# Patient Record
Sex: MALE | Race: WHITE | NOT HISPANIC OR LATINO | Employment: FULL TIME | URBAN - METROPOLITAN AREA
[De-identification: names, ages, dates, MRNs, and addresses within clinical notes are randomized per-mention and may not be internally consistent; named-entity substitution may affect disease eponyms.]

---

## 2017-02-17 ENCOUNTER — OFFICE VISIT (OUTPATIENT)
Dept: INTERNAL MEDICINE | Facility: CLINIC | Age: 47
End: 2017-02-17
Payer: COMMERCIAL

## 2017-02-17 VITALS
BODY MASS INDEX: 32.46 KG/M2 | WEIGHT: 239.63 LBS | HEIGHT: 72 IN | TEMPERATURE: 98 F | OXYGEN SATURATION: 97 % | DIASTOLIC BLOOD PRESSURE: 86 MMHG | SYSTOLIC BLOOD PRESSURE: 124 MMHG | HEART RATE: 63 BPM

## 2017-02-17 DIAGNOSIS — M1A.9XX0 CHRONIC GOUT WITHOUT TOPHUS, UNSPECIFIED CAUSE, UNSPECIFIED SITE: ICD-10-CM

## 2017-02-17 DIAGNOSIS — I10 ESSENTIAL HYPERTENSION: ICD-10-CM

## 2017-02-17 DIAGNOSIS — E11.9 TYPE 2 DIABETES MELLITUS WITHOUT COMPLICATION, UNSPECIFIED LONG TERM INSULIN USE STATUS: Primary | ICD-10-CM

## 2017-02-17 PROCEDURE — 3074F SYST BP LT 130 MM HG: CPT | Mod: S$GLB,,, | Performed by: FAMILY MEDICINE

## 2017-02-17 PROCEDURE — 3044F HG A1C LEVEL LT 7.0%: CPT | Mod: S$GLB,,, | Performed by: FAMILY MEDICINE

## 2017-02-17 PROCEDURE — 99999 PR PBB SHADOW E&M-EST. PATIENT-LVL III: CPT | Mod: PBBFAC,,, | Performed by: FAMILY MEDICINE

## 2017-02-17 PROCEDURE — 3079F DIAST BP 80-89 MM HG: CPT | Mod: S$GLB,,, | Performed by: FAMILY MEDICINE

## 2017-02-17 PROCEDURE — 99214 OFFICE O/P EST MOD 30 MIN: CPT | Mod: S$GLB,,, | Performed by: FAMILY MEDICINE

## 2017-02-17 PROCEDURE — 4010F ACE/ARB THERAPY RXD/TAKEN: CPT | Mod: S$GLB,,, | Performed by: FAMILY MEDICINE

## 2017-02-17 RX ORDER — ROSUVASTATIN CALCIUM 10 MG/1
10 TABLET, COATED ORAL DAILY
Qty: 30 TABLET | Refills: 11 | Status: SHIPPED | OUTPATIENT
Start: 2017-02-17 | End: 2018-02-19 | Stop reason: SDUPTHER

## 2017-02-17 NOTE — PROGRESS NOTES
Subjective:       Patient ID: Guillermo Castillo is a male.    Chief Complaint: Multiple issues see below    HPI Type 2 diabetes: a1c controlled.nov Tolerating medicine. No hypoglycemia;   Hypercholesterolemia:brenda crstor needs rf  Gout:  No c/o recent flares.  Urate elev  Hypertension: blood pressures at home normal. Tolerating medicine.   Fatty liver nl . No etoh long time;           Past Medical History   Diagnosis Date    Fatty liver     Gout     Hypertension     Hyperlipidemia     Hypertriglyceridemia     Mood disorder     Panic disorder     Type 2 diabetes mellitus      History reviewed. No pertinent past surgical history.  Family History   Problem Relation Age of Onset    Coronary artery disease Father     Diabetes Father     Lung cancer Father          Review of Systems   Respiratory: Negative for shortness of breath.    Cardiovascular: Negative for chest pain and leg swelling.   No ankle pain\  No back pain      Objective:      Physical Exam   Constitutional: He is oriented to person, place, and time. He appears well-developed and well-nourished.   HENT:   Head: Normocephalic and atraumatic.   Eyes: Conjunctivae and EOM are normal. Pupils are equal, round, and reactive to light.   Neck: Normal range of motion. Neck supple. Carotid bruit is not present.   Cardiovascular: Normal rate and regular rhythm.    Pulmonary/Chest: Effort normal and breath sounds normal.       Neurological: He is alert and oriented to person, place, and time.   Skin: Skin is warm and dry.   Psychiatric: He has a normal mood and affect. His behavior is normal. Judgment and thought content normal.       .        Assessment:       1. Type 2 diabetes mellitus    2. Hypertension    3. Gout    4. Hypercholesteremia    5. Fatty liver      Plan:     Lab 5/17 and f/u    rf crestor  Type 2 diabetes mellitus without complication, unspecified long term insulin use status    Essential hypertension    Chronic gout without tophus, unspecified  cause, unspecified site    Other orders  -     rosuvastatin (CRESTOR) 10 MG tablet; Take 1 tablet (10 mg total) by mouth once daily.  Dispense: 30 tablet; Refill: 11

## 2017-03-24 ENCOUNTER — OFFICE VISIT (OUTPATIENT)
Dept: URGENT CARE | Facility: CLINIC | Age: 47
End: 2017-03-24
Payer: COMMERCIAL

## 2017-03-24 VITALS
SYSTOLIC BLOOD PRESSURE: 120 MMHG | BODY MASS INDEX: 31.69 KG/M2 | TEMPERATURE: 98 F | WEIGHT: 233.94 LBS | HEIGHT: 72 IN | OXYGEN SATURATION: 98 % | RESPIRATION RATE: 20 BRPM | HEART RATE: 82 BPM | DIASTOLIC BLOOD PRESSURE: 76 MMHG

## 2017-03-24 DIAGNOSIS — J01.00 ACUTE NON-RECURRENT MAXILLARY SINUSITIS: Primary | ICD-10-CM

## 2017-03-24 PROCEDURE — 1160F RVW MEDS BY RX/DR IN RCRD: CPT | Mod: S$GLB,,, | Performed by: PHYSICIAN ASSISTANT

## 2017-03-24 PROCEDURE — 3078F DIAST BP <80 MM HG: CPT | Mod: S$GLB,,, | Performed by: PHYSICIAN ASSISTANT

## 2017-03-24 PROCEDURE — 99999 PR PBB SHADOW E&M-EST. PATIENT-LVL III: CPT | Mod: PBBFAC,,, | Performed by: PHYSICIAN ASSISTANT

## 2017-03-24 PROCEDURE — 3074F SYST BP LT 130 MM HG: CPT | Mod: S$GLB,,, | Performed by: PHYSICIAN ASSISTANT

## 2017-03-24 PROCEDURE — 99214 OFFICE O/P EST MOD 30 MIN: CPT | Mod: S$GLB,,, | Performed by: PHYSICIAN ASSISTANT

## 2017-03-24 RX ORDER — BROMPHENIRAMINE MALEATE, PSEUDOEPHEDRINE HYDROCHLORIDE, AND DEXTROMETHORPHAN HYDROBROMIDE 2; 30; 10 MG/5ML; MG/5ML; MG/5ML
5 SYRUP ORAL
Qty: 118 ML | Refills: 0 | Status: SHIPPED | OUTPATIENT
Start: 2017-03-24 | End: 2017-08-07

## 2017-03-24 RX ORDER — BENZONATATE 100 MG/1
200 CAPSULE ORAL 3 TIMES DAILY PRN
Qty: 60 CAPSULE | Refills: 0 | Status: SHIPPED | OUTPATIENT
Start: 2017-03-24 | End: 2017-04-03

## 2017-03-24 RX ORDER — AMOXICILLIN AND CLAVULANATE POTASSIUM 875; 125 MG/1; MG/1
1 TABLET, FILM COATED ORAL 2 TIMES DAILY
Qty: 14 TABLET | Refills: 0 | Status: SHIPPED | OUTPATIENT
Start: 2017-03-24 | End: 2017-03-31

## 2017-03-24 NOTE — PATIENT INSTRUCTIONS
Sinusitis (Antibiotic Treatment)    The sinuses are air-filled spaces within the bones of the face. They connect to the inside of the nose. Sinusitis is an inflammation of the tissue lining the sinus cavity. Sinus inflammation can occur during a cold. It can also be due to allergies to pollens and other particles in the air. Sinusitis can cause symptoms of sinus congestion and fullness. A sinus infection causes fever, headache and facial pain. There is often green or yellow drainage from the nose or into the back of the throat (post-nasal drip). You have been given antibiotics to treat this condition.  Home care:  · Take the full course of antibiotics as instructed. Do not stop taking them, even if you feel better.  · Drink plenty of water, hot tea, and other liquids. This may help thin mucus. It also may promote sinus drainage.  · Heat may help soothe painful areas of the face. Use a towel soaked in hot water. Or,  the shower and direct the hot spray onto your face. Using a vaporizer along with a menthol rub at night may also help.   · An expectorant containing guaifenesin may help thin the mucus and promote drainage from the sinuses.  · Over-the-counter decongestants may be used unless a similar medicine was prescribed. Nasal sprays work the fastest. Use one that contains phenylephrine or oxymetazoline. First blow the nose gently. Then use the spray. Do not use these medicines more often than directed on the label or symptoms may get worse. You may also use tablets containing pseudoephedrine. Avoid products that combine ingredients, because side effects may be increased. Read labels. You can also ask the pharmacist for help. (NOTE: Persons with high blood pressure should not use decongestants. They can raise blood pressure.)  · Over-the-counter antihistamines may help if allergies contributed to your sinusitis.    · Do not use nasal rinses or irrigation during an acute sinus infection, unless told to by  your health care provider. Rinsing may spread the infection to other sinuses.  · Use acetaminophen or ibuprofen to control pain, unless another pain medicine was prescribed. (If you have chronic liver or kidney disease or ever had a stomach ulcer, talk with your doctor before using these medicines. Aspirin should never be used in anyone under 18 years of age who is ill with a fever. It may cause severe liver damage.)  · Don't smoke. This can worsen symptoms.  Follow-up care  Follow up with your healthcare provider or our staff if you are not improving within the next week.  When to seek medical advice  Call your healthcare provider if any of these occur:  · Facial pain or headache becoming more severe  · Stiff neck  · Unusual drowsiness or confusion  · Swelling of the forehead or eyelids  · Vision problems, including blurred or double vision  · Fever of 100.4ºF (38ºC) or higher, or as directed by your healthcare provider  · Seizure  · Breathing problems  · Symptoms not resolving within 10 days  Date Last Reviewed: 4/13/2015  © 8118-4807 [employer]. [address] [place] [address]. All rights reserved. This information is not intended as a substitute for professional medical care. Always follow your healthcare professional's instructions.      Take all antibiotics even if you feel better before completing the entire regimen.   -Use normal saline nasal spray during the day every 3 hours for sinus irrigation and congestion.    -Avoid exposure to cigarette smoke.    -Practice good handwashing.  -Use warm compresses to sinuses for relief several times a day  -Increase fluid intake to at least 64 ounces of water per day to keep secretions thin and loose  -Use a humidifier in home to help relieve congestion or steam inhalation three times a day for 20-30 minutes.  -Sleep with head of bed elevated   -Take tylenol or ibuprofen as needed for sinus headache.    -Use warm salt water gargles for throat  discomfort.  -Avoid caffeine and alcohol    Follow up with PCP in 1 week if no improvement or sooner if worsening.    Go to ER if you develop fever of 103 or higher, chest pain, shortness of breath, upper back pain, stiff neck or severe headache.

## 2017-03-24 NOTE — MR AVS SNAPSHOT
[facility]  [address]  [place] [address]  Phone: [phone]                  Guillermo Castillo   3/24/2017 5:00 PM   Office Visit    Description:  Male : 1970   Provider:  Linda Macario PA-C   Department:  [facility]           Reason for Visit     Cough     Nasal Congestion     Hoarse           Diagnoses this Visit        Comments    Acute non-recurrent maxillary sinusitis    -  Primary            To Do List           Future Appointments        Provider Department Dept Phone    2017 7:45 AM LABORATORY, [facility] [phone]    2017 7:50 AM SPECIMEN, [facility] [phone]    2017 10:40 AM Kip Siddiqui MD [facility] - Internal [facility] [phone]      Goals (5 Years of Data)     None      Follow-Up and Disposition     Return if symptoms worsen or fail to improve.       These Medications        Disp Refills Start End    brompheniramine-pseudoeph-DM (BROMFED DM) 2-30-10 mg/5 mL Syrp 118 mL 0 3/24/2017     Take 5 mLs by mouth every 6 to 8 hours as needed. - Oral    Pharmacy: [id number] - [place], [place] - [address] #: [phone]       amoxicillin-clavulanate 875-125mg (AUGMENTIN) 875-125 mg per tablet 14 tablet 0 3/24/2017 3/31/2017    Take 1 tablet by mouth 2 (two) times daily. - Oral    Pharmacy: [id number] - [place], [place] - [address] Ph #: [phone]       benzonatate (TESSALON) 100 MG capsule 60 capsule 0 3/24/2017 4/3/2017    Take 2 capsules (200 mg total) by mouth 3 (three) times daily as needed for Cough (daytime cough). - Oral    Pharmacy: [id number] - [place], [place] - [address] #: [phone]         [facility]     [facility] On [facility] Nurse Care Line -  Assistance  Registered nurses in the [facility] Center provide clinical advisement, health education, appointment booking, and other advisory services.  Call for this free service at  [phone].             Medications           Message regarding Medications     Verify the changes and/or additions to your medication regime listed below are the same as discussed with your clinician today.  If any of these changes or additions are incorrect, please notify your healthcare provider.        START taking these NEW medications        Refills    brompheniramine-pseudoeph-DM (BROMFED DM) 2-30-10 mg/5 mL Syrp 0    Sig: Take 5 mLs by mouth every 6 to 8 hours as needed.    Class: Print    Route: Oral    amoxicillin-clavulanate 875-125mg (AUGMENTIN) 875-125 mg per tablet 0    Sig: Take 1 tablet by mouth 2 (two) times daily.    Class: Normal    Route: Oral    benzonatate (TESSALON) 100 MG capsule 0    Sig: Take 2 capsules (200 mg total) by mouth 3 (three) times daily as needed for Cough (daytime cough).    Class: Normal    Route: Oral           Verify that the below list of medications is an accurate representation of the medications you are currently taking.  If none reported, the list may be blank. If incorrect, please contact your healthcare provider. Carry this list with you in case of emergency.           Current Medications     amlodipine-benazepril 5-10 mg (LOTREL) 5-10 mg per capsule Take 1 capsule by mouth once daily.    aspirin 81 mg Tab Take 1 tablet by mouth Daily.    duloxetine (CYMBALTA) 60 MG capsule TAKE ONE CAPSULE BY MOUTH EVERY DAY AS DIRECTED    ibuprofen (ADVIL,MOTRIN) 800 MG tablet Take 1 tablet (800 mg total) by mouth every 6 (six) hours as needed for Pain.    metformin (GLUCOPHAGE-XR) 500 MG 24 hr tablet TAKE TWO TABLETS BY MOUTH ONCE DAILY    rosuvastatin (CRESTOR) 10 MG tablet Take 1 tablet (10 mg total) by mouth once daily.    amoxicillin-clavulanate 875-125mg (AUGMENTIN) 875-125 mg per tablet Take 1 tablet by mouth 2 (two) times daily.    benzonatate (TESSALON) 100 MG capsule Take 2 capsules (200 mg total) by mouth 3 (three) times daily as needed for Cough (daytime cough).     brompheniramine-pseudoeph-DM (BROMFED DM) 2-30-10 mg/5 mL Syrp Take 5 mLs by mouth every 6 to 8 hours as needed.    hydrocodone-acetaminophen 10-325mg (NORCO)  mg Tab Take 1 tablet by mouth every 4 (four) hours as needed.           Clinical Reference Information           Your Vitals Were     BP Pulse Temp Resp    120/76 (BP Location: Right arm, Patient Position: Sitting, BP Method: Manual) 82 98 °F (36.7 °C) (Tympanic) 20    Height Weight SpO2 BMI    6' (1.829 m) 106.1 kg (233 lb 14.5 oz) 98% 31.72 kg/m2      Blood Pressure          Most Recent Value    BP  120/76      Allergies as of 3/24/2017     Hydrochlorothiazide (Bulk)    Simvastatin      Immunizations Administered on Date of Encounter - 3/24/2017     None      Instructions      Sinusitis (Antibiotic Treatment)    The sinuses are air-filled spaces within the bones of the face. They connect to the inside of the nose. Sinusitis is an inflammation of the tissue lining the sinus cavity. Sinus inflammation can occur during a cold. It can also be due to allergies to pollens and other particles in the air. Sinusitis can cause symptoms of sinus congestion and fullness. A sinus infection causes fever, headache and facial pain. There is often green or yellow drainage from the nose or into the back of the throat (post-nasal drip). You have been given antibiotics to treat this condition.  Home care:  · Take the full course of antibiotics as instructed. Do not stop taking them, even if you feel better.  · Drink plenty of water, hot tea, and other liquids. This may help thin mucus. It also may promote sinus drainage.  · Heat may help soothe painful areas of the face. Use a towel soaked in hot water. Or,  the shower and direct the hot spray onto your face. Using a vaporizer along with a menthol rub at night may also help.   · An expectorant containing guaifenesin may help thin the mucus and promote drainage from the  sinuses.  · Over-the-counter decongestants may be used unless a similar medicine was prescribed. Nasal sprays work the fastest. Use one that contains phenylephrine or oxymetazoline. First blow the nose gently. Then use the spray. Do not use these medicines more often than directed on the label or symptoms may get worse. You may also use tablets containing pseudoephedrine. Avoid products that combine ingredients, because side effects may be increased. Read labels. You can also ask the pharmacist for help. (NOTE: Persons with high blood pressure should not use decongestants. They can raise blood pressure.)  · Over-the-counter antihistamines may help if allergies contributed to your sinusitis.    · Do not use nasal rinses or irrigation during an acute sinus infection, unless told to by your health care provider. Rinsing may spread the infection to other sinuses.  · Use acetaminophen or ibuprofen to control pain, unless another pain medicine was prescribed. (If you have chronic liver or kidney disease or ever had a stomach ulcer, talk with your doctor before using these medicines. Aspirin should never be used in anyone under 18 years of age who is ill with a fever. It may cause severe liver damage.)  · Don't smoke. This can worsen symptoms.  Follow-up care  Follow up with your healthcare provider or our staff if you are not improving within the next week.  When to seek medical advice  Call your healthcare provider if any of these occur:  · Facial pain or headache becoming more severe  · Stiff neck  · Unusual drowsiness or confusion  · Swelling of the forehead or eyelids  · Vision problems, including blurred or double vision  · Fever of 100.4ºF (38ºC) or higher, or as directed by your healthcare provider  · Seizure  · Breathing problems  · Symptoms not resolving within 10 days  Date Last Reviewed: 4/13/2015  © 1953-5919 [employer]. [address] [place] [address], [place] [address]. All rights reserved. This  information is not intended as a substitute for professional medical care. Always follow your healthcare professional's instructions.      Take all antibiotics even if you feel better before completing the entire regimen.   -Use normal saline nasal spray during the day every 3 hours for sinus irrigation and congestion.    -Avoid exposure to cigarette smoke.    -Practice good handwashing.  -Use warm compresses to sinuses for relief several times a day  -Increase fluid intake to at least 64 ounces of water per day to keep secretions thin and loose  -Use a humidifier in home to help relieve congestion or steam inhalation three times a day for 20-30 minutes.  -Sleep with head of bed elevated   -Take tylenol or ibuprofen as needed for sinus headache.    -Use warm salt water gargles for throat discomfort.  -Avoid caffeine and alcohol    Follow up with PCP in 1 week if no improvement or sooner if worsening.    Go to ER if you develop fever of 103 or higher, chest pain, shortness of breath, upper back pain, stiff neck or severe headache.           Language Assistance Services     ATTENTION: Language assistance services are available, free of charge. Please call [phone].      ATENCIÓN: Si habla deepak, tiene a miller disposición servicios gratuitos de asistencia lingüística. Llame al [phone].     CHRISTOPH Ý: N?u b?n nói Ti?ng Vi?t, có các d?ch v? h? tr? ngôn ng? mi?n phí dành cho b?n. G?i s? [phone].         [facility] complies with applicable Federal civil rights laws and does not discriminate on the basis of race, color, national origin, age, disability, or sex.

## 2017-03-24 NOTE — PROGRESS NOTES
Subjective:       Patient ID: Guillermo Castillo is a 47 y.o. male.    Chief Complaint: Cough; Nasal Congestion; and Hoarse    Cough   This is a new problem. The current episode started 1 to 4 weeks ago (now for almost 2 weeks ). The problem has been unchanged. The problem occurs constantly. The cough is non-productive (dry, thinks it's from drainage). Associated symptoms include headaches (sinus), nasal congestion (has had nasal congestion for 2 weeks, has alternating sides with worsening congestion, feels something blocked but doesn't have much drainage), postnasal drip and rhinorrhea. Pertinent negatives include no chest pain, chills, ear congestion, ear pain, fever, sore throat (lost his voice today), shortness of breath or wheezing. The symptoms are aggravated by lying down. Treatments tried: delsym, dayquil, nyquil. The treatment provided mild relief. There is no history of bronchitis or pneumonia.     Review of Systems   Constitutional: Negative for chills, fatigue and fever.   HENT: Positive for congestion, postnasal drip, rhinorrhea and voice change. Negative for ear discharge, ear pain, sinus pressure, sneezing, sore throat (lost his voice today) and trouble swallowing.    Eyes: Negative for pain and discharge.   Respiratory: Positive for cough. Negative for shortness of breath and wheezing.    Cardiovascular: Negative for chest pain and leg swelling.   Gastrointestinal: Negative for abdominal pain, nausea and vomiting.   Neurological: Positive for headaches (sinus).       Objective:      /76 (BP Location: Right arm, Patient Position: Sitting, BP Method: Manual)  Pulse 82  Temp 98 °F (36.7 °C) (Tympanic)   Resp 20  Ht 6' (1.829 m)  Wt 106.1 kg (233 lb 14.5 oz)  SpO2 98%  BMI 31.72 kg/m2  Physical Exam   Constitutional: He is oriented to person, place, and time. He appears well-developed and well-nourished. No distress.   HENT:   Head: Normocephalic and atraumatic.   Right Ear: Tympanic membrane,  external ear and ear canal normal.   Left Ear: Tympanic membrane, external ear and ear canal normal.   Nose: Nose normal. Right sinus exhibits no maxillary sinus tenderness and no frontal sinus tenderness. Left sinus exhibits no maxillary sinus tenderness and no frontal sinus tenderness.   Mouth/Throat: Oropharynx is clear and moist. No oropharyngeal exudate or posterior oropharyngeal erythema. No tonsillar exudate.   Eyes: Conjunctivae and EOM are normal. Pupils are equal, round, and reactive to light.   Neck: Normal range of motion. Neck supple.   Cardiovascular: Normal rate, regular rhythm, normal heart sounds and intact distal pulses.  Exam reveals no gallop and no friction rub.    No murmur heard.  Pulmonary/Chest: Effort normal and breath sounds normal. No stridor. No respiratory distress. He has no wheezes. He has no rales. He exhibits no tenderness.   Lymphadenopathy:     He has no cervical adenopathy.   Neurological: He is alert and oriented to person, place, and time.   Skin: Skin is warm and dry. No rash noted. He is not diaphoretic.   Nursing note and vitals reviewed.      Assessment:       1. Acute non-recurrent maxillary sinusitis        Plan:       Acute non-recurrent maxillary sinusitis  -     brompheniramine-pseudoeph-DM (BROMFED DM) 2-30-10 mg/5 mL Syrp; Take 5 mLs by mouth every 6 to 8 hours as needed.  Dispense: 118 mL; Refill: 0  -     amoxicillin-clavulanate 875-125mg (AUGMENTIN) 875-125 mg per tablet; Take 1 tablet by mouth 2 (two) times daily.  Dispense: 14 tablet; Refill: 0  -     benzonatate (TESSALON) 100 MG capsule; Take 2 capsules (200 mg total) by mouth 3 (three) times daily as needed for Cough (daytime cough).  Dispense: 60 capsule; Refill: 0    Suspect initial viral sinobronchitis now with secondary bacterial sinusitis given improving symptoms last week and now 5 days of worsening sinus pressure, congestion, and post nasal drainage.      Take all antibiotics even if you feel better  before completing the entire regimen.   -Use normal saline nasal spray during the day every 3 hours for sinus irrigation and congestion.    -Avoid exposure to cigarette smoke.    -Practice good handwashing.  -Use warm compresses to sinuses for relief several times a day  -Increase fluid intake to at least 64 ounces of water per day to keep secretions thin and loose  -Use a humidifier in home to help relieve congestion or steam inhalation three times a day for 20-30 minutes.  -Sleep with head of bed elevated   -Take tylenol or ibuprofen as needed for sinus headache.    -Use warm salt water gargles for throat discomfort.  -Avoid caffeine and alcohol    Follow up with PCP in 1 week if no improvement or sooner if worsening.    Go to ER if you develop fever of 103 or higher, chest pain, shortness of breath, upper back pain, stiff neck or severe headache.        Heather Trant PA-C [facility]

## 2017-05-22 ENCOUNTER — LAB VISIT (OUTPATIENT)
Dept: LAB | Facility: HOSPITAL | Age: 47
End: 2017-05-22
Attending: FAMILY MEDICINE
Payer: COMMERCIAL

## 2017-05-22 DIAGNOSIS — E11.9 TYPE 2 DIABETES MELLITUS WITHOUT COMPLICATION, WITHOUT LONG-TERM CURRENT USE OF INSULIN: ICD-10-CM

## 2017-05-22 LAB
ANION GAP SERPL CALC-SCNC: 9 MMOL/L
BUN SERPL-MCNC: 16 MG/DL
CALCIUM SERPL-MCNC: 9.7 MG/DL
CHLORIDE SERPL-SCNC: 104 MMOL/L
CHOLEST/HDLC SERPL: 5 {RATIO}
CO2 SERPL-SCNC: 26 MMOL/L
CREAT SERPL-MCNC: 1 MG/DL
EST. GFR  (AFRICAN AMERICAN): >60 ML/MIN/1.73 M^2
EST. GFR  (NON AFRICAN AMERICAN): >60 ML/MIN/1.73 M^2
GLUCOSE SERPL-MCNC: 118 MG/DL
HDL/CHOLESTEROL RATIO: 20 %
HDLC SERPL-MCNC: 190 MG/DL
HDLC SERPL-MCNC: 38 MG/DL
LDLC SERPL CALC-MCNC: 85.6 MG/DL
NONHDLC SERPL-MCNC: 152 MG/DL
POTASSIUM SERPL-SCNC: 4.3 MMOL/L
SODIUM SERPL-SCNC: 139 MMOL/L
TRIGL SERPL-MCNC: 332 MG/DL

## 2017-05-22 PROCEDURE — 36415 COLL VENOUS BLD VENIPUNCTURE: CPT | Mod: PO

## 2017-05-22 PROCEDURE — 83036 HEMOGLOBIN GLYCOSYLATED A1C: CPT

## 2017-05-22 PROCEDURE — 80061 LIPID PANEL: CPT

## 2017-05-22 PROCEDURE — 80048 BASIC METABOLIC PNL TOTAL CA: CPT

## 2017-05-22 RX ORDER — METFORMIN HYDROCHLORIDE 500 MG/1
TABLET, EXTENDED RELEASE ORAL
Qty: 60 TABLET | Refills: 10 | Status: SHIPPED | OUTPATIENT
Start: 2017-05-22 | End: 2018-06-13 | Stop reason: SDUPTHER

## 2017-05-23 LAB
ESTIMATED AVG GLUCOSE: 114 MG/DL
HBA1C MFR BLD HPLC: 5.6 %

## 2017-05-29 ENCOUNTER — OFFICE VISIT (OUTPATIENT)
Dept: INTERNAL MEDICINE | Facility: CLINIC | Age: 47
End: 2017-05-29
Payer: COMMERCIAL

## 2017-05-29 ENCOUNTER — LAB VISIT (OUTPATIENT)
Dept: LAB | Facility: HOSPITAL | Age: 47
End: 2017-05-29
Attending: FAMILY MEDICINE
Payer: COMMERCIAL

## 2017-05-29 VITALS
WEIGHT: 239.19 LBS | HEIGHT: 72 IN | DIASTOLIC BLOOD PRESSURE: 84 MMHG | BODY MASS INDEX: 32.4 KG/M2 | OXYGEN SATURATION: 98 % | TEMPERATURE: 97 F | SYSTOLIC BLOOD PRESSURE: 134 MMHG | HEART RATE: 67 BPM

## 2017-05-29 DIAGNOSIS — K76.0 FATTY LIVER: ICD-10-CM

## 2017-05-29 DIAGNOSIS — E11.9 TYPE 2 DIABETES MELLITUS WITHOUT COMPLICATION, UNSPECIFIED LONG TERM INSULIN USE STATUS: Primary | ICD-10-CM

## 2017-05-29 DIAGNOSIS — N45.1 EPIDIDYMITIS: ICD-10-CM

## 2017-05-29 DIAGNOSIS — N50.819 ORCHALGIA: ICD-10-CM

## 2017-05-29 DIAGNOSIS — I10 ESSENTIAL HYPERTENSION: ICD-10-CM

## 2017-05-29 LAB
BILIRUB UR QL STRIP: NEGATIVE
CLARITY UR: CLEAR
COLOR UR: YELLOW
GLUCOSE UR QL STRIP: NEGATIVE
HGB UR QL STRIP: NEGATIVE
KETONES UR QL STRIP: NEGATIVE
LEUKOCYTE ESTERASE UR QL STRIP: NEGATIVE
NITRITE UR QL STRIP: NEGATIVE
PH UR STRIP: 6 [PH] (ref 5–8)
PROT UR QL STRIP: NEGATIVE
SP GR UR STRIP: 1.02 (ref 1–1.03)
URN SPEC COLLECT METH UR: NORMAL

## 2017-05-29 PROCEDURE — 4010F ACE/ARB THERAPY RXD/TAKEN: CPT | Mod: S$GLB,,, | Performed by: FAMILY MEDICINE

## 2017-05-29 PROCEDURE — 87086 URINE CULTURE/COLONY COUNT: CPT

## 2017-05-29 PROCEDURE — 81003 URINALYSIS AUTO W/O SCOPE: CPT | Mod: PO

## 2017-05-29 PROCEDURE — 99214 OFFICE O/P EST MOD 30 MIN: CPT | Mod: S$GLB,,, | Performed by: FAMILY MEDICINE

## 2017-05-29 PROCEDURE — 99999 PR PBB SHADOW E&M-EST. PATIENT-LVL III: CPT | Mod: PBBFAC,,, | Performed by: FAMILY MEDICINE

## 2017-05-29 PROCEDURE — 3044F HG A1C LEVEL LT 7.0%: CPT | Mod: S$GLB,,, | Performed by: FAMILY MEDICINE

## 2017-05-29 RX ORDER — CIPROFLOXACIN 500 MG/1
500 TABLET ORAL 2 TIMES DAILY
Qty: 14 TABLET | Refills: 0 | Status: SHIPPED | OUTPATIENT
Start: 2017-05-29 | End: 2017-06-05

## 2017-05-29 RX ORDER — OMEPRAZOLE 40 MG/1
40 CAPSULE, DELAYED RELEASE ORAL DAILY
Qty: 30 CAPSULE | Refills: 1 | Status: SHIPPED | OUTPATIENT
Start: 2017-05-29 | End: 2017-08-13 | Stop reason: SDUPTHER

## 2017-05-29 NOTE — PATIENT INSTRUCTIONS
Epididymitis  Inflammation of the epididymis can cause pain and swelling in your scrotum. The epididymis is a small tube next to the testicle that stores sperm. Epididymitis is usually caused by an infection. In sexually active men, it is often caused by a sexually transmitted disease (STD) such as chlamydia or gonorrhea. In boys and in men over 40, it can be from bacteria from other parts of the urinary tract (not an STD infection).  Symptoms may begin with pain in the lower belly (abdomen) or low back. The pain then spreads down into the scrotum. Usually only one side is affected. The testicle and scrotum swell and become very painful and red. You may have fever and a burning when passing urine. Sometimes you may have a discharge from the penis.  Treatment is with antibiotics, and anti-inflammatory and pain medicines. The condition should get better over the first few days of treatment. But it will take several weeks for all the swelling and discomfort to go away. If your healthcare provider suspects that an STD is the cause, your sexual partners may need to be treated.  Home care  The following will help you care for yourself at home:  · Support the scrotum. When lying down, place a rolled towel under the scrotum. When walking, use an athletic supporter or 2 pairs of jockey-style underwear.  · To relieve pain, put ice packs on the inflamed area. You can make your own ice pack by putting ice cubes in a sealed plastic bag wrapped in a thin towel.  · You may use over-the-counter medicines to control pain, unless another medicine was given. If you have chronic liver or kidney disease, talk with your healthcare provider before taking these medicines. Also talk with your provider if you've ever had a stomach ulcer or GI bleeding.  · Rest in bed for the first few days until the fever, pain, and swelling get better. It may take several weeks for all of the swelling to go away.  · Constipation can make you strain. This  makes the pain worse. Avoid constipation by eating natural laxatives such as prunes, fresh fruits, and whole-grain cereals. If necessary, use a mild over-the-counter laxative for constipation. Mineral oil can be used to keep the stools soft.  · Do not have sex until you have finished all treatment and all symptoms have cleared.  · Take all medicine as directed. Do not miss any doses and do not stop early even if you feel better.  Follow-up care  Follow up with your healthcare provider, or as advised, to be sure you are responding properly to treatment. If a culture was taken, you may call for the result as directed. A culture test can ensure that you are on the correct antibiotic.   When to seek medical advice  Call your healthcare provider right away if any of these occur:  · Fever of 100.4ºF (38ºC), or as directed by your healthcare provider  · Increasing pain or swelling of the testicle after starting treatment  · Pressure or pain in your bladder that gets worse  · Unable to pass urine for 8 hours  Date Last Reviewed: 9/1/2016  © 5875-0291 [employer]. [address] [place] [address], [place] [address]. All rights reserved. This information is not intended as a substitute for professional medical care. Always follow your healthcare professional's instructions.

## 2017-05-29 NOTE — PROGRESS NOTES
Subjective:       Patient ID: Guillermo Castillo is a male.    Chief Complaint: Multiple issues see below    HPI Type 2 diabetes: a1c controlled. Tolerating medicine. No hypoglycemia;   Hypercholesterolemia:brenda crstor   Gout:  No c/o recent flares.    Hypertension: blood pressures at home normal. Tolerating medicine.   Fatty liver nl . No etoh long time;     Several days ago lower left abd pain sharp intermtt then rad to left test. Area. No fever. No urin sympt resolved couple days    6 months ascend ant chest after eating any food. Several weeks snsation food getting stuck w swallowing. Only w foods like mash potatoes;no meds. Used. No nicotin nor etoh. No wt loss; monogamous x 6 yrs          Past Medical History   Diagnosis Date    Fatty liver     Gout     Hypertension     Hyperlipidemia     Hypertriglyceridemia     Mood disorder     Panic disorder     Type 2 diabetes mellitus      History reviewed. No pertinent past surgical history.  Family History   Problem Relation Age of Onset    Coronary artery disease Father     Diabetes Father     Lung cancer Father          Review of Systems   Respiratory: Negative for shortness of breath.    Cardiovascular: Negative for chest pain and leg swelling.   No ankle pain\  No back pain      Objective:      Physical Exam   Constitutional: He is oriented to person, place, and time. He appears well-developed and well-nourished.   HENT:   Head: Normocephalic and atraumatic.   Eyes: Conjunctivae and EOM are normal. Pupils are equal, round, and reactive to light.   Neck: Normal range of motion. Neck supple. Carotid bruit is not present. no mass  Cardiovascular: Normal rate and regular rhythm.    Pulmonary/Chest: Effort normal and breath sounds normal.   Abd+bs soft ntnd no hsm no mass      Neurological: He is alert and oriented to person, place, and time.   Skin: Skin is warm and dry.   Psychiatric: He has a normal mood and affect. His behavior is normal. Judgment and thought  content normal.         Gu: no hernia and tend left epidid. No test mass  .        Assessment:       1. Type 2 diabetes mellitus    2. Hypertension    3. Gout    4. Hypercholesteremia    5. Fatty liver    Gerd/globus  epididymitis  Plan:     Lab and follow up after in 6 months  Type 2 diabetes mellitus without complication, unspecified long term insulin use status  -     Hemoglobin A1c; Future; Expected date: 11/25/2017    Essential hypertension    Fatty liver    Orchalgia  -     Urinalysis; Future; Expected date: 05/29/2017    Epididymitis  -     Urine culture; Future; Expected date: 05/29/2017    Other orders  -     omeprazole (PRILOSEC) 40 MG capsule; Take 1 capsule (40 mg total) by mouth once daily.  Dispense: 30 capsule; Refill: 1  -     ciprofloxacin HCl (CIPRO) 500 MG tablet; Take 1 tablet (500 mg total) by mouth 2 (two) times daily.  Dispense: 14 tablet; Refill: 0    notify if reflux and swall. prob done resolve  Notify if gu sympt recur

## 2017-05-31 LAB — BACTERIA UR CULT: NO GROWTH

## 2017-08-06 ENCOUNTER — OFFICE VISIT (OUTPATIENT)
Dept: URGENT CARE | Facility: CLINIC | Age: 47
End: 2017-08-06
Payer: COMMERCIAL

## 2017-08-06 VITALS
BODY MASS INDEX: 32.34 KG/M2 | OXYGEN SATURATION: 98 % | SYSTOLIC BLOOD PRESSURE: 140 MMHG | HEART RATE: 70 BPM | TEMPERATURE: 96 F | DIASTOLIC BLOOD PRESSURE: 86 MMHG | WEIGHT: 238.44 LBS

## 2017-08-06 DIAGNOSIS — M79.674 GREAT TOE PAIN, RIGHT: Primary | ICD-10-CM

## 2017-08-06 PROCEDURE — 99213 OFFICE O/P EST LOW 20 MIN: CPT | Mod: S$GLB,,, | Performed by: NURSE PRACTITIONER

## 2017-08-06 PROCEDURE — 3008F BODY MASS INDEX DOCD: CPT | Mod: S$GLB,,, | Performed by: NURSE PRACTITIONER

## 2017-08-06 PROCEDURE — 99999 PR PBB SHADOW E&M-EST. PATIENT-LVL III: CPT | Mod: PBBFAC,,, | Performed by: NURSE PRACTITIONER

## 2017-08-06 RX ORDER — NAPROXEN 500 MG/1
500 TABLET ORAL 2 TIMES DAILY WITH MEALS
Qty: 20 TABLET | Refills: 0 | Status: SHIPPED | OUTPATIENT
Start: 2017-08-06 | End: 2017-08-15

## 2017-08-06 NOTE — PROGRESS NOTES
"Subjective:       Patient ID: Guillermo Castillo is a 47 y.o. male.    Chief Complaint: Foot Injury    Patient is presenting with right great toe pain. No injury to area. He reports that he had gout years ago and is " worried that this is a flare up".       Foot Injury    The incident occurred 3 to 5 days ago. There was no injury mechanism. The pain is present in the right toes. The quality of the pain is described as shooting. The pain is at a severity of 3/10. The pain has been intermittent since onset. Pertinent negatives include no inability to bear weight, loss of motion, loss of sensation, muscle weakness, numbness or tingling. He reports no foreign bodies present. He has tried elevation for the symptoms. The treatment provided mild relief.     Review of Systems   Constitutional: Positive for activity change. Negative for fever.   HENT: Negative.    Respiratory: Negative.    Cardiovascular: Negative.    Musculoskeletal:        Pain to right great toe.   Allergic/Immunologic: Negative.    Neurological: Negative for tingling and numbness.       Objective:      BP (!) 140/86 (BP Location: Left arm, Patient Position: Sitting, BP Method: Automatic)   Pulse 70   Temp 96.2 °F (35.7 °C)   Wt 108.2 kg (238 lb 6.8 oz)   SpO2 98%   BMI 32.34 kg/m²   Physical Exam   Constitutional: He is oriented to person, place, and time. He appears well-developed and well-nourished. He appears distressed.   Cardiovascular: Normal rate, regular rhythm, normal heart sounds and intact distal pulses.    Pulmonary/Chest: Effort normal and breath sounds normal.   Musculoskeletal: Normal range of motion. He exhibits tenderness. He exhibits no edema or deformity.   Neurological: He is alert and oriented to person, place, and time. He has normal reflexes.   Skin: Capillary refill takes less than 2 seconds. There is erythema.   Right great toe, no edema, tenderness with exam , mild erythema.    Psychiatric: He has a normal mood and affect. His " behavior is normal.       Assessment:       1. Great toe pain, right        Plan:       Great toe pain, right  -     naproxen (NAPROSYN) 500 MG tablet; Take 1 tablet (500 mg total) by mouth 2 (two) times daily with meals.  Dispense: 20 tablet; Refill: 0      No labs within the past 12 months related to uric acid levels.Lead KIMBERLY contacted for recommendations. Her recommendation was NSAIDs for 10 days, follow-up with PCP.Patient agreed with plan of care.

## 2017-08-07 ENCOUNTER — OFFICE VISIT (OUTPATIENT)
Dept: INTERNAL MEDICINE | Facility: CLINIC | Age: 47
End: 2017-08-07
Payer: COMMERCIAL

## 2017-08-07 VITALS
HEIGHT: 72 IN | OXYGEN SATURATION: 98 % | DIASTOLIC BLOOD PRESSURE: 93 MMHG | TEMPERATURE: 97 F | BODY MASS INDEX: 32.57 KG/M2 | HEART RATE: 70 BPM | WEIGHT: 240.5 LBS | SYSTOLIC BLOOD PRESSURE: 138 MMHG

## 2017-08-07 DIAGNOSIS — E11.9 TYPE 2 DIABETES MELLITUS WITHOUT COMPLICATION, WITHOUT LONG-TERM CURRENT USE OF INSULIN: ICD-10-CM

## 2017-08-07 DIAGNOSIS — I10 ESSENTIAL HYPERTENSION: ICD-10-CM

## 2017-08-07 DIAGNOSIS — M10.9 ACUTE GOUT OF RIGHT FOOT, UNSPECIFIED CAUSE: Primary | ICD-10-CM

## 2017-08-07 PROCEDURE — 3080F DIAST BP >= 90 MM HG: CPT | Mod: S$GLB,,, | Performed by: PHYSICIAN ASSISTANT

## 2017-08-07 PROCEDURE — 3044F HG A1C LEVEL LT 7.0%: CPT | Mod: S$GLB,,, | Performed by: PHYSICIAN ASSISTANT

## 2017-08-07 PROCEDURE — 99999 PR PBB SHADOW E&M-EST. PATIENT-LVL IV: CPT | Mod: PBBFAC,,, | Performed by: PHYSICIAN ASSISTANT

## 2017-08-07 PROCEDURE — 3075F SYST BP GE 130 - 139MM HG: CPT | Mod: S$GLB,,, | Performed by: PHYSICIAN ASSISTANT

## 2017-08-07 PROCEDURE — 99213 OFFICE O/P EST LOW 20 MIN: CPT | Mod: S$GLB,,, | Performed by: PHYSICIAN ASSISTANT

## 2017-08-07 PROCEDURE — 3008F BODY MASS INDEX DOCD: CPT | Mod: S$GLB,,, | Performed by: PHYSICIAN ASSISTANT

## 2017-08-07 PROCEDURE — 4010F ACE/ARB THERAPY RXD/TAKEN: CPT | Mod: S$GLB,,, | Performed by: PHYSICIAN ASSISTANT

## 2017-08-07 RX ORDER — COLCHICINE 0.6 MG/1
TABLET ORAL
Qty: 3 TABLET | Refills: 0 | Status: SHIPPED | OUTPATIENT
Start: 2017-08-07 | End: 2017-08-15

## 2017-08-07 NOTE — PROGRESS NOTES
Subjective:       Patient ID: Guillermo Castillo is a 47 y.o. male.    Chief Complaint: Gout (R foot)    47 year old male c/o swelling, redness, and pain to R 1st MTP joint X one week. PCP is Dr. Siddiqui. He reports having gout and has not had a gout flare in about 6 years since avoiding EtOH. He saw rheum in 2012 for gout. He reports going on an out-of-state trip last week and ate seafood and red meat prior to onset of sxs. He reports likely breaking R 1st toe 3-4 years ago when a heavy hitch fell on the toe - he reports he did not seek medical care at that time and sxs improved on their own. He declines any foot imaging at this time. He reports no fever, chills, known recent injury, open wounds, CP, SOB, numbness/tingling, muscular weakness, N/V, red streaking, radiation of the pain, or other medical complaints. He was seen by  yesterday and was prescribed Naproxen, which he says improves his discomfort short-term. He reports overall sxs have not improved since onset and he needs to RTW in 2 days, so he would like sxs to improve more quickly if possible. Pt reports occasionally checking glucose and it is around 130 postprandial. A1C was 5.6 on 5/22/17.     Patient Active Problem List:     Fatty liver     Type 2 diabetes mellitus     Mood disorder     Hypertriglyceridemia     Hyperlipidemia     Hypertension     Chronic gout      Review of Systems   Constitutional: Negative for activity change, chills, fever and unexpected weight change.   HENT: Negative for hearing loss, rhinorrhea and trouble swallowing.    Eyes: Negative for discharge and visual disturbance.   Respiratory: Negative for cough, chest tightness, shortness of breath and wheezing.    Cardiovascular: Negative for chest pain, palpitations and leg swelling.   Gastrointestinal: Negative for blood in stool, constipation, diarrhea and vomiting.   Endocrine: Negative for polydipsia and polyuria.   Genitourinary: Negative for difficulty urinating, hematuria and  urgency.   Musculoskeletal: Positive for arthralgias (R 1st MTP) and joint swelling (R 1st MTP). Negative for neck pain.   Skin: Negative for rash.   Neurological: Negative for weakness, numbness and headaches.   Psychiatric/Behavioral: Negative for confusion and dysphoric mood.       Objective:       Vitals:    08/07/17 0942   BP: (!) 138/93   BP Location: Right arm   Patient Position: Sitting   BP Method: Automatic   Pulse: 70   Temp: 97 °F (36.1 °C)   TempSrc: Tympanic   SpO2: 98%   Weight: 109.1 kg (240 lb 8.4 oz)   Height: 6' (1.829 m)       Physical Exam   Constitutional: He is oriented to person, place, and time. He appears well-developed and well-nourished. No distress.   HENT:   Head: Normocephalic and atraumatic.   Eyes: EOM are normal. No scleral icterus.   Neck: Neck supple.   Cardiovascular: Normal rate, regular rhythm and intact distal pulses.    Pulmonary/Chest: Effort normal and breath sounds normal. No respiratory distress. He has no decreased breath sounds. He has no wheezes. He has no rhonchi. He has no rales.   Musculoskeletal: Normal range of motion. He exhibits no edema.   FROM BLEs; localized mild erythema, warmth, and TTP to lateral R 1st MTP joint; no bruising, open wounds, rash, drainage, red streaking, or crepitus; NV intact; 5+ strength   Neurological: He is alert and oriented to person, place, and time. No cranial nerve deficit.   Skin: Skin is dry. No rash noted. He is not diaphoretic.   Psychiatric: He has a normal mood and affect. His speech is normal and behavior is normal. Thought content normal.       Assessment:       1. Acute gout of right foot, unspecified cause    2. Essential hypertension    3. Type 2 diabetes mellitus without complication, without long-term current use of insulin        Plan:         Guillermo was seen today for gout.    Diagnoses and all orders for this visit:    Acute gout of right foot, unspecified cause  -     colchicine 0.6 mg tablet; 1.2mg PO once, then  0.6mg one hour later  Different medication options for acute gout flare discussed with pt - will try colchicine - discussed possible increased risk of myopathy / rhabdomyolysis due to concurrent use of Crestor - pt reports he accepts risks and will monitor for sxs.  Stop Naproxen (and avoid other NSAIDs) while taking colchicine. Pt declines foot imaging. Monitor for new or worsening sxs.  Rest and elevate foot often. Gout diet. F/u with rheumatology or PCP if sxs persist or worsen. ER if severe sxs occur.    Essential hypertension  Currently mildly elevated at 138/93 - pt in pain (foot). Monitor BP and f/u with PCP as recommended.    Type 2 diabetes mellitus without complication, without long-term current use of insulin  Well-controlled. Healthy diet and f/u with PCP as recommended.

## 2017-08-14 RX ORDER — OMEPRAZOLE 40 MG/1
40 CAPSULE, DELAYED RELEASE ORAL DAILY
Qty: 30 CAPSULE | Refills: 11 | Status: SHIPPED | OUTPATIENT
Start: 2017-08-14 | End: 2017-12-04 | Stop reason: SDUPTHER

## 2017-08-15 ENCOUNTER — OFFICE VISIT (OUTPATIENT)
Dept: INTERNAL MEDICINE | Facility: CLINIC | Age: 47
End: 2017-08-15
Payer: COMMERCIAL

## 2017-08-15 VITALS
OXYGEN SATURATION: 98 % | TEMPERATURE: 96 F | WEIGHT: 242.31 LBS | HEART RATE: 63 BPM | SYSTOLIC BLOOD PRESSURE: 142 MMHG | BODY MASS INDEX: 32.82 KG/M2 | DIASTOLIC BLOOD PRESSURE: 96 MMHG | HEIGHT: 72 IN

## 2017-08-15 DIAGNOSIS — E11.9 TYPE 2 DIABETES MELLITUS WITHOUT COMPLICATION, WITHOUT LONG-TERM CURRENT USE OF INSULIN: ICD-10-CM

## 2017-08-15 DIAGNOSIS — M10.9 PODAGRA: Primary | ICD-10-CM

## 2017-08-15 DIAGNOSIS — I10 ESSENTIAL HYPERTENSION: ICD-10-CM

## 2017-08-15 PROCEDURE — 4010F ACE/ARB THERAPY RXD/TAKEN: CPT | Mod: S$GLB,,, | Performed by: FAMILY MEDICINE

## 2017-08-15 PROCEDURE — 99214 OFFICE O/P EST MOD 30 MIN: CPT | Mod: S$GLB,,, | Performed by: FAMILY MEDICINE

## 2017-08-15 PROCEDURE — 3008F BODY MASS INDEX DOCD: CPT | Mod: S$GLB,,, | Performed by: FAMILY MEDICINE

## 2017-08-15 PROCEDURE — 3080F DIAST BP >= 90 MM HG: CPT | Mod: S$GLB,,, | Performed by: FAMILY MEDICINE

## 2017-08-15 PROCEDURE — 99999 PR PBB SHADOW E&M-EST. PATIENT-LVL III: CPT | Mod: PBBFAC,,, | Performed by: FAMILY MEDICINE

## 2017-08-15 PROCEDURE — 3044F HG A1C LEVEL LT 7.0%: CPT | Mod: S$GLB,,, | Performed by: FAMILY MEDICINE

## 2017-08-15 PROCEDURE — 3077F SYST BP >= 140 MM HG: CPT | Mod: S$GLB,,, | Performed by: FAMILY MEDICINE

## 2017-08-15 RX ORDER — INDOMETHACIN 50 MG/1
CAPSULE ORAL
Qty: 30 CAPSULE | Refills: 0 | Status: SHIPPED | OUTPATIENT
Start: 2017-08-15 | End: 2018-06-18 | Stop reason: ALTCHOICE

## 2017-09-19 RX ORDER — IBUPROFEN 800 MG/1
800 TABLET ORAL EVERY 6 HOURS PRN
Qty: 30 TABLET | Refills: 5 | Status: SHIPPED | OUTPATIENT
Start: 2017-09-19 | End: 2018-04-07 | Stop reason: SDUPTHER

## 2017-09-20 ENCOUNTER — PATIENT MESSAGE (OUTPATIENT)
Dept: ADMINISTRATIVE | Facility: OTHER | Age: 47
End: 2017-09-20

## 2017-10-16 RX ORDER — OMEPRAZOLE 40 MG/1
40 CAPSULE, DELAYED RELEASE ORAL DAILY
Qty: 30 CAPSULE | Refills: 1 | Status: SHIPPED | OUTPATIENT
Start: 2017-10-16 | End: 2018-10-31 | Stop reason: SDUPTHER

## 2017-11-16 ENCOUNTER — PATIENT OUTREACH (OUTPATIENT)
Dept: ADMINISTRATIVE | Facility: HOSPITAL | Age: 47
End: 2017-11-16

## 2017-11-20 ENCOUNTER — LAB VISIT (OUTPATIENT)
Dept: LAB | Facility: HOSPITAL | Age: 47
End: 2017-11-20
Attending: FAMILY MEDICINE
Payer: COMMERCIAL

## 2017-11-20 DIAGNOSIS — E11.9 TYPE 2 DIABETES MELLITUS WITHOUT COMPLICATION, UNSPECIFIED LONG TERM INSULIN USE STATUS: ICD-10-CM

## 2017-11-20 LAB
ESTIMATED AVG GLUCOSE: 117 MG/DL
HBA1C MFR BLD HPLC: 5.7 %

## 2017-11-20 PROCEDURE — 83036 HEMOGLOBIN GLYCOSYLATED A1C: CPT

## 2017-11-20 PROCEDURE — 36415 COLL VENOUS BLD VENIPUNCTURE: CPT | Mod: PO

## 2017-11-22 RX ORDER — DULOXETIN HYDROCHLORIDE 60 MG/1
CAPSULE, DELAYED RELEASE ORAL
Qty: 30 CAPSULE | Refills: 11 | Status: SHIPPED | OUTPATIENT
Start: 2017-11-22 | End: 2018-10-02 | Stop reason: SDUPTHER

## 2017-11-23 ENCOUNTER — PATIENT MESSAGE (OUTPATIENT)
Dept: ADMINISTRATIVE | Facility: OTHER | Age: 47
End: 2017-11-23

## 2017-11-27 ENCOUNTER — PATIENT MESSAGE (OUTPATIENT)
Dept: ADMINISTRATIVE | Facility: OTHER | Age: 47
End: 2017-11-27

## 2017-11-28 ENCOUNTER — PATIENT MESSAGE (OUTPATIENT)
Dept: ADMINISTRATIVE | Facility: OTHER | Age: 47
End: 2017-11-28

## 2017-12-04 ENCOUNTER — PATIENT OUTREACH (OUTPATIENT)
Dept: OTHER | Facility: OTHER | Age: 47
End: 2017-12-04

## 2017-12-04 NOTE — PROGRESS NOTES
Mr. Guillermo Castillo is a 47 y.o. male who is newly enrolled in the [facility]pertension [facility].     The following information was reviewed/updated:  Preferred pharmacy   [id number] - [place] - [address]  [address]  [place] [address]  Phone: [phone] Fax: [phone]    Patient prefers a 90 days supply  Review of patient's allergies indicates:   Allergen Reactions    Hydrochlorothiazide (bulk)      Other reaction(s): gout    Simvastatin      Other reaction(s): aches/inc lfts     Current Outpatient Prescriptions on File Prior to Visit   Medication Sig Dispense Refill    amlodipine-benazepril 5-10 mg (LOTREL) 5-10 mg per capsule Take 1 capsule by mouth once daily. 30 capsule 11    aspirin 81 mg Tab Take 1 tablet by mouth Daily.      DULoxetine (CYMBALTA) 60 MG capsule TAKE ONE CAPSULE BY MOUTH EVERY DAY AS DIRECTED 30 capsule 11    ibuprofen (ADVIL,MOTRIN) 800 MG tablet Take 1 tablet (800 mg total) by mouth every 6 (six) hours as needed for Pain. 30 tablet 5    indomethacin (INDOCIN) 50 MG capsule 1 po tid x 2 days then tid prn only 30 capsule 0    metformin (GLUCOPHAGE-XR) 500 MG 24 hr tablet TAKE TWO TABLETS BY MOUTH ONCE DAILY 60 tablet 10    omeprazole (PRILOSEC) 40 MG capsule TAKE 1 CAPSULE (40 MG TOTAL) BY MOUTH ONCE DAILY. 30 capsule 1    rosuvastatin (CRESTOR) 10 MG tablet Take 1 tablet (10 mg total) by mouth once daily. 30 tablet 11    [DISCONTINUED] omeprazole (PRILOSEC) 40 MG capsule TAKE 1 CAPSULE (40 MG TOTAL) BY MOUTH ONCE DAILY. 30 capsule 11     No current facility-administered medications on file prior to visit.        IF ARPAN screen positive    ARPAN screening results for this patient suggest a high likelihood of sleep apnea, which can contribute to hypertension. Patient has not been previously diagnosed with sleep apnea and is interested in referral at this time. I sent a message to Dr. Siddiqui informing him of the need for further  evaluation.      Reviewed non-pharmacologic therapies and impact on BP:    1. Low-sodium diet- 11 mmHg reduction 2-4 weeks. I have reviewed [facility].A.S.[facility] diet sodium restrictions (<2000mg/day). Patient does not put salt on anything.   2. Exercise- 7 mmHg reduction 4-12 weeks. I have recommended patient engage in exercise as tolerated at least 30 minutes 5x per week to improve cardiovascular health. No daily exercise.  3. Alcohol intake- 3 mmHg reduction 4-12 weeks. I have discussed with patient the maximum recommended number of 3 drink(s) per day for men. Patient does not consume alcohol.     Explained that we expect patient to obtain several blood pressures per week at random times of day.   Our goal is to get  BP to consistently below 130/80mmHg and make the process convenient so patient can avoid extra trips to the office. Getting your blood pressure below 130/80mmHg (definition of control) will reduce your risk for heart attack, kidney failure, stroke and death (as well as kidney failure, eye disease, & dementia).     Patient is not meeting the goal already.   When asked what the patient thinks is causing BP to be elevated, he states he took his BP laying down. We went through the proper technique described below and he will begin testing using this technique 12/5.    Instructed patient not to allow anyone else to use phone and BP cuff.   I'm not available for emergencies. Patient will call [facility] on-call ([phone] or [phone]) or [phone] if needed.     Discussed appropriate BP measuring technique:  Before taking your blood pressure  Find a quiet place. You will need to listen for your heartbeat.  Roll up the sleeve on your left arm or remove any tight-sleeved clothing, if needed. (It's best to take your blood pressure from your left arm if you are right-handed.You can use the other arm if you have been told by your health care provider to do so.)  Rest in a chair next to a table for 5 to 10 minutes. (Your left  arm should rest comfortably at heart level.)  Sit up straight with your back against the chair, legs uncrossed and on the ground.  Rest your forearm on the table with the palm of your hand facing up.  You should not talk, read the newspaper, or watch television during this process.      Patient and I agreed that he will continue to monitor blood pressure and sodium intake, and continue to remain adherent to medications.     I will plan to follow-up with the patient in 1-2 weeks.   Emailed patient link to [email or web address] HTN webpage and my contact information in case he has any questions.

## 2017-12-05 ENCOUNTER — TELEPHONE (OUTPATIENT)
Dept: INTERNAL MEDICINE | Facility: CLINIC | Age: 47
End: 2017-12-05

## 2017-12-05 DIAGNOSIS — G47.33 OSA (OBSTRUCTIVE SLEEP APNEA): Primary | ICD-10-CM

## 2017-12-05 NOTE — TELEPHONE ENCOUNTER
----- Message from Ruby York, PharmJACKIE sent at 12/4/2017  2:47 PM CST -----  Good Afternoon Dr. Siddiqui and Staff,    Mr. Castillo screened positive for obstructive sleep apnea in this questionnaires into the Hypertension Digital Medicine program. Upon further questioning with the patient, it does appear he has sleep apnea. I informed the patient I would message you to assess whether it is something that requires further information.     Thank you!    Ruby York

## 2017-12-05 NOTE — TELEPHONE ENCOUNTER
Please let patient know digital htn program screen showed symptoms concerned for sleep apnea. Recommend see pulm dept for further eval

## 2017-12-06 ENCOUNTER — PATIENT OUTREACH (OUTPATIENT)
Dept: OTHER | Facility: OTHER | Age: 47
End: 2017-12-06

## 2017-12-06 NOTE — PROGRESS NOTES
Last 5 Patient Entered Readings Current 30 Day Average: 151/90     Recent Readings 12/4/2017 12/4/2017 12/3/2017 12/1/2017 12/1/2017    Systolic BP (mmHg) 135 148 154 148 148    Diastolic BP (mmHg) 96 88 89 82 93    Pulse 65 67 64 64 66          LVM and requested Abbey Guillermo MOSELEY Jonathan call back so I can complete the initial introduction of the health coaches role in the program.

## 2017-12-07 ENCOUNTER — TELEPHONE (OUTPATIENT)
Dept: INTERNAL MEDICINE | Facility: CLINIC | Age: 47
End: 2017-12-07

## 2017-12-07 DIAGNOSIS — G47.33 OSA (OBSTRUCTIVE SLEEP APNEA): Primary | ICD-10-CM

## 2017-12-07 DIAGNOSIS — E11.9 TYPE 2 DIABETES MELLITUS WITHOUT COMPLICATION, WITHOUT LONG-TERM CURRENT USE OF INSULIN: ICD-10-CM

## 2017-12-07 NOTE — TELEPHONE ENCOUNTER
----- Message from Steph Pierce MA sent at 12/4/2017  3:03 PM CST -----      ----- Message -----  From: Ruby York PharmD  Sent: 12/4/2017   2:47 PM  To: Kip Siddiqui MD, Artur TEAGUE Staff    Good Afternoon Dr. Siddiqui and Staff,    Abbey Jonathan screened positive for obstructive sleep apnea in this questionnaires into the Hypertension Digital Medicine program. Upon further questioning with the patient, it does appear he has sleep apnea. I informed the patient I would message you to assess whether it is something that requires further information.     Thank you!    Ruby York

## 2017-12-08 NOTE — TELEPHONE ENCOUNTER
Please let him know digital htn screen indicates possible sleep apnea so need pulm referral  Also needs lab 6 months anf /u

## 2017-12-11 NOTE — TELEPHONE ENCOUNTER
Patient notified and states ok, appt scheduled with Lejeune on 12/21/17 at 1pm, lab 6/15/18 and f/u appt  6/22/18 at 9:40am

## 2017-12-13 ENCOUNTER — PATIENT OUTREACH (OUTPATIENT)
Dept: OTHER | Facility: OTHER | Age: 47
End: 2017-12-13

## 2017-12-13 DIAGNOSIS — I10 ESSENTIAL HYPERTENSION: Primary | ICD-10-CM

## 2017-12-13 NOTE — PROGRESS NOTES
Last 5 Patient Entered Readings Current 30 Day Average: 148/90     Recent Readings 12/12/2017 12/8/2017 12/8/2017 12/7/2017 12/7/2017    Systolic BP (mmHg) 145 149 159 142 139    Diastolic BP (mmHg) 99 85 96 81 77    Pulse 66 73 77 92 62          Patient's BP average is above goal of <130/80.     Patient denies s/s of hypotension (lightheadedness, dizziness, nausea, fatigue) associated with low readings. Instructed patient to inform me if this occurs, patient confirms understanding.      Patient denies s/s of hypertension (SOB, CP, severe headaches, changes in vision) associated with high readings. Instructed patient to go to the ED if BP > 180/110 and accompanied by hypertensive s/s, patient confirms understanding.    Will continue to monitor regularly. Will follow up in 2-3 weeks, sooner if BP begins to trend upward or downward.    Patient has my contact information and knows to call with any concerns or clinical changes.     Current HTN regimen:  Hypertension Medications             amlodipine-benazepril 5-10 mg (LOTREL) 5-10 mg per capsule TAKE 1 CAPSULE BY MOUTH ONCE DAILY.

## 2017-12-19 ENCOUNTER — OFFICE VISIT (OUTPATIENT)
Dept: INTERNAL MEDICINE | Facility: CLINIC | Age: 47
End: 2017-12-19
Payer: COMMERCIAL

## 2017-12-19 VITALS
DIASTOLIC BLOOD PRESSURE: 88 MMHG | TEMPERATURE: 98 F | HEART RATE: 65 BPM | BODY MASS INDEX: 32.31 KG/M2 | OXYGEN SATURATION: 98 % | WEIGHT: 238.56 LBS | HEIGHT: 72 IN | SYSTOLIC BLOOD PRESSURE: 138 MMHG

## 2017-12-19 DIAGNOSIS — E11.9 TYPE 2 DIABETES MELLITUS WITHOUT COMPLICATION, WITHOUT LONG-TERM CURRENT USE OF INSULIN: Primary | ICD-10-CM

## 2017-12-19 DIAGNOSIS — I10 ESSENTIAL HYPERTENSION: ICD-10-CM

## 2017-12-19 DIAGNOSIS — K76.0 FATTY LIVER: ICD-10-CM

## 2017-12-19 DIAGNOSIS — G47.33 OSA (OBSTRUCTIVE SLEEP APNEA): ICD-10-CM

## 2017-12-19 PROCEDURE — 99214 OFFICE O/P EST MOD 30 MIN: CPT | Mod: S$GLB,,, | Performed by: FAMILY MEDICINE

## 2017-12-19 PROCEDURE — 99999 PR PBB SHADOW E&M-EST. PATIENT-LVL III: CPT | Mod: PBBFAC,,, | Performed by: FAMILY MEDICINE

## 2017-12-19 NOTE — PROGRESS NOTES
Subjective:       Patient ID: Guillermo Castillo is a male.    Chief Complaint: Multiple issues see below    HPI Type 2 diabetes: a1c controlled. Tolerating medicine. No hypoglycemia;   Hypercholesterolemia:brenda crstor   Gout:  No c/o recent flares.    Hypertension: blood pressures at home 140-150 syt at home w dig. htn program. Nl here.  Tolerating medicine.   Fatty liver nl . No etoh long time;   gerd controlled. No sympt            Past Medical History   Diagnosis Date    Fatty liver     Gout     Hypertension     Hyperlipidemia     Hypertriglyceridemia     Mood disorder     Panic disorder     Type 2 diabetes mellitus      History reviewed. No pertinent past surgical history.  Family History   Problem Relation Age of Onset    Coronary artery disease Father     Diabetes Father     Lung cancer Father          Review of Systems   Respiratory: Negative for shortness of breath.    Cardiovascular: Negative for chest pain and leg swelling.       Objective:      Physical Exam   Constitutional: He is oriented to person, place, and time. He appears well-developed and well-nourished.   HENT:   Head: Normocephalic and atraumatic.   Eyes: Conjunctivae and EOM are normal. Pupils are equal, round, and reactive to light.   Neck: Normal range of motion. Neck supple. Carotid bruit is not present. no mass  Cardiovascular: Normal rate and regular rhythm.    Pulmonary/Chest: Effort normal and breath sounds normal.         Neurological: He is alert and oriented to person, place, and time.   Skin: Skin is warm and dry.   Psychiatric: He has a normal mood and affect. His behavior is normal. Judgment and thought content normal.         Bilateral feet with normal monofilament testing and no lesions.    .        Assessment:       1. Type 2 diabetes mellitus    2. Hypertension    3. Gout    4. Hypercholesteremia    5. Fatty liver        Plan:     Lab and follow up after in June schd  Before changing bp med he wants to check w a diff  automatic arm cuff and let me know if elev  Type 2 diabetes mellitus without complication, without long-term current use of insulin    Essential hypertension    ARPAN (obstructive sleep apnea)    Fatty liver  -     ALT (SGPT); Future; Expected date: 06/17/2018    he will sched eye

## 2017-12-21 ENCOUNTER — OFFICE VISIT (OUTPATIENT)
Dept: PULMONOLOGY | Facility: CLINIC | Age: 47
End: 2017-12-21
Payer: COMMERCIAL

## 2017-12-21 VITALS
SYSTOLIC BLOOD PRESSURE: 130 MMHG | WEIGHT: 238.31 LBS | HEART RATE: 67 BPM | RESPIRATION RATE: 18 BRPM | BODY MASS INDEX: 31.59 KG/M2 | HEIGHT: 73 IN | DIASTOLIC BLOOD PRESSURE: 82 MMHG

## 2017-12-21 DIAGNOSIS — G47.33 OSA (OBSTRUCTIVE SLEEP APNEA): Primary | ICD-10-CM

## 2017-12-21 DIAGNOSIS — G47.26 SHIFTING SLEEP-WORK SCHEDULE: ICD-10-CM

## 2017-12-21 PROCEDURE — 99999 PR PBB SHADOW E&M-EST. PATIENT-LVL III: CPT | Mod: PBBFAC,,, | Performed by: NURSE PRACTITIONER

## 2017-12-21 PROCEDURE — 99243 OFF/OP CNSLTJ NEW/EST LOW 30: CPT | Mod: S$GLB,,, | Performed by: NURSE PRACTITIONER

## 2017-12-21 NOTE — PROGRESS NOTES
"Subjective:      Patient ID: Guillermo Castillo is a 47 y.o. male.    Chief Complaint: Sleep Apnea    Patient presents to the office today for evaluation of sleep apnea.  Patient with snoring and witnessed apneas. Patient not having problems falling asleep. He works shift with EMS.  Patient with daytime hypersomnolence.  [place] Sleepiness Scale score 14.  Patient has had symptoms for years. Comorbidities include HTN, DM    STOP - BANG Questionnaire:     1. Snoring : Do you snore loudly ?    Yes    2. Tired : Do you often feel tired, fatigued, or sleepy during daytime? Yes    3. Observed: Has anyone observed you stop breathing during your sleep?   Yes     4. Blood pressure : Do you have or are you being treated for high blood pressure?   Yes    5. BMI :BMI more than 35 kg/m2?   No    6. Age : Age over 50 yr old?   No    7. Neck circumference: Neck circumference greater than 40 cm?   No    8. Gender: Gender male?   Yes    High risk of ARPAN: Yes 5 - 8  Intermediate risk of ARPAN: Yes 3 - 4  Low risk of ARPAN: Yes 0 - 2      References:   STOP Questionnaire   A Tool to Screen Patients for Obstructive Sleep Apnea: SARAI Ortega.C.P.C., Shar Sharma M.B.B.S., Shawn Dela Cruz M.D.,Yesenia Wang, Ph.D., Horacio Mcbride M.B.B.S.,_ MANDI Pinto.Sc.,_ Susanne Monroy M.D., Devyn Quintero F.R.C.P.C.; Anesthesiology 2008; 108:812-21 Copyright © 2008, the American Society of Anesthesiologists, Inc. [employer]            /82   Pulse 67   Resp 18   Ht 6' 1" (1.854 m)   Wt 108.1 kg (238 lb 5.1 oz)   BMI 31.44 kg/m²   Body mass index is 31.44 kg/m².    Review of Systems   Constitutional: Negative.    HENT: Negative.    Respiratory: Positive for snoring and somnolence.    Cardiovascular: Negative.    Musculoskeletal: Negative.    Gastrointestinal: Negative.    Neurological: Negative.    Psychiatric/Behavioral: Negative.      Objective:      Physical Exam   Constitutional: He is " oriented to person, place, and time. He appears well-developed and well-nourished.   HENT:   Head: Normocephalic and atraumatic.   Mouth/Throat: Oropharynx is clear and moist.   Eyes: EOM are normal. Pupils are equal, round, and reactive to light.   Neck: Normal range of motion. Neck supple.   Cardiovascular: Normal rate and regular rhythm.  Exam reveals no gallop and no friction rub.    No murmur heard.  Pulmonary/Chest: Effort normal and breath sounds normal.   Abdominal: Soft. Bowel sounds are normal. He exhibits no distension. There is no tenderness.   Musculoskeletal: Normal range of motion.   Neurological: He is alert and oriented to person, place, and time.   Skin: Skin is warm and dry.   Psychiatric: He has a normal mood and affect.         Assessment:       1. ARPAN (obstructive sleep apnea)    2. Shifting sleep-work schedule        Outpatient Encounter Prescriptions as of 12/21/2017   Medication Sig Dispense Refill    amlodipine-benazepril 5-10 mg (LOTREL) 5-10 mg per capsule Take 1 capsule by mouth once daily. 30 capsule 11    aspirin 81 mg Tab Take 1 tablet by mouth Daily.      DULoxetine (CYMBALTA) 60 MG capsule TAKE ONE CAPSULE BY MOUTH EVERY DAY AS DIRECTED 30 capsule 11    ibuprofen (ADVIL,MOTRIN) 800 MG tablet Take 1 tablet (800 mg total) by mouth every 6 (six) hours as needed for Pain. 30 tablet 5    indomethacin (INDOCIN) 50 MG capsule 1 po tid x 2 days then tid prn only 30 capsule 0    metformin (GLUCOPHAGE-XR) 500 MG 24 hr tablet TAKE TWO TABLETS BY MOUTH ONCE DAILY 60 tablet 10    omeprazole (PRILOSEC) 40 MG capsule TAKE 1 CAPSULE (40 MG TOTAL) BY MOUTH ONCE DAILY. 30 capsule 1    rosuvastatin (CRESTOR) 10 MG tablet Take 1 tablet (10 mg total) by mouth once daily. 30 tablet 11     No facility-administered encounter medications on file as of 12/21/2017.      Orders Placed This Encounter   Procedures    Polysomnogram (CPAP will be added if patient meets diagnostic criteria.)     Standing  Status:   Future     Standing Expiration Date:   12/21/2018     Plan:      Formal polysomnogram for evaluation of sleep apnea. Return to clinic when study is available for review.    Thank you Dr. Siddiqui for this consultation.

## 2017-12-21 NOTE — LETTER
December 21, 2017      Kip Siddiqui MD  [address]  [place] [address]           [facility]  [address] [address]  Phone: [phone]  Fax: [phone]          Patient: Guillermo Castillo   MR Number: [id number]   YOB: 1970   Date of Visit: 12/21/2017       Dear Dr. Kip Siddiqui:    Thank you for referring Guillermo Castillo to me for evaluation. Attached you will find relevant portions of my assessment and plan of care.    If you have questions, please do not hesitate to call me. I look forward to following Guillermo Castillo along with you.    Sincerely,    Elizabeth Lejeune, NP    Enclosure  CC:  No Recipients    If you would like to receive this communication electronically, please contact [email or web address] or [phone] to request more information on [facility] Link access.    For providers and/or their staff who would like to refer a patient to [facility], please contact us through our one-stop-shop provider referral line, [facility], at [phone].    If you feel you have received this communication in error or would no longer like to receive these types of communications, please e-mail [email or web address]

## 2017-12-26 RX ORDER — AMLODIPINE AND BENAZEPRIL HYDROCHLORIDE 5; 10 MG/1; MG/1
1 CAPSULE ORAL DAILY
Qty: 30 CAPSULE | Refills: 11 | Status: SHIPPED | OUTPATIENT
Start: 2017-12-26 | End: 2018-10-02 | Stop reason: SDUPTHER

## 2018-01-04 NOTE — PROGRESS NOTES
"Last 5 Patient Entered Readings Current 30 Day Average: 146/88     Recent Readings 1/4/2018 12/29/2017 12/29/2017 12/22/2017 12/21/2017    SBP (mmHg) 154 156 166 136 139    DBP (mmHg) 94 88 90 68 81    Pulse 71 79 83 71 62          Hypertension Digital Medicine Program (HDMP): Health  Introduction    Introduced Mr. Guillermo Castillo to [facility]. Discussed health  role and recommended lifestyle modifications.    Diet (i.e. Low sodium, food labels): Patient reports he eats out and cooks at home. Patient states when eating out his go to places are [employer] and [employer]. Patient reports when he eats at [employer] he gets the steak and cheese sub / [facility] sauce. Patient reports he does not cook fried food at home but does go out and eat fried food. Patient states he "loves [employer] fried chicken".  Patient reports when he cooks at home he bakes chicken and uses lemon pepper seasoning.Patient reports he does not add any salt to his meals while cooking at home or add salt to his meals when eating out. Patient reports he buys frozen vegetables. Patient states he drinks a lot of diet coke. We discussed trying to eat more home cooked meals, cutting back on diet soft drinks and drinking more water, and eating less fried food.     Exercise: Patient reports he does not get any physical activity. We discussed trying to increase physical activity by doing exercises he is comfortable with. Mr. Castillo states he will try riding his bike for 15 minutes 2 times a week.     Alcohol/Tobacco: Patient reports he does not smoke or drink.    Medication Adherence: has been compliant with the medicaiton regimen.    Other goals: Patient reports his goal is to check his BP .    Reviewed AHA recommendations:  Limit sodium intake to <2000mg/day  Perform 150 minutes of physical activity per week    Patient is aware of the importance of taking daily BP readings at various times of the day  Patient aware that the health  can be used as a resource " for lifestyle modifications to help reduce or maintain a healthy BP  Patient is aware of the importance of medication adherence.  [facility]. Patient should call 911 or [facility] on [facility] if one arises.

## 2018-01-04 NOTE — PROGRESS NOTES
Last 5 Patient Entered Readings Current 30 Day Average: 145/87     Recent Readings 12/29/2017 12/29/2017 12/22/2017 12/21/2017 12/21/2017    SBP (mmHg) 156 166 136 139 130    DBP (mmHg) 88 90 68 81 70    Pulse 79 83 71 62 68        Patient's BP average is above goal of <130/80.     Patient denies s/s of hypotension (lightheadedness, dizziness, nausea, fatigue) associated with low readings. Instructed patient to inform me if this occurs, patient confirms understanding.      Patient denies s/s of hypertension (SOB, CP, severe headaches, changes in vision) associated with high readings. Instructed patient to go to the ED if BP > 180/110 and accompanied by hypertensive s/s, patient confirms understanding.    Will continue to monitor regularly. Will follow up in 2-3 weeks, sooner if BP begins to trend upward or downward.    Patient has my contact information and knows to call with any concerns or clinical changes.     Current HTN regimen:  Hypertension Medications             amlodipine-benazepril 5-10 mg (LOTREL) 5-10 mg per capsule TAKE 1 CAPSULE BY MOUTH ONCE DAILY.        Patient has been out of town on a cruise. Patient denies any swelling with amlodipine. I am increasing amlodipine-benazepril from daily to twice daily starting today. He just received a new prescription and has enough capsules. I cannot change the order in meds/orders and will need to make this adjustment at his next call in two weeks. We went through side effects and hypotensive symptoms.

## 2018-01-09 DIAGNOSIS — G47.33 OSA (OBSTRUCTIVE SLEEP APNEA): Primary | ICD-10-CM

## 2018-01-10 ENCOUNTER — TELEPHONE (OUTPATIENT)
Dept: PULMONOLOGY | Facility: HOSPITAL | Age: 48
End: 2018-01-10

## 2018-01-10 NOTE — TELEPHONE ENCOUNTER
----- Message from Urbano Sam sent at 1/10/2018  9:25 AM CST -----  Review Chart.[facility]

## 2018-01-18 ENCOUNTER — PATIENT OUTREACH (OUTPATIENT)
Dept: OTHER | Facility: OTHER | Age: 48
End: 2018-01-18

## 2018-01-18 NOTE — PROGRESS NOTES
Last 5 Patient Entered Readings                                      Current 30 Day Average: 144/82     Recent Readings 1/16/2018 1/15/2018 1/13/2018 1/13/2018 1/11/2018    SBP (mmHg) 146 153 143 155 131    DBP (mmHg) 77 83 86 85 75    Pulse 60 65 62 65 62        Patient's BP average is above goal of <130/80.     Patient denies s/s of hypotension (lightheadedness, dizziness, nausea, fatigue) associated with low readings. Instructed patient to inform me if this occurs, patient confirms understanding.      Patient denies s/s of hypertension (SOB, CP, severe headaches, changes in vision) associated with high readings. Instructed patient to go to the ED if BP > 180/110 and accompanied by hypertensive s/s, patient confirms understanding.    Will continue to monitor regularly. Will follow up in 2-3 weeks, sooner if BP begins to trend upward or downward.    Patient has my contact information and knows to call with any concerns or clinical changes.     Current HTN regimen:  Hypertension Medications             amlodipine-benazepril 5-10 mg (LOTREL) 5-10 mg per capsule TAKE 1 CAPSULE BY MOUTH ONCE DAILY.        Patient expected to take Lotrel BID however the patient often forget the nighttime dose. Out of 7 nights, he stated he is missing the dose 3-4 times. I am changing the way he is taking Lotrel from BID to 10-20mg in the morning. Patient has a history of gout therefore thiazides can exacerbate a gout flare-up. He is currently not on a thiazide and should be considered.     CPAP began 1/31/18. I will follow-up in 1-2 weeks to make sure his BP is reducing since increasing Lotrel and starting the CPAP. If not, I will consider adding on spironolactone or breaking up Lotrel.

## 2018-01-19 ENCOUNTER — OFFICE VISIT (OUTPATIENT)
Dept: SLEEP MEDICINE | Facility: CLINIC | Age: 48
End: 2018-01-19
Payer: COMMERCIAL

## 2018-01-19 DIAGNOSIS — G47.33 OSA (OBSTRUCTIVE SLEEP APNEA): ICD-10-CM

## 2018-01-19 PROCEDURE — 99999 PR PBB SHADOW E&M-EST. PATIENT-LVL II: CPT | Mod: PBBFAC,,,

## 2018-01-19 PROCEDURE — 99499 UNLISTED E&M SERVICE: CPT | Mod: S$GLB,,, | Performed by: INTERNAL MEDICINE

## 2018-01-19 PROCEDURE — 95806 SLEEP STUDY UNATT&RESP EFFT: CPT | Mod: S$GLB,,, | Performed by: INTERNAL MEDICINE

## 2018-01-19 NOTE — Clinical Note
Assessment and Recommendations Data collected for 5 hours 51 minutes. Lowest oxygen saturation was 88%. Average heart rate was 67 bpm with a maximal heart rate of 1 55 bpm. Significant heart rate variability noted. Snoring recorded was 100% of the time recorded above 50 dB. Apnea-hypopnea index: 12.1 events per hour total events 71. Obstructive sleep apnea detected Obstructive sleep apnea-hypopnea syndrome detected. Treatment recommendations: CPAP would be the guideline recommendation for first-line treatment for obstructive sleep apnea. Either order in lab CPAP titration or AutoPAP device. Follow-up evaluation and treatment in the sleep disorder clinic. Other modalities for treatment of obstructive sleep apnea may be explored in patients with intolerant to CPAP including evaluation by ear nose and throat, mandibular advancement device, hypoglossal nerve stimulator ( INSPIRE) , nonsupine sleep positioning device. Significant weight loss is recommended to normal ranges. Close follow-up to ensure

## 2018-01-19 NOTE — PROGRESS NOTES
Assessment and Recommendations  Data collected for 5 hours 51 minutes. Lowest oxygen saturation was 88%. Average heart rate was 67 bpm with a  maximal heart rate of 1 55 bpm. Significant heart rate variability noted. Snoring recorded was 100% of the time  recorded above 50 dB.  Apnea-hypopnea index: 12.1 events per hour total events 71.  Obstructive sleep apnea detected  Obstructive sleep apnea-hypopnea syndrome detected.  Treatment recommendations:  CPAP would be the guideline recommendation for first-line treatment for obstructive sleep apnea.  Either order in lab CPAP titration or AutoPAP device.  Follow-up evaluation and treatment in the sleep disorder clinic.  Other modalities for treatment of obstructive sleep apnea may be explored in patients with intolerant to CPAP including  evaluation by ear nose and throat, mandibular advancement device, hypoglossal nerve stimulator ( INSPIRE) , nonsupine sleep  positioning device.  Significant weight loss is recommended to normal ranges.  Close follow-up to ensure resolution of symptoms.

## 2018-01-22 ENCOUNTER — PATIENT MESSAGE (OUTPATIENT)
Dept: PULMONOLOGY | Facility: CLINIC | Age: 48
End: 2018-01-22

## 2018-01-22 DIAGNOSIS — G47.33 OSA (OBSTRUCTIVE SLEEP APNEA): Primary | ICD-10-CM

## 2018-01-30 ENCOUNTER — OFFICE VISIT (OUTPATIENT)
Dept: SLEEP MEDICINE | Facility: CLINIC | Age: 48
End: 2018-01-30
Payer: COMMERCIAL

## 2018-01-30 ENCOUNTER — PATIENT OUTREACH (OUTPATIENT)
Dept: ADMINISTRATIVE | Facility: HOSPITAL | Age: 48
End: 2018-01-30

## 2018-01-30 VITALS
HEART RATE: 70 BPM | DIASTOLIC BLOOD PRESSURE: 82 MMHG | WEIGHT: 240.31 LBS | HEIGHT: 73 IN | BODY MASS INDEX: 31.85 KG/M2 | OXYGEN SATURATION: 97 % | SYSTOLIC BLOOD PRESSURE: 128 MMHG | RESPIRATION RATE: 17 BRPM

## 2018-01-30 DIAGNOSIS — G47.33 OSA (OBSTRUCTIVE SLEEP APNEA): Primary | ICD-10-CM

## 2018-01-30 DIAGNOSIS — G47.26 SHIFT WORK SLEEP DISORDER: ICD-10-CM

## 2018-01-30 PROCEDURE — 99213 OFFICE O/P EST LOW 20 MIN: CPT | Mod: S$GLB,,, | Performed by: NURSE PRACTITIONER

## 2018-01-30 PROCEDURE — 99999 PR PBB SHADOW E&M-EST. PATIENT-LVL III: CPT | Mod: PBBFAC,,, | Performed by: NURSE PRACTITIONER

## 2018-01-30 PROCEDURE — 3008F BODY MASS INDEX DOCD: CPT | Mod: S$GLB,,, | Performed by: NURSE PRACTITIONER

## 2018-01-30 NOTE — PROGRESS NOTES
"Subjective:      Patient ID: Guillermo Castillo is a 47 y.o. male.    Chief Complaint: Sleep Apnea    Patient presents to the office today for evaluation of sleep apnea.  Patient with snoring and witnessed apneas. Patient not having problems falling asleep. He works shift with EMS.  Patient with daytime hypersomnolence.  [place] Sleepiness Scale score 14.  Patient has had symptoms for years. Comorbidities include HTN, DM            /82   Pulse 70   Resp 17   Ht 6' 1" (1.854 m)   Wt 109 kg (240 lb 4.8 oz)   SpO2 97%   BMI 31.70 kg/m²   Body mass index is 31.7 kg/m².    Review of Systems   Respiratory: Positive for snoring and somnolence.    Psychiatric/Behavioral: Positive for sleep disturbance.   All other systems reviewed and are negative.    Objective:      Physical Exam   Constitutional: He is oriented to person, place, and time. He appears well-developed and well-nourished.   HENT:   Head: Normocephalic and atraumatic.   Neck: Normal range of motion. Neck supple.   Neurological: He is alert and oriented to person, place, and time.   Skin: Skin is warm and dry.   Psychiatric: He has a normal mood and affect.     Personal Diagnostic Review    Home sleep test with AHI 12/hr      Assessment:       1. ARPAN (obstructive sleep apnea)    2. Shift work sleep disorder        Outpatient Encounter Prescriptions as of 1/30/2018   Medication Sig Dispense Refill    amlodipine-benazepril 5-10 mg (LOTREL) 5-10 mg per capsule TAKE 1 CAPSULE BY MOUTH ONCE DAILY. 30 capsule 11    aspirin 81 mg Tab Take 1 tablet by mouth Daily.      DULoxetine (CYMBALTA) 60 MG capsule TAKE ONE CAPSULE BY MOUTH EVERY DAY AS DIRECTED 30 capsule 11    ibuprofen (ADVIL,MOTRIN) 800 MG tablet Take 1 tablet (800 mg total) by mouth every 6 (six) hours as needed for Pain. 30 tablet 5    indomethacin (INDOCIN) 50 MG capsule 1 po tid x 2 days then tid prn only 30 capsule 0    metformin (GLUCOPHAGE-XR) 500 MG 24 hr tablet TAKE TWO TABLETS BY MOUTH " ONCE DAILY 60 tablet 10    omeprazole (PRILOSEC) 40 MG capsule TAKE 1 CAPSULE (40 MG TOTAL) BY MOUTH ONCE DAILY. 30 capsule 1    rosuvastatin (CRESTOR) 10 MG tablet Take 1 tablet (10 mg total) by mouth once daily. 30 tablet 11     No facility-administered encounter medications on file as of 1/30/2018.      No orders of the defined types were placed in this encounter.    Plan:      AutoPAP 4-20 cm H2O and follow up in 8 weeks with download of data card and review of symptoms.

## 2018-01-30 NOTE — PROGRESS NOTES
Attempted to schedule diabetic eye exam. Patient will call back to schedule. Patient in agreement and vocalize understanding.

## 2018-02-07 ENCOUNTER — PATIENT OUTREACH (OUTPATIENT)
Dept: OTHER | Facility: OTHER | Age: 48
End: 2018-02-07

## 2018-02-07 NOTE — LETTER
Ruby York, PharmD  [address] [place] [address]     Dear Guillermo,    We have made several attempts to encourage your participation in Digital Medicine monitoring. Unfortunately, we have been unsuccessful and this is an official notice that you are no longer enrolled in Hypertension Digital Medicine Program, and thus, we will no longer be managing your hypertension.    Please note this has no impact on your relationship with [facility] or your providers. Going forward, please reach out to your primary care provider with any questions or concerns regarding your health.                         Please contact [phone] if you have any additional questions.     Sincerely,    The [facility] Digital Medicine Team                                                                                                                                            Guillermo Castillo  [address] [id number]

## 2018-02-07 NOTE — PROGRESS NOTES
Last 5 Patient Entered Readings                                      Current 30 Day Average: 145/84     Recent Readings 2/7/2018 2/4/2018 2/4/2018 2/4/2018 2/3/2018    SBP (mmHg) 149 145 141 141 144    DBP (mmHg) 93 80 85 103 86    Pulse 72 78 60 64 75      Recommend changing amlodipine 10mg-benazepril 20mg to amlodipine 10mg and valsartan 160mg daily once I reach the patient.    4/17: Patient is discharged from our program for non-compliance.

## 2018-02-07 NOTE — LETTER
Ruby York, PharmD  [address] [facility] [address]     Dear Guillermo,    Thank you for enrolling in the [facility] Hypertension Digital Medicine Program. To participate in our program, we ask that you submit a blood pressure reading at least once weekly through your [facility] Account and maintain regular contact with your Care Team.  We have not received any data or heard from you in some time.     The Digital Medicine Care Team has attempted to reach you on multiple occasions to determine if you would like to continue participating in the program. While we encourage you to continue participating fully, we understand that circumstances may change.      To continue participating in the program, please contact me at [phone]. If we do not hear back, you will be un-enrolled and your physician will be notified of your decision.    If you have submitted blood pressure readings during the past 3 days and believe you are receiving this letter in error, please call the Digital Medicine Patient Support Line at [phone] for troubleshooting.      We look forward to hearing from you soon.    Sincerely,     Ruby York, PharmD  Your Personal Clinical Pharmacist                                                                                                                        Guillermo Castillo  [address] [address]

## 2018-02-19 RX ORDER — ROSUVASTATIN CALCIUM 10 MG/1
10 TABLET, COATED ORAL DAILY
Qty: 30 TABLET | Refills: 8 | Status: SHIPPED | OUTPATIENT
Start: 2018-02-19 | End: 2018-09-04 | Stop reason: SDUPTHER

## 2018-03-05 ENCOUNTER — OFFICE VISIT (OUTPATIENT)
Dept: OPHTHALMOLOGY | Facility: CLINIC | Age: 48
End: 2018-03-05
Payer: COMMERCIAL

## 2018-03-05 DIAGNOSIS — H52.4 BILATERAL PRESBYOPIA: ICD-10-CM

## 2018-03-05 DIAGNOSIS — E11.9 DIABETES MELLITUS TYPE 2 WITHOUT RETINOPATHY: Primary | ICD-10-CM

## 2018-03-05 PROCEDURE — 99999 PR PBB SHADOW E&M-EST. PATIENT-LVL I: CPT | Mod: PBBFAC,,, | Performed by: OPTOMETRIST

## 2018-03-05 PROCEDURE — 92015 DETERMINE REFRACTIVE STATE: CPT | Mod: S$GLB,,, | Performed by: OPTOMETRIST

## 2018-03-05 PROCEDURE — 92014 COMPRE OPH EXAM EST PT 1/>: CPT | Mod: S$GLB,,, | Performed by: OPTOMETRIST

## 2018-03-05 NOTE — PROGRESS NOTES
HPI     NIDDM exam. No visual complaints. Last eye exam 11/15/2016 TRF. Patient   wears reading glasses. Update glasses RX. Glasses scratch up and nose   piece missing.    Last edited by Christina Felipe on 3/5/2018  2:45 PM. (History)            Assessment /Plan     For exam results, see Encounter Report.    Diabetes mellitus type 2 without retinopathy    Bilateral presbyopia      No Background Diabetic Retinopathy    Dispense Final Rx for glasses.  RTC 1 year

## 2018-03-16 ENCOUNTER — HOSPITAL ENCOUNTER (EMERGENCY)
Facility: HOSPITAL | Age: 48
Discharge: HOME | End: 2018-03-17
Attending: EMERGENCY MEDICINE
Payer: COMMERCIAL

## 2018-03-16 DIAGNOSIS — E86.0 MILD DEHYDRATION: Primary | ICD-10-CM

## 2018-03-16 DIAGNOSIS — R50.9 FEVER: ICD-10-CM

## 2018-03-16 DIAGNOSIS — R42 DIZZINESS: ICD-10-CM

## 2018-03-16 LAB
ALBUMIN SERPL BCP-MCNC: 3.9 G/DL
ALP SERPL-CCNC: 103 U/L
ALT SERPL W/O P-5'-P-CCNC: 25 U/L
ANION GAP SERPL CALC-SCNC: 9 MMOL/L
AST SERPL-CCNC: 21 U/L
BASOPHILS # BLD AUTO: 0 K/UL
BASOPHILS NFR BLD: 0 %
BILIRUB SERPL-MCNC: 1.2 MG/DL
BILIRUB UR QL STRIP: NEGATIVE
BUN SERPL-MCNC: 25 MG/DL
CALCIUM SERPL-MCNC: 9.2 MG/DL
CHLORIDE SERPL-SCNC: 104 MMOL/L
CLARITY UR: CLEAR
CO2 SERPL-SCNC: 23 MMOL/L
COLOR UR: YELLOW
CREAT SERPL-MCNC: 1.1 MG/DL
DIFFERENTIAL METHOD: ABNORMAL
EOSINOPHIL # BLD AUTO: 0 K/UL
EOSINOPHIL NFR BLD: 0.3 %
ERYTHROCYTE [DISTWIDTH] IN BLOOD BY AUTOMATED COUNT: 14.4 %
EST. GFR  (AFRICAN AMERICAN): >60 ML/MIN/1.73 M^2
EST. GFR  (NON AFRICAN AMERICAN): >60 ML/MIN/1.73 M^2
FLUAV AG SPEC QL IA: NEGATIVE
FLUBV AG SPEC QL IA: NEGATIVE
GLUCOSE SERPL-MCNC: 157 MG/DL
GLUCOSE UR QL STRIP: NEGATIVE
HCT VFR BLD AUTO: 38.5 %
HGB BLD-MCNC: 13.4 G/DL
HGB UR QL STRIP: NEGATIVE
KETONES UR QL STRIP: NEGATIVE
LACTATE SERPL-SCNC: 2.4 MMOL/L
LEUKOCYTE ESTERASE UR QL STRIP: NEGATIVE
LYMPHOCYTES # BLD AUTO: 0.4 K/UL
LYMPHOCYTES NFR BLD: 6.9 %
MCH RBC QN AUTO: 28.7 PG
MCHC RBC AUTO-ENTMCNC: 34.8 G/DL
MCV RBC AUTO: 82 FL
MONOCYTES # BLD AUTO: 0.3 K/UL
MONOCYTES NFR BLD: 4.6 %
NEUTROPHILS # BLD AUTO: 5.5 K/UL
NEUTROPHILS NFR BLD: 88.2 %
NITRITE UR QL STRIP: NEGATIVE
PH UR STRIP: 6 [PH] (ref 5–8)
PLATELET # BLD AUTO: 170 K/UL
PMV BLD AUTO: 9.4 FL
POTASSIUM SERPL-SCNC: 3.8 MMOL/L
PROT SERPL-MCNC: 7 G/DL
PROT UR QL STRIP: ABNORMAL
RBC # BLD AUTO: 4.67 M/UL
SODIUM SERPL-SCNC: 136 MMOL/L
SP GR UR STRIP: 1.02 (ref 1–1.03)
SPECIMEN SOURCE: NORMAL
TROPONIN I SERPL DL<=0.01 NG/ML-MCNC: <0.006 NG/ML
URN SPEC COLLECT METH UR: ABNORMAL
UROBILINOGEN UR STRIP-ACNC: NEGATIVE EU/DL
WBC # BLD AUTO: 6.24 K/UL

## 2018-03-16 PROCEDURE — 80053 COMPREHEN METABOLIC PANEL: CPT

## 2018-03-16 PROCEDURE — 83605 ASSAY OF LACTIC ACID: CPT

## 2018-03-16 PROCEDURE — 81003 URINALYSIS AUTO W/O SCOPE: CPT

## 2018-03-16 PROCEDURE — 96360 HYDRATION IV INFUSION INIT: CPT

## 2018-03-16 PROCEDURE — 84484 ASSAY OF TROPONIN QUANT: CPT

## 2018-03-16 PROCEDURE — 96361 HYDRATE IV INFUSION ADD-ON: CPT

## 2018-03-16 PROCEDURE — 25000003 PHARM REV CODE 250: Performed by: EMERGENCY MEDICINE

## 2018-03-16 PROCEDURE — 36415 COLL VENOUS BLD VENIPUNCTURE: CPT

## 2018-03-16 PROCEDURE — 99284 EMERGENCY DEPT VISIT MOD MDM: CPT | Mod: 25

## 2018-03-16 PROCEDURE — 85025 COMPLETE CBC W/AUTO DIFF WBC: CPT

## 2018-03-16 PROCEDURE — 87400 INFLUENZA A/B EACH AG IA: CPT

## 2018-03-16 PROCEDURE — 93010 ELECTROCARDIOGRAM REPORT: CPT | Mod: ,,, | Performed by: INTERNAL MEDICINE

## 2018-03-16 RX ADMIN — SODIUM CHLORIDE 1000 ML: 0.9 INJECTION, SOLUTION INTRAVENOUS at 11:03

## 2018-03-17 VITALS
BODY MASS INDEX: 32.91 KG/M2 | SYSTOLIC BLOOD PRESSURE: 124 MMHG | RESPIRATION RATE: 16 BRPM | OXYGEN SATURATION: 100 % | HEART RATE: 89 BPM | WEIGHT: 242.94 LBS | TEMPERATURE: 99 F | HEIGHT: 72 IN | DIASTOLIC BLOOD PRESSURE: 62 MMHG

## 2018-03-17 PROCEDURE — 25000003 PHARM REV CODE 250: Performed by: EMERGENCY MEDICINE

## 2018-03-17 RX ORDER — ONDANSETRON 4 MG/1
4 TABLET, FILM COATED ORAL EVERY 8 HOURS PRN
Qty: 12 TABLET | Refills: 0 | Status: SHIPPED | OUTPATIENT
Start: 2018-03-17 | End: 2018-12-03

## 2018-03-17 RX ADMIN — SODIUM CHLORIDE 1000 ML: 0.9 INJECTION, SOLUTION INTRAVENOUS at 12:03

## 2018-03-17 NOTE — ED PROVIDER NOTES
SCRIBE #1 NOTE: I, Mariya Naranjo, am scribing for, and in the presence of, Lorena Jones MD. I have scribed the entire note.      History      Chief Complaint   Patient presents with    Dizziness     +nausea x 1 day       Review of patient's allergies indicates:   Allergen Reactions    Hydrochlorothiazide (bulk)      Other reaction(s): gout    Simvastatin      Other reaction(s): aches/inc lfts        HPI   HPI    3/16/2018, 10:42 PM   History obtained from the patient      History of Present Illness: Guillermo Castillo is a 48 y.o. male patient who presents to the Emergency Department for dizziness which onset gradually this morning. Symptoms are intermittent and moderate in severity. Pt states sxs have been worsening since this am.  Sxs are exacerbated with standing. Associated sxs include nausea, diaphoresis, subjective fever, back pain, diffuse myalgias, cough, rhinorrhea, and abd pain . Patient denies any vomiting, diarrhea, syncope, HA , hematochezia, dysuria, hematuria, weakness, SOB , CP and all other sxs at this time. No prior Tx reported. Pt states he received the flu shot this year. No further complaints or concerns at this time.         Arrival mode: Personal vehicle     PCP: Kip Siddiqui MD       Past Medical History:  Past Medical History:   Diagnosis Date    DM (diabetes mellitus) 05/2013     x 1 week ago 11/11/2015    DM (diabetes mellitus) 05/2013     03/01/2018 last tested    Fatty liver     Gout     Hyperlipidemia     Hypertension     Hypertriglyceridemia     Mood disorder     Panic disorder     Type 2 diabetes mellitus     05/2013 BS       Past Surgical History:  Past Surgical History:   Procedure Laterality Date    APPENDECTOMY           Family History:  Family History   Problem Relation Age of Onset    Coronary artery disease Father     Diabetes Father     Lung cancer Father     Diabetes Maternal Aunt     Diabetes Paternal Aunt     Hypertension Maternal  Grandmother     Hypertension Mother        Social History:  Social History     Social History Main Topics    Smoking status: Never Smoker    Smokeless tobacco: Never Used    Alcohol use Yes      Comment: rarely    Drug use: Unknown    Sexual activity: unknown       ROS   Review of Systems   Constitutional: Positive for diaphoresis and fever (subjective). Negative for chills.   HENT: Positive for rhinorrhea. Negative for congestion and sore throat.    Respiratory: Negative for shortness of breath.    Cardiovascular: Negative for chest pain.   Gastrointestinal: Positive for abdominal pain and nausea. Negative for constipation, diarrhea and vomiting.   Genitourinary: Negative for dysuria and hematuria.   Musculoskeletal: Positive for back pain and myalgias (diffuse). Negative for neck pain and neck stiffness.   Neurological: Positive for dizziness. Negative for syncope, weakness and headaches.       Physical Exam      Initial Vitals [03/16/18 2212]   BP Pulse Resp Temp SpO2   120/72 95 20 98.8 °F (37.1 °C) 97 %      MAP       88          Physical Exam  Nursing Notes and Vital Signs Reviewed.  Constitutional: Patient is in no apparent distress. Well-developed and well-nourished.  Head: Atraumatic. Normocephalic.  Eyes: PERRL. EOM intact. Conjunctivae are not pale. No scleral icterus.  ENT: Mucous membranes are moist. Oropharynx is clear and symmetric.    Neck: Supple. Full ROM. No lymphadenopathy.  Cardiovascular: Regular rate. Regular rhythm. No murmurs, rubs, or gallops. Distal pulses are 2+ and symmetric.  Pulmonary/Chest: No respiratory distress. Clear to auscultation bilaterally. No wheezing or rales.  Abdominal: Soft and non-distended.  There is no tenderness.  No rebound, guarding, or rigidity. Good bowel sounds.  Genitourinary: No CVA tenderness  Musculoskeletal: Moves all extremities. No obvious deformities. No edema. No calf tenderness.  Skin: Warm and dry.  Neurological:  Alert, awake, and appropriate.   Normal speech.  No acute focal neurological deficits are appreciated.  Psychiatric: Normal affect. Good eye contact. Appropriate in content.    ED Course    Procedures  ED Vital Signs:  Vitals:    03/16/18 2212 03/16/18 2258 03/16/18 2301 03/16/18 2305   BP: 120/72 122/61 122/72 122/72   Pulse: 95 98 90 98   Resp: 20 18 18 19   Temp: 98.8 °F (37.1 °C)      TempSrc: Oral      SpO2: 97% 97% 100% 100%   Weight: 110.2 kg (242 lb 15.2 oz)      Height: 6' (1.829 m)       03/16/18 2330 03/17/18 0118   BP:  124/62   Pulse: 82 89   Resp:  16   Temp:  98.6 °F (37 °C)   TempSrc:  Oral   SpO2:  100%   Weight:     Height:         Abnormal Lab Results:  Labs Reviewed   CBC W/ AUTO DIFFERENTIAL - Abnormal; Notable for the following:        Result Value    Hemoglobin 13.4 (*)     Hematocrit 38.5 (*)     Lymph # 0.4 (*)     Gran% 88.2 (*)     Lymph% 6.9 (*)     All other components within normal limits   COMPREHENSIVE METABOLIC PANEL - Abnormal; Notable for the following:     Glucose 157 (*)     BUN, Bld 25 (*)     Total Bilirubin 1.2 (*)     All other components within normal limits   LACTIC ACID, PLASMA - Abnormal; Notable for the following:     Lactate (Lactic Acid) 2.4 (*)     All other components within normal limits   URINALYSIS - Abnormal; Notable for the following:     Protein, UA Trace (*)     All other components within normal limits   TROPONIN I   INFLUENZA A AND B ANTIGEN        All Lab Results:  Results for orders placed or performed during the hospital encounter of 03/16/18   CBC auto differential   Result Value Ref Range    WBC 6.24 3.90 - 12.70 K/uL    RBC 4.67 4.60 - 6.20 M/uL    Hemoglobin 13.4 (L) 14.0 - 18.0 g/dL    Hematocrit 38.5 (L) 40.0 - 54.0 %    MCV 82 82 - 98 fL    MCH 28.7 27.0 - 31.0 pg    MCHC 34.8 32.0 - 36.0 g/dL    RDW 14.4 11.5 - 14.5 %    Platelets 170 150 - 350 K/uL    MPV 9.4 9.2 - 12.9 fL    Gran # (ANC) 5.5 1.8 - 7.7 K/uL    Lymph # 0.4 (L) 1.0 - 4.8 K/uL    Mono # 0.3 0.3 - 1.0 K/uL    Eos #  0.0 0.0 - 0.5 K/uL    Baso # 0.00 0.00 - 0.20 K/uL    Gran% 88.2 (H) 38.0 - 73.0 %    Lymph% 6.9 (L) 18.0 - 48.0 %    Mono% 4.6 4.0 - 15.0 %    Eosinophil% 0.3 0.0 - 8.0 %    Basophil% 0.0 0.0 - 1.9 %    Differential Method Automated    Comprehensive metabolic panel   Result Value Ref Range    Sodium 136 136 - 145 mmol/L    Potassium 3.8 3.5 - 5.1 mmol/L    Chloride 104 95 - 110 mmol/L    CO2 23 23 - 29 mmol/L    Glucose 157 (H) 70 - 110 mg/dL    BUN, Bld 25 (H) 6 - 20 mg/dL    Creatinine 1.1 0.5 - 1.4 mg/dL    Calcium 9.2 8.7 - 10.5 mg/dL    Total Protein 7.0 6.0 - 8.4 g/dL    Albumin 3.9 3.5 - 5.2 g/dL    Total Bilirubin 1.2 (H) 0.1 - 1.0 mg/dL    Alkaline Phosphatase 103 55 - 135 U/L    AST 21 10 - 40 U/L    ALT 25 10 - 44 U/L    Anion Gap 9 8 - 16 mmol/L    eGFR if African American >60 >60 mL/min/1.73 m^2    eGFR if non African American >60 >60 mL/min/1.73 m^2   Lactic acid, plasma   Result Value Ref Range    Lactate (Lactic Acid) 2.4 (H) 0.5 - 2.2 mmol/L   Urinalysis   Result Value Ref Range    Specimen UA Urine, Clean Catch     Color, UA Yellow Yellow, Straw, Xiomy    Appearance, UA Clear Clear    pH, UA 6.0 5.0 - 8.0    Specific Gravity, UA 1.025 1.005 - 1.030    Protein, UA Trace (A) Negative    Glucose, UA Negative Negative    Ketones, UA Negative Negative    Bilirubin (UA) Negative Negative    Occult Blood UA Negative Negative    Nitrite, UA Negative Negative    Urobilinogen, UA Negative <2.0 EU/dL    Leukocytes, UA Negative Negative   Troponin I   Result Value Ref Range    Troponin I <0.006 0.000 - 0.026 ng/mL   Influenza antigen Nasopharyngeal Swab   Result Value Ref Range    Influenza A Ag, EIA Negative Negative    Influenza B Ag, EIA Negative Negative    Flu A & B Source Nasopharyngeal Swab          Imaging Results:  Imaging Results          X-Ray Chest 1 View (Final result)  Result time 03/16/18 23:48:08    Final result by Linda Pham MD (03/16/18 23:48:08)                 Impression:      No acute cardiopulmonary disease.      Electronically signed by: JARAD BENOIT MD  Date:     03/16/18  Time:    23:48              Narrative:    EXAM:   XR CHEST 1 VIEW  CLINICAL HISTORY:  Fever  COMPARISON: September 2016  Findings:   The lungs are clear. The cardiac silhouette is within normal limits.  No aggressive osseous lesions.                                  The EKG was ordered, reviewed, and independently interpreted by the ED provider.  Interpretation time: 22:44  Rate: 88 BPM  Rhythm: normal sinus rhythm  Interpretation: No STEMI.      The Emergency Provider reviewed the vital signs and test results, which are outlined above.    ED Discussion     1:23 AM:. Discussed with pt all pertinent ED information and results. Discussed pt dx and plan of tx. Gave pt all f/u and return to the ED instructions. All questions and concerns were addressed at this time. Pt expresses understanding of information and instructions, and is comfortable with plan to discharge. Pt is stable for discharge.    I discussed with patient and/or family/caretaker that evaluation in the ED does not suggest any emergent or life threatening medical conditions requiring immediate intervention beyond what was provided in the ED, and I believe patient is safe for discharge.  Regardless, an unremarkable evaluation in the ED does not preclude the development or presence of a serious of life threatening condition. As such, patient was instructed to return immediately for any worsening or change in current symptoms.        ED Medication(s):  Medications   sodium chloride 0.9% bolus 1,000 mL (0 mLs Intravenous Stopped 3/17/18 0001)   sodium chloride 0.9% bolus 1,000 mL (0 mLs Intravenous Stopped 3/17/18 0122)       Discharge Medication List as of 3/17/2018  1:19 AM      START taking these medications    Details   ondansetron (ZOFRAN) 4 MG tablet Take 1 tablet (4 mg total) by mouth every 8 (eight) hours as needed for Nausea., Starting Sat  3/17/2018, Print             Follow-up Information     Kip Siddiqui MD In 2 days.    Specialty:  Family Medicine  Contact information:  [address]  [place] [address] [phone]             [facility] - .    Specialty:  Emergency Medicine  Why:  As needed, If symptoms worsen  Contact information:  [address]  [facility] [address] [phone]                   Medical Decision Making    Medical Decision Making:   Clinical Tests:   Lab Tests: Ordered and Reviewed  Radiological Study: Ordered and Reviewed  Medical Tests: Ordered and Reviewed           Scribe Attestation:   Scribe #1: I performed the above scribed service and the documentation accurately describes the services I performed. I attest to the accuracy of the note.    Attending:   Physician Attestation Statement for Scribe #1: I, Lorena Jones MD, personally performed the services described in this documentation, as scribed by Mariya Naranjo, in my presence, and it is both accurate and complete.          Clinical Impression       ICD-10-CM ICD-9-CM   1. Mild dehydration E86.0 276.51   2. Dizziness R42 780.4   3. Fever R50.9 780.60       Disposition:   Disposition: Discharged  Condition: Stable         Lorena Jones MD  03/17/18 6832

## 2018-03-19 ENCOUNTER — OFFICE VISIT (OUTPATIENT)
Dept: INTERNAL MEDICINE | Facility: CLINIC | Age: 48
End: 2018-03-19
Payer: COMMERCIAL

## 2018-03-19 ENCOUNTER — HOSPITAL ENCOUNTER (OUTPATIENT)
Dept: RADIOLOGY | Facility: HOSPITAL | Age: 48
Discharge: HOME | End: 2018-03-19
Attending: NURSE PRACTITIONER
Payer: COMMERCIAL

## 2018-03-19 VITALS
WEIGHT: 237.56 LBS | BODY MASS INDEX: 32.18 KG/M2 | SYSTOLIC BLOOD PRESSURE: 140 MMHG | TEMPERATURE: 98 F | DIASTOLIC BLOOD PRESSURE: 82 MMHG | OXYGEN SATURATION: 97 % | HEIGHT: 72 IN | HEART RATE: 75 BPM

## 2018-03-19 DIAGNOSIS — R10.33 PERIUMBILICAL ABDOMINAL PAIN: ICD-10-CM

## 2018-03-19 DIAGNOSIS — R19.7 DIARRHEA, UNSPECIFIED TYPE: ICD-10-CM

## 2018-03-19 DIAGNOSIS — R10.9 ABDOMINAL PAIN, UNSPECIFIED ABDOMINAL LOCATION: Primary | ICD-10-CM

## 2018-03-19 PROCEDURE — 74019 RADEX ABDOMEN 2 VIEWS: CPT | Mod: TC,FY,PO

## 2018-03-19 PROCEDURE — 74019 RADEX ABDOMEN 2 VIEWS: CPT | Mod: 26,,, | Performed by: RADIOLOGY

## 2018-03-19 PROCEDURE — 99213 OFFICE O/P EST LOW 20 MIN: CPT | Mod: S$GLB,,, | Performed by: NURSE PRACTITIONER

## 2018-03-19 PROCEDURE — 99999 PR PBB SHADOW E&M-EST. PATIENT-LVL IV: CPT | Mod: PBBFAC,,, | Performed by: NURSE PRACTITIONER

## 2018-03-19 PROCEDURE — 3079F DIAST BP 80-89 MM HG: CPT | Mod: CPTII,S$GLB,, | Performed by: NURSE PRACTITIONER

## 2018-03-19 PROCEDURE — 3077F SYST BP >= 140 MM HG: CPT | Mod: CPTII,S$GLB,, | Performed by: NURSE PRACTITIONER

## 2018-03-19 NOTE — PATIENT INSTRUCTIONS
Clear Liquid Diet    Clear liquids are any liquid that you can see through. They are also very easy to digest. You may be put on a clear liquid diet if you are recovering from irritation or infection of the stomach or intestinal tract. This diet may also be used before surgery or special procedures such as a colonoscopy. You should not be on this diet for more than 3 days. Below are some clear liquids you can have on this diet.  Adults and children over 2 years old  Adults should drink a total of 2 to 3 quarts of liquid per day. It may be easier to drink small frequent servings rather than a few large ones. Liquids can include:  · Fruit juices. Strained orange juice or lemonade (no pulp); apple, grape, and cranberry juice; clear fruit drinks  · Beverages. Sport drinks, sodas, mineral water (plain or flavored), tea, black coffee, liquid gelatin (add twice the recommended amount of water)  · Soups. Clear broth  · Desserts. Plain gelatin, popsicles, fruit juice bars  Children under 2 years old  Oral rehydration fluids are available at drug stores and most grocery stores. You dont need a prescription.  Date Last Reviewed: 8/1/2016  © 5769-6093 [employer]. [address] [place] [address], [place] [address]. All rights reserved. This information is not intended as a substitute for professional medical care. Always follow your healthcare professional's instructions.

## 2018-03-19 NOTE — LETTER
March 19, 2018                 [facility] Internal Medicine  Internal Medicine  [address] [place] [address]  Phone: [phone]  Fax: [phone]   March 19, 2018     Patient: Guillermo Castillo   YOB: 1970   Date of Visit: 3/19/2018       To Whom it May Concern:    Guillermo Castillo was seen in my clinic on 3/19/2018. He may return to work on 3/21/2018.  Please excuse from 3/16/2018-3/21/2018.    If you have any questions or concerns, please don't hesitate to call.    Sincerely,         Ruby Ortega NP

## 2018-03-27 ENCOUNTER — OFFICE VISIT (OUTPATIENT)
Dept: SLEEP MEDICINE | Facility: CLINIC | Age: 48
End: 2018-03-27
Payer: COMMERCIAL

## 2018-03-27 VITALS
OXYGEN SATURATION: 97 % | DIASTOLIC BLOOD PRESSURE: 78 MMHG | SYSTOLIC BLOOD PRESSURE: 122 MMHG | HEIGHT: 72 IN | RESPIRATION RATE: 18 BRPM | BODY MASS INDEX: 32.49 KG/M2 | HEART RATE: 64 BPM | WEIGHT: 239.88 LBS

## 2018-03-27 DIAGNOSIS — G47.26 SLEEP DISORDER, SHIFT WORK: ICD-10-CM

## 2018-03-27 DIAGNOSIS — G47.33 OSA (OBSTRUCTIVE SLEEP APNEA): Primary | ICD-10-CM

## 2018-03-27 PROCEDURE — 3078F DIAST BP <80 MM HG: CPT | Mod: CPTII,S$GLB,, | Performed by: NURSE PRACTITIONER

## 2018-03-27 PROCEDURE — 99999 PR PBB SHADOW E&M-EST. PATIENT-LVL III: CPT | Mod: PBBFAC,,, | Performed by: NURSE PRACTITIONER

## 2018-03-27 PROCEDURE — 99213 OFFICE O/P EST LOW 20 MIN: CPT | Mod: S$GLB,,, | Performed by: NURSE PRACTITIONER

## 2018-03-27 PROCEDURE — 3074F SYST BP LT 130 MM HG: CPT | Mod: CPTII,S$GLB,, | Performed by: NURSE PRACTITIONER

## 2018-03-27 NOTE — PROGRESS NOTES
Subjective:      Patient ID: Guillermo Castillo is a 48 y.o. male.    Chief Complaint: Sleep Apnea    Presents to office for review of AutoPAP therapy. Patient states improved symptoms with use of AutoPAP. Sleeping more soundly. Waking up feeling more refreshed. Improved daytime sleepiness. Patient states he is benefiting from use of the AutoPAP. He works the night shift with [employer] so sleep quality has not improved much. He is requesting to start at higher pressure.     Patient Active Problem List:     Fatty liver     Type 2 diabetes mellitus     Mood disorder     Hypertriglyceridemia     Hyperlipidemia     Hypertension     Chronic gout     ARPAN (obstructive sleep apnea)            /78   Pulse 64   Resp 18   Ht 6' (1.829 m)   Wt 108.8 kg (239 lb 13.8 oz)   SpO2 97%   BMI 32.53 kg/m²   Body mass index is 32.53 kg/m².    Review of Systems   Constitutional: Negative.    HENT: Negative.    Respiratory: Negative.    Cardiovascular: Negative.    Musculoskeletal: Negative.    Gastrointestinal: Negative.    Neurological: Negative.    Psychiatric/Behavioral: Negative.      Objective:      Physical Exam   Constitutional: He is oriented to person, place, and time. He appears well-developed and well-nourished.   HENT:   Head: Normocephalic and atraumatic.   Neck: Normal range of motion. Neck supple.   Neurological: He is alert and oriented to person, place, and time.   Skin: Skin is warm and dry.   Psychiatric: He has a normal mood and affect.     Personal Diagnostic Review  Autopap download: Patient wears on average 7 hrs and 17 minutes. Greater than 4 hrs 93.3 % of the time. 90% percentile pressure 10 with AHI 4 events/hr    Assessment:       1. ARPAN (obstructive sleep apnea)    2. Sleep disorder, shift work        Outpatient Encounter Prescriptions as of 3/27/2018   Medication Sig Dispense Refill    amlodipine-benazepril 5-10 mg (LOTREL) 5-10 mg per capsule TAKE 1 CAPSULE BY MOUTH ONCE DAILY. 30 capsule 11    aspirin  81 mg Tab Take 1 tablet by mouth Daily.      DULoxetine (CYMBALTA) 60 MG capsule TAKE ONE CAPSULE BY MOUTH EVERY DAY AS DIRECTED 30 capsule 11    ibuprofen (ADVIL,MOTRIN) 800 MG tablet Take 1 tablet (800 mg total) by mouth every 6 (six) hours as needed for Pain. 30 tablet 5    metformin (GLUCOPHAGE-XR) 500 MG 24 hr tablet TAKE TWO TABLETS BY MOUTH ONCE DAILY 60 tablet 10    omeprazole (PRILOSEC) 40 MG capsule TAKE 1 CAPSULE (40 MG TOTAL) BY MOUTH ONCE DAILY. 30 capsule 1    ondansetron (ZOFRAN) 4 MG tablet Take 1 tablet (4 mg total) by mouth every 8 (eight) hours as needed for Nausea. 12 tablet 0    rosuvastatin (CRESTOR) 10 MG tablet TAKE 1 TABLET (10 MG TOTAL) BY MOUTH ONCE DAILY. 30 tablet 8    indomethacin (INDOCIN) 50 MG capsule 1 po tid x 2 days then tid prn only 30 capsule 0     No facility-administered encounter medications on file as of 3/27/2018.      No orders of the defined types were placed in this encounter.    Plan:   Doing well on PAP. Increase start pressure to 7-15cm H2O. Patient is compliant. Follow up in 12 months with PAP data download or call earlier if any problems.

## 2018-04-09 RX ORDER — IBUPROFEN 800 MG/1
800 TABLET ORAL EVERY 6 HOURS PRN
Qty: 30 TABLET | Refills: 5 | Status: SHIPPED | OUTPATIENT
Start: 2018-04-09 | End: 2018-07-27 | Stop reason: SDUPTHER

## 2018-05-08 RX ORDER — METFORMIN HYDROCHLORIDE 500 MG/1
TABLET, EXTENDED RELEASE ORAL
Qty: 60 TABLET | Refills: 10 | Status: SHIPPED | OUTPATIENT
Start: 2018-05-08 | End: 2019-01-29

## 2018-06-13 RX ORDER — METFORMIN HYDROCHLORIDE 500 MG/1
TABLET, EXTENDED RELEASE ORAL
Qty: 60 TABLET | Refills: 10 | Status: SHIPPED | OUTPATIENT
Start: 2018-06-13 | End: 2019-07-10 | Stop reason: SDUPTHER

## 2018-06-18 ENCOUNTER — OFFICE VISIT (OUTPATIENT)
Dept: INTERNAL MEDICINE | Facility: CLINIC | Age: 48
End: 2018-06-18
Payer: COMMERCIAL

## 2018-06-18 ENCOUNTER — LAB VISIT (OUTPATIENT)
Dept: LAB | Facility: HOSPITAL | Age: 48
End: 2018-06-18
Attending: FAMILY MEDICINE
Payer: COMMERCIAL

## 2018-06-18 VITALS
DIASTOLIC BLOOD PRESSURE: 82 MMHG | SYSTOLIC BLOOD PRESSURE: 126 MMHG | TEMPERATURE: 97 F | OXYGEN SATURATION: 97 % | BODY MASS INDEX: 32.79 KG/M2 | HEART RATE: 67 BPM | HEIGHT: 72 IN | WEIGHT: 242.06 LBS

## 2018-06-18 DIAGNOSIS — E11.9 TYPE 2 DIABETES MELLITUS WITHOUT COMPLICATION, WITHOUT LONG-TERM CURRENT USE OF INSULIN: ICD-10-CM

## 2018-06-18 DIAGNOSIS — H65.01 RIGHT ACUTE SEROUS OTITIS MEDIA, RECURRENCE NOT SPECIFIED: Primary | ICD-10-CM

## 2018-06-18 LAB
CREAT UR-MCNC: 33 MG/DL
MICROALBUMIN UR DL<=1MG/L-MCNC: 3 UG/ML
MICROALBUMIN/CREATININE RATIO: 9.1 UG/MG

## 2018-06-18 PROCEDURE — 3079F DIAST BP 80-89 MM HG: CPT | Mod: CPTII,S$GLB,, | Performed by: PHYSICIAN ASSISTANT

## 2018-06-18 PROCEDURE — 96372 THER/PROPH/DIAG INJ SC/IM: CPT | Mod: S$GLB,,, | Performed by: PHYSICIAN ASSISTANT

## 2018-06-18 PROCEDURE — 99999 PR PBB SHADOW E&M-EST. PATIENT-LVL IV: CPT | Mod: PBBFAC,,, | Performed by: PHYSICIAN ASSISTANT

## 2018-06-18 PROCEDURE — 3008F BODY MASS INDEX DOCD: CPT | Mod: CPTII,S$GLB,, | Performed by: PHYSICIAN ASSISTANT

## 2018-06-18 PROCEDURE — 99214 OFFICE O/P EST MOD 30 MIN: CPT | Mod: 25,S$GLB,, | Performed by: PHYSICIAN ASSISTANT

## 2018-06-18 PROCEDURE — 82043 UR ALBUMIN QUANTITATIVE: CPT

## 2018-06-18 PROCEDURE — 3074F SYST BP LT 130 MM HG: CPT | Mod: CPTII,S$GLB,, | Performed by: PHYSICIAN ASSISTANT

## 2018-06-18 RX ORDER — METHYLPREDNISOLONE ACETATE 40 MG/ML
60 INJECTION, SUSPENSION INTRA-ARTICULAR; INTRALESIONAL; INTRAMUSCULAR; SOFT TISSUE ONCE
Status: COMPLETED | OUTPATIENT
Start: 2018-06-18 | End: 2018-06-18

## 2018-06-18 RX ADMIN — METHYLPREDNISOLONE ACETATE 60 MG: 40 INJECTION, SUSPENSION INTRA-ARTICULAR; INTRALESIONAL; INTRAMUSCULAR; SOFT TISSUE at 09:06

## 2018-06-18 NOTE — PATIENT INSTRUCTIONS
-start antihistamine daily for the next 2-4 weeks. (zyrtec, Claritin,or allegra) and take once daily.   -tylenol for pain today  -tomorrow, start ibuprofen or motrin. Take 2-3 tablets three times daily with food. Or start Aleve take 2 OTC tablets with food twice daily.   -if no improvement over the next week, contact clinic, may need to see ENT     Common Middle Ear Problems    Your middle ear may have been injured or infected recently. Over time, certain growths or bone disease can also harm the middle ear. Left untreated, middle ear problems often lead to lifelong hearing loss. There are two types of hearing loss: conductive and sensorineural. One or both kinds can occur. Injury, infection, certain growths, or bone disease can cause your symptoms. A ruptured eardrum or a long-lasting (chronic) ear infection may be painful and decrease hearing.  Symptoms  · Hearing loss in one or both ears  · Fluid, often smelly, draining from the ear  · Pain, pressure, or discomfort in the ear  · Ringing in the ear  Conductive and sensorineural hearing loss  Sound waves may be disrupted before they reach the inner ear. If this happens, conductive hearing loss may occur. The ear canal can be blocked by wax, infection, a tumor, or a foreign object. The eardrum can be injured or infected. Abnormal bone growth, infection, or tumors in the middle ear can block sound waves.  Sound waves may not be processed correctly in the inner ear. If this happens, sensorineural hearing loss may occur. Permanent hearing loss is most commonly associated with sensorineural problems.  The tests and evaluations used to diagnose what type of hearing problem you have will depend on your symptoms.   Date Last Reviewed: 10/1/2016  © 7847-0688 [employer]. [address] [place] [address], [place] [address]. All rights reserved. This information is not intended as a substitute for professional medical care. Always follow your healthcare professional's  instructions.

## 2018-06-26 ENCOUNTER — PATIENT OUTREACH (OUTPATIENT)
Dept: ADMINISTRATIVE | Facility: HOSPITAL | Age: 48
End: 2018-06-26

## 2018-07-09 ENCOUNTER — OFFICE VISIT (OUTPATIENT)
Dept: INTERNAL MEDICINE | Facility: CLINIC | Age: 48
End: 2018-07-09
Payer: COMMERCIAL

## 2018-07-09 VITALS
HEART RATE: 64 BPM | SYSTOLIC BLOOD PRESSURE: 128 MMHG | TEMPERATURE: 96 F | OXYGEN SATURATION: 98 % | DIASTOLIC BLOOD PRESSURE: 76 MMHG | HEIGHT: 72 IN | BODY MASS INDEX: 32.57 KG/M2 | WEIGHT: 240.5 LBS

## 2018-07-09 DIAGNOSIS — K76.0 FATTY LIVER: ICD-10-CM

## 2018-07-09 DIAGNOSIS — I10 ESSENTIAL HYPERTENSION: ICD-10-CM

## 2018-07-09 DIAGNOSIS — E78.5 HYPERLIPIDEMIA, UNSPECIFIED HYPERLIPIDEMIA TYPE: ICD-10-CM

## 2018-07-09 DIAGNOSIS — M1A.9XX0 CHRONIC GOUT WITHOUT TOPHUS, UNSPECIFIED CAUSE, UNSPECIFIED SITE: ICD-10-CM

## 2018-07-09 DIAGNOSIS — E11.9 TYPE 2 DIABETES MELLITUS WITHOUT COMPLICATION, WITHOUT LONG-TERM CURRENT USE OF INSULIN: Primary | ICD-10-CM

## 2018-07-09 PROCEDURE — 3078F DIAST BP <80 MM HG: CPT | Mod: CPTII,S$GLB,, | Performed by: FAMILY MEDICINE

## 2018-07-09 PROCEDURE — 99214 OFFICE O/P EST MOD 30 MIN: CPT | Mod: S$GLB,,, | Performed by: FAMILY MEDICINE

## 2018-07-09 PROCEDURE — 3044F HG A1C LEVEL LT 7.0%: CPT | Mod: CPTII,S$GLB,, | Performed by: FAMILY MEDICINE

## 2018-07-09 PROCEDURE — 99999 PR PBB SHADOW E&M-EST. PATIENT-LVL III: CPT | Mod: PBBFAC,,, | Performed by: FAMILY MEDICINE

## 2018-07-09 PROCEDURE — 3074F SYST BP LT 130 MM HG: CPT | Mod: CPTII,S$GLB,, | Performed by: FAMILY MEDICINE

## 2018-07-09 PROCEDURE — 3008F BODY MASS INDEX DOCD: CPT | Mod: CPTII,S$GLB,, | Performed by: FAMILY MEDICINE

## 2018-07-09 NOTE — PROGRESS NOTES
Subjective:       Patient ID: Guillermo Castillo is a male.    Chief Complaint: Multiple issues see below    HPI Type 2 diabetes: a1c controlled. Tolerating medicine. No hypoglycemia;   Hypercholesterolemia:brenda crstor   Gout:  No c/o recent flares.  Last one 8/17; d/wd prophylax /declines  Hypertension: blood pressures at home nl.  Tolerating medicine.   Fatty liver nl . No etoh long time;   gerd controlled. No sympt            Past Medical History   Diagnosis Date    Fatty liver     Gout     Hypertension     Hyperlipidemia     Hypertriglyceridemia     Mood disorder     Panic disorder     Type 2 diabetes mellitus      History reviewed. No pertinent past surgical history.  Family History   Problem Relation Age of Onset    Coronary artery disease Father     Diabetes Father     Lung cancer Father          Review of Systems   Respiratory: Negative for shortness of breath.    Cardiovascular: Negative for chest pain and leg swelling.       Objective:      Physical Exam   Constitutional: He is oriented to person, place, and time. He appears well-developed and well-nourished.   HENT:   Head: Normocephalic and atraumatic.   Eyes: Conjunctivae and EOM are normal. Pupils are equal, round, and reactive to light.   Neck: Normal range of motion. Neck supple. Carotid bruit is not present. no mass  Cardiovascular: Normal rate and regular rhythm.    Pulmonary/Chest: Effort normal and breath sounds normal.         Neurological: He is alert and oriented to person, place, and time.   Skin: Skin is warm and dry.   Psychiatric: He has a normal mood and affect. His behavior is normal. Judgment and thought content normal.             .        Assessment:       1. Type 2 diabetes mellitus    2. Hypertension    3. Gout    4. Hypercholesteremia    5. Fatty liver        Plan:     Lab and follow up 6 months  Type 2 diabetes mellitus without complication, without long-term current use of insulin  -     Hemoglobin A1c; Future; Expected  date: 01/05/2019    Chronic gout without tophus, unspecified cause, unspecified site    Fatty liver    Hyperlipidemia, unspecified hyperlipidemia type    Essential hypertension

## 2018-07-27 NOTE — TELEPHONE ENCOUNTER
Pt requested refill of ibuprofen 800 mg.  Notified pt that message will be sent to doctor for approval.  Pt verbalized understanding.

## 2018-07-27 NOTE — TELEPHONE ENCOUNTER
----- Message from Tammi Lazo sent at 7/27/2018  4:17 PM CDT -----  Contact: [facility] pharmacy   Caller is requesting a call back from the nurse in regards to pt getting a refill.  [phone] (home)           1. What is the name of the medication you are requesting? ibuprofen   2. What is the dose? 800 mg  3. How do you take the medication? Orally, topically, etc? Orally  4. How often do you take this medication? 1 every six hr.  5. Do you need a 30 day or 90 day supply? 30 day  6. How many refills are you requesting? Per   7. What is your preferred pharmacy and location of the pharmacy?     [facility]/pharmacy #[id number] - [place]  [address]  [address] [address]  Phone: [phone] Fax: [phone]    8. Who can we contact with further questions? pt

## 2018-07-30 RX ORDER — IBUPROFEN 800 MG/1
800 TABLET ORAL EVERY 6 HOURS PRN
Qty: 30 TABLET | Refills: 5 | Status: SHIPPED | OUTPATIENT
Start: 2018-07-30 | End: 2019-01-29

## 2018-09-04 RX ORDER — ROSUVASTATIN CALCIUM 10 MG/1
10 TABLET, COATED ORAL DAILY
Qty: 30 TABLET | Refills: 6 | Status: SHIPPED | OUTPATIENT
Start: 2018-09-04 | End: 2019-04-14 | Stop reason: SDUPTHER

## 2018-10-02 RX ORDER — DULOXETIN HYDROCHLORIDE 60 MG/1
CAPSULE, DELAYED RELEASE ORAL
Qty: 30 CAPSULE | Refills: 10 | Status: SHIPPED | OUTPATIENT
Start: 2018-10-02 | End: 2019-07-12 | Stop reason: SDUPTHER

## 2018-10-02 RX ORDER — AMLODIPINE AND BENAZEPRIL HYDROCHLORIDE 5; 10 MG/1; MG/1
1 CAPSULE ORAL DAILY
Qty: 30 CAPSULE | Refills: 10 | Status: SHIPPED | OUTPATIENT
Start: 2018-10-02 | End: 2019-05-01 | Stop reason: DRUGHIGH

## 2018-10-04 ENCOUNTER — OFFICE VISIT (OUTPATIENT)
Dept: INTERNAL MEDICINE | Facility: CLINIC | Age: 48
End: 2018-10-04
Payer: COMMERCIAL

## 2018-10-04 ENCOUNTER — HOSPITAL ENCOUNTER (OUTPATIENT)
Dept: RADIOLOGY | Facility: HOSPITAL | Age: 48
Discharge: HOME | End: 2018-10-04
Attending: FAMILY MEDICINE
Payer: COMMERCIAL

## 2018-10-04 VITALS
TEMPERATURE: 98 F | BODY MASS INDEX: 32.49 KG/M2 | DIASTOLIC BLOOD PRESSURE: 86 MMHG | OXYGEN SATURATION: 97 % | HEIGHT: 72 IN | WEIGHT: 239.88 LBS | HEART RATE: 62 BPM | SYSTOLIC BLOOD PRESSURE: 130 MMHG

## 2018-10-04 DIAGNOSIS — J34.1 MAXILLARY SINUS CYST: Primary | ICD-10-CM

## 2018-10-04 DIAGNOSIS — M79.671 RIGHT FOOT PAIN: ICD-10-CM

## 2018-10-04 PROCEDURE — 73630 X-RAY EXAM OF FOOT: CPT | Mod: TC,FY,PO,RT

## 2018-10-04 PROCEDURE — 90686 IIV4 VACC NO PRSV 0.5 ML IM: CPT | Mod: S$GLB,,, | Performed by: FAMILY MEDICINE

## 2018-10-04 PROCEDURE — 73630 X-RAY EXAM OF FOOT: CPT | Mod: 26,RT,, | Performed by: RADIOLOGY

## 2018-10-04 PROCEDURE — 99214 OFFICE O/P EST MOD 30 MIN: CPT | Mod: 25,S$GLB,, | Performed by: FAMILY MEDICINE

## 2018-10-04 PROCEDURE — 3079F DIAST BP 80-89 MM HG: CPT | Mod: CPTII,S$GLB,, | Performed by: FAMILY MEDICINE

## 2018-10-04 PROCEDURE — 90471 IMMUNIZATION ADMIN: CPT | Mod: S$GLB,,, | Performed by: FAMILY MEDICINE

## 2018-10-04 PROCEDURE — 3008F BODY MASS INDEX DOCD: CPT | Mod: CPTII,S$GLB,, | Performed by: FAMILY MEDICINE

## 2018-10-04 PROCEDURE — 3075F SYST BP GE 130 - 139MM HG: CPT | Mod: CPTII,S$GLB,, | Performed by: FAMILY MEDICINE

## 2018-10-04 PROCEDURE — 99999 PR PBB SHADOW E&M-EST. PATIENT-LVL IV: CPT | Mod: PBBFAC,,, | Performed by: FAMILY MEDICINE

## 2018-10-04 NOTE — PROGRESS NOTES
"Subjective:       Patient ID: Guillermo Castillo is a 48 y.o. male.    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 No sinus pain. Few weeks clear nasal calvin.   Two months nontraum r foot pain when walking for awhile. Diff from gout pain. 4/10. ibup somehelp;has improved recently    Past Medical History:   Diagnosis Date    DM (diabetes mellitus) 05/2013     x 1 week ago 11/11/2015    DM (diabetes mellitus) 05/2013     03/01/2018 last tested    Fatty liver     Gout     Hyperlipidemia     Hypertension     Hypertriglyceridemia     Mood disorder     Panic disorder     Type 2 diabetes mellitus     05/2013 BS     Past Surgical History:   Procedure Laterality Date    APPENDECTOMY       Family History   Problem Relation Age of Onset    Coronary artery disease Father     Diabetes Father     Lung cancer Father     Diabetes Maternal Aunt     Diabetes Paternal Aunt     Hypertension Maternal Grandmother     Hypertension Mother      Social History     Socioeconomic History    Marital status: Single     Spouse name: None    Number of children: None    Years of education: None    Highest education level: None   Social Needs    Financial resource strain: None    Food insecurity - worry: None    Food insecurity - inability: None    Transportation needs - medical: None    Transportation needs - non-medical: None   Occupational History    None   Tobacco Use    Smoking status: Never Smoker    Smokeless tobacco: Never Used   Substance and Sexual Activity    Alcohol use: Yes     Comment: rarely    Drug use: None    Sexual activity: None   Other Topics Concern    None   Social History Narrative    None       Review of Systems    Objective:      Physical Exam  r foot no swelling. ttp plant forefoot. Pain reprod dors/plant flex.   Assessment:       1. Maxillary sinus cyst    2. Right foot pain        Plan:       **arch support " insert  Defers podiatry but recomm this if pain not gone two weeks*      Maxillary sinus cyst  -     CT Maxillofacial Without Contrast; Future; Expected date: 10/04/2018  -     Ambulatory referral to ENT    Right foot pain  -     X-Ray Foot Complete Right; Future; Expected date: 10/04/2018    f/u as sched

## 2018-10-09 ENCOUNTER — OFFICE VISIT (OUTPATIENT)
Dept: OTOLARYNGOLOGY | Facility: CLINIC | Age: 48
End: 2018-10-09
Payer: COMMERCIAL

## 2018-10-09 ENCOUNTER — HOSPITAL ENCOUNTER (OUTPATIENT)
Dept: RADIOLOGY | Facility: HOSPITAL | Age: 48
Discharge: HOME | End: 2018-10-09
Attending: FAMILY MEDICINE
Payer: COMMERCIAL

## 2018-10-09 VITALS
DIASTOLIC BLOOD PRESSURE: 82 MMHG | BODY MASS INDEX: 32.71 KG/M2 | HEART RATE: 63 BPM | SYSTOLIC BLOOD PRESSURE: 129 MMHG | WEIGHT: 241.19 LBS

## 2018-10-09 DIAGNOSIS — R09.81 NASAL CONGESTION: Primary | ICD-10-CM

## 2018-10-09 DIAGNOSIS — J34.1 MAXILLARY SINUS CYST: ICD-10-CM

## 2018-10-09 DIAGNOSIS — G47.33 OSA (OBSTRUCTIVE SLEEP APNEA): ICD-10-CM

## 2018-10-09 PROCEDURE — 99999 PR PBB SHADOW E&M-EST. PATIENT-LVL III: CPT | Mod: PBBFAC,,, | Performed by: PHYSICIAN ASSISTANT

## 2018-10-09 PROCEDURE — 3074F SYST BP LT 130 MM HG: CPT | Mod: CPTII,S$GLB,, | Performed by: PHYSICIAN ASSISTANT

## 2018-10-09 PROCEDURE — 99203 OFFICE O/P NEW LOW 30 MIN: CPT | Mod: S$GLB,,, | Performed by: PHYSICIAN ASSISTANT

## 2018-10-09 PROCEDURE — 70486 CT MAXILLOFACIAL W/O DYE: CPT | Mod: 26,,, | Performed by: RADIOLOGY

## 2018-10-09 PROCEDURE — 70486 CT MAXILLOFACIAL W/O DYE: CPT | Mod: TC,PO

## 2018-10-09 PROCEDURE — 3008F BODY MASS INDEX DOCD: CPT | Mod: CPTII,S$GLB,, | Performed by: PHYSICIAN ASSISTANT

## 2018-10-09 PROCEDURE — 3079F DIAST BP 80-89 MM HG: CPT | Mod: CPTII,S$GLB,, | Performed by: PHYSICIAN ASSISTANT

## 2018-10-09 RX ORDER — FLUTICASONE PROPIONATE 50 MCG
2 SPRAY, SUSPENSION (ML) NASAL DAILY
Qty: 1 BOTTLE | Refills: 12 | Status: SHIPPED | OUTPATIENT
Start: 2018-10-09 | End: 2021-09-05 | Stop reason: CLARIF

## 2018-10-09 NOTE — LETTER
October 9, 2018      [facility]  [address] [facility] [place] [address]  Phone: [phone]  Fax: [phone]       Patient: Guillermo Castillo   YOB: 1970  Date of Visit: 10/09/2018    To Whom It May Concern:    Teresita Castillo  was at [facility] on 10/09/2018. He may return to work/school on 10/10/18 with no restrictions. If you have any questions or concerns, or if I can be of further assistance, please do not hesitate to contact me.    Sincerely,        MOOKIE Coates

## 2018-10-09 NOTE — LETTER
October 10, 2018      Kip Siddiqui MD  [address] [place] [address]           [facility]  [address] [place] [address]  Phone: [phone]  Fax: [phone]          Patient: Guillermo Castillo   MR Number: [id number]   YOB: 1970   Date of Visit: 10/9/2018       Dear Dr. Kip Siddiqui:    Thank you for referring Guillermo Castillo to me for evaluation. Attached you will find relevant portions of my assessment and plan of care.    If you have questions, please do not hesitate to call me. I look forward to following Guillermo Castillo along with you.    Sincerely,    Yaneth Portillo PA-C    Enclosure  CC:  No Recipients    If you would like to receive this communication electronically, please contact [email or web address] or [phone] to request more information on [facility] access.    For providers and/or their staff who would like to refer a patient to [facility], please contact us through our one-stop-shop provider referral line, [facility], at [phone].    If you feel you have received this communication in error or would no longer like to receive these types of communications, please e-mail [email or web address]

## 2018-10-09 NOTE — PROGRESS NOTES
Subjective:       Patient ID: Guillermo Castillo is a 48 y.o. male.    Chief Complaint: Maxillary Sinus Cyst (Dr. Rene Michele in [place] )    Mr. Castillo is a very pleasant 47 yo male here to see me today for the first time with complaints of nasal congestion. He states that he went to dentist and had yearly xray and they noticed a maxillary sinus cyst. He then went to his pcp that ordered a CT sinus which is scheduled for later today. He has a h/o seasonal allergies but is on no medications.        Review of Systems   Constitutional: Negative for fatigue, fever and unexpected weight change.   HENT: Positive for congestion and rhinorrhea. Negative for ear discharge, ear pain, facial swelling, hearing loss, nosebleeds, postnasal drip, sinus pressure, sneezing, sore throat, tinnitus, trouble swallowing and voice change.    Eyes: Positive for itching. Negative for discharge and redness.   Respiratory: Negative for cough, choking, shortness of breath and wheezing.    Cardiovascular: Negative for chest pain and palpitations.   Gastrointestinal: Negative for abdominal pain.        No reflux.   Musculoskeletal: Negative for neck pain.   Neurological: Negative for dizziness, facial asymmetry, light-headedness and headaches.   Hematological: Negative for adenopathy. Does not bruise/bleed easily.   Psychiatric/Behavioral: Negative for agitation, behavioral problems, confusion and decreased concentration.       Objective:      Physical Exam   Constitutional: He is oriented to person, place, and time. Vital signs are normal. He appears well-developed and well-nourished. No distress.   HENT:   Head: Normocephalic and atraumatic.   Right Ear: Hearing, tympanic membrane, external ear and ear canal normal.   Left Ear: Hearing, tympanic membrane, external ear and ear canal normal.   Nose: Nose normal. No mucosal edema, rhinorrhea, nasal deformity or septal deviation.   Mouth/Throat: Uvula is midline, oropharynx is clear and moist  and mucous membranes are normal. No trismus in the jaw. Normal dentition. No uvula swelling. No oropharyngeal exudate or posterior oropharyngeal edema.   Eyes: Conjunctivae and EOM are normal. Pupils are equal, round, and reactive to light. Right eye exhibits no chemosis. Left eye exhibits no chemosis. Right conjunctiva is not injected. Left conjunctiva is not injected. No scleral icterus.   Neck: Trachea normal and phonation normal. No tracheal tenderness present. No tracheal deviation present. No thyroid mass and no thyromegaly present.   Cardiovascular: Intact distal pulses.   Pulmonary/Chest: Effort normal. No accessory muscle usage or stridor. No respiratory distress.   Lymphadenopathy:        Head (right side): No submental, no submandibular, no preauricular and no posterior auricular adenopathy present.        Head (left side): No submental, no submandibular, no preauricular and no posterior auricular adenopathy present.     He has no cervical adenopathy.        Right cervical: No superficial cervical and no deep cervical adenopathy present.       Left cervical: No superficial cervical and no deep cervical adenopathy present.   Neurological: He is alert and oriented to person, place, and time. No cranial nerve deficit.   Skin: Skin is warm and dry. No rash noted. No erythema.   Psychiatric: He has a normal mood and affect. His behavior is normal. Thought content normal.       Assessment:       1. Nasal congestion    2. ARPAN (obstructive sleep apnea)        Plan:       Nasal Congstion: We talked that a lot of his nasal irritation could be secondary to CPAP machine. We also discussed that the cyst found on xray could be just and incidental findings. If the cyst is not too large to block opening of sinus, it frequently will not be problematic. We will obtain CT as ordered by pcp. I also rec that he start flonase daily, frequent saline flushes and making sure CPAP is humidified air. RTC as needed.   ARPAN: continue  CPAP as prescribed.

## 2018-10-10 ENCOUNTER — TELEPHONE (OUTPATIENT)
Dept: INTERNAL MEDICINE | Facility: CLINIC | Age: 48
End: 2018-10-10

## 2018-10-10 ENCOUNTER — TELEPHONE (OUTPATIENT)
Dept: OTOLARYNGOLOGY | Facility: CLINIC | Age: 48
End: 2018-10-10

## 2018-10-10 NOTE — TELEPHONE ENCOUNTER
----- Message from Katarzyna Lamar sent at 10/8/2018  2:22 PM CDT -----  Regarding: CT Order  Mr Castillo is scheduled for CT Maxillofacial tomorrow. It looks like this scan is for the sinuses. If so, please change the order to CT SINUSES WITHOUT CONTRAST and link it to his appointment.     Thanks,  Masha  [phone]

## 2018-10-10 NOTE — TELEPHONE ENCOUNTER
----- Message from Claudia Harden sent at 10/10/2018  3:21 PM CDT -----  Contact: Kel- Dr Mcfarlane's office  Kel is calling for an e-mail for Dr Valdes to send pt's information. Please  back at [phone].    Thanks,   Claudia Harden

## 2018-10-10 NOTE — TELEPHONE ENCOUNTER
I spoke with Kel and gave her Dr. Valdes email.  The doctor is going to send over some pictures of xrays.  I did inform Kel that Dr. Valdes is out of the country for a few weeks.  She verbalized understanding.

## 2018-10-31 RX ORDER — OMEPRAZOLE 40 MG/1
40 CAPSULE, DELAYED RELEASE ORAL DAILY
Qty: 30 CAPSULE | Refills: 11 | Status: SHIPPED | OUTPATIENT
Start: 2018-10-31 | End: 2021-05-13 | Stop reason: SDUPTHER

## 2018-12-03 ENCOUNTER — OFFICE VISIT (OUTPATIENT)
Dept: URGENT CARE | Facility: CLINIC | Age: 48
End: 2018-12-03
Payer: COMMERCIAL

## 2018-12-03 VITALS
BODY MASS INDEX: 33.19 KG/M2 | HEIGHT: 72 IN | DIASTOLIC BLOOD PRESSURE: 90 MMHG | SYSTOLIC BLOOD PRESSURE: 150 MMHG | HEART RATE: 78 BPM | WEIGHT: 245.06 LBS | TEMPERATURE: 98 F | OXYGEN SATURATION: 98 %

## 2018-12-03 DIAGNOSIS — J06.9 URI WITH COUGH AND CONGESTION: Primary | ICD-10-CM

## 2018-12-03 PROCEDURE — 99214 OFFICE O/P EST MOD 30 MIN: CPT | Mod: S$GLB,,, | Performed by: FAMILY MEDICINE

## 2018-12-03 PROCEDURE — 3008F BODY MASS INDEX DOCD: CPT | Mod: CPTII,S$GLB,, | Performed by: FAMILY MEDICINE

## 2018-12-03 PROCEDURE — 99999 PR PBB SHADOW E&M-EST. PATIENT-LVL III: CPT | Mod: PBBFAC,,, | Performed by: FAMILY MEDICINE

## 2018-12-03 PROCEDURE — 3077F SYST BP >= 140 MM HG: CPT | Mod: CPTII,S$GLB,, | Performed by: FAMILY MEDICINE

## 2018-12-03 PROCEDURE — 3080F DIAST BP >= 90 MM HG: CPT | Mod: CPTII,S$GLB,, | Performed by: FAMILY MEDICINE

## 2018-12-03 RX ORDER — CETIRIZINE HYDROCHLORIDE 10 MG/1
10 TABLET ORAL DAILY PRN
COMMUNITY
End: 2021-09-05 | Stop reason: CLARIF

## 2018-12-03 RX ORDER — ALBUTEROL SULFATE 90 UG/1
AEROSOL, METERED RESPIRATORY (INHALATION)
Qty: 1 INHALER | Refills: 0 | Status: SHIPPED | OUTPATIENT
Start: 2018-12-03 | End: 2019-02-14

## 2018-12-03 NOTE — PROGRESS NOTES
Subjective:       Patient ID: Guillermo Castillo is a 48 y.o. male.    Chief Complaint: Nasal Congestion and Headache    BP (!) 150/90 (BP Location: Right arm, Patient Position: Sitting, BP Method: Large (Manual))   Pulse 78   Temp 97.7 °F (36.5 °C) (Tympanic)   Ht 6' (1.829 m)   Wt 111.1 kg (245 lb 0.7 oz)   SpO2 98%   BMI 33.23 kg/m²     HPI  Nasal congestion head congestion for 3 days. Feeling miserable. Taking nyquil. Too a neb treatment was somewhat helpful. Pt works EMS    Review of Systems   Constitutional: Positive for activity change and fatigue. Negative for chills and fever.   HENT: Positive for congestion, postnasal drip and sinus pressure. Negative for sore throat.    Respiratory: Positive for shortness of breath (using C pap). Negative for cough and wheezing.    Cardiovascular: Negative.    Gastrointestinal: Negative.    Genitourinary: Negative.    Musculoskeletal: Negative.    Skin: Negative.    Neurological: Positive for headaches.   Hematological: Negative.        Objective:      Physical Exam   Constitutional: He is oriented to person, place, and time. He appears well-developed and well-nourished. No distress.   HENT:   Head: Normocephalic and atraumatic.   Mouth/Throat: Oropharynx is clear and moist. No oropharyngeal exudate.   Eyes: EOM are normal. Pupils are equal, round, and reactive to light. Right eye exhibits no discharge. Left eye exhibits no discharge.   Neck: Normal range of motion. Neck supple.   Cardiovascular: Normal rate, regular rhythm, normal heart sounds and intact distal pulses.   No murmur heard.  Pulmonary/Chest: Effort normal and breath sounds normal. He has no wheezes. He has no rales.   Musculoskeletal: Normal range of motion.   Lymphadenopathy:     He has cervical adenopathy (left).   Neurological: He is alert and oriented to person, place, and time.   Skin: Skin is warm and dry. He is not diaphoretic.   Nursing note and vitals reviewed.      Assessment:       1. URI with  cough and congestion        Plan:     Guillermo was seen today for nasal congestion and headache.    Diagnoses and all orders for this visit:    URI with cough and congestion  -     albuterol (PROVENTIL/VENTOLIN HFA) 90 mcg/actuation inhaler; 2 puffs every 4-6 hours as needed for wheezing, shortness of breath or cough    warm fluids, rest.   OTC sudafed for nasal congestion  Rx albuterol for deep cough, wheeze or SOB  Work excuse

## 2018-12-03 NOTE — LETTER
December 3, 2018      [facility]  [address]  [place] [address]  Phone: [phone]  Fax: [phone]       Patient: Guillermo Castillo   YOB: 1970  Date of Visit: 12/03/2018    To Whom It May Concern:    Teresita Castillo  was at [facility] on 12/03/2018. He may return to work/school on 12/5 with no restrictions. If you have any questions or concerns, or if I can be of further assistance, please do not hesitate to contact me.    Sincerely,    Soy Schrader MD

## 2019-01-07 ENCOUNTER — LAB VISIT (OUTPATIENT)
Dept: LAB | Facility: HOSPITAL | Age: 49
End: 2019-01-07
Attending: FAMILY MEDICINE
Payer: COMMERCIAL

## 2019-01-07 DIAGNOSIS — E11.9 TYPE 2 DIABETES MELLITUS WITHOUT COMPLICATION, WITHOUT LONG-TERM CURRENT USE OF INSULIN: ICD-10-CM

## 2019-01-07 LAB
ESTIMATED AVG GLUCOSE: 177 MG/DL
HBA1C MFR BLD HPLC: 7.8 %

## 2019-01-07 PROCEDURE — 83036 HEMOGLOBIN GLYCOSYLATED A1C: CPT

## 2019-01-07 PROCEDURE — 36415 COLL VENOUS BLD VENIPUNCTURE: CPT | Mod: PO

## 2019-01-28 ENCOUNTER — LAB VISIT (OUTPATIENT)
Dept: LAB | Facility: HOSPITAL | Age: 49
End: 2019-01-28
Attending: PHYSICIAN ASSISTANT
Payer: COMMERCIAL

## 2019-01-28 ENCOUNTER — OFFICE VISIT (OUTPATIENT)
Dept: URGENT CARE | Facility: CLINIC | Age: 49
End: 2019-01-28
Payer: COMMERCIAL

## 2019-01-28 VITALS
WEIGHT: 237.44 LBS | TEMPERATURE: 97 F | SYSTOLIC BLOOD PRESSURE: 128 MMHG | BODY MASS INDEX: 32.2 KG/M2 | DIASTOLIC BLOOD PRESSURE: 84 MMHG

## 2019-01-28 DIAGNOSIS — R73.09 ABNORMAL BLOOD SUGAR: Primary | ICD-10-CM

## 2019-01-28 DIAGNOSIS — R73.09 ABNORMAL BLOOD SUGAR: ICD-10-CM

## 2019-01-28 LAB
ALBUMIN SERPL BCP-MCNC: 4.4 G/DL
ALP SERPL-CCNC: 118 U/L
ALT SERPL W/O P-5'-P-CCNC: 41 U/L
ANION GAP SERPL CALC-SCNC: 12 MMOL/L
AST SERPL-CCNC: 27 U/L
BASOPHILS # BLD AUTO: 0.05 K/UL
BASOPHILS NFR BLD: 0.5 %
BILIRUB SERPL-MCNC: 0.8 MG/DL
BUN SERPL-MCNC: 24 MG/DL
CALCIUM SERPL-MCNC: 10.1 MG/DL
CHLORIDE SERPL-SCNC: 98 MMOL/L
CO2 SERPL-SCNC: 24 MMOL/L
CREAT SERPL-MCNC: 1.4 MG/DL
DIFFERENTIAL METHOD: ABNORMAL
EOSINOPHIL # BLD AUTO: 0.3 K/UL
EOSINOPHIL NFR BLD: 2.9 %
ERYTHROCYTE [DISTWIDTH] IN BLOOD BY AUTOMATED COUNT: 14 %
EST. GFR  (AFRICAN AMERICAN): >60 ML/MIN/1.73 M^2
EST. GFR  (NON AFRICAN AMERICAN): 59 ML/MIN/1.73 M^2
GLUCOSE SERPL-MCNC: 234 MG/DL
HCT VFR BLD AUTO: 43.4 %
HGB BLD-MCNC: 14.5 G/DL
IMM GRANULOCYTES # BLD AUTO: 0.12 K/UL
IMM GRANULOCYTES NFR BLD AUTO: 1.2 %
LYMPHOCYTES # BLD AUTO: 2.5 K/UL
LYMPHOCYTES NFR BLD: 24.1 %
MCH RBC QN AUTO: 27.2 PG
MCHC RBC AUTO-ENTMCNC: 33.4 G/DL
MCV RBC AUTO: 81 FL
MONOCYTES # BLD AUTO: 0.9 K/UL
MONOCYTES NFR BLD: 8.7 %
NEUTROPHILS # BLD AUTO: 6.6 K/UL
NEUTROPHILS NFR BLD: 63.8 %
NRBC BLD-RTO: 0 /100 WBC
PLATELET # BLD AUTO: 270 K/UL
PMV BLD AUTO: 9.2 FL
POTASSIUM SERPL-SCNC: 4.1 MMOL/L
PROT SERPL-MCNC: 7.8 G/DL
RBC # BLD AUTO: 5.33 M/UL
SODIUM SERPL-SCNC: 134 MMOL/L
WBC # BLD AUTO: 10.35 K/UL

## 2019-01-28 PROCEDURE — 3008F BODY MASS INDEX DOCD: CPT | Mod: CPTII,S$GLB,, | Performed by: PHYSICIAN ASSISTANT

## 2019-01-28 PROCEDURE — 3079F PR MOST RECENT DIASTOLIC BLOOD PRESSURE 80-89 MM HG: ICD-10-PCS | Mod: CPTII,S$GLB,, | Performed by: PHYSICIAN ASSISTANT

## 2019-01-28 PROCEDURE — 3074F PR MOST RECENT SYSTOLIC BLOOD PRESSURE < 130 MM HG: ICD-10-PCS | Mod: CPTII,S$GLB,, | Performed by: PHYSICIAN ASSISTANT

## 2019-01-28 PROCEDURE — 85025 COMPLETE CBC W/AUTO DIFF WBC: CPT

## 2019-01-28 PROCEDURE — 80053 COMPREHEN METABOLIC PANEL: CPT

## 2019-01-28 PROCEDURE — 3008F PR BODY MASS INDEX (BMI) DOCUMENTED: ICD-10-PCS | Mod: CPTII,S$GLB,, | Performed by: PHYSICIAN ASSISTANT

## 2019-01-28 PROCEDURE — 36415 COLL VENOUS BLD VENIPUNCTURE: CPT

## 2019-01-28 PROCEDURE — 99214 PR OFFICE/OUTPT VISIT, EST, LEVL IV, 30-39 MIN: ICD-10-PCS | Mod: S$GLB,,, | Performed by: PHYSICIAN ASSISTANT

## 2019-01-28 PROCEDURE — 3079F DIAST BP 80-89 MM HG: CPT | Mod: CPTII,S$GLB,, | Performed by: PHYSICIAN ASSISTANT

## 2019-01-28 PROCEDURE — 99999 PR PBB SHADOW E&M-EST. PATIENT-LVL III: ICD-10-PCS | Mod: PBBFAC,,, | Performed by: PHYSICIAN ASSISTANT

## 2019-01-28 PROCEDURE — 99214 OFFICE O/P EST MOD 30 MIN: CPT | Mod: S$GLB,,, | Performed by: PHYSICIAN ASSISTANT

## 2019-01-28 PROCEDURE — 3074F SYST BP LT 130 MM HG: CPT | Mod: CPTII,S$GLB,, | Performed by: PHYSICIAN ASSISTANT

## 2019-01-28 PROCEDURE — 99999 PR PBB SHADOW E&M-EST. PATIENT-LVL III: CPT | Mod: PBBFAC,,, | Performed by: PHYSICIAN ASSISTANT

## 2019-01-28 NOTE — PROGRESS NOTES
Subjective:      Patient ID: Guillermo Castillo is a 48 y.o. male.    Chief Complaint: Hyperglycemia    Guillermo is a 48 year old male who presents to urgent care with elevated blood sugar.  His blood sugar when he woke up today was 120, but after he ate a bowl of cereal (honey bunches of oats), it went up to 256.  It continued to climb up to 422.  A that point, he took metformin.  It continued to be elevated after metformin with the highest reading being greater than 500, so he came to urgent care.  Upon arrival here, his blood sugar was 255.  He has noticed that his sugars have been running higher for the past 3-4 days, with today being the highest.      He is on 500 mg of metformin per day for diabetes  His blood sugars are usually well controlled, and often in the 100's.  Typically, he can eat some sugar (like honey bunches of oats) without major spikes in his blood glucose.  He denies upper respiratory symptoms, or other signs of infection, or recent surgeries.  He admits mild lightheadedness and had mild blurred vision when his blood glucose had peaked, but his vision has improved.  He has been drinking lots of water, but does not admit increase thirst.  He denies peeing more than normal.        Review of Systems   Constitutional: Negative for chills and fever.   HENT: Negative for ear discharge, sinus pressure, sinus pain and sore throat.    Eyes: Positive for visual disturbance (blurred vision when sugars were highest, vision has improved back to baseline as sugars have come down ).   Respiratory: Negative for cough, shortness of breath and wheezing.    Gastrointestinal: Negative for abdominal pain, diarrhea, nausea and vomiting.   Endocrine: Negative for polydipsia and polyuria.   Skin: Negative for rash.       Objective:   /84 (BP Location: Left arm, Patient Position: Sitting, BP Method: Large (Automatic))   Temp 97.2 °F (36.2 °C) (Tympanic)   Wt 107.7 kg (237 lb 7 oz)   BMI 32.20 kg/m²   Physical Exam    Constitutional: He is oriented to person, place, and time. He appears well-developed and well-nourished. No distress.   HENT:   Head: Normocephalic.   Right Ear: Tympanic membrane, external ear and ear canal normal.   Left Ear: Tympanic membrane, external ear and ear canal normal.   Nose: Nose normal. No mucosal edema or rhinorrhea. Right sinus exhibits no maxillary sinus tenderness and no frontal sinus tenderness. Left sinus exhibits no maxillary sinus tenderness and no frontal sinus tenderness.   Mouth/Throat: Uvula is midline, oropharynx is clear and moist and mucous membranes are normal. No oropharyngeal exudate, posterior oropharyngeal edema or posterior oropharyngeal erythema.   Eyes: Conjunctivae and EOM are normal.   Neck: Normal range of motion. Neck supple.   Cardiovascular: Normal rate, regular rhythm and normal heart sounds.   Pulmonary/Chest: Effort normal and breath sounds normal. No accessory muscle usage. No apnea, no tachypnea and no bradypnea. No respiratory distress. He has no decreased breath sounds. He has no wheezes. He has no rhonchi. He has no rales.   Lymphadenopathy:        Head (right side): No submental, no submandibular and no tonsillar adenopathy present.        Head (left side): No submental, no submandibular and no tonsillar adenopathy present.     He has no cervical adenopathy.   Neurological: He is alert and oriented to person, place, and time.   Skin: Skin is warm and dry. He is not diaphoretic.     Assessment:      1. Abnormal blood sugar       Plan:   Abnormal blood sugar  -     CBC auto differential; Future; Expected date: 01/28/2019  -     Comprehensive metabolic panel; Future; Expected date: 01/28/2019    Abnormal Blood Sugar    -  POCT glucose 255   -  No history of recent infection, injury, surgeries    -  Patient admits some blurred vision earlier in the day that resolved as his blood sugar came down - advised going to ED if reoccurs    -  CMP:  BUN mildly increased.  Cr  trending upward over time- called patient with results - will repeat lab tomorrow to ensure accurate before appt with PCP   -  Recommended small meals with protein throughout the day, keep daily sugar to less than 25g, increase fluids     -  Guillermo is due for his 6 month follow-up with Dr. Siddiqui - scheduled for tomorrow - can discuss glucose control at that time     Discussed warning signs to go to ED for further evaluation:  Visual changes, blood glucose greater than 450, fever, nausea/vomiting, or other worrisome symptoms     AVS provided and instructions reviewed with patient.     Aurora Fonseca PA-C   Physician Assistant   [facility]

## 2019-01-28 NOTE — PATIENT INSTRUCTIONS
"  Exercise to Manage Your Blood Sugar    Being physically active every day can help you manage your blood sugar. Thats because an active lifestyle can improve your bodys ability to use insulin. Daily activity can also help delay or prevent complications of diabetes. And its a great way to relieve stress. If you arent normally active, be sure to consult your healthcare provider before getting started.  How much activity do you need?  If daily activity is new to you, start slow and steady. Begin with 10 minutes of activity each day. Then work up to at least 150 minutes a week of physical activity. Don't let more than 2 days go by without being active. When sitting for long periods of time, get up for short sessions of light activity every 30 minutes  Just move!  You dont have to join a gym or own pricey sports equipment. Just get out and walk. Walking is an aerobic exercise that makes your heart and lungs work hard. It helps your heart and blood vessels. Walking needs only a sturdy pair of sneakers and your own two feet. The more you walk, the easier it gets:  · Schedule time every day to move your feet.  · Make it part of your daily routine.  · Walk with a friend or a group to keep it interesting and fun.  · Try taking several short walks during the day to meet your daily activity goal.  A pedometer makes every step count  A pedometer is a small device that keeps track of how many steps you take. You can clip it to your belt (or a strap on your arm or leg) and go about your daily routine. "Smartphones" now also have apps to record your walking. At the end of the day, the pedometer shows the total number of steps you took. Use a pedometer to set daily goals for yourself. For instance, if you walk 4,000 steps a day, try adding 200 more steps each day. Aim for a goal of 7,500. With every step, youre doing a little more to help your body use insulin.   Adding resistance exercise  Resistance exercise (also called " strength training), makes muscles stronger. It also helps muscles use insulin better. Ask your healthcare provider whether this type of exercise is right for you. If it is, your healthcare provider can help you work it in to your activity plan.  Staying safe  Being active may cause blood sugar to drop faster than usual. This is especially true if you take medicine to manage your blood sugar. But there are things you can do to help reduce the risk of accidental lows. Keep these tips in mind:  · Always carry identification when you exercise outside your home. Carry a cell phone to use in case of emergency.  · If you can, include friends and family in your activities.  · Wear a medical ID bracelet that says you have diabetes.  · Use the right safety equipment for the activity you do (such as a bicycle helmet when you ride a bicycle outdoors). Wear closed-toed shoes that fit your feet well.  · Drink plenty of water before and during activity.  · Keep a fast-acting sugar (such as glucose tablets) on hand in case of low blood sugar.  · Dress properly for the weather. Wear a hat if its jose, or wait until evening if its too hot.  · Avoid being active for long periods in very hot or very cold weather.  · Skip activity if youre sick.     Notice how activity affects blood sugar  Physical activity is important when you have diabetes. But you need to keep an eye on your blood sugar level. Check often if you have been active for longer than usual, or if the activity was unplanned. Make it a habit to check your blood sugar before being active. And check again a few hours later. Use your log book to write down how activity affects your numbers. If you take insulin, you may be able to adjust your dose before a planned activity. This can help prevent lows. You may also need to take a small carbohydrate snack before the exercise. Talk to your healthcare provider to learn more.    Date Last Reviewed: 6/1/2016  © 6479-2937 The  [employer]. [address] [place] [address], [place] [address]. All rights reserved. This information is not intended as a substitute for professional medical care. Always follow your healthcare professional's instructions.        Diet: Diabetes  Food is an important tool that you can use to control diabetes and stay healthy. Eating well-balanced meals in the correct amounts will help you control your blood glucose levels and prevent low blood sugar reactions. It will also help you reduce the health risks of diabetes. There is no one specific diet that is right for everyone with diabetes. But there are general guidelines to follow. A registered dietitian (PRIMO) will create a tailored diet approach thats just right for you. He or she will also help you plan healthy meals and snacks. If you have any questions, call your dietitian for advice.     Guidelines for success  Talk with your healthcare provider before starting a diabetes diet or weight loss program. If you haven't talked with a dietitian yet, ask your provider for a referral. The following guidelines can help you succeed:  · Select foods from the 6 food groups below. Your dietitian will help you find food choices within each group. He or she will also show you serving sizes and how many servings you can have at each meal.  ¨ Grains, beans, and starchy vegetables  ¨ Vegetables  ¨ Fruit  ¨ Milk or yogurt  ¨ Meat, poultry, fish, or tofu  ¨ Healthy fats  · Check your blood sugar levels as directed by your provider. Take any medicine as prescribed by your provider.  · Learn to read food labels and pick the right portion sizes.  · Eat only the amount of food in your meal plan. Eat about the same amount of food at regular times each day. Dont skip meals. Eat meals 4 to 5 hours apart, with snacks in between.  · Limit alcohol. It raises blood sugar levels. Drink water or calorie-free diet drinks that use safe sweeteners.  · Eat less fat to help lower your risk of  heart disease. Use nonfat or low-fat dairy products and lean meats. Avoid fried foods. Use cooking oils that are unsaturated, such as olive, canola, or peanut oil.  · Talk with your dietitian about safe sugar substitutes.  · Avoid added salt. It can contribute to high blood pressure, which can cause heart disease. People with diabetes already have a risk of high blood pressure and heart disease.  · Stay at a healthy weight. If you need to lose weight, cut down on your portion sizes. But dont skip meals. Exercise is an important part of any weight management program. Talk with your provider about an exercise program thats right for you.  · For more information about the best diet plan for you, talk with a registered dietitian (RD). To find an RD in your area, contact:  ¨ Academy of Nutrition and Dietetics www.eatright.org  ¨ The American Diabetes Association [phone] www.diabetes.org  Date Last Reviewed: 8/1/2016 © 2000-2017 [employer]. [address] [place] [address], [place] [address]. All rights reserved. This information is not intended as a substitute for professional medical care. Always follow your healthcare professional's instructions.

## 2019-01-29 ENCOUNTER — PATIENT MESSAGE (OUTPATIENT)
Dept: ADMINISTRATIVE | Facility: OTHER | Age: 49
End: 2019-01-29

## 2019-01-29 ENCOUNTER — LAB VISIT (OUTPATIENT)
Dept: LAB | Facility: HOSPITAL | Age: 49
End: 2019-01-29
Attending: PHYSICIAN ASSISTANT
Payer: COMMERCIAL

## 2019-01-29 ENCOUNTER — OFFICE VISIT (OUTPATIENT)
Dept: INTERNAL MEDICINE | Facility: CLINIC | Age: 49
End: 2019-01-29
Payer: COMMERCIAL

## 2019-01-29 VITALS
BODY MASS INDEX: 32.13 KG/M2 | HEIGHT: 72 IN | OXYGEN SATURATION: 96 % | DIASTOLIC BLOOD PRESSURE: 74 MMHG | SYSTOLIC BLOOD PRESSURE: 126 MMHG | WEIGHT: 237.19 LBS | TEMPERATURE: 98 F | HEART RATE: 77 BPM

## 2019-01-29 DIAGNOSIS — E78.5 HYPERLIPIDEMIA, UNSPECIFIED HYPERLIPIDEMIA TYPE: ICD-10-CM

## 2019-01-29 DIAGNOSIS — R73.09 ABNORMAL BLOOD SUGAR: ICD-10-CM

## 2019-01-29 DIAGNOSIS — I10 ESSENTIAL HYPERTENSION: ICD-10-CM

## 2019-01-29 DIAGNOSIS — E11.9 TYPE 2 DIABETES MELLITUS WITHOUT COMPLICATION, WITHOUT LONG-TERM CURRENT USE OF INSULIN: Primary | ICD-10-CM

## 2019-01-29 LAB
ALBUMIN SERPL BCP-MCNC: 4.3 G/DL
ALP SERPL-CCNC: 109 U/L
ALT SERPL W/O P-5'-P-CCNC: 42 U/L
ANION GAP SERPL CALC-SCNC: 10 MMOL/L
AST SERPL-CCNC: 31 U/L
BILIRUB SERPL-MCNC: 0.7 MG/DL
BUN SERPL-MCNC: 19 MG/DL
CALCIUM SERPL-MCNC: 9.6 MG/DL
CHLORIDE SERPL-SCNC: 101 MMOL/L
CO2 SERPL-SCNC: 24 MMOL/L
CREAT SERPL-MCNC: 1.5 MG/DL
EST. GFR  (AFRICAN AMERICAN): >60 ML/MIN/1.73 M^2
EST. GFR  (NON AFRICAN AMERICAN): 55 ML/MIN/1.73 M^2
GLUCOSE SERPL-MCNC: 191 MG/DL
POTASSIUM SERPL-SCNC: 4.5 MMOL/L
PROT SERPL-MCNC: 7.6 G/DL
SODIUM SERPL-SCNC: 135 MMOL/L

## 2019-01-29 PROCEDURE — 3045F PR MOST RECENT HEMOGLOBIN A1C LEVEL 7.0-9.0%: CPT | Mod: CPTII,S$GLB,, | Performed by: FAMILY MEDICINE

## 2019-01-29 PROCEDURE — 3078F DIAST BP <80 MM HG: CPT | Mod: CPTII,S$GLB,, | Performed by: FAMILY MEDICINE

## 2019-01-29 PROCEDURE — 3078F PR MOST RECENT DIASTOLIC BLOOD PRESSURE < 80 MM HG: ICD-10-PCS | Mod: CPTII,S$GLB,, | Performed by: FAMILY MEDICINE

## 2019-01-29 PROCEDURE — 3074F PR MOST RECENT SYSTOLIC BLOOD PRESSURE < 130 MM HG: ICD-10-PCS | Mod: CPTII,S$GLB,, | Performed by: FAMILY MEDICINE

## 2019-01-29 PROCEDURE — 3008F BODY MASS INDEX DOCD: CPT | Mod: CPTII,S$GLB,, | Performed by: FAMILY MEDICINE

## 2019-01-29 PROCEDURE — 3045F PR MOST RECENT HEMOGLOBIN A1C LEVEL 7.0-9.0%: ICD-10-PCS | Mod: CPTII,S$GLB,, | Performed by: FAMILY MEDICINE

## 2019-01-29 PROCEDURE — 36415 COLL VENOUS BLD VENIPUNCTURE: CPT

## 2019-01-29 PROCEDURE — 99214 OFFICE O/P EST MOD 30 MIN: CPT | Mod: S$GLB,,, | Performed by: FAMILY MEDICINE

## 2019-01-29 PROCEDURE — 99999 PR PBB SHADOW E&M-EST. PATIENT-LVL III: ICD-10-PCS | Mod: PBBFAC,,, | Performed by: FAMILY MEDICINE

## 2019-01-29 PROCEDURE — 99214 PR OFFICE/OUTPT VISIT, EST, LEVL IV, 30-39 MIN: ICD-10-PCS | Mod: S$GLB,,, | Performed by: FAMILY MEDICINE

## 2019-01-29 PROCEDURE — 99999 PR PBB SHADOW E&M-EST. PATIENT-LVL III: CPT | Mod: PBBFAC,,, | Performed by: FAMILY MEDICINE

## 2019-01-29 PROCEDURE — 3074F SYST BP LT 130 MM HG: CPT | Mod: CPTII,S$GLB,, | Performed by: FAMILY MEDICINE

## 2019-01-29 PROCEDURE — 3008F PR BODY MASS INDEX (BMI) DOCUMENTED: ICD-10-PCS | Mod: CPTII,S$GLB,, | Performed by: FAMILY MEDICINE

## 2019-01-29 PROCEDURE — 80053 COMPREHEN METABOLIC PANEL: CPT

## 2019-01-29 RX ORDER — CALCIUM CARB/VITAMIN D3/VIT K1 500-100-40
TABLET,CHEWABLE ORAL
Qty: 100 EACH | Refills: 0 | Status: SHIPPED | OUTPATIENT
Start: 2019-01-29 | End: 2021-12-28 | Stop reason: SDUPTHER

## 2019-01-29 RX ORDER — INSULIN LISPRO 100 [IU]/ML
INJECTION, SOLUTION INTRAVENOUS; SUBCUTANEOUS
Qty: 10 ML | Refills: 1 | Status: SHIPPED | OUTPATIENT
Start: 2019-01-29 | End: 2019-09-06 | Stop reason: SDUPTHER

## 2019-01-29 NOTE — PATIENT INSTRUCTIONS
Check premeal sugars  If 150-199 give 2 units insulin  If 200-249 give 4 units insulin  If 250-299 then 6 units  If 300-349 then 8 units  If 350-399 then 10 units  If 400 or higher premeal then contact doctor (on call if after hours/weekends)

## 2019-01-30 ENCOUNTER — PATIENT MESSAGE (OUTPATIENT)
Dept: OTHER | Facility: OTHER | Age: 49
End: 2019-01-30

## 2019-01-30 ENCOUNTER — PATIENT OUTREACH (OUTPATIENT)
Dept: OTHER | Facility: OTHER | Age: 49
End: 2019-01-30

## 2019-01-30 NOTE — LETTER
Ruby York, PharmD  [address] [place] [address]     Dear Guillermo Castillo,    Welcome to [facility]!           My name is Ruby York, PharmD, and I am your dedicated Digital Medicine clinician. As an expert in medication management, I will help ensure that the medications you are taking continue to provide the intended benefits for both blood pressure and diabetes goals.        I am Karley Ortega, and I will be your health  for the duration of the program. My job is to help you identify lifestyle changes to improve your blood pressure and diabetes control. We will talk about nutrition, exercise, and other ways you may be able to adjust your current habits to better your health.     Additionally, we will help ensure you are completing the tests and screenings that are necessary to help manage your diabetes.Together, we will work to improve your overall health and encourage you to meet your goals for a healthier lifestyle.       What we expect from YOU:    You will need to take blood pressure readings multiple times a week and no less than one reading per week.   It is important that you take your measurements at different times during the day, when possible.    If you test your blood sugar at home, you should take home measurements according to the frequency your physician and Digital Medicine care team specifies.   Be available to receive phone calls or [facility] messages when appropriate from your care team.   Complete routine tests and screenings (such as Hemoglobin A1c testing, eye exams, and Nephropathy assessment). Dont worry, we will help keep you on track!    What you should expect from your Digital Medicine Care Team:   We will provide you with education about high blood pressure and diabetes, including lifestyle changes that could help you to control your blood pressure and blood sugar.   We will adjust your current medications, if  needed, and continue monitoring your progress in the long term.   We will provide you and your physician with monthly progress reports after you have been in the program for more than 30 days.   We will send you reminders through [facility] and text messages to help ensure you do not miss any testing deadlines.    We want to make you get important tests and screenings done regularly to assure that your health needs are met. We have put a new system in place, called CareTouch that will help us improve how we monitor and reach out to you about the following lab tests that you will need to help manage your diabetes.    Hemoglobin A1c testing (Frequency: Every 3 to 6 months, dependent on A1c goal).   Nephropathy Assessment, generally urine micro albumin testing (Frequency: Yearly)   Eye exam through a quick 30-minute Eye Photo Exam (Frequency: 1-2 Years, depending on result)    When necessary you can come in to one of the labs on the attached page any weekday between 10:30 am and 4:00 pm to have your tests done prior to their due date. Tell the  you received a CareTouch letter, or just look for the CareTouch sign.    You will be able to reach me by phone at [phone] or through your [facility] account by clicking my name under Care Team on the right side of the home screen.    I look forward to working with you to help manage your Diabetes!    Sincerely,    Ruby York, PharmD  Your personal clinician      Please visit [email or web address] to learn more about high blood pressure and what you can do lower your blood pressure.     Please visit [email or web address] to learn more about diabetes, especially ways to take control of your health.                                Guillermo Castillo  [address] [facility] [address]

## 2019-01-30 NOTE — PROGRESS NOTES
Last 5 Patient Entered Readings                                      Current 30 Day Average: 147/89     Recent Readings 1/30/2019 1/29/2019 1/29/2019 1/29/2019 1/29/2019    SBP (mmHg) 140 140 141 154 144    DBP (mmHg) 89 82 87 100 92    Pulse 71 69 63 64 69

## 2019-01-30 NOTE — PROGRESS NOTES
Subjective:       Patient ID: Guillermo Castillo is a 48 y.o. male.    Chief Complaint: No chief complaint on file.    HPI   last week or so has checked sugars elevated at times to 200s (occas lower) preprandial goes up to 3-400 after eating.  No home glucoses for some time prior to this.  Previously well controlled.  Was taking metformin once daily because higher doses cause loose stools but he increased to two daily yesterday or day before and when glucose 120 but postprandial shot up to 400.  He is off ibuprofen.  He has no infectious symptoms no chest pain shortness of breath cough cold symptoms urinary symptoms rash.no change in diet or activity;no polyuria    elev creat recently. Off nsaids. D/wd poss mild dehyd w elev gluc    Past Medical History:   Diagnosis Date    DM (diabetes mellitus) 05/2013     x 1 week ago 11/11/2015    DM (diabetes mellitus) 05/2013     03/01/2018 last tested    Fatty liver     Gout     Hyperlipidemia     Hypertension     Hypertriglyceridemia     Mood disorder     Panic disorder     Type 2 diabetes mellitus     05/2013 BS     Past Surgical History:   Procedure Laterality Date    APPENDECTOMY       Family History   Problem Relation Age of Onset    Coronary artery disease Father     Diabetes Father     Lung cancer Father     Diabetes Maternal Aunt     Diabetes Paternal Aunt     Hypertension Maternal Grandmother     Hypertension Mother      Social History     Socioeconomic History    Marital status: Single     Spouse name: None    Number of children: None    Years of education: None    Highest education level: None   Social Needs    Financial resource strain: None    Food insecurity - worry: None    Food insecurity - inability: None    Transportation needs - medical: None    Transportation needs - non-medical: None   Occupational History    None   Tobacco Use    Smoking status: Never Smoker    Smokeless tobacco: Never Used   Substance and Sexual  Activity    Alcohol use: Yes     Comment: rarely    Drug use: None    Sexual activity: None   Other Topics Concern    None   Social History Narrative    None       Review of Systems  Cardiovascular: no chest pain  Chest: no shortness of breath  Abd: no abd pain  Remainder review of systems negative    Objective:      Physical Exam   Constitutional: He is oriented to person, place, and time. He appears well-developed and well-nourished.   HENT:   Head: Normocephalic and atraumatic.   Right Ear: External ear normal.   Left Ear: External ear normal.   Nose: Nose normal.   Mouth/Throat: Oropharynx is clear and moist.   Nl tms   Eyes: Conjunctivae and EOM are normal. Pupils are equal, round, and reactive to light. No scleral icterus.   Neck: Normal range of motion. Neck supple. Carotid bruit is not present.   Cardiovascular: Normal rate, regular rhythm and normal heart sounds. Exam reveals no gallop and no friction rub.   No murmur heard.  Pulmonary/Chest: Effort normal and breath sounds normal. He has no wheezes.   Abdominal: Soft. Bowel sounds are normal. He exhibits no distension and no mass. There is no hepatosplenomegaly. There is no tenderness. There is no rebound and no guarding.   Musculoskeletal: Normal range of motion. He exhibits no tenderness.   Lymphadenopathy:     He has no cervical adenopathy.   Neurological: He is alert and oriented to person, place, and time. No cranial nerve deficit. Coordination normal.   Skin: Skin is warm and dry. No rash noted. No erythema.   Psychiatric: He has a normal mood and affect. His behavior is normal. Judgment and thought content normal.   Nursing note and vitals reviewed.    Bilateral feet with normal monofilament testing and no lesions.    Assessment:       1. Type 2 diabetes mellitus without complication, without long-term current use of insulin    2. Essential hypertension    3. Hyperlipidemia, unspecified hyperlipidemia type      elev creat  Plan:      "hydrate  **insul teaching  Inc metform daily to two daily  Type 2 diabetes mellitus without complication, without long-term current use of insulin  -     Hemoglobin A1c; Future; Expected date: 04/29/2019  -     Basic metabolic panel; Future; Expected date: 04/29/2019  -     Basic metabolic panel; Future; Expected date: 02/12/2019    Essential hypertension  -     Hypertension Digital Medicine (HDMP) Enrollment Order  -     Hypertension Digital Medicine ([facility]): Assign Onboarding Questionnaires    Hyperlipidemia, unspecified hyperlipidemia type    Other orders  -     insulin lispro (HUMALOG U-100 INSULIN) 100 unit/mL injection; Use via sliding scale  Dispense: 10 mL; Refill: 1  -     insulin syringe-needle U-100 0.3 mL 31 gauge x 5/16" Syrg; Use as directed  Dispense: 100 each; Refill: 0    sscale printed out*    ab and f/u 3 months  Also bmp two weeks and marielle couple days after   "

## 2019-01-30 NOTE — PROGRESS NOTES
Digital Medicine Enrollment Call    Introduced Mr. Guillermo Castillo to Digital Medicine.     Discussed program expectations and requirements.    Introduced digital medicine care team.     Reviewed the importance of self-monitoring for digital medicine participation.     Reviewed that the Digital Medicine team is not available for emergencies and instructed the patient to call 911 or [facility] ([phone] or [phone]) if one arises.    Pt states he was dropped from the [facility] due to non-compliance. He states the clinical team called him too frequently requesting medication adjustments that he didn't agree with. He felt, and still feels, the i[employer] cuff is not accurate and does not want medications adjusted due to the inaccuracy of the [employer] cuff. He states he is very familiar with proper BP technique, as he has been a paramedic for 20 years. He states his mom is also enrolled in the [facility] and she also receives inaccurate readings due to the i[employer] cuff. He feels the cuff is too sentisive. Pt advised of the program expectations and requirements. Pt states he will comply this time around. Pt advised that there is an order that can be placed so only Dr Siddiqui monitors his progress and that there will be no contact or monitoring by the digital medicine team. Pt states he is ok with the team monitoring and contacting him. He says his doctor encourages him to participate due to his diabetes being uncontrolled. Pt states doctor tried placing an order to enroll the pt in the DDMP as well but  was having technical issues, so the doctor directed him to the [employer] to purchase the cuff and glucometer. He reports that he has taken blood sugar readings a few times today already. Pt advised there is no data for blood sugar and there is no order placed for DDMP. Pt request to speak with tech support. Call transferred to tech support.           Last 5 Patient Entered Readings                                      Current 30  Day Average: 147/89     Recent Readings 1/30/2019 1/29/2019 1/29/2019 1/29/2019 1/29/2019    SBP (mmHg) 140 140 141 154 144    DBP (mmHg) 89 82 87 100 92    Pulse 71 69 63 64 69

## 2019-02-01 ENCOUNTER — PATIENT OUTREACH (OUTPATIENT)
Dept: OTHER | Facility: OTHER | Age: 49
End: 2019-02-01

## 2019-02-01 NOTE — PROGRESS NOTES
Last 5 Patient Entered Readings                                      Current 30 Day Average: 151/88     Recent Readings 1/30/2019 1/30/2019 1/30/2019 1/30/2019 1/29/2019    SBP (mmHg) 152 152 140 140 140    DBP (mmHg) 86 86 89 89 82    Pulse 65 65 71 71 69        Last 6 Patient Entered Readings                                          Most Recent A1c: 7.8% on 1/7/2019  (Goal: 7%)     Recent Readings 2/1/2019 1/31/2019 1/31/2019 1/31/2019 1/31/2019    Blood Glucose (mg/dL) 170 156 204 146 101        Patient states he is test all the time. Advised patient to test 3 times a day and test before food, 1-2 hours after food and before he goes to bed.     Digital Medicine: Health  Follow Up    Lifestyle Modifications:    1.Dietary Modifications (Sodium intake <2,000mg/day, food labels, dining out): Patient reports he has been trying to cut back watching his carb's. Patient states he was eating out at [employer] or canes before BG was getting out of control. Discussed with patient on eating more home cooked meals, watching his carb and sodium intake closely, and reading food labels for the carb content and sugar.  Will send patient tips on meal planing for diabetes vai personal e-mail.     2.Physical Activity: Patient has not been exercising at all. Encouraged patient to increase riding his bike more to increase physical activity.     3.Medication Therapy: Patient has been compliant with the medication regimen.    4.Patient has the following medication side effects/concerns: Patient denies any s/s of hypotension (dizziness,LH, nausea, or fatigue) with low readings. Patient denies any s/s of hypertension (CP,SOB,severe headaches, or change in vision) with high readings.   (Frequency/Alleviating factors/Precipitating factors, etc.)     Follow up with Mr. Guillermo Castillo completed. No further questions or concerns.     Plan: Will continue to follow up to achieve health goals.

## 2019-02-06 ENCOUNTER — PATIENT OUTREACH (OUTPATIENT)
Dept: OTHER | Facility: OTHER | Age: 49
End: 2019-02-06

## 2019-02-06 NOTE — PROGRESS NOTES
HPI:  Last 5 Patient Entered Readings                                      Current 30 Day Average: 151/88     Recent Readings 1/30/2019 1/30/2019 1/30/2019 1/30/2019 1/29/2019    SBP (mmHg) 152 152 140 140 140    DBP (mmHg) 86 86 89 89 82    Pulse 65 65 71 71 69        Patient denies s/s of hypotension (lightheadedness, dizziness, nausea, fatigue) associated with low readings. Instructed patient to inform me if this occurs, patient confirms understanding.    Patient denies s/s of hypertension (SOB, CP, severe headaches, changes in vision) associated with high readings. Instructed patient to go to the ED if BP >180/110 and accompanied by hypertensive s/s, patient confirms understanding.    Last 6 Patient Entered Readings                                          Most Recent A1c: 7.8% on 1/7/2019  (Goal: 7%)     Recent Readings 2/5/2019 2/5/2019 2/5/2019 2/5/2019 2/4/2019    Blood Glucose (mg/dL) 118 203 235 122 123        Patient denies s/s or episodes of hypoglycemia (weakness, dizziness, hunger, shakiness, nausea, headache, heart palpitations, sweating, fatigue, anxiety, etc.).    Patient denies s/s of hyperglycemia (headache, increased thirst, increased urination, fatigue, blurred vision).    Assessment:  Patient's current 30-day average is not at goal of <130/80 mmHg based on ACC/AHA HTN guidelines. He feels the readings are falsely elevated.    Diabetes is not at goal based on patient's last A1C. SMBG readings reviewed and are trending down appropriately. He started a sliding scale insulin regimen with Dr. Siddiqui 1/29. He is consuming oatmeal with milk, cinnamon and Splenda in the morning and a beef jeferson with green beans for dinner. He mentioned he is starving in between meals.    Health maintenance is up to date.    Plan:  Continue current regimen. Patient will see Dr. Siddiqui 2/12 and will bring his BP cuff with him to compare readings. If he remains elevated, I will stop amlodipine-benazepril and change to  amlodipine and irbesartan.  Choose healthy and high protein snacks, such as peanut butter and an apple, when you are starving to prevent over eating at your meals.   Patients health , Karley Ortega, will be following up every 3-4 weeks.   I will continue to monitor regularly and will follow-up in 1-2 weeks, sooner if blood pressure or blood glucose begins to trend upward or downward.     Current medication regimen:  Hypertension Medications             amlodipine-benazepril 5-10 mg (LOTREL) 5-10 mg per capsule TAKE 1 CAPSULE BY MOUTH ONCE DAILY.        Diabetes Medications             insulin lispro (HUMALOG U-100 INSULIN) 100 unit/mL injection Use via sliding scale    metFORMIN (GLUCOPHAGE-XR) 500 MG 24 hr tablet TAKE TWO TABLETS BY MOUTH ONCE DAILY        Patient has my contact information and knows to call with any concerns or clinical changes.

## 2019-02-12 ENCOUNTER — LAB VISIT (OUTPATIENT)
Dept: LAB | Facility: HOSPITAL | Age: 49
End: 2019-02-12
Payer: COMMERCIAL

## 2019-02-12 DIAGNOSIS — E11.9 TYPE 2 DIABETES MELLITUS WITHOUT COMPLICATION, WITHOUT LONG-TERM CURRENT USE OF INSULIN: ICD-10-CM

## 2019-02-12 LAB
ANION GAP SERPL CALC-SCNC: 9 MMOL/L
BUN SERPL-MCNC: 14 MG/DL
CALCIUM SERPL-MCNC: 9.5 MG/DL
CHLORIDE SERPL-SCNC: 104 MMOL/L
CO2 SERPL-SCNC: 25 MMOL/L
CREAT SERPL-MCNC: 0.9 MG/DL
EST. GFR  (AFRICAN AMERICAN): >60 ML/MIN/1.73 M^2
EST. GFR  (NON AFRICAN AMERICAN): >60 ML/MIN/1.73 M^2
GLUCOSE SERPL-MCNC: 154 MG/DL
POTASSIUM SERPL-SCNC: 4 MMOL/L
SODIUM SERPL-SCNC: 138 MMOL/L

## 2019-02-12 PROCEDURE — 36415 COLL VENOUS BLD VENIPUNCTURE: CPT

## 2019-02-12 PROCEDURE — 80048 BASIC METABOLIC PNL TOTAL CA: CPT

## 2019-02-13 NOTE — PROGRESS NOTES
Last 5 Patient Entered Readings                                      Current 30 Day Average: 151/88     Recent Readings 1/30/2019 1/30/2019 1/30/2019 1/30/2019 1/29/2019    SBP (mmHg) 152 152 140 140 140    DBP (mmHg) 86 86 89 89 82    Pulse 65 65 71 71 69          Last 6 Patient Entered Readings                                          Most Recent A1c: 7.8% on 1/7/2019  (Goal: 7%)     Recent Readings 2/11/2019 2/10/2019 2/10/2019 2/9/2019 2/9/2019    Blood Glucose (mg/dL) 160 148 177 187 106        Patient's Dr. Siddiqui appointment 2/12 was moved to 2/14.

## 2019-02-14 ENCOUNTER — OFFICE VISIT (OUTPATIENT)
Dept: INTERNAL MEDICINE | Facility: CLINIC | Age: 49
End: 2019-02-14
Payer: COMMERCIAL

## 2019-02-14 VITALS
SYSTOLIC BLOOD PRESSURE: 163 MMHG | HEIGHT: 72 IN | HEART RATE: 56 BPM | OXYGEN SATURATION: 98 % | TEMPERATURE: 97 F | BODY MASS INDEX: 32.49 KG/M2 | DIASTOLIC BLOOD PRESSURE: 100 MMHG | WEIGHT: 239.88 LBS

## 2019-02-14 DIAGNOSIS — I10 ESSENTIAL HYPERTENSION: ICD-10-CM

## 2019-02-14 DIAGNOSIS — E11.9 TYPE 2 DIABETES MELLITUS WITHOUT COMPLICATION, WITHOUT LONG-TERM CURRENT USE OF INSULIN: ICD-10-CM

## 2019-02-14 DIAGNOSIS — Z09 FOLLOW UP: Primary | ICD-10-CM

## 2019-02-14 PROCEDURE — 3008F BODY MASS INDEX DOCD: CPT | Mod: CPTII,S$GLB,, | Performed by: NURSE PRACTITIONER

## 2019-02-14 PROCEDURE — 99999 PR PBB SHADOW E&M-EST. PATIENT-LVL V: ICD-10-PCS | Mod: PBBFAC,,, | Performed by: NURSE PRACTITIONER

## 2019-02-14 PROCEDURE — 3045F PR MOST RECENT HEMOGLOBIN A1C LEVEL 7.0-9.0%: CPT | Mod: CPTII,S$GLB,, | Performed by: NURSE PRACTITIONER

## 2019-02-14 PROCEDURE — 99999 PR PBB SHADOW E&M-EST. PATIENT-LVL V: CPT | Mod: PBBFAC,,, | Performed by: NURSE PRACTITIONER

## 2019-02-14 PROCEDURE — 3008F PR BODY MASS INDEX (BMI) DOCUMENTED: ICD-10-PCS | Mod: CPTII,S$GLB,, | Performed by: NURSE PRACTITIONER

## 2019-02-14 PROCEDURE — 3074F PR MOST RECENT SYSTOLIC BLOOD PRESSURE < 130 MM HG: ICD-10-PCS | Mod: CPTII,S$GLB,, | Performed by: NURSE PRACTITIONER

## 2019-02-14 PROCEDURE — 3045F PR MOST RECENT HEMOGLOBIN A1C LEVEL 7.0-9.0%: ICD-10-PCS | Mod: CPTII,S$GLB,, | Performed by: NURSE PRACTITIONER

## 2019-02-14 PROCEDURE — 3074F SYST BP LT 130 MM HG: CPT | Mod: CPTII,S$GLB,, | Performed by: NURSE PRACTITIONER

## 2019-02-14 PROCEDURE — 3078F PR MOST RECENT DIASTOLIC BLOOD PRESSURE < 80 MM HG: ICD-10-PCS | Mod: CPTII,S$GLB,, | Performed by: NURSE PRACTITIONER

## 2019-02-14 PROCEDURE — 3078F DIAST BP <80 MM HG: CPT | Mod: CPTII,S$GLB,, | Performed by: NURSE PRACTITIONER

## 2019-02-14 PROCEDURE — 99213 PR OFFICE/OUTPT VISIT, EST, LEVL III, 20-29 MIN: ICD-10-PCS | Mod: S$GLB,,, | Performed by: NURSE PRACTITIONER

## 2019-02-14 PROCEDURE — 99213 OFFICE O/P EST LOW 20 MIN: CPT | Mod: S$GLB,,, | Performed by: NURSE PRACTITIONER

## 2019-02-14 NOTE — PROGRESS NOTES
Subjective:       Patient ID: Guillermo Castillo is a 49 y.o. male.    Chief Complaint: Follow-up    Patient presents for a follow up.  Started sliding scale insulin.  Had to use sliding scale insulin a few times.  Reports some neck and right shoulder discomfort that started about 1.5 week. Tried OTC NSAIDs.  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  Enrolled in diabetic and hypertension digital medicine program.  Automatic blood pressure readings are higher then manual.  CBG's low in the 200's or less.        Review of Systems   Constitutional: Negative for chills and fever.   Respiratory: Negative for cough and shortness of breath.    Musculoskeletal: Positive for arthralgias and neck pain. Negative for joint swelling.   Skin: Negative for color change and wound.   Psychiatric/Behavioral: Negative for agitation and confusion.       Objective:      Physical Exam   Constitutional: He is oriented to person, place, and time. Vital signs are normal. He appears well-developed and well-nourished.   HENT:   Head: Normocephalic and atraumatic.   Neck: Normal range of motion.   Cardiovascular: Normal rate and regular rhythm.   Pulmonary/Chest: Effort normal and breath sounds normal.   Musculoskeletal: Normal range of motion.   Neurological: He is alert and oriented to person, place, and time.   Skin: Skin is warm.   Psychiatric: He has a normal mood and affect. His behavior is normal.       Assessment:       1. Follow up    2. Essential hypertension    3. Type 2 diabetes mellitus without complication, without long-term current use of insulin        Plan:         Follow up    Essential hypertension    Type 2 diabetes mellitus without complication, without long-term current use of insulin        Manual blood pressure reading is good.  Reports his manual readings are good.  Continue to follow diabetic diet and current medications.  Follow up in 3 months as scheduled.

## 2019-02-14 NOTE — PATIENT INSTRUCTIONS
Diet: Diabetes  Food is an important tool that you can use to control diabetes and stay healthy. Eating well-balanced meals in the correct amounts will help you control your blood glucose levels and prevent low blood sugar reactions. It will also help you reduce the health risks of diabetes. There is no one specific diet that is right for everyone with diabetes. But there are general guidelines to follow. A registered dietitian (RD) will create a tailored diet approach thats just right for you. He or she will also help you plan healthy meals and snacks. If you have any questions, call your dietitian for advice.     Guidelines for success  Talk with your healthcare provider before starting a diabetes diet or weight loss program. If you haven't talked with a dietitian yet, ask your provider for a referral. The following guidelines can help you succeed:  · Select foods from the 6 food groups below. Your dietitian will help you find food choices within each group. He or she will also show you serving sizes and how many servings you can have at each meal.  ¨ Grains, beans, and starchy vegetables  ¨ Vegetables  ¨ Fruit  ¨ Milk or yogurt  ¨ Meat, poultry, fish, or tofu  ¨ Healthy fats  · Check your blood sugar levels as directed by your provider. Take any medicine as prescribed by your provider.  · Learn to read food labels and pick the right portion sizes.  · Eat only the amount of food in your meal plan. Eat about the same amount of food at regular times each day. Dont skip meals. Eat meals 4 to 5 hours apart, with snacks in between.  · Limit alcohol. It raises blood sugar levels. Drink water or calorie-free diet drinks that use safe sweeteners.  · Eat less fat to help lower your risk of heart disease. Use nonfat or low-fat dairy products and lean meats. Avoid fried foods. Use cooking oils that are unsaturated, such as olive, canola, or peanut oil.  · Talk with your dietitian about safe sugar substitutes.  · Avoid  added salt. It can contribute to high blood pressure, which can cause heart disease. People with diabetes already have a risk of high blood pressure and heart disease.  · Stay at a healthy weight. If you need to lose weight, cut down on your portion sizes. But dont skip meals. Exercise is an important part of any weight management program. Talk with your provider about an exercise program thats right for you.  · For more information about the best diet plan for you, talk with a registered dietitian (RD). To find an RD in your area, contact:  ¨ Academy of Nutrition and Dietetics www.eatright.org  ¨ The American Diabetes Association 094-542-2453 www.diabetes.org  Date Last Reviewed: 8/1/2016 © 2000-2017 [employer]. [address] [place] [address], [place] [address]. All rights reserved. This information is not intended as a substitute for professional medical care. Always follow your healthcare professional's instructions.

## 2019-02-15 ENCOUNTER — PATIENT OUTREACH (OUTPATIENT)
Dept: OTHER | Facility: OTHER | Age: 49
End: 2019-02-15

## 2019-02-15 DIAGNOSIS — E11.9 TYPE 2 DIABETES MELLITUS WITHOUT COMPLICATION, WITHOUT LONG-TERM CURRENT USE OF INSULIN: Primary | ICD-10-CM

## 2019-02-15 NOTE — PROGRESS NOTES
HPI:  Last 5 Patient Entered Readings                                      Current 30 Day Average: 150/89     Recent Readings 2/15/2019 2/15/2019 2/15/2019 2/15/2019 2/15/2019    SBP (mmHg) 142 142 142 142 142    DBP (mmHg) 85 85 87 87 77    Pulse 70 70 67 67 70        Patient denies s/s of hypotension (lightheadedness, dizziness, nausea, fatigue) associated with low readings. Instructed patient to inform me if this occurs, patient confirms understanding.    Patient denies s/s of hypertension (SOB, CP, severe headaches, changes in vision) associated with high readings. Instructed patient to go to the ED if BP >180/110 and accompanied by hypertensive s/s, patient confirms understanding.    Last 6 Patient Entered Readings                                          Most Recent A1c: 7.8% on 1/7/2019  (Goal: 7%)     Recent Readings 2/15/2019 2/15/2019 2/15/2019 2/14/2019 2/14/2019    Blood Glucose (mg/dL) 252 235 150 131 184        Patient denies s/s or episodes of hypoglycemia (weakness, dizziness, hunger, shakiness, nausea, headache, heart palpitations, sweating, fatigue, anxiety, etc.).    Patient denies s/s of hyperglycemia (headache, increased thirst, increased urination, fatigue, blurred vision).    He compared BP cuffs at his visit yesterday and had a 30-40 point difference in cuffs. He will compare his cuff tonight while at work to determine if there's this large of a difference again. 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    Assessment:  Patient's current 30-day average is at goal of <130/80 mmHg based on ACC/AHA HTN guidelines in the office and high at home.     Diabetes is not at goal based on patient's last A1C. SMBG readings reviewed and are elevated.    Health maintenance is up to date.    Plan:  Start Ozempic 0.25mg weekly 2/18. Compare BP cuffs at work and your digital machine.  Patients health , Karley Ortega, will be following up every  3-4 weeks.   I will continue to monitor regularly and will follow-up in 2 weeks, sooner if blood pressure or blood glucose begins to trend upward or downward.     Current medication regimen:  Hypertension Medications             amlodipine-benazepril 5-10 mg (LOTREL) 5-10 mg per capsule TAKE 1 CAPSULE BY MOUTH ONCE DAILY.        Diabetes Medications             insulin lispro (HUMALOG U-100 INSULIN) 100 unit/mL injection Use via sliding scale    metFORMIN (GLUCOPHAGE-XR) 500 MG 24 hr tablet TAKE TWO TABLETS BY MOUTH ONCE DAILY    semaglutide (OZEMPIC) 0.25 mg or 0.5 mg(2 mg/1.5 mL) PnIj Inject 0.25 mg into the skin every 7 days.        Patient has my contact information and knows to call with any concerns or clinical changes.

## 2019-02-28 ENCOUNTER — PATIENT OUTREACH (OUTPATIENT)
Dept: OTHER | Facility: OTHER | Age: 49
End: 2019-02-28

## 2019-02-28 NOTE — PROGRESS NOTES
HPI:  Last 5 Patient Entered Readings                                      Current 30 Day Average: 146/88     Recent Readings 2/24/2019 2/24/2019 2/19/2019 2/19/2019 2/19/2019    SBP (mmHg) 142 142 144 144 147    DBP (mmHg) 83 83 83 83 86    Pulse 73 73 65 65 71        Patient denies s/s of hypotension (lightheadedness, dizziness, nausea, fatigue) associated with low readings. Instructed patient to inform me if this occurs, patient confirms understanding.    Patient denies s/s of hypertension (SOB, CP, severe headaches, changes in vision) associated with high readings. Instructed patient to go to the ED if BP >180/110 and accompanied by hypertensive s/s, patient confirms understanding.    Last 6 Patient Entered Readings                                          Most Recent A1c: 7.8% on 1/7/2019  (Goal: 7%)     Recent Readings 2/26/2019 2/26/2019 2/26/2019 2/26/2019 2/26/2019    Blood Glucose (mg/dL) 126 134 125 135 161        Patient denies s/s or episodes of hypoglycemia (weakness, dizziness, hunger, shakiness, nausea, headache, heart palpitations, sweating, fatigue, anxiety, etc.).    Patient denies s/s of hyperglycemia (headache, increased thirst, increased urination, fatigue, blurred vision).    Patient wasn't able to afford Ozempic due to cost.    Assessment:  Patient's current 30-day average is not at goal of <130/80 mmHg based on ACC/AHA HTN guidelines.     Diabetes is not at goal based on patient's last A1C. SMBG readings reviewed and are dropping nicely due to lifestyle improvements by watching his sugar intake.    Health maintenance is up to date.    Plan:  Increase amlodipine-benazepril to 10-20mg daily. Once he is at the end of this prescription, I will change amlodipine-benazepril to amlodipine and an ARB. Continue to work on lifestyle through diet improvements to continue to bring down his glucose readings.   Patients health , Karley Ortega, will be following up every 3-4 weeks.   I will  continue to monitor regularly and will follow-up in 2 weeks, sooner if blood pressure or blood glucose begins to trend upward or downward.     Current medication regimen:  Hypertension Medications             amlodipine-benazepril 5-10 mg (LOTREL) 5-10 mg per capsule TAKE 1 CAPSULE BY MOUTH ONCE DAILY.        Diabetes Medications             insulin lispro (HUMALOG U-100 INSULIN) 100 unit/mL injection Use via sliding scale    metFORMIN (GLUCOPHAGE-XR) 500 MG 24 hr tablet TAKE TWO TABLETS BY MOUTH ONCE DAILY             Patient has my contact information and knows to call with any concerns or clinical changes.

## 2019-03-14 ENCOUNTER — PATIENT OUTREACH (OUTPATIENT)
Dept: OTHER | Facility: OTHER | Age: 49
End: 2019-03-14

## 2019-03-14 NOTE — PROGRESS NOTES
HPI:    Called patient to discuss how he is tolerating the higher dose of Lotrel. He is tolerating the change without any issues. He has another three weeks left of Lotrel. Once he is finished, I will break apart this medication to an ARB and amlodipine due to the increase in lung cancer risk.  Last 5 Patient Entered Readings                                      Current 30 Day Average: 148/86     Recent Readings 3/5/2019 3/5/2019 3/5/2019 3/5/2019 3/4/2019    SBP (mmHg) 150 150 153 153 154    DBP (mmHg) 81 81 92 92 86    Pulse 69 69 61 61 72        Patient denies s/s of hypotension (lightheadedness, dizziness, nausea, fatigue) associated with low readings. Instructed patient to inform me if this occurs, patient confirms understanding.    Patient denies s/s of hypertension (SOB, CP, severe headaches, changes in vision) associated with high readings. Instructed patient to go to the ED if BP >180/110 and accompanied by hypertensive s/s, patient confirms understanding.    Last 6 Patient Entered Readings                                          Most Recent A1c: 7.8% on 1/7/2019  (Goal: 7%)     Recent Readings 3/10/2019 3/5/2019 3/5/2019 3/4/2019 3/3/2019    Blood Glucose (mg/dL) 142 267 152 156 112        Patient denies s/s or episodes of hypoglycemia (weakness, dizziness, hunger, shakiness, nausea, headache, heart palpitations, sweating, fatigue, anxiety, etc.).    Patient denies s/s of hyperglycemia (headache, increased thirst, increased urination, fatigue, blurred vision).    He went to the [facility] 3/4-3/5 causing his elevated blood sugar readings on 3/5.    Assessment:  Patient's current 30-day average is not at goal of <130/80 mmHg based on ACC/AHA HTN guidelines.     Diabetes is not controlled based on patient's last A1C. SMBG readings reviewed and are controlled the majority of the time.    Health maintenance is up to date.    Plan:  Continue current regimen.  Patients health , Karley Ortega, will be  following up every 3-4 weeks.   I will continue to monitor regularly and will follow-up in 3 weeks when he is doing with his current prescription of Lotrel, sooner if blood pressure or blood glucose begins to trend upward or downward.     Current medication regimen:  Hypertension Medications             amlodipine-benazepril 5-10 mg (LOTREL) 5-10 mg per capsule TAKE 1 CAPSULE BY MOUTH ONCE DAILY.        Diabetes Medications             insulin lispro (HUMALOG U-100 INSULIN) 100 unit/mL injection Use via sliding scale    metFORMIN (GLUCOPHAGE-XR) 500 MG 24 hr tablet TAKE TWO TABLETS BY MOUTH ONCE DAILY        Patient has my contact information and knows to call with any concerns or clinical changes.

## 2019-03-18 ENCOUNTER — OFFICE VISIT (OUTPATIENT)
Dept: INTERNAL MEDICINE | Facility: CLINIC | Age: 49
End: 2019-03-18
Payer: COMMERCIAL

## 2019-03-18 VITALS
WEIGHT: 242.75 LBS | TEMPERATURE: 97 F | SYSTOLIC BLOOD PRESSURE: 136 MMHG | OXYGEN SATURATION: 98 % | HEART RATE: 66 BPM | HEIGHT: 72 IN | BODY MASS INDEX: 32.88 KG/M2 | DIASTOLIC BLOOD PRESSURE: 80 MMHG

## 2019-03-18 DIAGNOSIS — I10 ESSENTIAL HYPERTENSION: ICD-10-CM

## 2019-03-18 DIAGNOSIS — J06.9 URTI (ACUTE UPPER RESPIRATORY INFECTION): ICD-10-CM

## 2019-03-18 DIAGNOSIS — M10.9 EXACERBATION OF GOUT: Primary | ICD-10-CM

## 2019-03-18 DIAGNOSIS — E66.9 OBESITY (BMI 30.0-34.9): ICD-10-CM

## 2019-03-18 DIAGNOSIS — E11.9 TYPE 2 DIABETES MELLITUS WITHOUT COMPLICATION, WITHOUT LONG-TERM CURRENT USE OF INSULIN: ICD-10-CM

## 2019-03-18 PROBLEM — E66.811 OBESITY (BMI 30.0-34.9): Status: ACTIVE | Noted: 2019-03-18

## 2019-03-18 PROCEDURE — 3045F PR MOST RECENT HEMOGLOBIN A1C LEVEL 7.0-9.0%: ICD-10-PCS | Mod: CPTII,S$GLB,, | Performed by: FAMILY MEDICINE

## 2019-03-18 PROCEDURE — 3075F PR MOST RECENT SYSTOLIC BLOOD PRESS GE 130-139MM HG: ICD-10-PCS | Mod: CPTII,S$GLB,, | Performed by: FAMILY MEDICINE

## 2019-03-18 PROCEDURE — 99214 PR OFFICE/OUTPT VISIT, EST, LEVL IV, 30-39 MIN: ICD-10-PCS | Mod: S$GLB,,, | Performed by: FAMILY MEDICINE

## 2019-03-18 PROCEDURE — 99999 PR PBB SHADOW E&M-EST. PATIENT-LVL III: CPT | Mod: PBBFAC,,, | Performed by: FAMILY MEDICINE

## 2019-03-18 PROCEDURE — 3008F BODY MASS INDEX DOCD: CPT | Mod: CPTII,S$GLB,, | Performed by: FAMILY MEDICINE

## 2019-03-18 PROCEDURE — 99214 OFFICE O/P EST MOD 30 MIN: CPT | Mod: S$GLB,,, | Performed by: FAMILY MEDICINE

## 2019-03-18 PROCEDURE — 99999 PR PBB SHADOW E&M-EST. PATIENT-LVL III: ICD-10-PCS | Mod: PBBFAC,,, | Performed by: FAMILY MEDICINE

## 2019-03-18 PROCEDURE — 3079F PR MOST RECENT DIASTOLIC BLOOD PRESSURE 80-89 MM HG: ICD-10-PCS | Mod: CPTII,S$GLB,, | Performed by: FAMILY MEDICINE

## 2019-03-18 PROCEDURE — 3008F PR BODY MASS INDEX (BMI) DOCUMENTED: ICD-10-PCS | Mod: CPTII,S$GLB,, | Performed by: FAMILY MEDICINE

## 2019-03-18 PROCEDURE — 3079F DIAST BP 80-89 MM HG: CPT | Mod: CPTII,S$GLB,, | Performed by: FAMILY MEDICINE

## 2019-03-18 PROCEDURE — 3075F SYST BP GE 130 - 139MM HG: CPT | Mod: CPTII,S$GLB,, | Performed by: FAMILY MEDICINE

## 2019-03-18 PROCEDURE — 3045F PR MOST RECENT HEMOGLOBIN A1C LEVEL 7.0-9.0%: CPT | Mod: CPTII,S$GLB,, | Performed by: FAMILY MEDICINE

## 2019-03-18 RX ORDER — INDOMETHACIN 50 MG/1
50 CAPSULE ORAL 3 TIMES DAILY PRN
Qty: 15 CAPSULE | Refills: 0 | Status: SHIPPED | OUTPATIENT
Start: 2019-03-18 | End: 2019-03-23

## 2019-03-18 NOTE — PROGRESS NOTES
Subjective:       Patient ID: Guillermo Castillo is a 49 y.o. male.    Chief Complaint: Cough; Sinus Problem; Sore Throat; and Gout    49-year-old male patient of Dr. Siddiqui with Patient Active Problem List:     Fatty liver     Type 2 diabetes mellitus     Mood disorder     Hypertriglyceridemia     Hyperlipidemia     Hypertension     Chronic gout     ARPAN (obstructive sleep apnea)     Obesity (BMI 30.0-34.9)  Here with complaint of right foot pain, throbbing type of pain at the base of the right great toe since Friday but gradually started to get worse up to 10/10 this morning, patient took BC Powder and ibuprofen 800 mg, with minimal relief  Denies any fever, does not recall eating any salty seafood or drinking alcohol.   Only recent change was increasing amlodipine benazepril to 2 tablets daily.   Patient started to have sore throat and dry to productive cough, with nasal congestion for the past 1 week but denies any fever.   Sugars have been stable taking metformin and insulin as per sliding scale  Last gout attack was August of 2017.       Review of Systems   Constitutional: Negative for appetite change, chills, fatigue and fever.   HENT: Positive for congestion, postnasal drip and sore throat.    Eyes: Negative for visual disturbance.   Respiratory: Positive for cough. Negative for shortness of breath.    Cardiovascular: Negative for chest pain, palpitations and leg swelling.   Gastrointestinal: Negative for abdominal pain, nausea and vomiting.   Musculoskeletal: Positive for arthralgias and myalgias.   Skin: Negative for rash.   Neurological: Negative for weakness, numbness and headaches.   Psychiatric/Behavioral: Negative for sleep disturbance.         /80 (BP Location: Right arm, Patient Position: Sitting, BP Method: Medium (Manual))   Pulse 66   Temp 97.4 °F (36.3 °C) (Tympanic)   Ht 6' (1.829 m)   Wt 110.1 kg (242 lb 11.6 oz)   SpO2 98%   BMI 32.92 kg/m²   Objective:      Physical Exam    Constitutional: He is oriented to person, place, and time. He appears well-developed and well-nourished.   HENT:   Head: Normocephalic and atraumatic.   Right Ear: External ear normal.   Left Ear: External ear normal.   Mouth/Throat: Oropharynx is clear and moist. No oropharyngeal exudate.   Postnasal drip noted today   Neck: Neck supple.   Cardiovascular: Normal rate, regular rhythm and normal heart sounds.   No murmur heard.  Pulmonary/Chest: Effort normal and breath sounds normal. He has no wheezes.   Abdominal: Soft. Bowel sounds are normal. There is no tenderness.   Musculoskeletal: He exhibits tenderness. He exhibits no edema.   Positive for tenderness at the base of the 1st MTP joint on the right foot   Lymphadenopathy:     He has no cervical adenopathy.   Neurological: He is alert and oriented to person, place, and time.   Skin: Skin is warm and dry. No rash noted. No erythema.   No redness but warmth noted at the base of the 1st MTP joint   Psychiatric: He has a normal mood and affect.         Assessment:       1. Exacerbation of gout    2. URTI (acute upper respiratory infection)    3. Essential hypertension    4. Type 2 diabetes mellitus without complication, without long-term current use of insulin    5. Obesity (BMI 30.0-34.9)        Plan:   Exacerbation of gout  -     indomethacin (INDOCIN) 50 MG capsule; Take 1 capsule (50 mg total) by mouth 3 (three) times daily as needed.  Dispense: 15 capsule; Refill: 0  Indocin prescribed today for symptomatic relief  Patient was advised to discontinue over-the-counter NSAIDs while taking Indocin and okay to take Tylenol  Warm compresses recommended    URTI (acute upper respiratory infection)  Patient was advised to use Flonase and over-the-counter Mucinex/Zyrtec  Drink adequate fluids  No need for antibiotics    Essential hypertension  Blood pressure stable today currently on amlodipine benazepril 5/10 mg 2 tablets daily  Followed by digital hypertension  program    Type 2 diabetes mellitus without complication, without long-term current use of insulin  A1c at 7.8 currently on metformin 1000 mg daily and insulin via sliding scale    Obesity (BMI 30.0-34.9)

## 2019-03-27 ENCOUNTER — OFFICE VISIT (OUTPATIENT)
Dept: SLEEP MEDICINE | Facility: CLINIC | Age: 49
End: 2019-03-27
Payer: COMMERCIAL

## 2019-03-27 VITALS
WEIGHT: 237.88 LBS | SYSTOLIC BLOOD PRESSURE: 136 MMHG | BODY MASS INDEX: 32.22 KG/M2 | RESPIRATION RATE: 18 BRPM | HEIGHT: 72 IN | HEART RATE: 64 BPM | DIASTOLIC BLOOD PRESSURE: 82 MMHG | OXYGEN SATURATION: 97 %

## 2019-03-27 DIAGNOSIS — G47.33 OSA (OBSTRUCTIVE SLEEP APNEA): Primary | ICD-10-CM

## 2019-03-27 DIAGNOSIS — G47.26 SHIFTING SLEEP-WORK SCHEDULE: ICD-10-CM

## 2019-03-27 PROCEDURE — 3075F SYST BP GE 130 - 139MM HG: CPT | Mod: CPTII,S$GLB,, | Performed by: NURSE PRACTITIONER

## 2019-03-27 PROCEDURE — 99999 PR PBB SHADOW E&M-EST. PATIENT-LVL III: ICD-10-PCS | Mod: PBBFAC,,, | Performed by: NURSE PRACTITIONER

## 2019-03-27 PROCEDURE — 3008F BODY MASS INDEX DOCD: CPT | Mod: CPTII,S$GLB,, | Performed by: NURSE PRACTITIONER

## 2019-03-27 PROCEDURE — 99213 PR OFFICE/OUTPT VISIT, EST, LEVL III, 20-29 MIN: ICD-10-PCS | Mod: S$GLB,,, | Performed by: NURSE PRACTITIONER

## 2019-03-27 PROCEDURE — 3008F PR BODY MASS INDEX (BMI) DOCUMENTED: ICD-10-PCS | Mod: CPTII,S$GLB,, | Performed by: NURSE PRACTITIONER

## 2019-03-27 PROCEDURE — 99999 PR PBB SHADOW E&M-EST. PATIENT-LVL III: CPT | Mod: PBBFAC,,, | Performed by: NURSE PRACTITIONER

## 2019-03-27 PROCEDURE — 99213 OFFICE O/P EST LOW 20 MIN: CPT | Mod: S$GLB,,, | Performed by: NURSE PRACTITIONER

## 2019-03-27 PROCEDURE — 3079F DIAST BP 80-89 MM HG: CPT | Mod: CPTII,S$GLB,, | Performed by: NURSE PRACTITIONER

## 2019-03-27 PROCEDURE — 3075F PR MOST RECENT SYSTOLIC BLOOD PRESS GE 130-139MM HG: ICD-10-PCS | Mod: CPTII,S$GLB,, | Performed by: NURSE PRACTITIONER

## 2019-03-27 PROCEDURE — 3079F PR MOST RECENT DIASTOLIC BLOOD PRESSURE 80-89 MM HG: ICD-10-PCS | Mod: CPTII,S$GLB,, | Performed by: NURSE PRACTITIONER

## 2019-03-27 NOTE — PROGRESS NOTES
Subjective:      Patient ID: Guillermo Castillo is a 49 y.o. male.    Chief Complaint: Sleep Apnea    HPI  Presents to office for review of AutoPAP therapy. Patient states improved symptoms with use of AutoPAP. Sleeping more soundly. Waking up feeling more refreshed. Improved daytime sleepiness. Patient states he is benefiting from use of the AutoPAP. He works night shift with EMS. 2,3,2 days. No problems sleeping on days off.    Patient Active Problem List   Diagnosis    Fatty liver    Type 2 diabetes mellitus    Mood disorder    Hypertriglyceridemia    Hyperlipidemia    Hypertension    Chronic gout    ARPAN (obstructive sleep apnea)    Obesity (BMI 30.0-34.9)    Shifting sleep-work schedule       /82   Pulse 64   Resp 18   Ht 6' (1.829 m)   Wt 107.9 kg (237 lb 14 oz)   SpO2 97%   BMI 32.26 kg/m²   Body mass index is 32.26 kg/m².    Review of Systems   Constitutional: Negative.    HENT: Negative.    Respiratory: Negative.    Cardiovascular: Negative.    Musculoskeletal: Negative.    Gastrointestinal: Negative.    Neurological: Negative.    Psychiatric/Behavioral: Negative.      Objective:      Physical Exam   Constitutional: He is oriented to person, place, and time. He appears well-developed and well-nourished.   HENT:   Head: Normocephalic and atraumatic.   Neck: Normal range of motion. Neck supple.   Cardiovascular: Normal rate and regular rhythm.   Pulmonary/Chest: Effort normal and breath sounds normal.   Abdominal: Soft.   Musculoskeletal: He exhibits no edema.   Neurological: He is alert and oriented to person, place, and time.   Skin: Skin is warm and dry.   Psychiatric: He has a normal mood and affect.     Personal Diagnostic Review  Autopap download: Patient wears on average 8 hrs and 38 minutes. Greater than 4 hrs 96.7 % of the time. 90% percentile pressure 11.7 with AHI 4.5 events/hr    Assessment:       1. ARPAN (obstructive sleep apnea)    2. Shifting sleep-work schedule       "  Outpatient Encounter Medications as of 3/27/2019   Medication Sig Dispense Refill    amlodipine-benazepril 5-10 mg (LOTREL) 5-10 mg per capsule TAKE 1 CAPSULE BY MOUTH ONCE DAILY. (Patient taking differently: Take 2 capsules by mouth once daily. ) 30 capsule 10    aspirin 81 mg Tab Take 1 tablet by mouth Daily.      cetirizine (ZYRTEC) 10 MG tablet Take 10 mg by mouth daily as needed.       DULoxetine (CYMBALTA) 60 MG capsule TAKE ONE CAPSULE BY MOUTH EVERY DAY AS DIRECTED 30 capsule 10    fluticasone (FLONASE) 50 mcg/actuation nasal spray 2 sprays (100 mcg total) by Each Nare route once daily. (Patient taking differently: 2 sprays by Each Nare route daily as needed. ) 1 Bottle 12    insulin lispro (HUMALOG U-100 INSULIN) 100 unit/mL injection Use via sliding scale 10 mL 1    insulin syringe-needle U-100 0.3 mL 31 gauge x 5/16" Syrg Use as directed 100 each 0    metFORMIN (GLUCOPHAGE-XR) 500 MG 24 hr tablet TAKE TWO TABLETS BY MOUTH ONCE DAILY 60 tablet 10    omeprazole (PRILOSEC) 40 MG capsule TAKE 1 CAPSULE (40 MG TOTAL) BY MOUTH ONCE DAILY. (Patient taking differently: Take 40 mg by mouth daily as needed. ) 30 capsule 11    rosuvastatin (CRESTOR) 10 MG tablet TAKE 1 TABLET (10 MG TOTAL) BY MOUTH ONCE DAILY. 30 tablet 6     No facility-administered encounter medications on file as of 3/27/2019.      Orders Placed This Encounter   Procedures    CPAP/BIPAP SUPPLIES     Order Specific Question:   Type of mask:     Answer:   Nasal     Order Specific Question:   Headgear?     Answer:   Yes     Order Specific Question:   Tubing?     Answer:   Yes     Order Specific Question:   Humidifier chamber?     Answer:   Yes     Order Specific Question:   Chin strap?     Answer:   Yes     Order Specific Question:   Filters?     Answer:   Yes     Order Specific Question:   Cushions?     Answer:   Yes     Order Specific Question:   Length of need (1-99 months):     Answer:   99     Plan:      Doing well on PAP settings. " Patient is compliant. Follow up in 12 months with PAP data download or call earlier if any problems.  Problem List Items Addressed This Visit        Other    ARPAN (obstructive sleep apnea) - Primary    Relevant Orders    CPAP/BIPAP SUPPLIES    Shifting sleep-work schedule    Overview     Night shift. 2,3,2 days

## 2019-03-31 ENCOUNTER — OFFICE VISIT (OUTPATIENT)
Dept: URGENT CARE | Facility: CLINIC | Age: 49
End: 2019-03-31
Payer: COMMERCIAL

## 2019-03-31 VITALS
HEIGHT: 72 IN | OXYGEN SATURATION: 99 % | SYSTOLIC BLOOD PRESSURE: 148 MMHG | TEMPERATURE: 98 F | WEIGHT: 240 LBS | BODY MASS INDEX: 32.51 KG/M2 | HEART RATE: 71 BPM | DIASTOLIC BLOOD PRESSURE: 82 MMHG

## 2019-03-31 DIAGNOSIS — R05.9 COUGH: ICD-10-CM

## 2019-03-31 DIAGNOSIS — B96.89 ACUTE BACTERIAL SINUSITIS: ICD-10-CM

## 2019-03-31 DIAGNOSIS — E11.9 TYPE 2 DIABETES MELLITUS WITHOUT COMPLICATION, WITHOUT LONG-TERM CURRENT USE OF INSULIN: ICD-10-CM

## 2019-03-31 DIAGNOSIS — I10 HYPERTENSION, UNSPECIFIED TYPE: ICD-10-CM

## 2019-03-31 DIAGNOSIS — G44.89 OTHER HEADACHE SYNDROME: Primary | ICD-10-CM

## 2019-03-31 DIAGNOSIS — J01.90 ACUTE BACTERIAL SINUSITIS: ICD-10-CM

## 2019-03-31 PROCEDURE — 3079F PR MOST RECENT DIASTOLIC BLOOD PRESSURE 80-89 MM HG: ICD-10-PCS | Mod: CPTII,S$GLB,, | Performed by: NURSE PRACTITIONER

## 2019-03-31 PROCEDURE — 3077F SYST BP >= 140 MM HG: CPT | Mod: CPTII,S$GLB,, | Performed by: NURSE PRACTITIONER

## 2019-03-31 PROCEDURE — 99214 PR OFFICE/OUTPT VISIT, EST, LEVL IV, 30-39 MIN: ICD-10-PCS | Mod: S$GLB,,, | Performed by: NURSE PRACTITIONER

## 2019-03-31 PROCEDURE — 99999 PR PBB SHADOW E&M-EST. PATIENT-LVL IV: ICD-10-PCS | Mod: PBBFAC,,, | Performed by: NURSE PRACTITIONER

## 2019-03-31 PROCEDURE — 3045F PR MOST RECENT HEMOGLOBIN A1C LEVEL 7.0-9.0%: ICD-10-PCS | Mod: CPTII,S$GLB,, | Performed by: NURSE PRACTITIONER

## 2019-03-31 PROCEDURE — 3008F BODY MASS INDEX DOCD: CPT | Mod: CPTII,S$GLB,, | Performed by: NURSE PRACTITIONER

## 2019-03-31 PROCEDURE — 3079F DIAST BP 80-89 MM HG: CPT | Mod: CPTII,S$GLB,, | Performed by: NURSE PRACTITIONER

## 2019-03-31 PROCEDURE — 3077F PR MOST RECENT SYSTOLIC BLOOD PRESSURE >= 140 MM HG: ICD-10-PCS | Mod: CPTII,S$GLB,, | Performed by: NURSE PRACTITIONER

## 2019-03-31 PROCEDURE — 3008F PR BODY MASS INDEX (BMI) DOCUMENTED: ICD-10-PCS | Mod: CPTII,S$GLB,, | Performed by: NURSE PRACTITIONER

## 2019-03-31 PROCEDURE — 99214 OFFICE O/P EST MOD 30 MIN: CPT | Mod: S$GLB,,, | Performed by: NURSE PRACTITIONER

## 2019-03-31 PROCEDURE — 99999 PR PBB SHADOW E&M-EST. PATIENT-LVL IV: CPT | Mod: PBBFAC,,, | Performed by: NURSE PRACTITIONER

## 2019-03-31 PROCEDURE — 3045F PR MOST RECENT HEMOGLOBIN A1C LEVEL 7.0-9.0%: CPT | Mod: CPTII,S$GLB,, | Performed by: NURSE PRACTITIONER

## 2019-03-31 RX ORDER — AMOXICILLIN AND CLAVULANATE POTASSIUM 875; 125 MG/1; MG/1
1 TABLET, FILM COATED ORAL EVERY 12 HOURS
Qty: 14 TABLET | Refills: 0 | Status: SHIPPED | OUTPATIENT
Start: 2019-03-31 | End: 2019-04-07

## 2019-03-31 RX ORDER — PROMETHAZINE HYDROCHLORIDE AND DEXTROMETHORPHAN HYDROBROMIDE 6.25; 15 MG/5ML; MG/5ML
5 SYRUP ORAL
Qty: 180 ML | Refills: 0 | Status: SHIPPED | OUTPATIENT
Start: 2019-03-31 | End: 2019-07-22

## 2019-03-31 NOTE — PATIENT INSTRUCTIONS
PLAN:   Advise increase p.o. fluids-- water/juice & rest  Meds:  Augmentin, Phenergan DM    / no refills  Advise use of OTC Flonase with sensimist  Simply saline nasal wash  to irrigate sinuses and for congestion/runny nose.  Cool mist humidifier/vaporizer.  Practice good handwashing.  Advise use of honey lemon tea  Mucinex for cough and chest congestion.  Tylenol or Ibuprofen for fever, headache and body aches.  Warm salt water gargles for throat comfort.  Chloraseptic spray or lozenges for throat comfort.  Advise follow up with PCP  Advise go to ER if symptoms worsen or fail to improve with treatment.

## 2019-03-31 NOTE — PROGRESS NOTES
"CHIEF COMPLAINT/REASON FOR VISIT: nasal congestion, post nasal drip, sore throat, facial pressure    HISTORY OF PRESENT ILLNESS:  50 y/o male complains of nasal congestion, post nasal drip, sore throat, facial pressure, ears popping and non productive cough onset 4-5 weeks ago. Patient  admits nothing is working. . Patient admits "feels like a sinus infection".  Patient admits seen & treated 2 weeks ago with a diagnosis of URTI, given medications with no relief.  Patient denies chest pain, shortness of breath, nausea, vomiting, diarrhea, fever with no body aches.       Past Medical History:   Diagnosis Date    DM (diabetes mellitus) 05/2013     x 1 week ago 11/11/2015    DM (diabetes mellitus) 05/2013     03/01/2018 last tested    Fatty liver     Gout     Hyperlipidemia     Hypertension     Hypertriglyceridemia     Mood disorder     Panic disorder     Type 2 diabetes mellitus     05/2013 BS         .  Past Surgical History:   Procedure Laterality Date    APPENDECTOMY           Social History     Socioeconomic History    Marital status: Single     Spouse name: Not on file    Number of children: Not on file    Years of education: Not on file    Highest education level: Not on file   Occupational History    Not on file   Social Needs    Financial resource strain: Not on file    Food insecurity:     Worry: Not on file     Inability: Not on file    Transportation needs:     Medical: Not on file     Non-medical: Not on file   Tobacco Use    Smoking status: Never Smoker    Smokeless tobacco: Never Used   Substance and Sexual Activity    Alcohol use: Yes     Comment: rarely    Drug use: No    Sexual activity: Yes     Partners: Female   Lifestyle    Physical activity:     Days per week: Not on file     Minutes per session: Not on file    Stress: Not on file   Relationships    Social connections:     Talks on phone: Not on file     Gets together: Not on file     Attends Buddhist " service: Not on file     Active member of club or organization: Not on file     Attends meetings of clubs or organizations: Not on file     Relationship status: Not on file    Intimate partner violence:     Fear of current or ex partner: Not on file     Emotionally abused: Not on file     Physically abused: Not on file     Forced sexual activity: Not on file   Other Topics Concern    Not on file   Social History Narrative    Not on file       Family History   Problem Relation Age of Onset    Coronary artery disease Father     Diabetes Father     Lung cancer Father     Diabetes Maternal Aunt     Diabetes Paternal Aunt     Hypertension Maternal Grandmother     Hypertension Mother          ROS:  GENERAL: reports no fever, chills.  SKIN: No rashes, itching or changes in color or texture of skin.  HEENT: reports headaches, nasal congestion, postnasal drip, sore throat, ears popping.   CHEST: report nonproductive cough .  CARDIOVASCULAR: Denies chest pain, PND, orthopnea or reduced exercise tolerance.  ABDOMEN: Appetite fine. No weight loss. .  MUSCULOSKELETAL: No joint stiffness or swelling. Denies back pain.  NEUROLOGIC: No history of seizures, paralysis, alteration of gait or coordination.  PSYCHIATRIC: Denies mood swings, depression or suicidal thoughts.    PE:   APPEARANCE: Well nourished, well developed, in mild distress.   V/S: Reviewed.  SKIN: Normal skin turgor, no lesions.  HEENT: Turbinates red,  minimal red pharynx, TM's poor light reflex bilaterally, facial tenderness.  CHEST: Lungs clear to auscultation. no wheezing  CARDIOVASCULAR: Regular rate and rhythm.  MUSCULOSKELETAL: Motor: 5/5 strength major flexors/extensors.  NEUROLOGIC: No sensory deficits. Gait & Posture: Normal, No cerebellar signs.  MENTAL STATUS: Patient alert, oriented x 3 & conversant.    PLAN:   Advise increase p.o. fluids-- water/juice & rest  Meds:  Augmentin, Phenergan DM    / no refills  Advise use of OTC Flonase with  sensimist  Simply saline nasal wash  to irrigate sinuses and for congestion/runny nose.  Cool mist humidifier/vaporizer.  Practice good handwashing.  Advise use of honey lemon tea  Mucinex for cough and chest congestion.  Tylenol or Ibuprofen for fever, headache and body aches.  Warm salt water gargles for throat comfort.  Chloraseptic spray or lozenges for throat comfort.  Advise follow up with PCP  Advise go to ER if symptoms worsen or fail to improve with treatment.      DIAGNOSIS:  Cough  Headache  Hypertension  Acute bacterial sinusitis  Type 2 diabetes mellitus without complication

## 2019-04-01 ENCOUNTER — TELEPHONE (OUTPATIENT)
Dept: INTERNAL MEDICINE | Facility: CLINIC | Age: 49
End: 2019-04-01

## 2019-04-01 DIAGNOSIS — E11.9 TYPE 2 DIABETES MELLITUS WITHOUT COMPLICATION, WITHOUT LONG-TERM CURRENT USE OF INSULIN: Primary | ICD-10-CM

## 2019-04-01 NOTE — TELEPHONE ENCOUNTER
----- Message from Sonia Moscoso MA sent at 2019  3:25 PM CDT -----  Contact: self [phone]      ----- Message -----  From: Elsie Ordonez  Sent: 2019   2:19 PM  To: Artur TEAGUE Staff    Type:  RX Refill Request    Who Called: Guillermo Castillo   Refill or New Rx:refill   RX Name and Strength: [facility] Glucose test strips  How is the patient currently taking it? (ex. ):3x day  Is this a 30 day or 90 day RX:30 day  Preferred Pharmacy with phone number:    [facility]/pharmacy [place][id number] [place]  [address] [address] [address]   [facility] [address]  Phone: [phone] Fax: [phone]    Local or Mail Order:local  Ordering Provider:Artur  Would the patient rather a call back or a response via [facility]? Call back   Best Call Back Number:[phone]  Additional Information: states that he received his last rx for the [facility] glucose test strips from the [facility] at . States that his strips have .

## 2019-04-03 ENCOUNTER — PATIENT OUTREACH (OUTPATIENT)
Dept: OTHER | Facility: OTHER | Age: 49
End: 2019-04-03

## 2019-04-03 DIAGNOSIS — I10 ESSENTIAL HYPERTENSION: ICD-10-CM

## 2019-04-03 DIAGNOSIS — E11.8 TYPE 2 DIABETES MELLITUS WITH COMPLICATION, WITHOUT LONG-TERM CURRENT USE OF INSULIN: Primary | ICD-10-CM

## 2019-04-03 RX ORDER — AMLODIPINE BESYLATE 10 MG/1
10 TABLET ORAL DAILY
Qty: 30 TABLET | Refills: 3 | Status: SHIPPED | OUTPATIENT
Start: 2019-04-03 | End: 2019-09-04 | Stop reason: SDUPTHER

## 2019-04-03 RX ORDER — VALSARTAN 160 MG/1
160 TABLET ORAL DAILY
Qty: 30 TABLET | Refills: 3 | Status: SHIPPED | OUTPATIENT
Start: 2019-04-03 | End: 2019-06-12 | Stop reason: SDUPTHER

## 2019-04-03 NOTE — PROGRESS NOTES
Last 5 Patient Entered Readings                                      Current 30 Day Average: 146/86     Recent Readings 3/31/2019 3/31/2019 3/31/2019 3/31/2019 3/28/2019    SBP (mmHg) 143 143 151 151 150    DBP (mmHg) 78 78 98 98 89    Pulse 72 72 76 76 64      HPI:  Called patient to follow up. Patient endorses adherence to medication regimen. Patient denies hypotensive s/sx (lightheadedness, dizziness, nausea, fatigue); patient denies hypertensive s/sx (SOB, CP, severe headaches, changes in vision). Instructed patient to seek medical care if BP > 180/110 and is accompanied by hypertensive s/sx associated, patient confirms understanding.     Assessment:  Reviewed recent readings. Per 2017 ACC/ AHA HTN guidelines (goal of BP < 130/80), current 30-day average needs to be addressed more thoroughly today.     Plan:  Recommended changing from amlodipine-benazepril 5-10 mg 2 tablets po daily to amlodipine 10 mg po daily and valsartan 160 mg po daily. Patient reports having a few weeks left of amlodipine-benazepril and refused to change to new prescription. Sent new prescription to pharmacy just in case he changes his mind. Continue current regimen and will reassess when I follow up on A1c on 4/23.  I will continue to monitor regularly and will follow-up in 2 to 3 weeks, sooner if blood pressure begins to trend upward or downward.     Current medication regimen:  Hypertension Medications             amlodipine-benazepril 5-10 mg (LOTREL) 5-10 mg per capsule TAKE 1 CAPSULE BY MOUTH ONCE DAILY.          Patient denies having questions or concerns. Patient has my contact information and knows to call with any concerns or clinical changes.      Last 6 Patient Entered Readings                                          Most Recent A1c: 7.8% on 1/7/2019  (Goal: 7%)     Recent Readings 3/29/2019 3/28/2019 3/23/2019 3/23/2019 3/23/2019    Blood Glucose (mg/dL) 105 119 126 157 147        HPI:  Called patient to follow up regarding  the DDMP (Diabetes Digital Medicine Program).  Patient endorses adherence to medication regimen. Patient denies hypoglycemic s/sx (dizziness, extreme hunger, headaches, confusion, trouble concentrating, sweating, shaking, blurred vision, personality changes); patient denies hyperglycemic s/sx (increased thirst, increased urination, headaches, trouble concentrating, blurred vision, fatigue).      Assessment:  Reviewed recent readings. Per AACE/ACE guidelines (goal A1C < 7%), DM is mostly well controlled     Plan:  Continue current medication regimen. Patient is due for A1c, previously had a BMP and A1c appointment on  and he would like to keep this appointment. Patient's strips are , instructed him to call [phone]. I will continue to monitor regularly and will follow-up in 3 to 4  weeks, sooner if blood sugar begins to trend upward or downward. If A1c is 7 or greater, will increase regimen to metformin  mg- take 2 tab po BID.     Current Medication Regimen:  Diabetes Medications             insulin lispro (HUMALOG U-100 INSULIN) 100 unit/mL injection Use via sliding scale    metFORMIN (GLUCOPHAGE-XR) 500 MG 24 hr tablet TAKE TWO TABLETS BY MOUTH ONCE DAILY            Patient has my contact information and knows to call with any concerns or clinical changes.

## 2019-04-04 NOTE — PROGRESS NOTES
Last 5 Patient Entered Readings                                      Current 30 Day Average: 145/86     Recent Readings 3/31/2019 3/31/2019 3/31/2019 3/31/2019 3/28/2019    SBP (mmHg) 143 143 151 151 150    DBP (mmHg) 78 78 98 98 89    Pulse 72 72 76 76 64          Last 6 Patient Entered Readings                                          Most Recent A1c: 7.8% on 1/7/2019  (Goal: 7%)     Recent Readings 3/29/2019 3/28/2019 3/23/2019 3/23/2019 3/23/2019    Blood Glucose (mg/dL) 105 119 126 157 147      I agree with the plan of care given by DANIELLE Orr.

## 2019-04-15 RX ORDER — ROSUVASTATIN CALCIUM 10 MG/1
TABLET, COATED ORAL
Qty: 30 TABLET | Refills: 11 | Status: SHIPPED | OUTPATIENT
Start: 2019-04-15 | End: 2020-03-06 | Stop reason: SDUPTHER

## 2019-04-22 ENCOUNTER — LAB VISIT (OUTPATIENT)
Dept: LAB | Facility: HOSPITAL | Age: 49
End: 2019-04-22
Payer: COMMERCIAL

## 2019-04-22 DIAGNOSIS — E11.9 TYPE 2 DIABETES MELLITUS WITHOUT COMPLICATION, WITHOUT LONG-TERM CURRENT USE OF INSULIN: ICD-10-CM

## 2019-04-22 LAB
ANION GAP SERPL CALC-SCNC: 11 MMOL/L (ref 8–16)
BUN SERPL-MCNC: 14 MG/DL (ref 6–20)
CALCIUM SERPL-MCNC: 9.6 MG/DL (ref 8.7–10.5)
CHLORIDE SERPL-SCNC: 103 MMOL/L (ref 95–110)
CO2 SERPL-SCNC: 24 MMOL/L (ref 23–29)
CREAT SERPL-MCNC: 0.9 MG/DL (ref 0.5–1.4)
EST. GFR  (AFRICAN AMERICAN): >60 ML/MIN/1.73 M^2
EST. GFR  (NON AFRICAN AMERICAN): >60 ML/MIN/1.73 M^2
ESTIMATED AVG GLUCOSE: 123 MG/DL (ref 68–131)
GLUCOSE SERPL-MCNC: 116 MG/DL (ref 70–110)
HBA1C MFR BLD HPLC: 5.9 % (ref 4–5.6)
POTASSIUM SERPL-SCNC: 3.7 MMOL/L (ref 3.5–5.1)
SODIUM SERPL-SCNC: 138 MMOL/L (ref 136–145)

## 2019-04-22 PROCEDURE — 80048 BASIC METABOLIC PNL TOTAL CA: CPT

## 2019-04-22 PROCEDURE — 83036 HEMOGLOBIN GLYCOSYLATED A1C: CPT

## 2019-04-22 PROCEDURE — 36415 COLL VENOUS BLD VENIPUNCTURE: CPT

## 2019-04-24 ENCOUNTER — PATIENT OUTREACH (OUTPATIENT)
Dept: OTHER | Facility: OTHER | Age: 49
End: 2019-04-24

## 2019-04-24 NOTE — PROGRESS NOTES
Last 5 Patient Entered Readings                                      Current 30 Day Average: 145/85     Recent Readings 4/22/2019 4/20/2019 4/18/2019 4/17/2019 4/15/2019    SBP (mmHg) 133 149 148 149 154    DBP (mmHg) 87 80 85 91 86    Pulse 66 67 66 61 69          Last 6 Patient Entered Readings                                          Most Recent A1c: 5.9% on 4/22/2019  (Goal: 7%)     Recent Readings 4/22/2019 4/19/2019 4/17/2019 4/15/2019 4/13/2019    Blood Glucose (mg/dL) 133 173 126 132 140        Left a voice message. Planned to increase amlodipine to 10 mg po daily and change benazepril to valsartan 160 mg po daily. If he did  the new prescription and has been on these medications for at least 2 weeks, we will increase valsartan to 320 mg po daily.

## 2019-04-29 NOTE — PROGRESS NOTES
Last 5 Patient Entered Readings                                      Current 30 Day Average: 148/89     Recent Readings 4/27/2019 4/27/2019 4/27/2019 4/22/2019 4/20/2019    SBP (mmHg) 167 167 158 133 149    DBP (mmHg) 89 100 116 87 80    Pulse 69 65 72 66 67          Last 6 Patient Entered Readings                                          Most Recent A1c: 5.9% on 4/22/2019  (Goal: 7%)     Recent Readings 4/28/2019 4/24/2019 4/22/2019 4/19/2019 4/17/2019    Blood Glucose (mg/dL) 121 130 133 173 126          Mail box is full, I was unable to leave a voice message. I will send a [employer] Message. Planned to ask Mr. Castillo about the most recent readings of 158/116 on 4/27 @0855, 167/100 on 4/27 @0856, and 167/89 on 4/27 @0920. Planned to increase amlodipine to 10 mg po daily and change benazepril to valsartan 160 mg po daily. If he did  the new prescription and has been on these medications for at least 2 weeks, we will increase valsartan to 320 mg po daily.

## 2019-05-01 NOTE — PROGRESS NOTES
HPI:  Last 5 Patient Entered Readings                                      Current 30 Day Average: 149/89     Recent Readings 5/1/2019 4/30/2019 4/27/2019 4/27/2019 4/27/2019    SBP (mmHg) 156 147 167 167 158    DBP (mmHg) 83 84 89 100 116    Pulse 69 68 69 65 72        Patient denies s/s of hypotension (lightheadedness, dizziness, nausea, fatigue) associated with low readings. Instructed patient to inform me if this occurs, patient confirms understanding.    Patient denies s/s of hypertension (SOB, CP, severe headaches, changes in vision) associated with high readings. Instructed patient to go to the ED if BP >180/110 and accompanied by hypertensive s/s, patient confirms understanding.    Patient never picked up valsartan 160mg daily or amlodipine 10mg daily.    Last 6 Patient Entered Readings                                          Most Recent A1c: 5.9% on 4/22/2019  (Goal: 7%)     Recent Readings 4/30/2019 4/28/2019 4/24/2019 4/22/2019 4/19/2019    Blood Glucose (mg/dL) 111 121 130 133 173        Patient denies s/s or episodes of hypoglycemia (weakness, dizziness, hunger, shakiness, nausea, headache, heart palpitations, sweating, fatigue, anxiety, etc.).    Patient denies s/s of hyperglycemia (headache, increased thirst, increased urination, fatigue, blurred vision).    Assessment:  Patient's current 30-day average is not at goal of <130/80 mmHg based on ACC/AHA HTN guidelines.     Diabetes is controlled based on patient's last A1C. SMBG readings reviewed and are trending down nicely.    Health maintenance is up to date.    Plan:  Stop Lotrel and start amlodipine 10mg daily and valsartan 160mg daily. This is an increase in amlodipine.  Patients health , Karley Ortega, will be following up every 3-4 weeks.   I will continue to monitor regularly and will follow-up in 2 weeks, sooner if blood pressure or blood glucose begins to trend upward or downward.     Current medication regimen:  Hypertension  Medications             amLODIPine (NORVASC) 10 MG tablet Take 1 tablet (10 mg total) by mouth once daily.    amlodipine-benazepril 5-10 mg (LOTREL) 5-10 mg per capsule TAKE 1 CAPSULE BY MOUTH ONCE DAILY.    valsartan (DIOVAN) 160 MG tablet Take 1 tablet (160 mg total) by mouth once daily.        Diabetes Medications             insulin lispro (HUMALOG U-100 INSULIN) 100 unit/mL injection Use via sliding scale    metFORMIN (GLUCOPHAGE-XR) 500 MG 24 hr tablet TAKE TWO TABLETS BY MOUTH ONCE DAILY        Patient has my contact information and knows to call with any concerns or clinical changes.

## 2019-05-07 ENCOUNTER — OFFICE VISIT (OUTPATIENT)
Dept: INTERNAL MEDICINE | Facility: CLINIC | Age: 49
End: 2019-05-07
Payer: COMMERCIAL

## 2019-05-07 VITALS
BODY MASS INDEX: 32.55 KG/M2 | OXYGEN SATURATION: 98 % | WEIGHT: 240.31 LBS | DIASTOLIC BLOOD PRESSURE: 80 MMHG | HEART RATE: 62 BPM | HEIGHT: 72 IN | SYSTOLIC BLOOD PRESSURE: 122 MMHG | TEMPERATURE: 98 F

## 2019-05-07 DIAGNOSIS — I10 HYPERTENSION, UNSPECIFIED TYPE: ICD-10-CM

## 2019-05-07 DIAGNOSIS — K76.0 FATTY LIVER: ICD-10-CM

## 2019-05-07 DIAGNOSIS — E11.8 TYPE 2 DIABETES MELLITUS WITH COMPLICATION, WITH LONG-TERM CURRENT USE OF INSULIN: Primary | ICD-10-CM

## 2019-05-07 DIAGNOSIS — M1A.9XX0 CHRONIC GOUT WITHOUT TOPHUS, UNSPECIFIED CAUSE, UNSPECIFIED SITE: ICD-10-CM

## 2019-05-07 DIAGNOSIS — E78.5 HYPERLIPIDEMIA, UNSPECIFIED HYPERLIPIDEMIA TYPE: ICD-10-CM

## 2019-05-07 DIAGNOSIS — Z79.4 TYPE 2 DIABETES MELLITUS WITH COMPLICATION, WITH LONG-TERM CURRENT USE OF INSULIN: Primary | ICD-10-CM

## 2019-05-07 PROCEDURE — 3074F PR MOST RECENT SYSTOLIC BLOOD PRESSURE < 130 MM HG: ICD-10-PCS | Mod: CPTII,S$GLB,, | Performed by: FAMILY MEDICINE

## 2019-05-07 PROCEDURE — 99999 PR PBB SHADOW E&M-EST. PATIENT-LVL III: ICD-10-PCS | Mod: PBBFAC,,, | Performed by: FAMILY MEDICINE

## 2019-05-07 PROCEDURE — 3044F HG A1C LEVEL LT 7.0%: CPT | Mod: CPTII,S$GLB,, | Performed by: FAMILY MEDICINE

## 2019-05-07 PROCEDURE — 99214 OFFICE O/P EST MOD 30 MIN: CPT | Mod: S$GLB,,, | Performed by: FAMILY MEDICINE

## 2019-05-07 PROCEDURE — 99999 PR PBB SHADOW E&M-EST. PATIENT-LVL III: CPT | Mod: PBBFAC,,, | Performed by: FAMILY MEDICINE

## 2019-05-07 PROCEDURE — 3074F SYST BP LT 130 MM HG: CPT | Mod: CPTII,S$GLB,, | Performed by: FAMILY MEDICINE

## 2019-05-07 PROCEDURE — 3008F BODY MASS INDEX DOCD: CPT | Mod: CPTII,S$GLB,, | Performed by: FAMILY MEDICINE

## 2019-05-07 PROCEDURE — 3044F PR MOST RECENT HEMOGLOBIN A1C LEVEL <7.0%: ICD-10-PCS | Mod: CPTII,S$GLB,, | Performed by: FAMILY MEDICINE

## 2019-05-07 PROCEDURE — 3079F DIAST BP 80-89 MM HG: CPT | Mod: CPTII,S$GLB,, | Performed by: FAMILY MEDICINE

## 2019-05-07 PROCEDURE — 99214 PR OFFICE/OUTPT VISIT, EST, LEVL IV, 30-39 MIN: ICD-10-PCS | Mod: S$GLB,,, | Performed by: FAMILY MEDICINE

## 2019-05-07 PROCEDURE — 3079F PR MOST RECENT DIASTOLIC BLOOD PRESSURE 80-89 MM HG: ICD-10-PCS | Mod: CPTII,S$GLB,, | Performed by: FAMILY MEDICINE

## 2019-05-07 PROCEDURE — 3008F PR BODY MASS INDEX (BMI) DOCUMENTED: ICD-10-PCS | Mod: CPTII,S$GLB,, | Performed by: FAMILY MEDICINE

## 2019-05-07 NOTE — PROGRESS NOTES
Subjective:       Patient ID: Guillermo Castillo is a male.    Chief Complaint: Multiple issues see below    HPI Type 2 diabetes: a1c controlled. Tolerating medicine. No hypoglycemia; infreq humalog  Hypercholesterolemia:brenda crstor   Gout:  No c/o recent flares.   Hypertension: blood pressures at home nl.  Tolerating medicine.   Fatty liver nl . No etoh long time;   gerd controlled. No sympt            Past Medical History   Diagnosis Date    Fatty liver     Gout     Hypertension     Hyperlipidemia     Hypertriglyceridemia     Mood disorder     Panic disorder     Type 2 diabetes mellitus      History reviewed. No pertinent past surgical history.  Family History   Problem Relation Age of Onset    Coronary artery disease Father     Diabetes Father     Lung cancer Father          Review of Systems   Respiratory: Negative for shortness of breath.    Cardiovascular: Negative for chest pain and leg swelling.       Objective:      Physical Exam   Constitutional: He is oriented to person, place, and time. He appears well-developed and well-nourished.   HENT:   Head: Normocephalic and atraumatic.   Eyes: Conjunctivae and EOM are normal. Pupils are equal, round, and reactive to light.   Neck: Normal range of motion. Neck supple. Carotid bruit is not present. no mass  Cardiovascular: Normal rate and regular rhythm.    Pulmonary/Chest: Effort normal and breath sounds normal.         Neurological: He is alert and oriented to person, place, and time.   Skin: Skin is warm and dry.   Psychiatric: He has a normal mood and affect. His behavior is normal. Judgment and thought content normal.             .        Assessment:       1. Type 2 diabetes mellitus    2. Hypertension    3. Gout    4. Hypercholesteremia    5. Fatty liver        Plan:     Lab and follow up 6 months Ruby Gonzales   Type 2 diabetes mellitus with complication, with long-term current use of insulin  -     Comprehensive metabolic panel; Future; Expected date:  11/03/2019  -     Hemoglobin A1c; Future; Expected date: 11/03/2019  -     Microalbumin/creatinine urine ratio; Future; Expected date: 11/03/2019  -     Lipid panel; Future; Expected date: 11/03/2019  -     Uric acid; Future; Expected date: 11/03/2019    Chronic gout without tophus, unspecified cause, unspecified site    Fatty liver    Hyperlipidemia, unspecified hyperlipidemia type    Hypertension, unspecified type

## 2019-05-09 ENCOUNTER — OFFICE VISIT (OUTPATIENT)
Dept: OPHTHALMOLOGY | Facility: CLINIC | Age: 49
End: 2019-05-09
Payer: COMMERCIAL

## 2019-05-09 DIAGNOSIS — H52.4 BILATERAL PRESBYOPIA: ICD-10-CM

## 2019-05-09 DIAGNOSIS — E11.9 DIABETES MELLITUS TYPE 2 WITHOUT RETINOPATHY: Primary | ICD-10-CM

## 2019-05-09 PROCEDURE — 99999 PR PBB SHADOW E&M-EST. PATIENT-LVL I: ICD-10-PCS | Mod: PBBFAC,,, | Performed by: OPTOMETRIST

## 2019-05-09 PROCEDURE — 92014 COMPRE OPH EXAM EST PT 1/>: CPT | Mod: S$GLB,,, | Performed by: OPTOMETRIST

## 2019-05-09 PROCEDURE — 92015 PR REFRACTION: ICD-10-PCS | Mod: S$GLB,,, | Performed by: OPTOMETRIST

## 2019-05-09 PROCEDURE — 92014 PR EYE EXAM, EST PATIENT,COMPREHESV: ICD-10-PCS | Mod: S$GLB,,, | Performed by: OPTOMETRIST

## 2019-05-09 PROCEDURE — 92015 DETERMINE REFRACTIVE STATE: CPT | Mod: S$GLB,,, | Performed by: OPTOMETRIST

## 2019-05-09 PROCEDURE — 99999 PR PBB SHADOW E&M-EST. PATIENT-LVL I: CPT | Mod: PBBFAC,,, | Performed by: OPTOMETRIST

## 2019-05-13 ENCOUNTER — PATIENT OUTREACH (OUTPATIENT)
Dept: OTHER | Facility: OTHER | Age: 49
End: 2019-05-13

## 2019-05-13 ENCOUNTER — PATIENT MESSAGE (OUTPATIENT)
Dept: ADMINISTRATIVE | Facility: OTHER | Age: 49
End: 2019-05-13

## 2019-05-13 NOTE — LETTER
May 13, 2019     Guillermo Castillo  [address] [address]       Dear Guillermo,    Welcome to [facility]! Our goal is to make care effective, proactive and convenient by using data you send us from home to better treat your chronic conditions.          My name is Ruby York, and I am your dedicated Digital Medicine clinician. As an expert in medication management, I will help ensure that the medications you are taking continue to provide the intended benefits and help you reach your goals. You can reach me directly at [phone] or by sending me a message directly through your [facility] account.      I am Ladarius Albrecht and I will be your health . My job is to help you identify lifestyle changes to improve your disease control. We will talk about nutrition, exercise, and other ways you may be able to adjust your current habits to better your health. Additionally, we will help ensure you are completing the tests and screenings that are necessary to help manage your conditions. You can reach me directly at [phone] or by sending me a message directly through your [facility] account.    Most importantly, YOU are at the center of this team. Together, we will work to improve your overall health and encourage you to meet your goals for a healthier lifestyle.     What we expect from YOU:  · Please take frequent home blood pressure measurements. We ask that you take at least 1 blood pressure reading per week, but more information will better help us get you know you. Be sure you rest for a few minutes before taking the reading in a quiet, comfortable place.    · Please take frequent home blood sugar measurements according to the frequency your physician and Digital Medicine care team specify. It is important that your team see both fasting and after meal readings.      Be available to receive phone calls or [facility] messages, when appropriate, from your care team. Please let us  know if there are any specific days or times that work best for us to reach you via phone.     Complete routine tests and screenings. Dont worry, we will help keep you on track!           What you should expect from your Digital Medicine Care Team:   We will work with you to create a personalized plan of care and provide you with encouragement and education, including regarding lifestyle changes, that could help you manage your disease states.     We will adjust your current medications, if needed, and continue to monitor your long-term progress.     We will provide you and your physician with monthly progress reports after you have been in the program for more than 30 days.     We will send you reminders through [facility] and text messages to help ensure you do not miss any testing deadlines to help manage your disease states.    You will be able to reach us by phone or through your [facility] account by clicking our names under Care Team on the right side of the home screen.    I look forward to working with you to achieve your blood pressure goals!    We look forward to working with you to help manage your health,    Sincerely,    Your Digital Medicine Team    Please visit our websites to learn more:   · Hypertension: [email or web address]  · Diabetes: [email or web address]      Remember, we are not available for emergencies. If you have an emergency, please contact your doctors office directly or call [facility] on-call ([phone] or [phone]) or 911.    Diabetes: We want help you get important tests and screenings done regularly to assure that your health needs are met. We have put a new system in place, called CareTouch that will help us improve how we monitor and reach out to you about the following lab tests that you will need to help manage your diabetes.  · Hemoglobin A1c testing (Frequency: Every 3 to 6 months, dependent on A1c goal)  · Nephropathy  Assessment, generally urine micro albumin testing (Frequency: Yearly)  · Eye exam through a quick 30-minute Eye Photo Exam (Frequency: 1-2 Years, depending on result)    When necessary you can come in to one of the labs on the attached page any weekday between 10:30 am and 4:00 pm to have your tests done prior to their due date. Tell the  you received a CareTouch letter, or just look for the CareTouch sign.

## 2019-05-15 ENCOUNTER — PATIENT OUTREACH (OUTPATIENT)
Dept: OTHER | Facility: OTHER | Age: 49
End: 2019-05-15

## 2019-05-15 NOTE — PROGRESS NOTES
HPI:  Patient didn't start valsartan and amlodipine until today.   Last 5 Patient Entered Readings                                      Current 30 Day Average: 150/89     Recent Readings 5/15/2019 5/14/2019 5/14/2019 5/13/2019 5/13/2019    SBP (mmHg) 152 148 151 144 147    DBP (mmHg) 87 91 87 77 91    Pulse 73 68 68 63 86        Patient denies s/s of hypotension (lightheadedness, dizziness, nausea, fatigue) associated with low readings. Instructed patient to inform me if this occurs, patient confirms understanding.    Patient denies s/s of hypertension (SOB, CP, severe headaches, changes in vision) associated with high readings. Instructed patient to go to the ED if BP >180/110 and accompanied by hypertensive s/s, patient confirms understanding.    Last 6 Patient Entered Readings                                          Most Recent A1c: 5.9% on 4/22/2019  (Goal: 7%)     Recent Readings 5/15/2019 5/14/2019 5/14/2019 5/14/2019 5/14/2019    Blood Glucose (mg/dL) 180 162 235 136 145        Patient denies s/s or episodes of hypoglycemia (weakness, dizziness, hunger, shakiness, nausea, headache, heart palpitations, sweating, fatigue, anxiety, etc.).    Patient denies s/s of hyperglycemia (headache, increased thirst, increased urination, fatigue, blurred vision).    Assessment:  Patient's current 30-day average is not at goal of <130/80 mmHg based on ACC/AHA HTN guidelines.     Diabetes is at goal based on patient's last A1C. SMBG readings reviewed and are slightly elevated.    Health maintenance is up to date.    Plan:  Continue current HTN and DM regimen since starting the change tomorrow 5/16.  Increase metformin to 1000mg daily and 500mg in the evening if he remains elevated in the next two weeks.  Patients health , Ladarius Albrecht, will be following up every 3-4 weeks.   I will continue to monitor regularly and will follow-up in 2 weeks, sooner if blood pressure or blood glucose begins to trend upward or  downward.     Current medication regimen:  Hypertension Medications             amLODIPine (NORVASC) 10 MG tablet Take 1 tablet (10 mg total) by mouth once daily.    valsartan (DIOVAN) 160 MG tablet Take 1 tablet (160 mg total) by mouth once daily.        Diabetes Medications             insulin lispro (HUMALOG U-100 INSULIN) 100 unit/mL injection Use via sliding scale    metFORMIN (GLUCOPHAGE-XR) 500 MG 24 hr tablet TAKE TWO TABLETS BY MOUTH ONCE DAILY        Patient has my contact information and knows to call with any concerns or clinical changes.

## 2019-05-29 ENCOUNTER — PATIENT OUTREACH (OUTPATIENT)
Dept: OTHER | Facility: OTHER | Age: 49
End: 2019-05-29

## 2019-05-29 DIAGNOSIS — I10 ESSENTIAL HYPERTENSION: ICD-10-CM

## 2019-06-10 ENCOUNTER — PATIENT OUTREACH (OUTPATIENT)
Dept: OTHER | Facility: OTHER | Age: 49
End: 2019-06-10

## 2019-06-10 NOTE — PROGRESS NOTES
Last 5 Patient Entered Readings                                      Current 30 Day Average: 148/89     Recent Readings 6/5/2019 6/2/2019 5/30/2019 5/30/2019 5/24/2019    SBP (mmHg) 147 140 160 157 143    DBP (mmHg) 90 78 93 97 94    Pulse 60 70 63 66 64          Last 6 Patient Entered Readings                                          Most Recent A1c: 5.9% on 4/22/2019  (Goal: 7%)     Recent Readings 6/10/2019 6/7/2019 6/6/2019 6/5/2019 6/4/2019    Blood Glucose (mg/dL) 127 170 160 138 141        Digital Medicine: Health  Follow Up    Lifestyle Modifications:  Dietary Modifications (Sodium intake <2,000mg/day, food labels, dining out):   Patient stated that he has tried picking up a few protein bars here and there but hasn't had the chance to stockpile anything just yet.  Patient's diet is still coming mostly from fast food.      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   Patient has a bike that he wants to start using but has not yet gotten the opportunity to do so.  Encouraged patient that getting some more physical activity in will help to bring those top numbers down.     Medication Therapy:   Patient has been compliant with the medication regimen.    Patient has the following medication side effects/concerns: None  (Frequency/Alleviating factors/Precipitating factors, etc.)      Follow up with . Guillermo Castillo completed. No further questions or concerns. Will continue to follow up to achieve health goals.    Notes:  Patient is actively managed and BP is stable. Will f/u on diet/protein bars, and physical activity at next outreach.  Patient asked about any particular protein bar that would be best for him so I will do some research and send him some resources when I get it figured out.

## 2019-06-12 RX ORDER — VALSARTAN 320 MG/1
320 TABLET ORAL DAILY
Qty: 30 TABLET | Refills: 3 | Status: SHIPPED | OUTPATIENT
Start: 2019-06-12 | End: 2019-09-06 | Stop reason: SDUPTHER

## 2019-06-12 NOTE — PROGRESS NOTES
HPI:  Called Mr. Guillermo Castillo for hypertension and diabetes digital medicine follow-up. Patient is doing well since changing to valsartan and amlodipine.  Last 5 Patient Entered Readings                                      Current 30 Day Average: 148/89     Recent Readings 6/5/2019 6/2/2019 5/30/2019 5/30/2019 5/24/2019    SBP (mmHg) 147 140 160 157 143    DBP (mmHg) 90 78 93 97 94    Pulse 60 70 63 66 64        Patient denies s/s of hypotension (lightheadedness, dizziness, nausea, fatigue) associated with low readings. Instructed patient to inform me if this occurs, patient confirms understanding.    Patient denies s/s of hypertension (SOB, CP, severe headaches, changes in vision) associated with high readings. Instructed patient to go to the ED if BP >180/110 and accompanied by hypertensive s/s, patient confirms understanding.    Last 6 Patient Entered Readings                                          Most Recent A1c: 5.9% on 4/22/2019  (Goal: 7%)     Recent Readings 6/10/2019 6/7/2019 6/6/2019 6/5/2019 6/4/2019    Blood Glucose (mg/dL) 127 170 160 138 141        Patient denies s/s or episodes of hypoglycemia (weakness, dizziness, hunger, shakiness, nausea, headache, heart palpitations, sweating, fatigue, anxiety, etc.).    Patient denies s/s of hyperglycemia (headache, increased thirst, increased urination, fatigue, blurred vision).    Assessment:  Patient's current 30-day average is not at goal of <130/80 mmHg based on ACC/AHA HTN guidelines.     Diabetes is at goal based on patient's last A1C. SMBG readings reviewed and are mostly within target range.    Health maintenance is up to date.    Plan:  Increase valsartan to 320mg daily. Maintain current DM medications. If further adjustments are needed, I will increase metformin.   Patients health , Ladarius Albrecht, will follow-up as scheduled.    I will continue to monitor regularly and will follow-up in 2 weeks, sooner if blood pressure or blood glucose  begins to trend upward or downward.     Current medication regimen:  Hypertension Medications             amLODIPine (NORVASC) 10 MG tablet Take 1 tablet (10 mg total) by mouth once daily.    valsartan (DIOVAN) 320 MG tablet Take 1 tablet (320 mg total) by mouth once daily.        Diabetes Medications             insulin lispro (HUMALOG U-100 INSULIN) 100 unit/mL injection Use via sliding scale    metFORMIN (GLUCOPHAGE-XR) 500 MG 24 hr tablet TAKE TWO TABLETS BY MOUTH ONCE DAILY        Patient has my contact information and knows to call with any concerns or clinical changes.

## 2019-06-26 ENCOUNTER — PATIENT OUTREACH (OUTPATIENT)
Dept: OTHER | Facility: OTHER | Age: 49
End: 2019-06-26

## 2019-06-26 DIAGNOSIS — I10 HYPERTENSION, UNSPECIFIED TYPE: Primary | ICD-10-CM

## 2019-06-26 DIAGNOSIS — E11.9 TYPE 2 DIABETES MELLITUS WITHOUT COMPLICATION, WITHOUT LONG-TERM CURRENT USE OF INSULIN: ICD-10-CM

## 2019-06-26 NOTE — PROGRESS NOTES
Last 5 Patient Entered Readings                                      Current 30 Day Average: 152/87     Recent Readings 6/24/2019 6/20/2019 6/20/2019 6/17/2019 6/17/2019    SBP (mmHg) 151 150 142 162 162    DBP (mmHg) 83 89 81 85 92    Pulse 72 72 71 68 66          Last 6 Patient Entered Readings                                          Most Recent A1c: 5.9% on 4/22/2019  (Goal: 7%)     Recent Readings 6/24/2019 6/20/2019 6/16/2019 6/15/2019 6/13/2019    Blood Glucose (mg/dL) 299 128 182 148 221        LMB to discuss how he is tolerating the higher dose of valsartan, add a thiazide and increase metformin to 1000mg BID.

## 2019-07-08 ENCOUNTER — PATIENT OUTREACH (OUTPATIENT)
Dept: OTHER | Facility: OTHER | Age: 49
End: 2019-07-08

## 2019-07-10 RX ORDER — CHLORTHALIDONE 25 MG/1
25 TABLET ORAL DAILY
Qty: 30 TABLET | Refills: 3 | Status: ON HOLD | OUTPATIENT
Start: 2019-07-10 | End: 2019-09-29 | Stop reason: HOSPADM

## 2019-07-10 RX ORDER — METFORMIN HYDROCHLORIDE 500 MG/1
TABLET, EXTENDED RELEASE ORAL
Qty: 60 TABLET | Refills: 10
Start: 2019-07-10 | End: 2019-07-12 | Stop reason: SDUPTHER

## 2019-07-10 NOTE — PROGRESS NOTES
HPI:  Called Mr. Guillermo Castillo for hypertension and diabetes digital medicine follow-up and discuss how he is tolerating the higher dose of valsartan. Patient reports adherence to medication regimen with no complaints/complaints.   Last 5 Patient Entered Readings                                      Current 30 Day Average: 151/88     Recent Readings 7/10/2019 7/6/2019 7/1/2019 6/27/2019 6/26/2019    SBP (mmHg) 147 143 152 149 153    DBP (mmHg) 95 81 89 90 94    Pulse 64 71 60 62 67        Patient denies s/s of hypotension (lightheadedness, dizziness, nausea, fatigue) associated with low readings. Instructed patient to inform me if this occurs, patient confirms understanding.    Patient denies s/s of hypertension (SOB, CP, severe headaches, changes in vision) associated with high readings. Instructed patient to go to the ED if BP >180/110 and accompanied by hypertensive s/s, patient confirms understanding.    Last 6 Patient Entered Readings                                          Most Recent A1c: 5.9% on 4/22/2019  (Goal: 7%)     Recent Readings 7/10/2019 7/8/2019 7/6/2019 7/3/2019 7/1/2019    Blood Glucose (mg/dL) 161 144 151 139 262        Patient denies s/s or episodes of hypoglycemia (weakness, dizziness, hunger, shakiness, nausea, headache, heart palpitations, sweating, fatigue, anxiety, etc.).    Patient denies s/s of hyperglycemia (headache, increased thirst, increased urination, fatigue, blurred vision).    Assessment:  Patient's current 30-day average is at goal of <130/80 mmHg based on ACC/AHA HTN guidelines.     Diabetes is at goal based on patient's last A1C. SMBG readings reviewed and are elevated.    Health maintenance is up to date.    His last gout attack was in March 2019. He does not normally experience gout flare-ups. His average is once a year.     Plan:  Start chlorthalidone 25mg daily with a BMP 7/24.  Increase metformin to 1000mg in the morning and 500mg at night.  Patients health ,  Ladarius Albrecht, will follow-up as scheduled.    I will continue to monitor regularly and will follow-up in 2 weeks, sooner if blood pressure or blood glucose begins to trend upward or downward.     Current medication regimen:  Hypertension Medications             amLODIPine (NORVASC) 10 MG tablet Take 1 tablet (10 mg total) by mouth once daily.    valsartan (DIOVAN) 320 MG tablet Take 1 tablet (320 mg total) by mouth once daily.        Diabetes Medications             insulin lispro (HUMALOG U-100 INSULIN) 100 unit/mL injection Use via sliding scale    metFORMIN (GLUCOPHAGE-XR) 500 MG 24 hr tablet TAKE TWO TABLETS BY MOUTH ONCE DAILY        Patient has my contact information and knows to call with any concerns or clinical changes.

## 2019-07-12 DIAGNOSIS — E11.9 TYPE 2 DIABETES MELLITUS WITHOUT COMPLICATION, WITHOUT LONG-TERM CURRENT USE OF INSULIN: ICD-10-CM

## 2019-07-12 RX ORDER — DULOXETIN HYDROCHLORIDE 60 MG/1
CAPSULE, DELAYED RELEASE ORAL
Qty: 30 CAPSULE | Refills: 11 | Status: SHIPPED | OUTPATIENT
Start: 2019-07-12 | End: 2019-07-18 | Stop reason: SDUPTHER

## 2019-07-12 RX ORDER — METFORMIN HYDROCHLORIDE 500 MG/1
TABLET, EXTENDED RELEASE ORAL
Qty: 60 TABLET | Refills: 11 | Status: SHIPPED | OUTPATIENT
Start: 2019-07-12 | End: 2019-07-18 | Stop reason: SDUPTHER

## 2019-07-12 NOTE — TELEPHONE ENCOUNTER
Spoke with patient to inform that Rx was sent to pharmacy. Patient expressed understanding and voiced no further questions or concerns./eliceo

## 2019-07-18 DIAGNOSIS — E11.9 TYPE 2 DIABETES MELLITUS WITHOUT COMPLICATION, WITHOUT LONG-TERM CURRENT USE OF INSULIN: ICD-10-CM

## 2019-07-19 RX ORDER — METFORMIN HYDROCHLORIDE 500 MG/1
TABLET, EXTENDED RELEASE ORAL
Qty: 60 TABLET | Refills: 11 | Status: SHIPPED | OUTPATIENT
Start: 2019-07-19 | End: 2020-11-24

## 2019-07-19 RX ORDER — DULOXETIN HYDROCHLORIDE 60 MG/1
CAPSULE, DELAYED RELEASE ORAL
Qty: 30 CAPSULE | Refills: 11 | Status: SHIPPED | OUTPATIENT
Start: 2019-07-19 | End: 2019-09-03 | Stop reason: SDUPTHER

## 2019-07-22 ENCOUNTER — HOSPITAL ENCOUNTER (OUTPATIENT)
Dept: RADIOLOGY | Facility: HOSPITAL | Age: 49
Discharge: HOME | End: 2019-07-22
Attending: NURSE PRACTITIONER
Payer: COMMERCIAL

## 2019-07-22 ENCOUNTER — OFFICE VISIT (OUTPATIENT)
Dept: INTERNAL MEDICINE | Facility: CLINIC | Age: 49
End: 2019-07-22
Payer: COMMERCIAL

## 2019-07-22 VITALS
OXYGEN SATURATION: 98 % | DIASTOLIC BLOOD PRESSURE: 70 MMHG | HEART RATE: 62 BPM | TEMPERATURE: 97 F | SYSTOLIC BLOOD PRESSURE: 116 MMHG | WEIGHT: 242.75 LBS | BODY MASS INDEX: 32.88 KG/M2 | HEIGHT: 72 IN

## 2019-07-22 DIAGNOSIS — M79.631 RIGHT FOREARM PAIN: ICD-10-CM

## 2019-07-22 DIAGNOSIS — M25.521 RIGHT ELBOW PAIN: ICD-10-CM

## 2019-07-22 DIAGNOSIS — M25.521 RIGHT ELBOW PAIN: Primary | ICD-10-CM

## 2019-07-22 PROCEDURE — 3008F PR BODY MASS INDEX (BMI) DOCUMENTED: ICD-10-PCS | Mod: CPTII,S$GLB,, | Performed by: NURSE PRACTITIONER

## 2019-07-22 PROCEDURE — 99999 PR PBB SHADOW E&M-EST. PATIENT-LVL V: ICD-10-PCS | Mod: PBBFAC,,, | Performed by: NURSE PRACTITIONER

## 2019-07-22 PROCEDURE — 99213 PR OFFICE/OUTPT VISIT, EST, LEVL III, 20-29 MIN: ICD-10-PCS | Mod: S$GLB,,, | Performed by: NURSE PRACTITIONER

## 2019-07-22 PROCEDURE — 3008F BODY MASS INDEX DOCD: CPT | Mod: CPTII,S$GLB,, | Performed by: NURSE PRACTITIONER

## 2019-07-22 PROCEDURE — 73070 X-RAY EXAM OF ELBOW: CPT | Mod: 26,RT,, | Performed by: RADIOLOGY

## 2019-07-22 PROCEDURE — 99999 PR PBB SHADOW E&M-EST. PATIENT-LVL V: CPT | Mod: PBBFAC,,, | Performed by: NURSE PRACTITIONER

## 2019-07-22 PROCEDURE — 73070 X-RAY EXAM OF ELBOW: CPT | Mod: TC,RT

## 2019-07-22 PROCEDURE — 3078F DIAST BP <80 MM HG: CPT | Mod: CPTII,S$GLB,, | Performed by: NURSE PRACTITIONER

## 2019-07-22 PROCEDURE — 3078F PR MOST RECENT DIASTOLIC BLOOD PRESSURE < 80 MM HG: ICD-10-PCS | Mod: CPTII,S$GLB,, | Performed by: NURSE PRACTITIONER

## 2019-07-22 PROCEDURE — 73070 XR ELBOW 2 VIEWS RIGHT: ICD-10-PCS | Mod: 26,RT,, | Performed by: RADIOLOGY

## 2019-07-22 PROCEDURE — 99213 OFFICE O/P EST LOW 20 MIN: CPT | Mod: S$GLB,,, | Performed by: NURSE PRACTITIONER

## 2019-07-22 PROCEDURE — 3074F PR MOST RECENT SYSTOLIC BLOOD PRESSURE < 130 MM HG: ICD-10-PCS | Mod: CPTII,S$GLB,, | Performed by: NURSE PRACTITIONER

## 2019-07-22 PROCEDURE — 3074F SYST BP LT 130 MM HG: CPT | Mod: CPTII,S$GLB,, | Performed by: NURSE PRACTITIONER

## 2019-07-22 RX ORDER — IBUPROFEN 800 MG/1
800 TABLET ORAL EVERY 6 HOURS PRN
Refills: 1 | COMMUNITY
Start: 2019-07-18 | End: 2019-10-14

## 2019-07-22 NOTE — PROGRESS NOTES
Subjective:       Patient ID: Guillremo Castillo is a 49 y.o. male.    Chief Complaint: Arm Pain (right)    Patient presents with right elbow pain that radiated to right forearm.  Was told it was tendinitis.  Wear brace.  Has increase in pain with gripping and pulling.  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   Reports BC powder helps.  Taking daily.  Blood glucose fluctuates.  Had to take 8 units of sliding once in the past few days.  Concerned about possible ADD.  Will discuss further at follow up.    Review of Systems   Constitutional: Negative for chills and fatigue.   Respiratory: Negative for cough and shortness of breath.    Musculoskeletal: Positive for arthralgias. Negative for joint swelling and myalgias.   Skin: Negative for color change and rash.   Psychiatric/Behavioral: Negative for agitation and confusion.       Objective:      Physical Exam   Constitutional: He is oriented to person, place, and time. Vital signs are normal. He appears well-developed and well-nourished.   HENT:   Head: Normocephalic and atraumatic.   Neck: Normal range of motion.   Cardiovascular: Normal rate.   Pulmonary/Chest: Effort normal.   Musculoskeletal: He exhibits tenderness. He exhibits no edema.        Right elbow: Tenderness found.   Neurological: He is alert and oriented to person, place, and time.   Skin: Skin is warm.   Psychiatric: He has a normal mood and affect. His behavior is normal.       Assessment:       1. Right elbow pain    2. Right forearm pain        Plan:         Right elbow pain  -     Cancel: X-Ray Elbow 2 Views Right; Future; Expected date: 07/22/2019  -     Ambulatory Referral to Physical/Occupational Therapy    Right forearm pain  -     Ambulatory Referral to Physical/Occupational Therapy        Continue to wear brace.  X-ray today.  Open to physical therapy.  Will order.  Follow up if needed.

## 2019-07-23 NOTE — PROGRESS NOTES
Last 5 Patient Entered Readings                                      Current 30 Day Average: 147/88     Recent Readings 7/15/2019 7/10/2019 7/6/2019 7/1/2019 6/27/2019    SBP (mmHg) 134 147 143 152 149    DBP (mmHg) 83 95 81 89 90    Pulse 61 64 71 60 62          Last 6 Patient Entered Readings                                          Most Recent A1c: 5.9% on 4/22/2019  (Goal: 7%)     Recent Readings 7/21/2019 7/20/2019 7/16/2019 7/15/2019 7/15/2019    Blood Glucose (mg/dL) 166 215 231 176 228          Digital Medicine: Health  Follow Up    Left voicemail to follow up with Mr. Guillermo Castillo.  Current BP average 147/88 mmHg is not at goal 130/80.  Patient BP is trending down. WCB in 2-3 weeks. Sent [employer] message as well.

## 2019-07-25 ENCOUNTER — LAB VISIT (OUTPATIENT)
Dept: LAB | Facility: HOSPITAL | Age: 49
End: 2019-07-25
Payer: COMMERCIAL

## 2019-07-25 DIAGNOSIS — I10 HYPERTENSION, UNSPECIFIED TYPE: ICD-10-CM

## 2019-07-25 LAB
ANION GAP SERPL CALC-SCNC: 13 MMOL/L (ref 8–16)
BUN SERPL-MCNC: 20 MG/DL (ref 6–20)
CALCIUM SERPL-MCNC: 10.1 MG/DL (ref 8.7–10.5)
CHLORIDE SERPL-SCNC: 97 MMOL/L (ref 95–110)
CO2 SERPL-SCNC: 24 MMOL/L (ref 23–29)
CREAT SERPL-MCNC: 1 MG/DL (ref 0.5–1.4)
EST. GFR  (AFRICAN AMERICAN): >60 ML/MIN/1.73 M^2
EST. GFR  (NON AFRICAN AMERICAN): >60 ML/MIN/1.73 M^2
GLUCOSE SERPL-MCNC: 241 MG/DL (ref 70–110)
POTASSIUM SERPL-SCNC: 3.7 MMOL/L (ref 3.5–5.1)
SODIUM SERPL-SCNC: 134 MMOL/L (ref 136–145)

## 2019-07-25 PROCEDURE — 36415 COLL VENOUS BLD VENIPUNCTURE: CPT

## 2019-07-25 PROCEDURE — 80048 BASIC METABOLIC PNL TOTAL CA: CPT

## 2019-07-25 NOTE — PROGRESS NOTES
Last 5 Patient Entered Readings                                      Current 30 Day Average: 148/88     Recent Readings 7/25/2019 7/24/2019 7/15/2019 7/10/2019 7/6/2019    SBP (mmHg) 151 155 134 147 143    DBP (mmHg) 94 81 83 95 81    Pulse 59 68 61 64 71          Last 6 Patient Entered Readings                                          Most Recent A1c: 5.9% on 4/22/2019  (Goal: 7%)     Recent Readings 7/21/2019 7/20/2019 7/16/2019 7/15/2019 7/15/2019    Blood Glucose (mg/dL) 166 215 231 176 228        Labs 7/24 rescheduled to 7/25.

## 2019-07-29 ENCOUNTER — PATIENT OUTREACH (OUTPATIENT)
Dept: OTHER | Facility: OTHER | Age: 49
End: 2019-07-29

## 2019-07-29 NOTE — PROGRESS NOTES
Last 5 Patient Entered Readings                                      Current 30 Day Average: 147/87     Recent Readings 7/25/2019 7/24/2019 7/15/2019 7/10/2019 7/6/2019    SBP (mmHg) 151 155 134 147 143    DBP (mmHg) 94 81 83 95 81    Pulse 59 68 61 64 71          Last 6 Patient Entered Readings                                          Most Recent A1c: 5.9% on 4/22/2019  (Goal: 7%)     Recent Readings 7/28/2019 7/28/2019 7/28/2019 7/28/2019 7/27/2019    Blood Glucose (mg/dL) 243 349 398 323 199        LMFCB to assess how he is tolerating metformin 1000mg in AM and 500mg in PM and consider increasing to 1000mg BID. Also to see how he is tolerating chlorthalidone.

## 2019-07-31 ENCOUNTER — CLINICAL SUPPORT (OUTPATIENT)
Dept: REHABILITATION | Facility: HOSPITAL | Age: 49
End: 2019-07-31
Payer: COMMERCIAL

## 2019-07-31 DIAGNOSIS — R29.898 DECREASED GRIP STRENGTH OF RIGHT HAND: ICD-10-CM

## 2019-07-31 DIAGNOSIS — M25.511 RIGHT SHOULDER PAIN, UNSPECIFIED CHRONICITY: ICD-10-CM

## 2019-07-31 DIAGNOSIS — M62.81 MUSCLE WEAKNESS OF RIGHT ARM: ICD-10-CM

## 2019-07-31 DIAGNOSIS — M25.521 RIGHT ELBOW PAIN: Primary | ICD-10-CM

## 2019-07-31 PROCEDURE — 97140 MANUAL THERAPY 1/> REGIONS: CPT

## 2019-07-31 PROCEDURE — 97162 PT EVAL MOD COMPLEX 30 MIN: CPT

## 2019-07-31 PROCEDURE — 97110 THERAPEUTIC EXERCISES: CPT

## 2019-07-31 NOTE — PATIENT INSTRUCTIONS
Position only from pictures  1. AROM elbow bent palm down  2. Elbow bent, palm to body.  3. Elbow straight palm down.  4. Elbow straight, palm towards body.    3/10 reps without pain to progress to next ex, STOP if any increased pain even if do not make 3/10 reps.

## 2019-07-31 NOTE — PROGRESS NOTES
[facility]  Physical Therapy Initial Evaluation    Name: Guillermo Castillo  Clinic Number: [id number]    Therapy Diagnosis:   Encounter Diagnoses   Name Primary?    Right elbow pain Yes    Muscle weakness of right arm     Right shoulder pain, unspecified chronicity     Decreased  strength of right hand      Physician: Ruby Ortega, NP    Physician Orders: PT Eval and Treat   Medical Diagnosis from Referral: Right elbow pain, right forearm pain  Evaluation Date: 7/31/2019  Authorization Period Expiration: 12/31/2019  Plan of Care Expiration: 8/30/2019  Visit # / Visits authorized: 1/ 25    Time In: 11:05  Time Out: 12:25  Total Billable Time: 70 minutes    Precautions: Standard    Subjective   Date of onset: May 2019  History of current condition - Guillermo reports: Has had multiple elbow injuries that went untreated but healed on their own in childhood. Had tendinitis last year that he resolved himself with bracing - pain is in a different location from last year. 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 About 5 months ago, pain started increasing in elbow throughout shifts. By the end of his shifts it is so painful he cannot do anything. Ibuprofen does not help at all. Can point to the pain - very, very tender in one spot.  Have to use the other arm to support or perform tasks instead of the injured arm. Has a hx of disc pathology in C-spine, no hx of shoulder pathology. This is dominant arm. Has been wearing an old brace that he tightens as much as possible to relieve symptoms. Was given another brace by orthopedist last year that did not relieve symptoms.     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  Prior Level of Function: independent in activities  Current Level of Function: currently maintaining all activities at work and home but painful and requires rest    Imaging, X-ray: see EMR    Medical History:   Past Medical History:   Diagnosis Date    DM (diabetes mellitus) 05/2013     x 1 week ago 11/11/2015    DM (diabetes mellitus) 05/2013     03/01/2018 last tested    DM (diabetes mellitus) 05/2013     05/07/2019  last test 05/09/2019    Fatty liver     Gout     Hyperlipidemia     Hypertension     Hypertriglyceridemia     Mood disorder     Panic disorder     Type 2 diabetes mellitus     05/2013 BS       Surgical History:   Guillermo Castillo  has a past surgical history that includes Appendectomy.    Medications:   Guillermo has a current medication list which includes the following prescription(s): amlodipine, aspirin, blood sugar diagnostic, cetirizine, chlorthalidone, duloxetine, fluticasone propionate, ibuprofen, insulin lispro, insulin syringe-needle u-100, metformin, omeprazole, rosuvastatin, and valsartan.    Allergies:   Review of patient's allergies indicates:   Allergen Reactions    Hydrochlorothiazide (bulk)      Other reaction(s): gout    Simvastatin      Other reaction(s): aches/inc lfts      Pts goals: be able to work and do household chores without pain    Objective     Posture: Pt noted to present with forward head/rounded shoulder posture.     ROM:    PROM Right Left   Sh Flexion 135 LUE WNL globally   Sh ABDuction 150    External Rotation 77 p! at end range    Sh Internal Rotation 80    Elbow flexion 145, p! At end range    Elbow extension (-3), p! At end range    supination 65 pain at biceps, and lat epicondyle    pronation 60, pain in upper arm    Wrist flexion, elbow bent/straight 45/39    wristt extension, elbow bent/straight 75/59      Pain with motions of  prolonged elbow flexion, prolonged elbow extension; increase in pain by end of ROM assessment.    Cervical Spine AROM: no increase in symptoms with C-spine motion   % Pain   FB 50% Y   BB 25% Y   RSB 50% N   LSB 50% N       Strength:   Muscle (Myotome) Right Left   Elbow Flexors (C5) 4+/5, mild pain 5/5   Wrist Extensors (C6) 4+/5  5/5   Elbow Extensors (C7) 5/5, 5/5   ER (arm at side) 5/5 5/5   IR (arm at side) 5/5 5/5    strength 100#, 82#, 90#, increased p! 100#, 118#, 110#   Intrinsic strength 4th/5th 4-/5 5/5     ECRL: 4/5; increased pain R, 5/5 L  ECRB: 3+/5; severe pain R, 5/5 L    Sensation: Intact BUE     Girth: 29.5 R; 29.75 L at olecranon  3 in distal to olecranon: 31.25 L; 30.25 R    Special Test:  Arizmendi +       Neers  +     Drop Arm -     Yergasons +        AC crossover -       Palpation:  Patient tender with increased tone over B UT, levator scap, lats, teres minor, infraspinatus, subscap, ant chest mm, post cervical mm, SCM, med/lateral scapular mm's., R forearm, brachialis, brachioradialis, lateral epicondyle.    Function: Patient reports 50/80 score of the UEFS.    UPPER EXTREMITY FUNCTIONAL SCALE         1. Any of your usual work, housework or school activities   2/4  2. Your usual hobbies, sporting     2/4  3. Lifting bag of groceries to waist     2/4  4. Lifting above your head      2/4  5. Grooming hair       4/4  6. Pushing up from a chair to stand     2/4  7. Preparing food       4/4  8. Driving        3/4  9. Vacuuming, sweeping, or raking     2/4  10. Getting dressed       4/4  11. Doing up buttons       4/4  12. Using tools or appliances      2/4  13. Opening doors       3/4  14. Cleaning        2/4  15. Tying shoes       4/4  16. Sleeping        3/4  17. Laundering clothes      2/4  18. Opening a jar       1/4  19. Throwing a ball       1/4  20. Carrying a suitcase      1/4       TREATMENT   Treatment Time In: 11:50  Treatment Time Out: 12:25  Total Treatment time separate from  Evaluation: 18 minutes       Guillermo received therapeutic exercises to develop ROM for 8 minutes including:  Wrist flexion with elbow flexed, elbow extended  Wrist radial deviation with elbow flexed, elbow bent    Guillermo received the following manual therapy techniques of soft tissue mobilization/manual was performed by Екатерина Donovan PT to the: R upper quadrant for 10 minutes, including: subscapular releases, UT/levator scap, brachialis and scapular tilting.  FDN to ERL/ECRB, brachioradialis with prolonged insertion and gentle twist.     Guillermo received cold pack for 10 minutes to R elbow.    Home Exercises and Patient Education Provided    Education provided:   -Education on condition, HEP, and importance of rest.    Written Home Exercises Provided: yes.  Exercises were reviewed and Guillermo was able to demonstrate them prior to the end of the session.  Guillermo demonstrated good  understanding of the education provided.     See EMR under Patient Instructions for exercises provided 7/31/2019.    Assessment   Guillermo is a 49 y.o. male referred to outpatient Physical Therapy with a medical diagnosis of R elbow pain, R forearm pain. The patient presents with impairments which include decreased ROM, decreased strength and decreased overall function. Symptoms and objective findings are consistent with overuse and lateral epicondylitis. Patient has begun substituting motion at the elbow with shoulder motion. 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 He is also limited in writing, typing, and grasping objects.     Pt prognosis is Good due to personal factors and co-morbidities listed below.   Pt will benefit from skilled outpatient Physical Therapy to address the deficits stated above and in the chart below, provide pt/family education, and to maximize pt's level of independence.      Plan of care discussed with patient: Yes  Pt's spiritual, cultural and educational needs considered and patient is agreeable to the plan of care and goals as stated below:     Anticipated Barriers for therapy: history of elbow injury, pain    Medical Necessity is demonstrated by the following   History  Co-morbidities and personal factors that may impact the plan of care Co-morbidities:   anxiety, diabetes, difficulty sleeping and gout    Personal Factors:   attitudes     high   Examination  Body Structures and Functions, activity limitations and participation restrictions that may impact the plan of care Body Regions:   upper extremities    Body Systems:    gross symmetry  ROM  strength    Participation Restrictions:   none    Activity limitations:   Learning and applying knowledge  no deficits    General Tasks and Commands  no deficits    Communication  no deficits    Mobility  lifting and carrying objects  fine hand use (grasping/picking up)    Self care  no deficits    Domestic Life  doing house work (cleaning house, washing dishes, laundry)  assisting others    Interactions/Relationships  no deficits    Life Areas  no deficits    Community and Social Life  community life  recreation and leisure         moderate   Clinical Presentation evolving clinical presentation with changing clinical characteristics moderate   Decision Making/ Complexity Score: moderate     Goals:  Short Term Goals: In 3 weeks   1.Pt to be educated on HEP.  2.Patient to increase wrist ROM to WNL, without pain.  3.Patient to increase RUE strength by 1/2 grade.  4.Patient to have pain less than 5/10 at worst.  5.Patient to score 59/80 or higher on the UEFS.  6. Pt to be educated on body mechanics awareness.    Long Term Goals: In 6 weeks  1. Patient to score 68/80 or higher on the UEFS.  2. Patient to demo increase in UE strength to 5/5.  3. Patient to have decreased pain to 0/10 at all times.  5. Patient to perform daily activities  including lifting loads and required work duties without limitation.  6. Pt to demo negative Impingement testing at R shoulder.      Plan   Plan of care Certification: 7/31/2019 to 8/30/2019.    Outpatient Physical Therapy 2 times weekly for 6 weeks to include the following interventions: Electrical Stimulation IFC/PRN, Manual Therapy, Moist Heat/ Ice, Patient Education, Self Care, Therapeutic Activites and Therapeutic Exercise.     Aniya Martinez, SHERRILL    Thank you for this referral.    These services are reasonable and necessary for the conditions set forth above while under my care.

## 2019-08-05 ENCOUNTER — CLINICAL SUPPORT (OUTPATIENT)
Dept: REHABILITATION | Facility: HOSPITAL | Age: 49
End: 2019-08-05
Payer: COMMERCIAL

## 2019-08-05 DIAGNOSIS — M25.521 RIGHT ELBOW PAIN: ICD-10-CM

## 2019-08-05 DIAGNOSIS — M25.511 RIGHT SHOULDER PAIN, UNSPECIFIED CHRONICITY: ICD-10-CM

## 2019-08-05 DIAGNOSIS — M62.81 MUSCLE WEAKNESS OF RIGHT ARM: ICD-10-CM

## 2019-08-05 DIAGNOSIS — R29.898 DECREASED GRIP STRENGTH OF RIGHT HAND: ICD-10-CM

## 2019-08-05 PROCEDURE — 97140 MANUAL THERAPY 1/> REGIONS: CPT

## 2019-08-05 PROCEDURE — 97110 THERAPEUTIC EXERCISES: CPT

## 2019-08-05 PROCEDURE — 97014 ELECTRIC STIMULATION THERAPY: CPT

## 2019-08-06 NOTE — PROGRESS NOTES
Physical Therapy Daily Treatment Note     Name: Guillermo Castillo  Clinic Number: [id number]    Therapy Diagnosis:   Encounter Diagnoses   Name Primary?    Right elbow pain     Muscle weakness of right arm     Right shoulder pain, unspecified chronicity     Decreased  strength of right hand      Physician: Ruby Ortega NP    Visit Date: 8/5/2019    Physician Orders: PT Eval and Treat   Medical Diagnosis from Referral: Right elbow pain, right forearm pain  Evaluation Date: 7/31/2019  Authorization Period Expiration: 12/31/2019  Plan of Care Expiration: 8/30/2019  Visit # / Visits authorized: 2/25      Time In: 8:45  Time Out: 9:50  Total Billable Time: 58 minutes    Precautions: Standard    Subjective     Pt reports: Did feel better after needling after last tx session.  Very sore after needling today with estim, no c/o cramping of mm's just very sore.  .  He was compliant with home exercise program.  Response to previous treatment: None  Functional change: None    Pain: NT/10  Location: right elbow      Objective     Guillermo received therapeutic exercises to develop ROM for 18 minutes including:    Wrist flexion with elbow flexed, elbow extended 3/10  Wrist radial deviation with elbow flexed, elbow bent 3/10  Passive stretch elbow ext, ulnar deviation 3/30s  Supination/pronation 2/10  Foam roll stretch for pectorals 3'  ER with band 10x, YTB  LT iso with 1/2 roll 2'  Scap retract iso with 1/2 roll 2'     Guillermo received the following manual therapy techniques of soft tissue mobilization/manual was performed by Екатерина Donovan PT to the: R upper quadrant for 25 minutes, including: subscapular releases, UT/levator scap, brachialis and scapular tilting.  FDN to ERL/ECRB, brachioradialis with prolonged insertion and gentle twist. along with prolonged estim for twitch response.     Guillermo received the following supervised modalities after being cleared for contradictions: IFC Electrical Stimulation:  Guillermo  received IFC Electrical Stimulation for pain control applied to the R elbow. Pt received stimulation at 40 % scan at a frequency of  for 15 minutes. Guillermo tolerated treatment well without any adverse effects.      Guillermo received cold pack for 15 minutes to R elbow.      Home Exercises Provided and Patient Education Provided     Education provided:   - con't with HEP, will be sore for 24-48 hours post FDN    Written Home Exercises Provided: Patient instructed to cont prior HEP.  Exercises were reviewed and Guillermo was able to demonstrate them prior to the end of the session.  Guillermo demonstrated good  understanding of the education provided.     See EMR under Patient Instructions for exercises provided prior visit.    Assessment     Decreased tone but increased soreness post tx session.  Fatigues quickly with therex, and requires cues for posture and scapular control during therex.    Guillermo is progressing well towards his goals.   Pt prognosis is Good.     Pt will continue to benefit from skilled outpatient physical therapy to address the deficits listed in the problem list box on initial evaluation, provide pt/family education and to maximize pt's level of independence in the home and community environment.     Pt's spiritual, cultural and educational needs considered and pt agreeable to plan of care and goals.     Anticipated barriers to physical therapy: history of elbow injury, pain    Goals:   Short Term Goals: In 3 weeks   1.Pt to be educated on HEP.  2.Patient to increase wrist ROM to WNL, without pain.  3.Patient to increase RUE strength by 1/2 grade.  4.Patient to have pain less than 5/10 at worst.  5.Patient to score 59/80 or higher on the UEFS.  6. Pt to be educated on body mechanics awareness.     Long Term Goals: In 6 weeks  1. Patient to score 68/80 or higher on the UEFS.  2. Patient to demo increase in UE strength to 5/5.  3. Patient to have decreased pain to 0/10 at all times.  5. Patient to  perform daily activities including lifting loads and required work duties without limitation.  6. Pt to demo negative Impingement testing at R shoulder.      Plan     Monitor response to todays tx and progress with ROM and strengthening PRN.    Екатерина Donovan, PT

## 2019-08-08 ENCOUNTER — CLINICAL SUPPORT (OUTPATIENT)
Dept: REHABILITATION | Facility: HOSPITAL | Age: 49
End: 2019-08-08
Payer: COMMERCIAL

## 2019-08-08 DIAGNOSIS — M62.81 MUSCLE WEAKNESS OF RIGHT ARM: ICD-10-CM

## 2019-08-08 DIAGNOSIS — R29.898 DECREASED GRIP STRENGTH OF RIGHT HAND: ICD-10-CM

## 2019-08-08 DIAGNOSIS — M25.511 RIGHT SHOULDER PAIN, UNSPECIFIED CHRONICITY: ICD-10-CM

## 2019-08-08 DIAGNOSIS — M25.521 RIGHT ELBOW PAIN: ICD-10-CM

## 2019-08-08 PROCEDURE — 97014 ELECTRIC STIMULATION THERAPY: CPT

## 2019-08-08 PROCEDURE — 97140 MANUAL THERAPY 1/> REGIONS: CPT

## 2019-08-08 PROCEDURE — 97110 THERAPEUTIC EXERCISES: CPT

## 2019-08-08 NOTE — PROGRESS NOTES
Physical Therapy Daily Treatment Note     Name: Guillermo Castillo  Clinic Number: [id number]    Therapy Diagnosis:   Encounter Diagnoses   Name Primary?    Right elbow pain     Muscle weakness of right arm     Right shoulder pain, unspecified chronicity     Decreased  strength of right hand      Physician: Ruby Ortega NP    Visit Date: 8/8/2019    Physician Orders: PT Eval and Treat   Medical Diagnosis from Referral: Right elbow pain, right forearm pain  Evaluation Date: 7/31/2019  Authorization Period Expiration: 12/31/2019  Plan of Care Expiration: 8/30/2019  Visit # / Visits authorized: 3/25      Time In: 8:10  Time Out: 9:20  Total Billable Time: 60 minutes    Precautions: Standard    Subjective     Pt reports:  He was very sore after last tx session, reports he has been changing some things that he does at work to minimize his discomfort.  Overall he does feel like he may be a little better.  He still cannot  his drink tumbler (YETI type stainless steel).    He was compliant with home exercise program.  Response to previous treatment: Ex's seem to help, sore after last tx session.  Functional change: None    Pain: NT/10  Location: right elbow      Objective     Guillermo received therapeutic exercises to develop ROM for 25 minutes including:    Wrist flexion with elbow flexed, elbow extended 3/10  Wrist radial deviation with elbow flexed, elbow bent 3/10  Passive stretch elbow ext, ulnar deviation 3/30s  Supination/pronation 2/10  Foam roll stretch for pectorals 3'  ER with #10 10x, YTB  LT iso with 1/2 roll 2'  Scap retract iso with 1/2 roll 2'  Biceps/triceps #20 3/10 ea     Guillermo received the following manual therapy techniques of soft tissue mobilization/manual to the: R upper quadrant for 20 minutes, including: subscapular releases, UT/levator scap, brachialis and scapular tilting.  STM with and without tools to ERL/ECRB, brachioradialis, along with passive stretching.     Guillermo received the  following supervised modalities after being cleared for contradictions: IFC Electrical Stimulation:  Guillermo received IFC Electrical Stimulation for pain control applied to the R elbow. Pt received stimulation at 40 % scan at a frequency of  for 15 minutes. Guillermo tolerated treatment well without any adverse effects.      Guillermo received cold pack for 15 minutes to R elbow.      Home Exercises Provided and Patient Education Provided     Education provided:   - con't with HEP,  Avoid gripping tumbler with R hand.    Written Home Exercises Provided: Patient instructed to cont prior HEP.  Exercises were reviewed and Guillermo was able to demonstrate them prior to the end of the session.  Guillermo demonstrated good  understanding of the education provided.     See EMR under Patient Instructions for exercises provided prior visit.    Assessment     Soreness post manual but does feel that his mm's are looser.  Requires frequent postural cues, and cues for scapular control.    Guillermo is progressing well towards his goals.   Pt prognosis is Good.     Pt will continue to benefit from skilled outpatient physical therapy to address the deficits listed in the problem list box on initial evaluation, provide pt/family education and to maximize pt's level of independence in the home and community environment.     Pt's spiritual, cultural and educational needs considered and pt agreeable to plan of care and goals.     Anticipated barriers to physical therapy: history of elbow injury, pain    Goals:   Short Term Goals: In 3 weeks   1.Pt to be educated on HEP.  2.Patient to increase wrist ROM to WNL, without pain.  3.Patient to increase RUE strength by 1/2 grade.  4.Patient to have pain less than 5/10 at worst.  5.Patient to score 59/80 or higher on the UEFS.  6. Pt to be educated on body mechanics awareness.     Long Term Goals: In 6 weeks  1. Patient to score 68/80 or higher on the UEFS.  2. Patient to demo increase in UE strength  to 5/5.  3. Patient to have decreased pain to 0/10 at all times.  5. Patient to perform daily activities including lifting loads and required work duties without limitation.  6. Pt to demo negative Impingement testing at R shoulder.      Plan     Monitor response to todays tx and progress with ROM and strengthening PRN.    Aniya Martinez, SPT  Екатерина Donovan, PT

## 2019-08-09 NOTE — PROGRESS NOTES
HPI:  Called Mr. Guillermo Castillo for hypertension and diabetes digital medicine follow-up. Patient reports adherence  to medication regimen. He is having diarrhea with the higher dose of metformin.  Last 5 Patient Entered Readings                                      Current 30 Day Average: 142/85     Recent Readings 8/8/2019 8/3/2019 8/2/2019 7/25/2019 7/24/2019    SBP (mmHg) 133 142 133 151 155    DBP (mmHg) 80 75 84 94 81    Pulse 68 65 70 59 68        Patient denies s/s of hypotension (lightheadedness, dizziness, nausea, fatigue) associated with low readings. Instructed patient to inform me if this occurs, patient confirms understanding.    Patient denies s/s of hypertension (SOB, CP, severe headaches, changes in vision) associated with high readings. Instructed patient to go to the ED if BP >180/110 and accompanied by hypertensive s/s, patient confirms understanding.    Last 6 Patient Entered Readings                                          Most Recent A1c: 5.9% on 4/22/2019  (Goal: 7%)     Recent Readings 8/8/2019 8/4/2019 8/3/2019 8/3/2019 8/2/2019    Blood Glucose (mg/dL) 195 273 224 259 231        Patient denies s/s or episodes of hypoglycemia (weakness, dizziness, hunger, shakiness, nausea, headache, heart palpitations, sweating, fatigue, anxiety, etc.).    Patient denies s/s of hyperglycemia (headache, increased thirst, increased urination, fatigue, blurred vision).    Assessment:  Patient's current 30-day average is not at goal of <130/80 mmHg based on ACC/AHA HTN guidelines.     Diabetes is at goal based on patient's last A1C. SMBG readings reviewed and are elevated.    Health maintenance is up to date.    Plan:  Continue current regimen.  Work on lifestyle through an exercise routine. If no improvement in two weeks. I am adding a beta blocker and SGLT-2.  Patients health , Ladarius Albrecht, will follow-up as scheduled. Patient knows Ladarius has been trying to reach him and he will call him when  he has time.   I will continue to monitor regularly and will follow-up in 2 weeks, sooner if blood pressure or blood glucose begins to trend upward or downward.     Current medication regimen:  Hypertension Medications             amLODIPine (NORVASC) 10 MG tablet Take 1 tablet (10 mg total) by mouth once daily.    chlorthalidone (HYGROTEN) 25 MG Tab Take 1 tablet (25 mg total) by mouth once daily.    valsartan (DIOVAN) 320 MG tablet Take 1 tablet (320 mg total) by mouth once daily.        Diabetes Medications             insulin lispro (HUMALOG U-100 INSULIN) 100 unit/mL injection Use via sliding scale    metFORMIN (GLUCOPHAGE-XR) 500 MG 24 hr tablet TAKE 2 TABLETS BY MOUTH ONCE DAILY        Patient has my contact information and knows to call with any concerns or clinical changes.

## 2019-08-12 ENCOUNTER — CLINICAL SUPPORT (OUTPATIENT)
Dept: REHABILITATION | Facility: HOSPITAL | Age: 49
End: 2019-08-12
Payer: COMMERCIAL

## 2019-08-12 DIAGNOSIS — R29.898 DECREASED GRIP STRENGTH OF RIGHT HAND: ICD-10-CM

## 2019-08-12 DIAGNOSIS — M25.511 RIGHT SHOULDER PAIN, UNSPECIFIED CHRONICITY: ICD-10-CM

## 2019-08-12 DIAGNOSIS — M62.81 MUSCLE WEAKNESS OF RIGHT ARM: ICD-10-CM

## 2019-08-12 DIAGNOSIS — M25.521 RIGHT ELBOW PAIN: ICD-10-CM

## 2019-08-12 PROCEDURE — 97140 MANUAL THERAPY 1/> REGIONS: CPT

## 2019-08-12 PROCEDURE — 97110 THERAPEUTIC EXERCISES: CPT

## 2019-08-12 PROCEDURE — 97014 ELECTRIC STIMULATION THERAPY: CPT

## 2019-08-12 NOTE — PROGRESS NOTES
Physical Therapy Daily Treatment Note     Name: Guillermo Castillo  Clinic Number: [id number]    Therapy Diagnosis:   Encounter Diagnoses   Name Primary?    Right elbow pain     Muscle weakness of right arm     Right shoulder pain, unspecified chronicity     Decreased  strength of right hand      Physician: Ruby Ortega NP    Visit Date: 8/12/2019    Physician Orders: PT Eval and Treat   Medical Diagnosis from Referral: Right elbow pain, right forearm pain  Evaluation Date: 7/31/2019  Authorization Period Expiration: 12/31/2019  Plan of Care Expiration: 8/30/2019  Visit # / Visits authorized: 4/25      Time In: 7:40  Time Out: 8:40  Total Billable Time: 53 minutes    Precautions: Standard    Subjective     Pt reports:  He feels like he has had increased pain since last visit.  Hasn't worked since the next night, but has been in [facility] dealing with his mother who is having dementia issues and a lot of anger issues.    He was compliant with home exercise program.  Response to previous treatment: Ex's seem to help, sore after last tx session.  Functional change: None    Pain:  2-3/10  Location: right elbow      Objective     Guillermo received therapeutic exercises to develop ROM for 18 minutes including:    Wrist flexion with elbow flexed, elbow extended 3/10  Wrist radial deviation with elbow flexed, elbow bent 3/10  Passive stretch elbow ext, ulnar deviation 3/30s  Supination/pronation 2/10  Foam roll stretch for pectorals 3' pain after  LT iso with 1/2 roll 2'  Scap retract iso with 1/2 roll 2'       Guillermo received the following manual therapy techniques of soft tissue mobilization/manual to the: R upper quadrant for 20 minutes, including: subscapular releases, UT/levator scap, brachialis and scapular tilting.  FDN with and without estim to ERL/ECRB, brachioradialis, biceps along with STM to area as well.     Guillermo received the following supervised modalities after being cleared for contradictions: IFC  Electrical Stimulation:  Guillermo received IFC Electrical Stimulation for pain control applied to the R elbow. Pt received stimulation at 40 % scan at a frequency of  for 15 minutes. Guillermo tolerated treatment well without any adverse effects.      Guillermo received cold pack for 15 minutes to R elbow.      Home Exercises Provided and Patient Education Provided     Education provided:   - con't with HEP.    Written Home Exercises Provided: Patient instructed to cont prior HEP.  Exercises were reviewed and Guillermo was able to demonstrate them prior to the end of the session.  Guillermo demonstrated good  understanding of the education provided.     See EMR under Patient Instructions for exercises provided prior visit.    Assessment     Pt with increased pain c/o today, poor response to STM with tools last tx session, better after FDN but overall very sore and fatigued.  Reports that he slept poorly and when he woke up he was lying on his sore arm/elbow which increased his pain as well.    Guillermo is progressing well towards his goals.   Pt prognosis is Good.     Pt will continue to benefit from skilled outpatient physical therapy to address the deficits listed in the problem list box on initial evaluation, provide pt/family education and to maximize pt's level of independence in the home and community environment.     Pt's spiritual, cultural and educational needs considered and pt agreeable to plan of care and goals.     Anticipated barriers to physical therapy: history of elbow injury, pain    Goals:   Short Term Goals: In 3 weeks   1.Pt to be educated on HEP.  2.Patient to increase wrist ROM to WNL, without pain.  3.Patient to increase RUE strength by 1/2 grade.  4.Patient to have pain less than 5/10 at worst.  5.Patient to score 59/80 or higher on the UEFS.  6. Pt to be educated on body mechanics awareness.     Long Term Goals: In 6 weeks  1. Patient to score 68/80 or higher on the UEFS.  2. Patient to demo increase  in UE strength to 5/5.  3. Patient to have decreased pain to 0/10 at all times.  5. Patient to perform daily activities including lifting loads and required work duties without limitation.  6. Pt to demo negative Impingement testing at R shoulder.      Plan     Monitor response to todays tx and modify program PRN, if pt con't to have increased pain next week will refer to orthopedics for consult.    Aniya Martinez, SPT  Екатерина Donovan, PT

## 2019-08-13 NOTE — PROGRESS NOTES
Last 5 Patient Entered Readings                                      Current 30 Day Average: 141/82     Recent Readings 8/13/2019 8/13/2019 8/10/2019 8/8/2019 8/3/2019    SBP (mmHg) 144 134 134 133 142    DBP (mmHg) 80 84 80 80 75    Pulse 69 76 83 68 65          Last 6 Patient Entered Readings                                          Most Recent A1c: 5.9% on 4/22/2019  (Goal: 7%)     Recent Readings 8/13/2019 8/12/2019 8/12/2019 8/12/2019 8/11/2019    Blood Glucose (mg/dL) 277 236 225 264 318        Digital Medicine: Health  Follow Up    Lifestyle Modifications:  Dietary Modifications (Sodium intake <2,000mg/day, food labels, dining out):   Reviewed with patient the need for consistent meal times and trying to get the proper amount of carbs per meal and adhering to these sorts of things will help to regulate his BG and BP. Patient stated that he understood and would try to start getting more consistent with his diet. We reviewed some of his meals from the weekend where his numbers have been high to help patient identify what types of foods have higher CHO counts. Told patient I would work on a meal time schedule for him and get back to him about it this week.    Physical Activity:   Patient says that he has an electric mower that he will use about twice a week to cut the grass. Patient has a bike and has promised he would start using it but has yet to and he states that it is too hot right now to ride the bike.    Medication Therapy:   Patient has been compliant with the medication regimen.    Patient has the following medication side effects/concerns: none  (Frequency/Alleviating factors/Precipitating factors, etc.)     Follow up with Mr. Guillermo Castillo completed. No further questions or concerns. Will continue to follow up to achieve health goals.    Notes:  Come up with meal schedule for patient and send via email in 1 week. WCB in 4 weeks.

## 2019-08-15 ENCOUNTER — CLINICAL SUPPORT (OUTPATIENT)
Dept: REHABILITATION | Facility: HOSPITAL | Age: 49
End: 2019-08-15
Payer: COMMERCIAL

## 2019-08-15 DIAGNOSIS — M25.521 RIGHT ELBOW PAIN: ICD-10-CM

## 2019-08-15 DIAGNOSIS — R29.898 DECREASED GRIP STRENGTH OF RIGHT HAND: ICD-10-CM

## 2019-08-15 DIAGNOSIS — M62.81 MUSCLE WEAKNESS OF RIGHT ARM: ICD-10-CM

## 2019-08-15 DIAGNOSIS — M25.511 RIGHT SHOULDER PAIN, UNSPECIFIED CHRONICITY: ICD-10-CM

## 2019-08-15 PROCEDURE — 97110 THERAPEUTIC EXERCISES: CPT

## 2019-08-15 PROCEDURE — 97140 MANUAL THERAPY 1/> REGIONS: CPT

## 2019-08-15 PROCEDURE — 97014 ELECTRIC STIMULATION THERAPY: CPT

## 2019-08-19 ENCOUNTER — CLINICAL SUPPORT (OUTPATIENT)
Dept: REHABILITATION | Facility: HOSPITAL | Age: 49
End: 2019-08-19
Payer: COMMERCIAL

## 2019-08-19 DIAGNOSIS — M25.521 RIGHT ELBOW PAIN: ICD-10-CM

## 2019-08-19 DIAGNOSIS — R29.898 DECREASED GRIP STRENGTH OF RIGHT HAND: ICD-10-CM

## 2019-08-19 DIAGNOSIS — M62.81 MUSCLE WEAKNESS OF RIGHT ARM: ICD-10-CM

## 2019-08-19 DIAGNOSIS — M25.511 RIGHT SHOULDER PAIN, UNSPECIFIED CHRONICITY: ICD-10-CM

## 2019-08-19 PROCEDURE — 97140 MANUAL THERAPY 1/> REGIONS: CPT

## 2019-08-19 PROCEDURE — 97110 THERAPEUTIC EXERCISES: CPT

## 2019-08-19 PROCEDURE — 97014 ELECTRIC STIMULATION THERAPY: CPT

## 2019-08-22 ENCOUNTER — CLINICAL SUPPORT (OUTPATIENT)
Dept: REHABILITATION | Facility: HOSPITAL | Age: 49
End: 2019-08-22
Payer: COMMERCIAL

## 2019-08-22 DIAGNOSIS — M62.81 MUSCLE WEAKNESS OF RIGHT ARM: ICD-10-CM

## 2019-08-22 DIAGNOSIS — M25.511 RIGHT SHOULDER PAIN, UNSPECIFIED CHRONICITY: ICD-10-CM

## 2019-08-22 DIAGNOSIS — M25.521 RIGHT ELBOW PAIN: ICD-10-CM

## 2019-08-22 DIAGNOSIS — R29.898 DECREASED GRIP STRENGTH OF RIGHT HAND: ICD-10-CM

## 2019-08-22 PROCEDURE — 97110 THERAPEUTIC EXERCISES: CPT

## 2019-08-22 PROCEDURE — 97014 ELECTRIC STIMULATION THERAPY: CPT

## 2019-08-22 PROCEDURE — 97140 MANUAL THERAPY 1/> REGIONS: CPT

## 2019-08-22 NOTE — PROGRESS NOTES
Physical Therapy Daily Treatment Note     Name: Guillermo Castillo  Clinic Number: [id number]    Therapy Diagnosis:   Encounter Diagnoses   Name Primary?    Right elbow pain     Muscle weakness of right arm     Right shoulder pain, unspecified chronicity     Decreased  strength of right hand      Physician: Ruby Ortega NP    Visit Date: 8/22/2019    Physician Orders: PT Eval and Treat   Medical Diagnosis from Referral: Right elbow pain, right forearm pain  Evaluation Date: 7/31/2019  Authorization Period Expiration: 12/31/2019  Plan of Care Expiration: 8/30/2019  Visit # / Visits authorized: 7/25      Time In: 9:00  Time Out: 10:15  Total Billable Time: 58 minutes    Precautions: Standard    Subjective     Pt reports:  He is tired today from work but the elbow feels fine. Pt indicated location of the pain isolated to the lateral epicondyle by pointing to it, with no symptoms down into the forearm.    He was compliant with home exercise program.  Response to previous treatment: mild echymosis from FDN, no adverse symptoms  Functional change: Improved work tolerance    Pain:  1-2/10  Location: right elbow      Objective     Guillermo received therapeutic exercises to develop ROM for 25 minutes including:    AROM - elbow flexed, wrist flexion 3x10  AROM - elbow flexed, palm to body 3x10  AROM - elbow extended, wrist flexion 3x10  AROM - elbow extended, palm to body 3x10  Elbow flexed, palm down wrist flexion c 1# weight 20x  Supination/pronation 3x10  Foam roll pec stretch  3'  LT iso 2' - required cueing  Scap retract iso with 1/2 roll 2'  Seated B ER with YTB 3x10  Passive stretch forearm, elbow ext UD 3x30s  Reach/rolll 2'      Guillermo received the following manual therapy techniques of soft tissue mobilization/manual to the: R upper quadrant for 20 minutes, including: B Subscapular releases, UT/levator scap, brachialis and scapular tilting, forearm extensors  x 10 min  FDN to extensor insertion at lateral  epicondyle x 8 min, by Екатерина Donovan, PT       Guillermo received the following supervised modalities after being cleared for contradictions: IFC Electrical Stimulation:  Guillermo received IFC Electrical Stimulation for pain control applied to the R elbow. Pt received stimulation at 40 % scan at a frequency of  for 15 minutes. Guillermo tolerated treatment well without any adverse effects.      Guillermo received cold pack for 15 minutes to R elbow with IFC.      Home Exercises Provided and Patient Education Provided     Education provided:   - Continue with HEP    Written Home Exercises Provided: Patient instructed to cont prior HEP.  Exercises were reviewed and Guillermo was able to demonstrate them prior to the end of the session.  Guillermo demonstrated good  understanding of the education provided.     See EMR under Patient Instructions for exercises provided prior visit.    Assessment     Pt's symptoms have localized to lateral epicondyle with no forearm symptoms. FDN was performed again today because of pt's previous improvements but was advised that he will be sore and should try to rest as much as possible after his shift tonight. Because of Mr. Li's work demands it is difficult for him to adequately rest his arm after aggressive therapy interventions but he continues to show gradual improvement in symptoms.  He con't to have pain with gripping large objects, reports decreased sx's with small objects.    Guillermo is progressing well towards his goals.   Pt prognosis is Good.     Pt will continue to benefit from skilled outpatient physical therapy to address the deficits listed in the problem list box on initial evaluation, provide pt/family education and to maximize pt's level of independence in the home and community environment.     Pt's spiritual, cultural and educational needs considered and pt agreeable to plan of care and goals.     Anticipated barriers to physical therapy: history of elbow injury,  pain    Goals:   Short Term Goals: In 3 weeks   1.Pt to be educated on HEP.  2.Patient to increase wrist ROM to WNL, without pain.  3.Patient to increase RUE strength by 1/2 grade.  4.Patient to have pain less than 5/10 at worst.  5.Patient to score 59/80 or higher on the UEFS.  6. Pt to be educated on body mechanics awareness.     Long Term Goals: In 6 weeks  1. Patient to score 68/80 or higher on the UEFS.  2. Patient to demo increase in UE strength to 5/5.  3. Patient to have decreased pain to 0/10 at all times.  5. Patient to perform daily activities including lifting loads and required work duties without limitation.  6. Pt to demo negative Impingement testing at R shoulder.      Plan     Continue with gradual introduction of strengthening if pt's symptoms continue to decrease.    Aniya Martinez, SPT   Екатерина Donovan, PT

## 2019-08-23 ENCOUNTER — PATIENT OUTREACH (OUTPATIENT)
Dept: OTHER | Facility: OTHER | Age: 49
End: 2019-08-23

## 2019-08-23 DIAGNOSIS — E11.9 TYPE 2 DIABETES MELLITUS WITHOUT COMPLICATION, WITHOUT LONG-TERM CURRENT USE OF INSULIN: Primary | ICD-10-CM

## 2019-08-23 NOTE — PROGRESS NOTES
HPI:  Called Mr. Guillermo Castillo for hypertension and diabetes digital medicine follow-up. Patient reports adherence to medication regimen with no complaints/complaints.   Last 5 Patient Entered Readings                                      Current 30 Day Average: 141/82     Recent Readings 8/17/2019 8/13/2019 8/13/2019 8/10/2019 8/8/2019    SBP (mmHg) 137 144 134 134 133    DBP (mmHg) 82 80 84 80 80    Pulse 61 69 76 83 68        Patient denies s/s of hypotension (lightheadedness, dizziness, nausea, fatigue) associated with low readings. Instructed patient to inform me if this occurs, patient confirms understanding.    Patient denies s/s of hypertension (SOB, CP, severe headaches, changes in vision) associated with high readings. Instructed patient to go to the ED if BP >180/110 and accompanied by hypertensive s/s, patient confirms understanding.    Last 6 Patient Entered Readings                                          Most Recent A1c: 5.9% on 4/22/2019  (Goal: 7%)     Recent Readings 8/18/2019 8/18/2019 8/17/2019 8/17/2019 8/15/2019    Blood Glucose (mg/dL) 308 333 236 228 199        Patient denies s/s or episodes of hypoglycemia (weakness, dizziness, hunger, shakiness, nausea, headache, heart palpitations, sweating, fatigue, anxiety, etc.).    Patient denies s/s of hyperglycemia (headache, increased thirst, increased urination, fatigue, blurred vision).    Assessment:  Patient's current 30-day average is not at goal of <130/80 mmHg based on ACC/AHA HTN guidelines. She is trending down nicely.    Diabetes is at goal based on patient's last A1C. SMBG readings reviewed and are elevated.    Health maintenance is up to date.    Plan:  Add Ozempic 0.25mg once weekly.   Continue your current HTN medications.  Patients health , Ladarius Albrecht, will follow-up as scheduled.    I will continue to monitor regularly and will follow-up in 2 weeks, sooner if blood pressure or blood glucose begins to trend upward or  downward.     Current medication regimen:  Hypertension Medications             amLODIPine (NORVASC) 10 MG tablet Take 1 tablet (10 mg total) by mouth once daily.    chlorthalidone (HYGROTEN) 25 MG Tab Take 1 tablet (25 mg total) by mouth once daily.    valsartan (DIOVAN) 320 MG tablet Take 1 tablet (320 mg total) by mouth once daily.        Diabetes Medications             insulin lispro (HUMALOG U-100 INSULIN) 100 unit/mL injection Use via sliding scale    metFORMIN (GLUCOPHAGE-XR) 500 MG 24 hr tablet TAKE 2 TABLETS BY MOUTH ONCE DAILY        Patient has my contact information and knows to call with any concerns or clinical changes.

## 2019-08-26 ENCOUNTER — TELEPHONE (OUTPATIENT)
Dept: PHARMACY | Facility: CLINIC | Age: 49
End: 2019-08-26

## 2019-08-26 ENCOUNTER — CLINICAL SUPPORT (OUTPATIENT)
Dept: REHABILITATION | Facility: HOSPITAL | Age: 49
End: 2019-08-26
Payer: COMMERCIAL

## 2019-08-26 DIAGNOSIS — M62.81 MUSCLE WEAKNESS OF RIGHT ARM: ICD-10-CM

## 2019-08-26 DIAGNOSIS — M25.511 RIGHT SHOULDER PAIN, UNSPECIFIED CHRONICITY: ICD-10-CM

## 2019-08-26 DIAGNOSIS — M25.521 RIGHT ELBOW PAIN: ICD-10-CM

## 2019-08-26 DIAGNOSIS — R29.898 DECREASED GRIP STRENGTH OF RIGHT HAND: ICD-10-CM

## 2019-08-26 PROCEDURE — 97110 THERAPEUTIC EXERCISES: CPT

## 2019-08-26 PROCEDURE — 97014 ELECTRIC STIMULATION THERAPY: CPT

## 2019-08-26 PROCEDURE — 97140 MANUAL THERAPY 1/> REGIONS: CPT

## 2019-08-26 NOTE — PROGRESS NOTES
Physical Therapy Daily Treatment Note     Name: Guillermo Castillo  Clinic Number: [id number]    Therapy Diagnosis:   Encounter Diagnoses   Name Primary?    Right elbow pain     Muscle weakness of right arm     Right shoulder pain, unspecified chronicity     Decreased  strength of right hand      Physician: Ruby Ortega NP    Visit Date: 8/26/2019    Physician Orders: PT Eval and Treat   Medical Diagnosis from Referral: Right elbow pain, right forearm pain  Evaluation Date: 7/31/2019  Authorization Period Expiration: 12/31/2019  Plan of Care Expiration: 8/30/2019  Visit # / Visits authorized: 9/25      Time In: 8:10  Time Out: 9:15  Total Billable Time: 49 minutes    Precautions: Standard    Subjective     Pt reports:  Had pain later that day and then was a bit better, has not been doing as much ex's at home, discussed need to gain AROM and flexibility prior to working on getting stronger.  Pt con't to be sore post FDN/manual but does get good relief.    He was compliant with home exercise program.  Response to previous treatment: mild echymosis from FDN, no adverse symptoms  Functional change: Improved work tolerance    Pain:  1-2/10  Location: right elbow      Objective     Guillermo received therapeutic exercises to develop ROM for 24 minutes including:    AROM - elbow flexed, wrist flexion 3x10  AROM - elbow flexed, palm to body 3x10  AROM - elbow extended, wrist flexion 3x10  AROM - elbow extended, palm to body 3x10  Foam roll pec stretch  3'  LT iso 2' - required cueing  Scap retract iso with 1/2 roll 2'  Seated B ER with YTB 3x10  Passive stretch forearm, elbow ext UD 3x30s    Guillermo received the following manual therapy techniques of soft tissue mobilization/manual to the: R upper quadrant for 10 minutes, including: B Subscapular releases, UT/levator scap, brachialis and scapular tilting, forearm extensors  x 10 min  FDN to ECRL, ECRB with prolonged estim for twitch response, 10min, by Екатерина Donovan,  PT     Guillermo received the following supervised modalities after being cleared for contradictions: IFC Electrical Stimulation:  Guillermo received IFC Electrical Stimulation for pain control applied to the R elbow. Pt received stimulation at 40 % scan at a frequency of  for 15 minutes. Guillermo tolerated treatment well without any adverse effects.      Guillermo received cold pack for 15 minutes to R elbow with IFC.      Home Exercises Provided and Patient Education Provided     Education provided:   - Continue with HEP    Written Home Exercises Provided: Patient instructed to cont prior HEP.  Exercises were reviewed and Guillermo was able to demonstrate them prior to the end of the session.  Guillermo demonstrated good  understanding of the education provided.     See EMR under Patient Instructions for exercises provided prior visit.    Assessment     Pt's symptoms  con't to be around forearm today, less localized.  Encouraged pt to participate with HEP.  Cues for posture and sh position.    Guillermo is progressing well towards his goals.   Pt prognosis is Good.     Pt will continue to benefit from skilled outpatient physical therapy to address the deficits listed in the problem list box on initial evaluation, provide pt/family education and to maximize pt's level of independence in the home and community environment.     Pt's spiritual, cultural and educational needs considered and pt agreeable to plan of care and goals.     Anticipated barriers to physical therapy: history of elbow injury, pain    Goals:   Short Term Goals: In 3 weeks   1.Pt to be educated on HEP.  2.Patient to increase wrist ROM to WNL, without pain.  3.Patient to increase RUE strength by 1/2 grade.  4.Patient to have pain less than 5/10 at worst.  5.Patient to score 59/80 or higher on the UEFS.  6. Pt to be educated on body mechanics awareness.     Long Term Goals: In 6 weeks  1. Patient to score 68/80 or higher on the UEFS.  2. Patient to demo increase  in UE strength to 5/5.  3. Patient to have decreased pain to 0/10 at all times.  5. Patient to perform daily activities including lifting loads and required work duties without limitation.  6. Pt to demo negative Impingement testing at R shoulder.      Plan     Continue with gradual introduction of strengthening if pt's symptoms continue to decrease.    Екатерина Donovan, PT

## 2019-08-29 ENCOUNTER — CLINICAL SUPPORT (OUTPATIENT)
Dept: REHABILITATION | Facility: HOSPITAL | Age: 49
End: 2019-08-29
Payer: COMMERCIAL

## 2019-08-29 DIAGNOSIS — M25.521 RIGHT ELBOW PAIN: ICD-10-CM

## 2019-08-29 DIAGNOSIS — M25.511 RIGHT SHOULDER PAIN, UNSPECIFIED CHRONICITY: ICD-10-CM

## 2019-08-29 DIAGNOSIS — M62.81 MUSCLE WEAKNESS OF RIGHT ARM: ICD-10-CM

## 2019-08-29 DIAGNOSIS — R29.898 DECREASED GRIP STRENGTH OF RIGHT HAND: ICD-10-CM

## 2019-08-29 PROCEDURE — 97110 THERAPEUTIC EXERCISES: CPT

## 2019-08-29 PROCEDURE — 97140 MANUAL THERAPY 1/> REGIONS: CPT

## 2019-08-29 PROCEDURE — 97014 ELECTRIC STIMULATION THERAPY: CPT

## 2019-09-01 NOTE — PROGRESS NOTES
Physical Therapy Daily Treatment Note     Name: Guillermo Castillo  Clinic Number: [id number]    Therapy Diagnosis:   Encounter Diagnoses   Name Primary?    Right elbow pain     Muscle weakness of right arm     Right shoulder pain, unspecified chronicity     Decreased  strength of right hand      Physician: Ruby Ortega NP    Visit Date: 8/29/2019    Physician Orders: PT Eval and Treat   Medical Diagnosis from Referral: Right elbow pain, right forearm pain  Evaluation Date: 7/31/2019  Authorization Period Expiration: 12/31/2019  Plan of Care Expiration: 8/30/2019  Visit # / Visits authorized: 10/25      Time In: 8:05  Time Out: 9:10  Total Billable Time: 48 minutes    Precautions: Standard    Subjective     Pt reports:  Did not do well just hurting in elbow - does have an ortho appt next week.  Does get relief after FDN, and with AROM/stretching but then returns as soon as he has to use it.  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  Sore after FDN but feels better after stretches - heat with estim today to see if helps more.  Pt reports he is off for next few days.  He reports that after he would have to use his arm at work and it would flare up he was able to do his AROM and it would calm down a little until he had to use it with the next person.  Discussed trying to lift with palm up instead of palm down to see if that helps at all.    He was compliant with home exercise program.  Response to previous treatment: mild echymosis from FDN, no adverse symptoms  Functional change: Increased pain after working past several days    Pain:  NT/10  Location: right elbow      Objective     Guillermo received therapeutic exercises to develop ROM for 23 minutes including:    AROM - elbow flexed, wrist flexion 3x10  AROM - elbow flexed, palm to body 3x10  AROM - elbow extended, wrist flexion 3x10  AROM - elbow extended, palm to body 3x10  Foam roll pec stretch   3'  LT iso 2' - required cueing  Scap retract iso with 1/2 roll 2'  Passive stretch forearm, elbow ext UD 3x30s    Guillermo received the following manual therapy techniques of soft tissue mobilization/manual to the: R upper quadrant for 10 minutes, including: B Subscapular releases, UT/levator scap, brachialis and scapular tilting, forearm extensors  x 10 min  FDN to ECRL, ECRB with prolonged estim for twitch response, 10min     Guillermo received the following supervised modalities after being cleared for contradictions: IFC Electrical Stimulation:  Guillermo received IFC Electrical Stimulation for pain control applied to the R elbow. Pt received stimulation at 40 % scan at a frequency of  for 15 minutes. Guillermo tolerated treatment well without any adverse effects.      Guillermo received hot pack for 15 minutes to R elbow with IFC.      Home Exercises Provided and Patient Education Provided     Education provided:   - Continue with HEP    Written Home Exercises Provided: Patient instructed to cont prior HEP.  Exercises were reviewed and Guillermo was able to demonstrate them prior to the end of the session.  Guillermo demonstrated good  understanding of the education provided.     See EMR under Patient Instructions for exercises provided prior visit.    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    Guillermo is progressing well towards his goals.   Pt prognosis is Good.     Pt will continue to benefit from skilled outpatient physical therapy to address the deficits listed in the problem list box on initial evaluation, provide pt/family education and to maximize pt's level of independence in the home and community environment.     Pt's spiritual, cultural and educational needs considered and pt agreeable to plan of care and goals.     Anticipated barriers to physical therapy: history of elbow injury,  pain    Goals:   Short Term Goals: In 3 weeks   1.Pt to be educated on HEP.  2.Patient to increase wrist ROM to WNL, without pain.  3.Patient to increase RUE strength by 1/2 grade.  4.Patient to have pain less than 5/10 at worst.  5.Patient to score 59/80 or higher on the UEFS.  6. Pt to be educated on body mechanics awareness.     Long Term Goals: In 6 weeks  1. Patient to score 68/80 or higher on the UEFS.  2. Patient to demo increase in UE strength to 5/5.  3. Patient to have decreased pain to 0/10 at all times.  5. Patient to perform daily activities including lifting loads and required work duties without limitation.  6. Pt to demo negative Impingement testing at R shoulder.      Plan     Continue with tx, progress with strengthening as tolerated by pt, monitor response to tx today.    Екатерина Donovan, PT

## 2019-09-03 RX ORDER — DULOXETIN HYDROCHLORIDE 60 MG/1
CAPSULE, DELAYED RELEASE ORAL
Qty: 30 CAPSULE | Refills: 11 | Status: SHIPPED | OUTPATIENT
Start: 2019-09-03 | End: 2020-12-07

## 2019-09-03 RX ORDER — DULOXETIN HYDROCHLORIDE 60 MG/1
60 CAPSULE, DELAYED RELEASE ORAL DAILY
Qty: 30 CAPSULE | Refills: 11 | Status: SHIPPED | OUTPATIENT
Start: 2019-09-03 | End: 2020-05-21

## 2019-09-04 DIAGNOSIS — I10 ESSENTIAL HYPERTENSION: ICD-10-CM

## 2019-09-05 ENCOUNTER — HOSPITAL ENCOUNTER (OUTPATIENT)
Dept: RADIOLOGY | Facility: HOSPITAL | Age: 49
Discharge: HOME | End: 2019-09-05
Attending: ORTHOPAEDIC SURGERY
Payer: COMMERCIAL

## 2019-09-05 ENCOUNTER — OFFICE VISIT (OUTPATIENT)
Dept: ORTHOPEDICS | Facility: CLINIC | Age: 49
End: 2019-09-05
Payer: COMMERCIAL

## 2019-09-05 VITALS
SYSTOLIC BLOOD PRESSURE: 128 MMHG | BODY MASS INDEX: 32.78 KG/M2 | DIASTOLIC BLOOD PRESSURE: 76 MMHG | HEART RATE: 67 BPM | WEIGHT: 242 LBS | HEIGHT: 72 IN

## 2019-09-05 DIAGNOSIS — M77.11 LATERAL EPICONDYLITIS OF RIGHT ELBOW: ICD-10-CM

## 2019-09-05 DIAGNOSIS — M25.521 RIGHT ELBOW PAIN: ICD-10-CM

## 2019-09-05 DIAGNOSIS — M77.11 RIGHT LATERAL EPICONDYLITIS: Primary | ICD-10-CM

## 2019-09-05 DIAGNOSIS — M25.521 RIGHT ELBOW PAIN: Primary | ICD-10-CM

## 2019-09-05 PROCEDURE — 73080 X-RAY EXAM OF ELBOW: CPT | Mod: TC,RT

## 2019-09-05 PROCEDURE — 99204 PR OFFICE/OUTPT VISIT, NEW, LEVL IV, 45-59 MIN: ICD-10-PCS | Mod: 25,S$GLB,, | Performed by: ORTHOPAEDIC SURGERY

## 2019-09-05 PROCEDURE — 73080 X-RAY EXAM OF ELBOW: CPT | Mod: 26,RT,, | Performed by: RADIOLOGY

## 2019-09-05 PROCEDURE — 3008F BODY MASS INDEX DOCD: CPT | Mod: CPTII,S$GLB,, | Performed by: ORTHOPAEDIC SURGERY

## 2019-09-05 PROCEDURE — 99999 PR PBB SHADOW E&M-EST. PATIENT-LVL IV: CPT | Mod: PBBFAC,,, | Performed by: ORTHOPAEDIC SURGERY

## 2019-09-05 PROCEDURE — 3078F PR MOST RECENT DIASTOLIC BLOOD PRESSURE < 80 MM HG: ICD-10-PCS | Mod: CPTII,S$GLB,, | Performed by: ORTHOPAEDIC SURGERY

## 2019-09-05 PROCEDURE — 20551 NJX 1 TENDON ORIGIN/INSJ: CPT | Mod: RT,S$GLB,, | Performed by: ORTHOPAEDIC SURGERY

## 2019-09-05 PROCEDURE — 73080 XR ELBOW COMPLETE 3 VIEW RIGHT: ICD-10-PCS | Mod: 26,RT,, | Performed by: RADIOLOGY

## 2019-09-05 PROCEDURE — 99999 PR PBB SHADOW E&M-EST. PATIENT-LVL IV: ICD-10-PCS | Mod: PBBFAC,,, | Performed by: ORTHOPAEDIC SURGERY

## 2019-09-05 PROCEDURE — 3074F PR MOST RECENT SYSTOLIC BLOOD PRESSURE < 130 MM HG: ICD-10-PCS | Mod: CPTII,S$GLB,, | Performed by: ORTHOPAEDIC SURGERY

## 2019-09-05 PROCEDURE — 3008F PR BODY MASS INDEX (BMI) DOCUMENTED: ICD-10-PCS | Mod: CPTII,S$GLB,, | Performed by: ORTHOPAEDIC SURGERY

## 2019-09-05 PROCEDURE — 3074F SYST BP LT 130 MM HG: CPT | Mod: CPTII,S$GLB,, | Performed by: ORTHOPAEDIC SURGERY

## 2019-09-05 PROCEDURE — 3078F DIAST BP <80 MM HG: CPT | Mod: CPTII,S$GLB,, | Performed by: ORTHOPAEDIC SURGERY

## 2019-09-05 PROCEDURE — 99204 OFFICE O/P NEW MOD 45 MIN: CPT | Mod: 25,S$GLB,, | Performed by: ORTHOPAEDIC SURGERY

## 2019-09-05 PROCEDURE — 20551 TENDON ORIGIN: R ELBOW: ICD-10-PCS | Mod: RT,S$GLB,, | Performed by: ORTHOPAEDIC SURGERY

## 2019-09-05 RX ORDER — AMLODIPINE BESYLATE 10 MG/1
TABLET ORAL
Qty: 90 TABLET | Refills: 3 | Status: ON HOLD | OUTPATIENT
Start: 2019-09-05 | End: 2019-09-29 | Stop reason: HOSPADM

## 2019-09-05 RX ORDER — VALSARTAN 160 MG/1
TABLET ORAL
Qty: 90 TABLET | Refills: 3 | Status: SHIPPED | OUTPATIENT
Start: 2019-09-05 | End: 2019-09-06 | Stop reason: DRUGHIGH

## 2019-09-05 RX ADMIN — METHYLPREDNISOLONE ACETATE 40 MG: 80 INJECTION, SUSPENSION INTRA-ARTICULAR; INTRALESIONAL; INTRAMUSCULAR; SOFT TISSUE at 01:09

## 2019-09-05 NOTE — PROGRESS NOTES
Subjective:     Patient ID: Guillermo Castillo is a 49 y.o. male.    Chief Complaint: Pain of the Right Elbow    He states he works for [employer]    Overuse injury to right lateral elbow    Pain 6/10    Not improving, getting worse gradually    Has not tried any injections, would like to try one today    Elbow Pain    The pain is present in the right elbow. The pain radiates to the right shoulder and right hand. The current episode started more than 1 month ago. The injury was the result of a action while at home. The problem occurs intermittently. The problem has been gradually worsening. The quality of the pain is described as aching. The pain is at a severity of 6/10. Pertinent negatives include no fever or itching. The symptoms are aggravated by activity, bending, bearing weight and lifting. He has tried NSAIDs for the symptoms. Physical therapy was effective.      Past Medical History:   Diagnosis Date    DM (diabetes mellitus) 05/2013     x 1 week ago 11/11/2015    DM (diabetes mellitus) 05/2013     03/01/2018 last tested    DM (diabetes mellitus) 05/2013     05/07/2019  last test 05/09/2019    Fatty liver     Gout     Hyperlipidemia     Hypertension     Hypertriglyceridemia     Mood disorder     Panic disorder     Type 2 diabetes mellitus     05/2013 BS     Past Surgical History:   Procedure Laterality Date    APPENDECTOMY       Family History   Problem Relation Age of Onset    Coronary artery disease Father     Diabetes Father     Lung cancer Father     Diabetes Maternal Aunt     Diabetes Paternal Aunt     Hypertension Maternal Grandmother     Cataracts Maternal Grandmother     Hypertension Mother     Cataracts Mother      Social History     Socioeconomic History    Marital status: Single     Spouse name: Not on file    Number of children: Not on file    Years of education: Not on file    Highest education level: Not on file   Occupational History    Not on file   Social  "Needs    Financial resource strain: Not on file    Food insecurity:     Worry: Not on file     Inability: Not on file    Transportation needs:     Medical: Not on file     Non-medical: Not on file   Tobacco Use    Smoking status: Never Smoker    Smokeless tobacco: Never Used   Substance and Sexual Activity    Alcohol use: Not Currently     Comment: rarely    Drug use: No    Sexual activity: Yes     Partners: Female   Lifestyle    Physical activity:     Days per week: Not on file     Minutes per session: Not on file    Stress: Not on file   Relationships    Social connections:     Talks on phone: Not on file     Gets together: Not on file     Attends Spiritism service: Not on file     Active member of club or organization: Not on file     Attends meetings of clubs or organizations: Not on file     Relationship status: Not on file   Other Topics Concern    Not on file   Social History Narrative    Not on file     Medication List with Changes/Refills   Current Medications    AMLODIPINE (NORVASC) 10 MG TABLET    TAKE 1 TABLET BY MOUTH ONCE DAILY    ASPIRIN 81 MG TAB    Take 1 tablet by mouth Daily.    BLOOD SUGAR DIAGNOSTIC STRP    1 each by Misc.(Non-Drug; Combo Route) route 3 (three) times daily. Health Glucose Test Strips    CETIRIZINE (ZYRTEC) 10 MG TABLET    Take 10 mg by mouth daily as needed.     CHLORTHALIDONE (HYGROTEN) 25 MG TAB    Take 1 tablet (25 mg total) by mouth once daily.    DULOXETINE (CYMBALTA) 60 MG CAPSULE    TAKE 1 CAPSULE BY MOUTH ONCE DAILY    DULOXETINE (CYMBALTA) 60 MG CAPSULE    Take 1 capsule (60 mg total) by mouth once daily.    FLUTICASONE (FLONASE) 50 MCG/ACTUATION NASAL SPRAY    2 sprays (100 mcg total) by Each Nare route once daily.    IBUPROFEN (ADVIL,MOTRIN) 800 MG TABLET    Take 800 mg by mouth every 6 (six) hours as needed.    INSULIN SYRINGE-NEEDLE U-100 0.3 ML 31 GAUGE X 5/16" SYRG    Use as directed    METFORMIN (GLUCOPHAGE-XR) 500 MG 24 HR TABLET    TAKE 2 TABLETS " BY MOUTH ONCE DAILY    OMEPRAZOLE (PRILOSEC) 40 MG CAPSULE    TAKE 1 CAPSULE (40 MG TOTAL) BY MOUTH ONCE DAILY.    ROSUVASTATIN (CRESTOR) 10 MG TABLET    TAKE 1 TABLET BY MOUTH EVERY DAY    SEMAGLUTIDE (OZEMPIC) 0.25 MG OR 0.5 MG(2 MG/1.5 ML) PNIJ    Inject 0.25 mg into the skin every 7 days.   Changed and/or Refilled Medications    Modified Medication Previous Medication    INSULIN LISPRO (HUMALOG U-100 INSULIN) 100 UNIT/ML INJECTION insulin lispro (HUMALOG U-100 INSULIN) 100 unit/mL injection       Use via sliding scale    Use via sliding scale    VALSARTAN (DIOVAN) 320 MG TABLET valsartan (DIOVAN) 320 MG tablet       Take 1 tablet (320 mg total) by mouth once daily.    Take 1 tablet (320 mg total) by mouth once daily.   Discontinued Medications    VALSARTAN (DIOVAN) 160 MG TABLET    TAKE 1 TABLET BY MOUTH ONCE DAILY     Review of patient's allergies indicates:   Allergen Reactions    Hydrochlorothiazide (bulk)      Other reaction(s): gout    Simvastatin      Other reaction(s): aches/inc lfts     Review of Systems   Constitution: Negative for fever.   HENT: Negative for sore throat.    Eyes: Negative for blurred vision.   Cardiovascular: Negative for dyspnea on exertion.   Respiratory: Negative for shortness of breath.    Hematologic/Lymphatic: Does not bruise/bleed easily.   Skin: Negative for itching.   Gastrointestinal: Negative for vomiting.   Genitourinary: Negative for dysuria.   Neurological: Negative for dizziness.   Psychiatric/Behavioral: The patient does not have insomnia.        Objective:   Body mass index is 32.82 kg/m².  Vitals:    09/05/19 1422   BP: 128/76   Pulse: 67           General    Nursing note and vitals reviewed.  Constitutional: He is oriented to person, place, and time. He appears well-developed. No distress.   HENT:   Head: Normocephalic and atraumatic.   Eyes: EOM are normal.   Cardiovascular: Normal rate.    Pulmonary/Chest: Effort normal. No stridor.   Neurological: He is alert  and oriented to person, place, and time.   Psychiatric: He has a normal mood and affect. His behavior is normal.             Right Hand/Wrist Exam     Other     Neuorologic Exam    Median Distribution: normal  Ulnar Distribution: normal  Radial Distribution: normal      Right Elbow Exam     Inspection   Bruising: absent  Deformity: absent    Pain   The patient exhibits pain of the extensor musculature and lateral epicondyle    Tenderness   The patient is tender to palpation of the lateral epicondyle.     Range of Motion   Extension: 0   Flexion: 130   Pronation: normal   Supination: normal     Tests   Varus: negative  Valgus: negative  Tennis Elbow: severe and moderate      Left Elbow Exam     Range of Motion   Extension: 0   Flexion: 130   Pronation: normal   Supination: normal     Tests   Tennis Elbow: negative        Vascular Exam       Capillary Refill  Right Hand: normal capillary refill      IMAGING   EXAMINATION:  XR ELBOW COMPLETE 3 VIEW RIGHT    CLINICAL HISTORY:  . Pain in right elbow    TECHNIQUE:  AP, lateral, and oblique views of the right elbow were performed.    COMPARISON:  07/22/2019    FINDINGS:  The joint spaces are preserved.  Small posterior olecranon spur and early spur formation at the medial and lateral humeral epicondyles.  No joint effusion.  No acute fracture or dislocation..      Impression       As above.      Electronically signed by: JACKIE Waite MD  Date: 09/05/2019  Time: 15:23     Radiographs / Imaging : Results reviewed by me and interpreted by me, discussed with the patient and / or family       Assessment:     Encounter Diagnoses   Name Primary?    Right lateral epicondylitis Yes    Right elbow pain     Lateral epicondylitis of right elbow         Plan:       I had an in depth discussion today with Guillermo today regarding his right elbow problem, going over his radiographs and the model to help further his understanding. I explained the anatomy and pathophysiology of the  "problem. I told Guillermo  that I believe the problem relates to right "tennis elbow" . We had an in depth discussion regarding appropriate treatment and management of his condition.     From a treatment standpoint, the decision was made to go forward with :     -ice  NSAIDs  Physical therapy to include eccentric exercises  CSI right lateral epicondyle today, discussed pros and cons risks benefits including risk for infection, bleeding - "only 3 injections allowed"  95% of cases can be treated successfully non operatively but patience is often required   Follow up in 3 months for recheck    Discussed long term surgical options if he fails to improve  "

## 2019-09-06 ENCOUNTER — PATIENT OUTREACH (OUTPATIENT)
Dept: OTHER | Facility: OTHER | Age: 49
End: 2019-09-06

## 2019-09-06 DIAGNOSIS — I10 ESSENTIAL HYPERTENSION: ICD-10-CM

## 2019-09-06 DIAGNOSIS — E11.9 TYPE 2 DIABETES MELLITUS WITHOUT COMPLICATION, WITHOUT LONG-TERM CURRENT USE OF INSULIN: Primary | ICD-10-CM

## 2019-09-06 RX ORDER — METHYLPREDNISOLONE ACETATE 80 MG/ML
40 INJECTION, SUSPENSION INTRA-ARTICULAR; INTRALESIONAL; INTRAMUSCULAR; SOFT TISSUE
Status: DISCONTINUED | OUTPATIENT
Start: 2019-09-05 | End: 2019-09-06 | Stop reason: HOSPADM

## 2019-09-06 RX ORDER — VALSARTAN 320 MG/1
320 TABLET ORAL DAILY
Qty: 90 TABLET | Refills: 1 | Status: ON HOLD | OUTPATIENT
Start: 2019-09-06 | End: 2019-09-29 | Stop reason: HOSPADM

## 2019-09-06 RX ORDER — INSULIN LISPRO 100 [IU]/ML
INJECTION, SOLUTION INTRAVENOUS; SUBCUTANEOUS
Qty: 10 ML | Refills: 1 | Status: SHIPPED | OUTPATIENT
Start: 2019-09-06 | End: 2021-04-30 | Stop reason: SDUPTHER

## 2019-09-06 NOTE — PROGRESS NOTES
HPI:  Called Mr. Guillermo Castillo for hypertension and diabetes digital medicine follow-up and discuss how he is tolerating Ozempic. He has an  dose of Humalog. Patient reports non-adherence to medication regimen with no complaints/complaints.   Last 5 Patient Entered Readings                                      Current 30 Day Average: 134/76     Recent Readings 2019    SBP (mmHg) 120 118 139 72 135    DBP (mmHg) 68 65 79 56 82    Pulse 90 100 59 62 66        Patient denies s/s of hypotension (lightheadedness, dizziness, nausea, fatigue) associated with low readings. Instructed patient to inform me if this occurs, patient confirms understanding.    Patient denies s/s of hypertension (SOB, CP, severe headaches, changes in vision) associated with high readings. Instructed patient to go to the ED if BP >180/110 and accompanied by hypertensive s/s, patient confirms understanding.    Last 6 Patient Entered Readings                                          Most Recent A1c: 5.9% on 2019  (Goal: 7%)     Recent Readings 2019    Blood Glucose (mg/dL) 200 276 171 174 221        Patient denies s/s or episodes of hypoglycemia (weakness, dizziness, hunger, shakiness, nausea, headache, heart palpitations, sweating, fatigue, anxiety, etc.).    Patient denies s/s of hyperglycemia (headache, increased thirst, increased urination, fatigue, blurred vision).    Assessment:  Patient's current 30-day average is close to goal of <130/80 mmHg based on ACC/AHA HTN guidelines.     Diabetes is at goal based on patient's last A1C. SMBG readings reviewed and are     Health maintenance is up to date.    Plan:  Continue current regimen, resume Humalog.  Encouraged patient to  a new bottle of Humalog.   Patients health , Ladarius Albrecht, will follow-up as scheduled.    I will continue to monitor regularly and will follow-up in 3 weeks, sooner if  blood pressure or blood glucose begins to trend upward or downward.     Current medication regimen:  Hypertension Medications             amLODIPine (NORVASC) 10 MG tablet TAKE 1 TABLET BY MOUTH ONCE DAILY    chlorthalidone (HYGROTEN) 25 MG Tab Take 1 tablet (25 mg total) by mouth once daily.    valsartan (DIOVAN) 160 MG tablet TAKE 1 TABLET BY MOUTH ONCE DAILY    valsartan (DIOVAN) 320 MG tablet Take 1 tablet (320 mg total) by mouth once daily.        Diabetes Medications             insulin lispro (HUMALOG U-100 INSULIN) 100 unit/mL injection Use via sliding scale    metFORMIN (GLUCOPHAGE-XR) 500 MG 24 hr tablet TAKE 2 TABLETS BY MOUTH ONCE DAILY    semaglutide (OZEMPIC) 0.25 mg or 0.5 mg(2 mg/1.5 mL) PnIj Inject 0.25 mg into the skin every 7 days.        Patient has my contact information and knows to call with any concerns or clinical changes.

## 2019-09-06 NOTE — PROCEDURES
Tendon Origin: R elbow  Date/Time: 9/5/2019 1:50 PM  Performed by: Mansoor Turner MD  Authorized by: Mansoor Turner MD     Consent Done?:  Yes (Verbal)  Timeout: prior to procedure the correct patient, procedure, and site was verified    Indications:  Pain and diagnostic evaluation  Site marked: the procedure site was marked    Timeout: prior to procedure the correct patient, procedure, and site was verified    Location:  Elbow  Site:  R elbow (lateral epicondyle)  Prep: patient was prepped and draped in usual sterile fashion    Ultrasonic Guidance for Needle Placement?: No    Needle size:  25 G  Approach: lateral.  Medications:  40 mg methylPREDNISolone acetate 80 mg/mL  Patient tolerance:  Patient tolerated the procedure well with no immediate complications   The patient had no adverse reactions to the medication. The patient was instructed to apply ice to the joint for 30 minutes on and 30 minutes off for the next 48 hours, and avoid strenuous activities for 48 hours following the injection. Patient was warned of possible blood sugar and/or blood pressure changes during that time regarding corticosteroid injections if applicable.  Following that time, patient can resume regular activities. Note that informed consent was performed with the patient before injection discussing risks, benefits, indications and alternatives to injection, risks including but not limited to bleeding and infection.

## 2019-09-10 ENCOUNTER — PATIENT OUTREACH (OUTPATIENT)
Dept: OTHER | Facility: OTHER | Age: 49
End: 2019-09-10

## 2019-09-10 NOTE — PROGRESS NOTES
Digital Medicine: Health  Follow-Up    Reviewed BP and BG numbers with patient. BP numbers trending down and patient is almost at goal. BG numbers are a little elevated and it seems that patient and PharmD Ruby VALDES had discovered that maybe his Humalog had been cooked and had  prematurely. PharmD Ruby VALDES had made some changes to medications. Patient stated that he had tried some of the meal replacement shakes and wasn't a huge fan. Patient also wants to see how the new meds affect his blood glucose numbers. Will f/u in 4-6 weeks.        Follow Up  Follow-up reason(s): reading review and routine education      Readings are trending down due to lifestyle change and medication adherence.    Routine Education Topics: eating patterns and meal planning            Diet:       Eating style: time constraints    Barriers to a Healthy Diet: time/convenience    Patient works night shift as an EMT. Patient has been trying to figure out diet plan that is convenient and helps to regulate BG. Had sent sample meal plan at last outreach which patient had said he looked at and had tried some of the meal replacement shakes but he wasn't a fan of them.     Intervention(s): meal planning, regularly scheduling meals and reducing dining out      SDOH    Intervention/Plan        Topic    Lipid (Cholesterol) Test        Last 5 Patient Entered Readings                                      Current 30 Day Average: 135/75     Recent Readings 2019    SBP (mmHg) 120 118 139 72 135    DBP (mmHg) 68 65 79 56 82    Pulse 90 100 59 62 66        Last 6 Patient Entered Readings                                          Most Recent A1c: 5.9% on 2019  (Goal: 7%)     Recent Readings 9/10/2019 2019 2019 2019 2019    Blood Glucose (mg/dL) 195 150 192 151 234

## 2019-09-28 ENCOUNTER — HOSPITAL ENCOUNTER (OUTPATIENT)
Facility: HOSPITAL | Age: 49
Discharge: HOME | End: 2019-09-29
Attending: EMERGENCY MEDICINE | Admitting: INTERNAL MEDICINE
Payer: COMMERCIAL

## 2019-09-28 DIAGNOSIS — R07.9 CHEST PAIN: ICD-10-CM

## 2019-09-28 DIAGNOSIS — R53.1 WEAKNESS: ICD-10-CM

## 2019-09-28 DIAGNOSIS — R79.89 ELEVATED TROPONIN I LEVEL: ICD-10-CM

## 2019-09-28 DIAGNOSIS — I95.9 HYPOTENSION, UNSPECIFIED HYPOTENSION TYPE: Primary | ICD-10-CM

## 2019-09-28 DIAGNOSIS — R42 DIZZINESS: ICD-10-CM

## 2019-09-28 LAB
BASOPHILS # BLD AUTO: 0.03 K/UL (ref 0–0.2)
BASOPHILS NFR BLD: 0.5 % (ref 0–1.9)
BILIRUB UR QL STRIP: NEGATIVE
CLARITY UR: CLEAR
COLOR UR: YELLOW
DIFFERENTIAL METHOD: ABNORMAL
EOSINOPHIL # BLD AUTO: 0 K/UL (ref 0–0.5)
EOSINOPHIL NFR BLD: 0.7 % (ref 0–8)
ERYTHROCYTE [DISTWIDTH] IN BLOOD BY AUTOMATED COUNT: 15.3 % (ref 11.5–14.5)
GLUCOSE UR QL STRIP: NEGATIVE
HCT VFR BLD AUTO: 29.2 % (ref 40–54)
HGB BLD-MCNC: 10.2 G/DL (ref 14–18)
HGB UR QL STRIP: NEGATIVE
KETONES UR QL STRIP: NEGATIVE
LEUKOCYTE ESTERASE UR QL STRIP: NEGATIVE
LYMPHOCYTES # BLD AUTO: 1.2 K/UL (ref 1–4.8)
LYMPHOCYTES NFR BLD: 21.2 % (ref 18–48)
MCH RBC QN AUTO: 28.6 PG (ref 27–31)
MCHC RBC AUTO-ENTMCNC: 34.9 G/DL (ref 32–36)
MCV RBC AUTO: 82 FL (ref 82–98)
MONOCYTES # BLD AUTO: 0.7 K/UL (ref 0.3–1)
MONOCYTES NFR BLD: 11.7 % (ref 4–15)
NEUTROPHILS # BLD AUTO: 3.7 K/UL (ref 1.8–7.7)
NEUTROPHILS NFR BLD: 66.8 % (ref 38–73)
NITRITE UR QL STRIP: NEGATIVE
PH UR STRIP: 6 [PH] (ref 5–8)
PLATELET # BLD AUTO: 181 K/UL (ref 150–350)
PMV BLD AUTO: 9.2 FL (ref 9.2–12.9)
PROT UR QL STRIP: NEGATIVE
RBC # BLD AUTO: 3.57 M/UL (ref 4.6–6.2)
SP GR UR STRIP: <=1.005 (ref 1–1.03)
URN SPEC COLLECT METH UR: ABNORMAL
UROBILINOGEN UR STRIP-ACNC: 1 EU/DL
WBC # BLD AUTO: 5.65 K/UL (ref 3.9–12.7)

## 2019-09-28 PROCEDURE — 84484 ASSAY OF TROPONIN QUANT: CPT

## 2019-09-28 PROCEDURE — 80053 COMPREHEN METABOLIC PANEL: CPT

## 2019-09-28 PROCEDURE — 85025 COMPLETE CBC W/AUTO DIFF WBC: CPT

## 2019-09-28 PROCEDURE — 99285 EMERGENCY DEPT VISIT HI MDM: CPT | Mod: 25

## 2019-09-28 PROCEDURE — 63600175 PHARM REV CODE 636 W HCPCS: Performed by: EMERGENCY MEDICINE

## 2019-09-28 PROCEDURE — 93010 ELECTROCARDIOGRAM REPORT: CPT | Mod: ,,, | Performed by: INTERNAL MEDICINE

## 2019-09-28 PROCEDURE — 93010 EKG 12-LEAD: ICD-10-PCS | Mod: ,,, | Performed by: INTERNAL MEDICINE

## 2019-09-28 PROCEDURE — 96360 HYDRATION IV INFUSION INIT: CPT

## 2019-09-28 PROCEDURE — 81003 URINALYSIS AUTO W/O SCOPE: CPT

## 2019-09-28 PROCEDURE — 93005 ELECTROCARDIOGRAM TRACING: CPT

## 2019-09-28 RX ADMIN — SODIUM CHLORIDE 1000 ML: 0.9 INJECTION, SOLUTION INTRAVENOUS at 11:09

## 2019-09-29 VITALS
HEIGHT: 72 IN | HEART RATE: 79 BPM | TEMPERATURE: 100 F | DIASTOLIC BLOOD PRESSURE: 59 MMHG | SYSTOLIC BLOOD PRESSURE: 111 MMHG | WEIGHT: 230.19 LBS | OXYGEN SATURATION: 98 % | RESPIRATION RATE: 16 BRPM | BODY MASS INDEX: 31.18 KG/M2

## 2019-09-29 PROBLEM — R79.89 ELEVATED TROPONIN I LEVEL: Status: ACTIVE | Noted: 2019-09-29

## 2019-09-29 PROBLEM — R68.89 INTOLERANCE TO COLD: Status: RESOLVED | Noted: 2019-09-29 | Resolved: 2019-09-29

## 2019-09-29 PROBLEM — E87.6 HYPOKALEMIA: Status: RESOLVED | Noted: 2019-09-29 | Resolved: 2019-09-29

## 2019-09-29 PROBLEM — R68.89 INTOLERANCE TO COLD: Status: ACTIVE | Noted: 2019-09-29

## 2019-09-29 PROBLEM — R79.89 ELEVATED TROPONIN I LEVEL: Status: RESOLVED | Noted: 2019-09-29 | Resolved: 2019-09-29

## 2019-09-29 PROBLEM — E87.6 HYPOKALEMIA: Status: ACTIVE | Noted: 2019-09-29

## 2019-09-29 LAB
ALBUMIN SERPL BCP-MCNC: 3.7 G/DL (ref 3.5–5.2)
ALP SERPL-CCNC: 101 U/L (ref 55–135)
ALT SERPL W/O P-5'-P-CCNC: 39 U/L (ref 10–44)
ANION GAP SERPL CALC-SCNC: 11 MMOL/L (ref 8–16)
ANION GAP SERPL CALC-SCNC: 13 MMOL/L (ref 8–16)
AST SERPL-CCNC: 33 U/L (ref 10–40)
BILIRUB SERPL-MCNC: 1 MG/DL (ref 0.1–1)
BUN SERPL-MCNC: 18 MG/DL (ref 6–20)
BUN SERPL-MCNC: 18 MG/DL (ref 6–20)
CALCIUM SERPL-MCNC: 8.5 MG/DL (ref 8.7–10.5)
CALCIUM SERPL-MCNC: 9.2 MG/DL (ref 8.7–10.5)
CHLORIDE SERPL-SCNC: 98 MMOL/L (ref 95–110)
CHLORIDE SERPL-SCNC: 99 MMOL/L (ref 95–110)
CO2 SERPL-SCNC: 26 MMOL/L (ref 23–29)
CO2 SERPL-SCNC: 26 MMOL/L (ref 23–29)
CREAT SERPL-MCNC: 1 MG/DL (ref 0.5–1.4)
CREAT SERPL-MCNC: 1.3 MG/DL (ref 0.5–1.4)
EST. GFR  (AFRICAN AMERICAN): >60 ML/MIN/1.73 M^2
EST. GFR  (AFRICAN AMERICAN): >60 ML/MIN/1.73 M^2
EST. GFR  (NON AFRICAN AMERICAN): >60 ML/MIN/1.73 M^2
EST. GFR  (NON AFRICAN AMERICAN): >60 ML/MIN/1.73 M^2
ESTIMATED AVG GLUCOSE: 131 MG/DL (ref 68–131)
GLUCOSE SERPL-MCNC: 173 MG/DL (ref 70–110)
GLUCOSE SERPL-MCNC: 91 MG/DL (ref 70–110)
HBA1C MFR BLD HPLC: 6.2 % (ref 4–5.6)
POCT GLUCOSE: 179 MG/DL (ref 70–110)
POTASSIUM SERPL-SCNC: 2.9 MMOL/L (ref 3.5–5.1)
POTASSIUM SERPL-SCNC: 2.9 MMOL/L (ref 3.5–5.1)
POTASSIUM SERPL-SCNC: 3.3 MMOL/L (ref 3.5–5.1)
PROT SERPL-MCNC: 6.7 G/DL (ref 6–8.4)
SODIUM SERPL-SCNC: 136 MMOL/L (ref 136–145)
SODIUM SERPL-SCNC: 137 MMOL/L (ref 136–145)
TROPONIN I SERPL DL<=0.01 NG/ML-MCNC: 0.01 NG/ML (ref 0–0.03)
TROPONIN I SERPL DL<=0.01 NG/ML-MCNC: 0.01 NG/ML (ref 0–0.03)
TROPONIN I SERPL DL<=0.01 NG/ML-MCNC: 0.03 NG/ML (ref 0–0.03)
TROPONIN I SERPL DL<=0.01 NG/ML-MCNC: <0.006 NG/ML (ref 0–0.03)
TSH SERPL DL<=0.005 MIU/L-ACNC: 1.64 UIU/ML (ref 0.4–4)

## 2019-09-29 PROCEDURE — 36415 COLL VENOUS BLD VENIPUNCTURE: CPT

## 2019-09-29 PROCEDURE — 84484 ASSAY OF TROPONIN QUANT: CPT | Mod: 91

## 2019-09-29 PROCEDURE — G0378 HOSPITAL OBSERVATION PER HR: HCPCS

## 2019-09-29 PROCEDURE — 84484 ASSAY OF TROPONIN QUANT: CPT

## 2019-09-29 PROCEDURE — 25000003 PHARM REV CODE 250: Performed by: EMERGENCY MEDICINE

## 2019-09-29 PROCEDURE — 25000003 PHARM REV CODE 250: Performed by: INTERNAL MEDICINE

## 2019-09-29 PROCEDURE — 84132 ASSAY OF SERUM POTASSIUM: CPT

## 2019-09-29 PROCEDURE — 93306 TTE W/DOPPLER COMPLETE: CPT

## 2019-09-29 PROCEDURE — 80048 BASIC METABOLIC PNL TOTAL CA: CPT

## 2019-09-29 PROCEDURE — 84443 ASSAY THYROID STIM HORMONE: CPT

## 2019-09-29 PROCEDURE — 93306 TTE W/DOPPLER COMPLETE: CPT | Mod: 26,,, | Performed by: INTERNAL MEDICINE

## 2019-09-29 PROCEDURE — 93306 2D ECHO WITH COLOR FLOW DOPPLER: ICD-10-PCS | Mod: 26,,, | Performed by: INTERNAL MEDICINE

## 2019-09-29 PROCEDURE — 83036 HEMOGLOBIN GLYCOSYLATED A1C: CPT

## 2019-09-29 RX ORDER — GLUCAGON 1 MG
1 KIT INJECTION
Status: DISCONTINUED | OUTPATIENT
Start: 2019-09-29 | End: 2019-09-29 | Stop reason: HOSPADM

## 2019-09-29 RX ORDER — SODIUM CHLORIDE 0.9 % (FLUSH) 0.9 %
10 SYRINGE (ML) INJECTION
Status: DISCONTINUED | OUTPATIENT
Start: 2019-09-29 | End: 2019-09-29 | Stop reason: HOSPADM

## 2019-09-29 RX ORDER — ENOXAPARIN SODIUM 100 MG/ML
40 INJECTION SUBCUTANEOUS EVERY 24 HOURS
Status: DISCONTINUED | OUTPATIENT
Start: 2019-09-29 | End: 2019-09-29 | Stop reason: HOSPADM

## 2019-09-29 RX ORDER — POTASSIUM CHLORIDE 20 MEQ/1
20 TABLET, EXTENDED RELEASE ORAL ONCE
Status: DISCONTINUED | OUTPATIENT
Start: 2019-09-29 | End: 2019-09-29

## 2019-09-29 RX ORDER — POTASSIUM CHLORIDE 20 MEQ/1
40 TABLET, EXTENDED RELEASE ORAL ONCE
Status: COMPLETED | OUTPATIENT
Start: 2019-09-29 | End: 2019-09-29

## 2019-09-29 RX ORDER — ASPIRIN 325 MG
325 TABLET ORAL DAILY
Status: DISCONTINUED | OUTPATIENT
Start: 2019-09-29 | End: 2019-09-29

## 2019-09-29 RX ORDER — INSULIN ASPART 100 [IU]/ML
0-5 INJECTION, SOLUTION INTRAVENOUS; SUBCUTANEOUS
Status: DISCONTINUED | OUTPATIENT
Start: 2019-09-29 | End: 2019-09-29 | Stop reason: HOSPADM

## 2019-09-29 RX ORDER — IBUPROFEN 200 MG
24 TABLET ORAL
Status: DISCONTINUED | OUTPATIENT
Start: 2019-09-29 | End: 2019-09-29 | Stop reason: HOSPADM

## 2019-09-29 RX ORDER — SODIUM CHLORIDE 9 MG/ML
INJECTION, SOLUTION INTRAVENOUS CONTINUOUS
Status: DISCONTINUED | OUTPATIENT
Start: 2019-09-29 | End: 2019-09-29 | Stop reason: HOSPADM

## 2019-09-29 RX ORDER — SODIUM CHLORIDE 9 MG/ML
1000 INJECTION, SOLUTION INTRAVENOUS
Status: ACTIVE | OUTPATIENT
Start: 2019-09-29 | End: 2019-09-29

## 2019-09-29 RX ORDER — IBUPROFEN 200 MG
16 TABLET ORAL
Status: DISCONTINUED | OUTPATIENT
Start: 2019-09-29 | End: 2019-09-29 | Stop reason: HOSPADM

## 2019-09-29 RX ORDER — NAPROXEN SODIUM 220 MG/1
81 TABLET, FILM COATED ORAL DAILY
Status: DISCONTINUED | OUTPATIENT
Start: 2019-09-29 | End: 2019-09-29 | Stop reason: HOSPADM

## 2019-09-29 RX ORDER — POTASSIUM CHLORIDE 20 MEQ/1
20 TABLET, EXTENDED RELEASE ORAL ONCE
Status: DISCONTINUED | OUTPATIENT
Start: 2019-09-29 | End: 2019-09-29 | Stop reason: HOSPADM

## 2019-09-29 RX ORDER — HYDRALAZINE HYDROCHLORIDE 20 MG/ML
10 INJECTION INTRAMUSCULAR; INTRAVENOUS EVERY 6 HOURS PRN
Status: DISCONTINUED | OUTPATIENT
Start: 2019-09-29 | End: 2019-09-29 | Stop reason: HOSPADM

## 2019-09-29 RX ORDER — PANTOPRAZOLE SODIUM 40 MG/1
40 TABLET, DELAYED RELEASE ORAL DAILY
Status: DISCONTINUED | OUTPATIENT
Start: 2019-09-29 | End: 2019-09-29 | Stop reason: HOSPADM

## 2019-09-29 RX ADMIN — PANTOPRAZOLE SODIUM 40 MG: 40 TABLET, DELAYED RELEASE ORAL at 08:09

## 2019-09-29 RX ADMIN — ASPIRIN 81 MG CHEWABLE TABLET 81 MG: 81 TABLET CHEWABLE at 08:09

## 2019-09-29 RX ADMIN — POTASSIUM CHLORIDE 40 MEQ: 20 TABLET, EXTENDED RELEASE ORAL at 01:09

## 2019-09-29 RX ADMIN — POTASSIUM CHLORIDE 40 MEQ: 20 TABLET, EXTENDED RELEASE ORAL at 08:09

## 2019-09-29 RX ADMIN — ASPIRIN 325 MG ORAL TABLET 325 MG: 325 PILL ORAL at 01:09

## 2019-09-29 NOTE — SUBJECTIVE & OBJECTIVE
Past Medical History:   Diagnosis Date    DM (diabetes mellitus) 05/2013     x 1 week ago 11/11/2015    DM (diabetes mellitus) 05/2013     03/01/2018 last tested    DM (diabetes mellitus) 05/2013     05/07/2019  last test 05/09/2019    Fatty liver     Gout     Hyperlipidemia     Hypertension     Hypertriglyceridemia     Mood disorder     Panic disorder     Type 2 diabetes mellitus     05/2013 BS       Past Surgical History:   Procedure Laterality Date    APPENDECTOMY         Review of patient's allergies indicates:   Allergen Reactions    Hydrochlorothiazide (bulk)      Other reaction(s): gout    Simvastatin      Other reaction(s): aches/inc lfts       No current facility-administered medications on file prior to encounter.      Current Outpatient Medications on File Prior to Encounter   Medication Sig    amLODIPine (NORVASC) 10 MG tablet TAKE 1 TABLET BY MOUTH ONCE DAILY    chlorthalidone (HYGROTEN) 25 MG Tab Take 1 tablet (25 mg total) by mouth once daily.    DULoxetine (CYMBALTA) 60 MG capsule TAKE 1 CAPSULE BY MOUTH ONCE DAILY    metFORMIN (GLUCOPHAGE-XR) 500 MG 24 hr tablet TAKE 2 TABLETS BY MOUTH ONCE DAILY    omeprazole (PRILOSEC) 40 MG capsule TAKE 1 CAPSULE (40 MG TOTAL) BY MOUTH ONCE DAILY. (Patient taking differently: Take 40 mg by mouth daily as needed. )    rosuvastatin (CRESTOR) 10 MG tablet TAKE 1 TABLET BY MOUTH EVERY DAY    semaglutide (OZEMPIC) 0.25 mg or 0.5 mg(2 mg/1.5 mL) PnIj Inject 0.25 mg into the skin every 7 days.    valsartan (DIOVAN) 320 MG tablet Take 1 tablet (320 mg total) by mouth once daily.    aspirin 81 mg Tab Take 1 tablet by mouth Daily.    blood sugar diagnostic Strp 1 each by Misc.(Non-Drug; Combo Route) route 3 (three) times daily. Health Glucose Test Strips    cetirizine (ZYRTEC) 10 MG tablet Take 10 mg by mouth daily as needed.     DULoxetine (CYMBALTA) 60 MG capsule Take 1 capsule (60 mg total) by mouth once daily.    fluticasone  "(FLONASE) 50 mcg/actuation nasal spray 2 sprays (100 mcg total) by Each Nare route once daily. (Patient taking differently: 2 sprays by Each Nare route daily as needed. )    ibuprofen (ADVIL,MOTRIN) 800 MG tablet Take 800 mg by mouth every 6 (six) hours as needed.    insulin lispro (HUMALOG U-100 INSULIN) 100 unit/mL injection Use via sliding scale    insulin syringe-needle U-100 0.3 mL 31 gauge x 5/16" Syrg Use as directed     Family History     Problem Relation (Age of Onset)    Cataracts Maternal Grandmother, Mother    Coronary artery disease Father    Diabetes Father, Maternal Aunt, Paternal Aunt    Hypertension Maternal Grandmother, Mother    Lung cancer Father        Tobacco Use    Smoking status: Never Smoker    Smokeless tobacco: Never Used   Substance and Sexual Activity    Alcohol use: Not Currently     Comment: rarely    Drug use: No    Sexual activity: Yes     Partners: Female     Review of Systems   Constitutional: Negative for activity change, appetite change, chills, diaphoresis and fatigue.   HENT: Negative for congestion, dental problem, ear discharge, ear pain and facial swelling.    Eyes: Negative for pain, discharge and itching.   Respiratory: Negative for apnea, cough, chest tightness and shortness of breath.    Cardiovascular: Negative for chest pain and leg swelling.   Gastrointestinal: Negative for abdominal distention and abdominal pain.   Endocrine: Negative for cold intolerance, heat intolerance and polydipsia.   Genitourinary: Negative for difficulty urinating, dysuria and enuresis.   Musculoskeletal: Negative for arthralgias and back pain.   Skin: Negative for color change and pallor.   Allergic/Immunologic: Negative for environmental allergies and food allergies.   Neurological: Positive for light-headedness. Negative for dizziness, facial asymmetry and headaches.   Hematological: Negative for adenopathy. Does not bruise/bleed easily.   Psychiatric/Behavioral: Negative for " agitation and behavioral problems.     Objective:     Vital Signs (Most Recent):  Temp: 99.5 °F (37.5 °C) (09/28/19 2152)  Pulse: 80 (09/28/19 2325)  Resp: 18 (09/28/19 2152)  BP: 120/65 (09/28/19 2325)  SpO2: 100 % (09/28/19 2325) Vital Signs (24h Range):  Temp:  [99.5 °F (37.5 °C)] 99.5 °F (37.5 °C)  Pulse:  [71-80] 80  Resp:  [18] 18  SpO2:  [100 %] 100 %  BP: (112-127)/(61-72) 120/65     Weight: 105.5 kg (232 lb 9.4 oz)  Body mass index is 31.54 kg/m².    Physical Exam   Constitutional: He is oriented to person, place, and time. He appears well-developed and well-nourished.   HENT:   Head: Normocephalic and atraumatic.   Nose: Nose normal.   Eyes: EOM are normal.   PERRL   Neck: Normal range of motion. Neck supple.   Cardiovascular: Normal rate, regular rhythm and normal heart sounds.   Pulmonary/Chest: Effort normal and breath sounds normal. No respiratory distress. He has no wheezes. He has no rales.   Abdominal: There is no tenderness.   Musculoskeletal: Normal range of motion. He exhibits no edema.   Neurological: He is alert and oriented to person, place, and time.   Skin: Skin is dry.   Nursing note and vitals reviewed.        CRANIAL NERVES     CN III, IV, VI   Extraocular motions are normal.        Significant Labs:   BMP:   Recent Labs   Lab 09/28/19 2329   GLU 91      K 2.9*   CL 98   CO2 26   BUN 18   CREATININE 1.0   CALCIUM 9.2     CBC:   Recent Labs   Lab 09/28/19 2329   WBC 5.65   HGB 10.2*   HCT 29.2*        All pertinent labs within the past 24 hours have been reviewed.    Significant Imaging: I have reviewed and interpreted all pertinent imaging results/findings within the past 24 hours.

## 2019-09-29 NOTE — H&P
"[facility] Hospital Medicine  History & Physical    Patient Name: Guillermo Castillo  MRN: [id number]  Admission Date: 9/28/2019  Attending Physician: Kale Ortega Jr., MD   Primary Care Provider: Kip Siddiqui MD         Patient information was obtained from patient, past medical records and ER records.     Subjective:     Principal Problem:<principal problem not specified>    Chief Complaint:   Chief Complaint   Patient presents with    Hypotension     pt c/o hypotension since yesterday, states "I think they have me on too much blood pressure medicine" pt c/o dizziness and lightheadedness.         HPI:  49 year old  male patient with a PMHx of HTN and IDDM who presents to the Emergency Department for evaluation of hypotension.   He reports worsening and intermittent symptoms over the last month . He does home BP and glucose monitoring with the [facility] digital monitoring system and was recently started on chlorthalidone 25mg daily , he is also on Norvasc 10mg daily and Valsartan 320 mg daily .  His symptoms are exacerbated by sitting upright . He is concerned about the increased dosage of his BP meds.  He denies chest pain . Since admission, serum troponin was done and it is 0.029, serum K -2.9       Past Medical History:   Diagnosis Date    DM (diabetes mellitus) 05/2013     x 1 week ago 11/11/2015    DM (diabetes mellitus) 05/2013     03/01/2018 last tested    DM (diabetes mellitus) 05/2013     05/07/2019  last test 05/09/2019    Fatty liver     Gout     Hyperlipidemia     Hypertension     Hypertriglyceridemia     Mood disorder     Panic disorder     Type 2 diabetes mellitus     05/2013 BS       Past Surgical History:   Procedure Laterality Date    APPENDECTOMY         Review of patient's allergies indicates:   Allergen Reactions    Hydrochlorothiazide (bulk)      Other reaction(s): gout    Simvastatin      Other reaction(s): aches/inc lfts       No current " "facility-administered medications on file prior to encounter.      Current Outpatient Medications on File Prior to Encounter   Medication Sig    amLODIPine (NORVASC) 10 MG tablet TAKE 1 TABLET BY MOUTH ONCE DAILY    chlorthalidone (HYGROTEN) 25 MG Tab Take 1 tablet (25 mg total) by mouth once daily.    DULoxetine (CYMBALTA) 60 MG capsule TAKE 1 CAPSULE BY MOUTH ONCE DAILY    metFORMIN (GLUCOPHAGE-XR) 500 MG 24 hr tablet TAKE 2 TABLETS BY MOUTH ONCE DAILY    omeprazole (PRILOSEC) 40 MG capsule TAKE 1 CAPSULE (40 MG TOTAL) BY MOUTH ONCE DAILY. (Patient taking differently: Take 40 mg by mouth daily as needed. )    rosuvastatin (CRESTOR) 10 MG tablet TAKE 1 TABLET BY MOUTH EVERY DAY    semaglutide (OZEMPIC) 0.25 mg or 0.5 mg(2 mg/1.5 mL) PnIj Inject 0.25 mg into the skin every 7 days.    valsartan (DIOVAN) 320 MG tablet Take 1 tablet (320 mg total) by mouth once daily.    aspirin 81 mg Tab Take 1 tablet by mouth Daily.    blood sugar diagnostic Strp 1 each by Misc.(Non-Drug; Combo Route) route 3 (three) times daily. Health Glucose Test Strips    cetirizine (ZYRTEC) 10 MG tablet Take 10 mg by mouth daily as needed.     DULoxetine (CYMBALTA) 60 MG capsule Take 1 capsule (60 mg total) by mouth once daily.    fluticasone (FLONASE) 50 mcg/actuation nasal spray 2 sprays (100 mcg total) by Each Nare route once daily. (Patient taking differently: 2 sprays by Each Nare route daily as needed. )    ibuprofen (ADVIL,MOTRIN) 800 MG tablet Take 800 mg by mouth every 6 (six) hours as needed.    insulin lispro (HUMALOG U-100 INSULIN) 100 unit/mL injection Use via sliding scale    insulin syringe-needle U-100 0.3 mL 31 gauge x 5/16" Syrg Use as directed     Family History     Problem Relation (Age of Onset)    Cataracts Maternal Grandmother, Mother    Coronary artery disease Father    Diabetes Father, Maternal Aunt, Paternal Aunt    Hypertension Maternal Grandmother, Mother    Lung cancer Father        Tobacco Use    " Smoking status: Never Smoker    Smokeless tobacco: Never Used   Substance and Sexual Activity    Alcohol use: Not Currently     Comment: rarely    Drug use: No    Sexual activity: Yes     Partners: Female     Review of Systems   Constitutional: Negative for activity change, appetite change, chills, diaphoresis and fatigue.   HENT: Negative for congestion, dental problem, ear discharge, ear pain and facial swelling.    Eyes: Negative for pain, discharge and itching.   Respiratory: Negative for apnea, cough, chest tightness and shortness of breath.    Cardiovascular: Negative for chest pain and leg swelling.   Gastrointestinal: Negative for abdominal distention and abdominal pain.   Endocrine: Negative for cold intolerance, heat intolerance and polydipsia.   Genitourinary: Negative for difficulty urinating, dysuria and enuresis.   Musculoskeletal: Negative for arthralgias and back pain.   Skin: Negative for color change and pallor.   Allergic/Immunologic: Negative for environmental allergies and food allergies.   Neurological: Positive for light-headedness. Negative for dizziness, facial asymmetry and headaches.   Hematological: Negative for adenopathy. Does not bruise/bleed easily.   Psychiatric/Behavioral: Negative for agitation and behavioral problems.     Objective:     Vital Signs (Most Recent):  Temp: 99.5 °F (37.5 °C) (09/28/19 2152)  Pulse: 80 (09/28/19 2325)  Resp: 18 (09/28/19 2152)  BP: 120/65 (09/28/19 2325)  SpO2: 100 % (09/28/19 2325) Vital Signs (24h Range):  Temp:  [99.5 °F (37.5 °C)] 99.5 °F (37.5 °C)  Pulse:  [71-80] 80  Resp:  [18] 18  SpO2:  [100 %] 100 %  BP: (112-127)/(61-72) 120/65     Weight: 105.5 kg (232 lb 9.4 oz)  Body mass index is 31.54 kg/m².    Physical Exam   Constitutional: He is oriented to person, place, and time. He appears well-developed and well-nourished.   HENT:   Head: Normocephalic and atraumatic.   Nose: Nose normal.   Eyes: EOM are normal.   PERRL   Neck: Normal range  of motion. Neck supple.   Cardiovascular: Normal rate, regular rhythm and normal heart sounds.   Pulmonary/Chest: Effort normal and breath sounds normal. No respiratory distress. He has no wheezes. He has no rales.   Abdominal: There is no tenderness.   Musculoskeletal: Normal range of motion. He exhibits no edema.   Neurological: He is alert and oriented to person, place, and time.   Skin: Skin is dry.   Nursing note and vitals reviewed.        CRANIAL NERVES     CN III, IV, VI   Extraocular motions are normal.        Significant Labs:   BMP:   Recent Labs   Lab 09/28/19  2329   GLU 91      K 2.9*   CL 98   CO2 26   BUN 18   CREATININE 1.0   CALCIUM 9.2     CBC:   Recent Labs   Lab 09/28/19  2329   WBC 5.65   HGB 10.2*   HCT 29.2*        All pertinent labs within the past 24 hours have been reviewed.    Significant Imaging: I have reviewed and interpreted all pertinent imaging results/findings within the past 24 hours.    Assessment/Plan:     Hypokalemia    Will replete , check repeat serum potassium  in AM     Intolerance to cold    Will check TSH, he gives varying degrees of cold /heat intolerance-will rule out thyroid disorder     Elevated troponin I level    Will monitor serial troponin and will need close follow up .  He denies chest pain.     Hypertension    Will hold antihypertensives meds for now and will restart lower dose of Valsartan in AM.  He reports lightheadedness    Type 2 diabetes mellitus    Will use insulin sliding scale , diabetic diet .He is on  semaglutide at home      VTE Risk Mitigation (From admission, onward)    None             Keith Avila MD  Department of Hospital Medicine   [facility]

## 2019-09-29 NOTE — ED PROVIDER NOTES
"SCRIBE #1 NOTE: I, Cosmo Kramer, am scribing for, and in the presence of, Kale Ortega Jr., MD. I have scribed the entire note.       History     Chief Complaint   Patient presents with    Hypotension     pt c/o hypotension since yesterday, states "I think they have me on too much blood pressure medicine" pt c/o dizziness and lightheadedness.      Review of patient's allergies indicates:   Allergen Reactions    Hydrochlorothiazide (bulk)      Other reaction(s): gout    Simvastatin      Other reaction(s): aches/inc lfts         History of Present Illness     HPI    9/28/2019, 10:20 PM  History obtained from the patient      History of Present Illness: Guillermo Castillo is a 49 y.o. male patient with a PMHx of HTN and IDDM who presents to the Emergency Department for evaluation of hypotension. Pt c/o dizziness which onset gradually several days ago. Symptoms are constant and moderate in severity. Sxs are exacerbated by sitting upright. Associated sxs include light-headedness and generalized weakness. Patient denies any CP, palpitations, SOB, extremity numbness/weakness, LOC, HA, and all other sxs at this time. Prior Tx includes 1.2L IVF with some improvement. Pt states that his BP medications were changed 6 weeks ago and expresses concern his new dosage may be too high. No further complaints or concerns at this time.         Arrival mode: Personal vehicle    PCP: Kip Siddiqui MD        Past Medical History:  Past Medical History:   Diagnosis Date    DM (diabetes mellitus) 05/2013     x 1 week ago 11/11/2015    DM (diabetes mellitus) 05/2013     03/01/2018 last tested    DM (diabetes mellitus) 05/2013     05/07/2019  last test 05/09/2019    Fatty liver     Gout     Hyperlipidemia     Hypertension     Hypertriglyceridemia     Mood disorder     Panic disorder     Type 2 diabetes mellitus     05/2013 BS       Past Surgical History:  Past Surgical History:   Procedure Laterality Date "    APPENDECTOMY           Family History:  Family History   Problem Relation Age of Onset    Coronary artery disease Father     Diabetes Father     Lung cancer Father     Diabetes Maternal Aunt     Diabetes Paternal Aunt     Hypertension Maternal Grandmother     Cataracts Maternal Grandmother     Hypertension Mother     Cataracts Mother        Social History:  Social History     Tobacco Use    Smoking status: Never Smoker    Smokeless tobacco: Never Used   Substance and Sexual Activity    Alcohol use: Not Currently     Comment: rarely    Drug use: No    Sexual activity: Yes     Partners: Female        Review of Systems     Review of Systems   Constitutional: Negative for fever.        + generalized weakness   HENT: Negative for sore throat.    Respiratory: Negative for shortness of breath.    Cardiovascular: Negative for chest pain and palpitations.   Gastrointestinal: Negative for nausea.   Genitourinary: Negative for dysuria.   Musculoskeletal: Negative for back pain.   Skin: Negative for rash.   Neurological: Positive for dizziness and light-headedness. Negative for syncope, weakness, numbness and headaches.   Hematological: Does not bruise/bleed easily.   All other systems reviewed and are negative.       Physical Exam     Initial Vitals [09/28/19 2152]   BP Pulse Resp Temp SpO2   127/72 77 18 99.5 °F (37.5 °C) 100 %      MAP       --          Physical Exam  Nursing Notes and Vital Signs Reviewed.  Constitutional: Patient is in no acute distress. Well-developed and well-nourished.  Head: Atraumatic. Normocephalic.  Eyes: PERRL. EOM intact. Conjunctivae are not pale. No scleral icterus.  ENT: Mucous membranes are moist. Oropharynx is clear and symmetric.    Neck: Supple. Full ROM. No lymphadenopathy.  Cardiovascular: Regular rate. Regular rhythm. No murmurs, rubs, or gallops. Distal pulses are 2+ and symmetric.  Pulmonary/Chest: No respiratory distress. Clear to auscultation bilaterally. No  wheezing or rales.  Abdominal: Soft and non-distended.  There is no tenderness.  No rebound, guarding, or rigidity. Good bowel sounds.  Genitourinary: No CVA tenderness  Musculoskeletal: Moves all extremities. No obvious deformities. No edema. No calf tenderness.  Skin: Warm and dry.  Neurological:  Alert, awake, and appropriate.  Normal speech.  No acute focal neurological deficits are appreciated.  Psychiatric: Normal affect. Good eye contact. Appropriate in content.     ED Course   Procedures  ED Vital Signs:  Vitals:    09/28/19 2152 09/28/19 2153 09/28/19 2300 09/28/19 2322   BP: 127/72   112/65   Pulse: 77  71 74   Resp: 18      Temp: 99.5 °F (37.5 °C)      TempSrc: Oral      SpO2: 100%   100%   Weight: 104.3 kg (230 lb) 105.5 kg (232 lb 9.4 oz)     Height: 6' (1.829 m)       09/28/19 2324 09/28/19 2325   BP: 114/61 120/65   Pulse: 78 80   Resp:     Temp:     TempSrc:     SpO2: 100% 100%   Weight:     Height:         Abnormal Lab Results:  Labs Reviewed   CBC W/ AUTO DIFFERENTIAL - Abnormal; Notable for the following components:       Result Value    RBC 3.57 (*)     Hemoglobin 10.2 (*)     Hematocrit 29.2 (*)     RDW 15.3 (*)     All other components within normal limits   COMPREHENSIVE METABOLIC PANEL - Abnormal; Notable for the following components:    Potassium 2.9 (*)     All other components within normal limits   URINALYSIS, REFLEX TO URINE CULTURE - Abnormal; Notable for the following components:    Specific Gravity, UA <=1.005 (*)     All other components within normal limits    Narrative:     Preferred Collection Type->Urine, Clean Catch   TROPONIN I - Abnormal; Notable for the following components:    Troponin I 0.029 (*)     All other components within normal limits   TROPONIN I        All Lab Results:  Results for orders placed or performed during the hospital encounter of 09/28/19   CBC auto differential   Result Value Ref Range    WBC 5.65 3.90 - 12.70 K/uL    RBC 3.57 (L) 4.60 - 6.20 M/uL     Hemoglobin 10.2 (L) 14.0 - 18.0 g/dL    Hematocrit 29.2 (L) 40.0 - 54.0 %    Mean Corpuscular Volume 82 82 - 98 fL    Mean Corpuscular Hemoglobin 28.6 27.0 - 31.0 pg    Mean Corpuscular Hemoglobin Conc 34.9 32.0 - 36.0 g/dL    RDW 15.3 (H) 11.5 - 14.5 %    Platelets 181 150 - 350 K/uL    MPV 9.2 9.2 - 12.9 fL    Gran # (ANC) 3.7 1.8 - 7.7 K/uL    Lymph # 1.2 1.0 - 4.8 K/uL    Mono # 0.7 0.3 - 1.0 K/uL    Eos # 0.0 0.0 - 0.5 K/uL    Baso # 0.03 0.00 - 0.20 K/uL    Gran% 66.8 38.0 - 73.0 %    Lymph% 21.2 18.0 - 48.0 %    Mono% 11.7 4.0 - 15.0 %    Eosinophil% 0.7 0.0 - 8.0 %    Basophil% 0.5 0.0 - 1.9 %    Differential Method Automated    Comprehensive metabolic panel   Result Value Ref Range    Sodium 137 136 - 145 mmol/L    Potassium 2.9 (L) 3.5 - 5.1 mmol/L    Chloride 98 95 - 110 mmol/L    CO2 26 23 - 29 mmol/L    Glucose 91 70 - 110 mg/dL    BUN, Bld 18 6 - 20 mg/dL    Creatinine 1.0 0.5 - 1.4 mg/dL    Calcium 9.2 8.7 - 10.5 mg/dL    Total Protein 6.7 6.0 - 8.4 g/dL    Albumin 3.7 3.5 - 5.2 g/dL    Total Bilirubin 1.0 0.1 - 1.0 mg/dL    Alkaline Phosphatase 101 55 - 135 U/L    AST 33 10 - 40 U/L    ALT 39 10 - 44 U/L    Anion Gap 13 8 - 16 mmol/L    eGFR if African American >60 >60 mL/min/1.73 m^2    eGFR if non African American >60 >60 mL/min/1.73 m^2   Urinalysis, Reflex to Urine Culture Urine, Clean Catch   Result Value Ref Range    Specimen UA Urine, Clean Catch     Color, UA Yellow Yellow, Straw, Xiomy    Appearance, UA Clear Clear    pH, UA 6.0 5.0 - 8.0    Specific Gravity, UA <=1.005 (A) 1.005 - 1.030    Protein, UA Negative Negative    Glucose, UA Negative Negative    Ketones, UA Negative Negative    Bilirubin (UA) Negative Negative    Occult Blood UA Negative Negative    Nitrite, UA Negative Negative    Urobilinogen, UA 1.0 <2.0 EU/dL    Leukocytes, UA Negative Negative   Troponin I   Result Value Ref Range    Troponin I 0.029 (H) 0.000 - 0.026 ng/mL         Imaging Results:  Imaging Results           X-Ray Chest AP Portable (Final result)  Result time 09/28/19 23:43:28    Final result by Kyle Joe MD (09/28/19 23:43:28)                 Impression:      No acute cardiopulmonary disease.  No interval change compared to 03/16/2018.      Electronically signed by: Kyle Joe MD  Date:    09/28/2019  Time:    23:43             Narrative:    EXAMINATION:  XR CHEST AP PORTABLE    CLINICAL HISTORY:  Chest Pain;    TECHNIQUE:  Single frontal view of the chest was performed.    COMPARISON:  03/16/2018    FINDINGS:  The lungs are clear, with normal appearance of pulmonary vasculature.  No pleural effusion or pneumothorax.    The cardiac silhouette is normal in size. The hilar and mediastinal contours are unremarkable.                                 The EKG was ordered, reviewed, and independently interpreted by the ED provider.  Interpretation time: 2317  Rate: 68 BPM  Rhythm: normal sinus rhythm  Interpretation: No acute ST changes. No STEMI.           The Emergency Provider reviewed the vital signs and test results, which are outlined above.     ED Discussion       12:38 AM: Re-evaluated pt. I have discussed test results, shared treatment plan, and the need for admission with patient at bedside. Pt expresses understanding at this time and agree with all information. All questions answered. Pt has no further questions or concerns at this time. Pt is ready for admit.    12:45 AM: Discussed case with CRU[facility] [place] ([facility]). Dr. Avila agrees with current care and management of pt and accepts admission.   Admitting Service: Hospital Medicine  Admitting Physician: Dr. Avila  Admit to: Obs/tele             Medical Decision Making:   Clinical Tests:   Lab Tests: Ordered and Reviewed  Radiological Study: Reviewed and Ordered  Medical Tests: Reviewed and Ordered           ED Medication(s):  Medications   aspirin tablet 325 mg (has no administration in time range)   0.9%  NaCl infusion (has no administration  in time range)   sodium chloride 0.9% bolus 1,000 mL (1,000 mLs Intravenous New Bag 9/28/19 2337)       New Prescriptions    No medications on file               Scribe Attestation:   Scribe #1: I performed the above scribed service and the documentation accurately describes the services I performed. I attest to the accuracy of the note.     Attending:   Physician Attestation Statement for Scribe #1: I, Kale Ortega Jr., MD, personally performed the services described in this documentation, as scribed by Cosmo Kramer, in my presence, and it is both accurate and complete.           Clinical Impression       ICD-10-CM ICD-9-CM   1. Hypotension, unspecified hypotension type I95.9 458.9   2. Dizziness R42 780.4   3. Weakness R53.1 780.79   4. IDDM (insulin dependent diabetes mellitus) E11.9 250.00    Z79.4 V58.67   5. Elevated troponin I level R74.8 790.6       Disposition:   Disposition: Placed in Observation  Condition: Fair         Kale Ortega Jr., MD  09/29/19 1346

## 2019-09-29 NOTE — ASSESSMENT & PLAN NOTE
Will check TSH, he gives varying degrees of cold /heat intolerance-will rule out thyroid disorder

## 2019-09-29 NOTE — ED NOTES
Patient complains of  Dizziness and lightheaded  Symptoms have been present for since yesterday  Patient describes symptoms as low BP and dizziness on ambulation  Previous treatment includes 1l of NS . Associated symptoms include none Patient denies f/d/n    Level of Consciousness: The patient is awake, alert, and oriented with appropriate affect and speech; oriented to person, place and time.  Appearance: Sitting up  In bed with no acute distress noted. Clothing and hygiene are clean and worn appropriately.  Skin: Skin is warm and dry with good skin turgor; intact; pale .  Mucous membranes are dry   Musculoskeletal: Moves all extremities well in full range of motion. No obvious deformities or swelling noted.  Respiratory: Airway open and patent, respirations spontaneous, even and unlabored. No accessory muscles in use. Breath sounds clear  Cardiac: Regular rate and rhythm, no peripheral edema noted, good pulses palpated peripherally, capillary refill < 3 seconds.  Abdomen: Soft, non-tender to palpation. No distention noted.  Neurologic: PERRLA, face exhibits symmetrical expression, hand grasps equal and even bilaterally, reports normal sensation to all extremities and face.  Psychosocial: calm     Patient verbalized understanding of status and plan of care. Patient changed into hospital gown with assistance. Side rails up x 2, call light in reach, bed low and locked. Cardiac monitor applied to patient; alarms on, audible, and set.  at bedside. Will continue to monitor.

## 2019-09-29 NOTE — NURSING
General Surgery Operative Note    Pre-operative diagnosis:  appendicitis    Post-operative diagnosis:  appendicitis with perforation and diffuse peritonitis    Procedure: Laparoscopic Appendectomy and extensive abdominal washout.     Surgeon: Chuck Summers MD   Assistant(s): NONE   Anesthesia: General    Estimated blood loss: 5 cc's   Drains placed: None   Complications:  None   Findings:   diffuse inflammatory change throughout the abdomen, particularly involving small bowel loops.  There was purulent fluid in the pelvis and there was exudate on the fallopian tubes as well as small bowel loops.  The appendix was prominent within the pinpoint perforation near its base.  No other infectious source was noted.  Bilateral ovaries appeared normal.       Indications for operation: This is a 29-year-old woman who presented with a less than 12 hour history of lower abdominal pain.  She presented to the emergency room in a significant degree of pain, and CT scan and ultrasound showed findings concerning for possible appendicitis.  The CT scan, however, showed rather diffuse inflammatory change, including thickening of the small bowel.  It raised the question of colitis as well.  Laparoscopic appendectomy was recommended with the understanding that there was a greater than usual chance that there might be some other cause for the inflammation.  We discussed the procedure, along with its risks and complications, in detail.  The patient agreed to proceed.    Details of the operation: After informed consent, the patient was taken to the operating room where she underwent satisfactory induction of general anesthesia.  The patient was sterilely prepped and draped and a supraumbilical skin incision was made.  Dissection was carried bluntly through the subcutaneous tissue and the fascia was opened using electrocautery.  The French trocar was introduced and fixed in place using stay sutures.  Pneumoperitoneum was achieved using  Stat lab for potassium filled out on downtime form and submitted for lab draw.    CO2 insufflation, and under direct visualization, two additional 5 mm ports were placed in the lower midline.  There was obvious inflammatory change of the small bowel with fibrinous exudate on its surface.  There was a significant amount of purulent fluid in the pelvis.  This was sent for culture.  Inflammatory adhesions were most notable in the right lower quadrant.  The appendix was prominent with fibrinous exudate on its surface.  At the very base of the appendix, there was a pinpoint perforation.  A window was made in the mesentery and an Endo CK stapler was fired across the cecum just below the area of the perforation.  A second load of the stapler was used to divide the mesoappendix.  The appendix was placed in an Endo Catch bag and brought out through the supraumbilical incision.  The bilateral tubes and ovaries were now visualized, and appeared grossly normal.  There appeared to be a small fibroid on the uterus.  The abdomen was now irrigated out with copious amounts of normal saline.  Care was taken to irrigate in the left and right upper quadrants, as well as the pelvis and the right lower quadrant.  The trocar sites were now infiltrated with 0.5% Marcaine.  The trochars were removed under direct visualization and the supraumbilical fascia was closed using interrupted 0 Vicryl sutures.  The skin incisions were closed using 4-0 subcuticular Vicryl followed by Steri-Strips.    The patient tolerated the procedure well and was transferred to the recovery room in satisfactory condition.  Sponge and needle counts were correct at the close of the case.    Specimens:   ID Type Source Tests Collected by Time Destination   1 :  Fluid Peritoneum ANAEROBIC BACTERIAL CULTURE, FLUID CULTURE AEROBIC BACTERIAL, GRAM STAIN Chuck Summers MD 1/2/2018  6:42 AM    A : appendix Tissue Appendix SURGICAL PATHOLOGY EXAM Chuck Summers MD 1/2/2018  7:11 AM            Chuck Summers MD

## 2019-09-29 NOTE — NURSING
Called radiology in order to get a time estimate on patient receiving his echo. Radiology tech informed nurse that the patient is on the list to be seen but she is not the one performing the echocardiograms and that she is unable to provide an ETA.

## 2019-09-29 NOTE — HOSPITAL COURSE
Mr Castillo is a 49 year old male who presented to [facility] Emergency Room for evaluation of hypotension. Patient reports had been experincing episodes dizziness and increase fatigue. Denies associated symptoms of chest pain, shortness of breath, nausea, vomiting, chills or diaphoresis. Potassium level 2.9 on admission and this AM as well. Troponin 0.029 on admission, however serial troponin have been negative. He was given Potassium 40 meq PO, potassium increased to 3.3, he was given additional dose of 20 meq PO. Vital signs now stable, no complains to dizziness. He will hold blood pressure medications and follow up with PCP to restart BP medications as tolerated. Patient was seen, examined and deemed suitable for discharge.

## 2019-09-29 NOTE — HPI
49 year old  male patient with a PMHx of HTN and IDDM who presents to the Emergency Department for evaluation of hypotension.  He reports worsening and intermittent symptoms over the last month . He does home BP and glucose monitoring with the [facility] digital monitoring system and was recently started on chlorthalidone 25mg daily , he is also on Norvasc 10mg daily and Valsartan 320 mg daily .  His symptoms are exacerbated by sitting upright . He is concerned about the increased dosage of his BP meds.  He denies chest pain . Since admission, serum troponin was done and it is 0.029, serum K -2.9

## 2019-09-29 NOTE — ASSESSMENT & PLAN NOTE
Will hold antihypertensives meds for now and will restart lower dose of Valsartan in AM.  He reports lightheadedness

## 2019-09-29 NOTE — DISCHARGE SUMMARY
[facility] Hospital Medicine  Discharge Summary      Patient Name: Guillermo Castillo  MRN: [id number]  Admission Date: 9/28/2019  Hospital Length of Stay: 0 days  Discharge Date and Time:  09/29/2019 2:07 PM  Attending Physician: Riki Martinez MD   Discharging Provider: Josr Bai NP  Primary Care Provider: Kip Siddiqui MD      HPI:    49 year old  male patient with a PMHx of HTN and IDDM who presents to the Emergency Department for evaluation of hypotension.   He reports worsening and intermittent symptoms over the last month . He does home BP and glucose monitoring with the [facility] digital monitoring system and was recently started on chlorthalidone 25mg daily , he is also on Norvasc 10mg daily and Valsartan 320 mg daily .  His symptoms are exacerbated by sitting upright . He is concerned about the increased dosage of his BP meds.  He denies chest pain . Since admission, serum troponin was done and it is 0.029, serum K -2.9       * No surgery found *      Hospital Course:   Mr Castillo is a 49 year old male who presented to [facility] Emergency Room for evaluation of hypotension. Patient reports had been experincing episodes dizziness and increase fatigue. Denies associated symptoms of chest pain, shortness of breath, nausea, vomiting, chills or diaphoresis. Potassium level 2.9 on admission and this AM as well. Troponin 0.029 on admission, however serial troponin have been negative. He was given Potassium 40 meq PO, potassium level increased to 3.3, he was given additional dose of 20 meq PO. Vital signs now stable, no complains to dizziness. He will hold blood pressure medications and follow up with PCP to restart BP medications as tolerated. Patient was seen, examined and deemed suitable for discharge.      Consults:     No new Assessment & Plan notes have been filed under this hospital service since the last note was generated.  Service: Hospital Medicine    Final Active Diagnoses:     Diagnosis Date Noted POA    Type 2 diabetes mellitus [E11.9]  Yes    Hypertension [I10]  Yes      Problems Resolved During this Admission:    Diagnosis Date Noted Date Resolved POA    PRINCIPAL PROBLEM:  Elevated troponin I level [R74.8] 09/29/2019 09/29/2019 Yes    Intolerance to cold [R68.89] 09/29/2019 09/29/2019 Yes    Hypokalemia [E87.6] 09/29/2019 09/29/2019 Yes       Discharged Condition: stable    Disposition: Home or Self Care    Follow Up:  Follow-up Information     Ruby Ortega NP. Schedule an appointment as soon as possible for a visit in 1 day.    Specialty:  Internal Medicine  Why:  hospital follow for low blood pressure   Contact information:  [address]  [place] [id number] [phone]                 Patient Instructions:      Diet diabetic     Activity as tolerated       Significant Diagnostic Studies: Labs:        and CBC   Recent Labs   Lab 09/28/19 2329   WBC 5.65   HGB 10.2*   HCT 29.2*          Pending Diagnostic Studies:     Procedure Component Value Units Date/Time    Hemoglobin A1c if not done in past 3 months [[id number]] Collected:  09/29/19 0132    Order Status:  Sent Lab Status:  In process Updated:  09/29/19 0133    Specimen:  Blood     Potassium [[id number]]     Order Status:  Sent Lab Status:  No result     Specimen:  Blood          Medications:  Reconciled Home Medications:      Medication List      CHANGE how you take these medications    fluticasone propionate 50 mcg/actuation nasal spray  Commonly known as:  FLONASE  2 sprays (100 mcg total) by Each Nare route once daily.  What changed:    · when to take this  · reasons to take this     omeprazole 40 MG capsule  Commonly known as:  PRILOSEC  TAKE 1 CAPSULE (40 MG TOTAL) BY MOUTH ONCE DAILY.  What changed:    · when to take this  · reasons to take this        CONTINUE taking these medications    aspirin 81 mg Tab  Take 1 tablet by mouth Daily.     blood sugar diagnostic Strp  1 each by Misc.(Non-Drug;  "Combo Route) route 3 (three) times daily. Health Glucose Test Strips     cetirizine 10 MG tablet  Commonly known as:  ZYRTEC  Take 10 mg by mouth daily as needed.     * DULoxetine 60 MG capsule  Commonly known as:  CYMBALTA  TAKE 1 CAPSULE BY MOUTH ONCE DAILY     * DULoxetine 60 MG capsule  Commonly known as:  CYMBALTA  Take 1 capsule (60 mg total) by mouth once daily.     ibuprofen 800 MG tablet  Commonly known as:  ADVIL,MOTRIN  Take 800 mg by mouth every 6 (six) hours as needed.     insulin lispro 100 unit/mL injection  Commonly known as:  HumaLOG U-100 Insulin  Use via sliding scale     insulin syringe-needle U-100 0.3 mL 31 gauge x 5/16" Syrg  Use as directed     metFORMIN 500 MG 24 hr tablet  Commonly known as:  GLUCOPHAGE-XR  TAKE 2 TABLETS BY MOUTH ONCE DAILY     OZEMPIC 0.25 mg or 0.5 mg(2 mg/1.5 mL) Pnij  Generic drug:  semaglutide  Inject 0.25 mg into the skin every 7 days.     rosuvastatin 10 MG tablet  Commonly known as:  CRESTOR  TAKE 1 TABLET BY MOUTH EVERY DAY         * This list has 2 medication(s) that are the same as other medications prescribed for you. Read the directions carefully, and ask your doctor or other care provider to review them with you.            STOP taking these medications    amLODIPine 10 MG tablet  Commonly known as:  NORVASC     chlorthalidone 25 MG Tab  Commonly known as:  HYGROTEN     valsartan 320 MG tablet  Commonly known as:  DIOVAN            Indwelling Lines/Drains at time of discharge:   Lines/Drains/Airways     None                 Time spent on the discharge of patient: 40 minutes  Patient was seen and examined on the date of discharge and determined to be suitable for discharge.         Josr Bai NP  Department of Hospital Medicine  [facility]  "

## 2019-09-29 NOTE — NURSING
Spoke to  who states that the patient's potassium level is 3.3mg/dL. Informed PAUL Kamara. Awaiting further instructions.

## 2019-09-30 ENCOUNTER — CLINICAL SUPPORT (OUTPATIENT)
Dept: REHABILITATION | Facility: HOSPITAL | Age: 49
End: 2019-09-30
Payer: COMMERCIAL

## 2019-09-30 ENCOUNTER — OFFICE VISIT (OUTPATIENT)
Dept: INTERNAL MEDICINE | Facility: CLINIC | Age: 49
End: 2019-09-30
Payer: COMMERCIAL

## 2019-09-30 ENCOUNTER — LAB VISIT (OUTPATIENT)
Dept: LAB | Facility: HOSPITAL | Age: 49
End: 2019-09-30
Attending: NURSE PRACTITIONER
Payer: COMMERCIAL

## 2019-09-30 VITALS
DIASTOLIC BLOOD PRESSURE: 64 MMHG | SYSTOLIC BLOOD PRESSURE: 114 MMHG | BODY MASS INDEX: 31.3 KG/M2 | HEIGHT: 72 IN | WEIGHT: 231.06 LBS | OXYGEN SATURATION: 99 % | TEMPERATURE: 97 F | HEART RATE: 60 BPM

## 2019-09-30 DIAGNOSIS — E87.6 HYPOKALEMIA: ICD-10-CM

## 2019-09-30 DIAGNOSIS — R53.83 FATIGUE, UNSPECIFIED TYPE: ICD-10-CM

## 2019-09-30 DIAGNOSIS — M62.81 MUSCLE WEAKNESS OF RIGHT ARM: ICD-10-CM

## 2019-09-30 DIAGNOSIS — D64.9 ANEMIA, UNSPECIFIED TYPE: ICD-10-CM

## 2019-09-30 DIAGNOSIS — I95.9 HYPOTENSION, UNSPECIFIED HYPOTENSION TYPE: ICD-10-CM

## 2019-09-30 DIAGNOSIS — R29.898 DECREASED GRIP STRENGTH OF RIGHT HAND: ICD-10-CM

## 2019-09-30 DIAGNOSIS — Z09 HOSPITAL DISCHARGE FOLLOW-UP: Primary | ICD-10-CM

## 2019-09-30 DIAGNOSIS — M25.521 RIGHT ELBOW PAIN: ICD-10-CM

## 2019-09-30 LAB
ANION GAP SERPL CALC-SCNC: 11 MMOL/L (ref 8–16)
BASOPHILS # BLD AUTO: 0.03 K/UL (ref 0–0.2)
BASOPHILS NFR BLD: 0.4 % (ref 0–1.9)
BUN SERPL-MCNC: 17 MG/DL (ref 6–20)
CALCIUM SERPL-MCNC: 9.5 MG/DL (ref 8.7–10.5)
CHLORIDE SERPL-SCNC: 99 MMOL/L (ref 95–110)
CO2 SERPL-SCNC: 29 MMOL/L (ref 23–29)
CREAT SERPL-MCNC: 1 MG/DL (ref 0.5–1.4)
DIASTOLIC DYSFUNCTION: NO
DIFFERENTIAL METHOD: ABNORMAL
EOSINOPHIL # BLD AUTO: 0.1 K/UL (ref 0–0.5)
EOSINOPHIL NFR BLD: 1.9 % (ref 0–8)
ERYTHROCYTE [DISTWIDTH] IN BLOOD BY AUTOMATED COUNT: 15 % (ref 11.5–14.5)
EST. GFR  (AFRICAN AMERICAN): >60 ML/MIN/1.73 M^2
EST. GFR  (NON AFRICAN AMERICAN): >60 ML/MIN/1.73 M^2
ESTIMATED PA SYSTOLIC PRESSURE: 23.04
GLUCOSE SERPL-MCNC: 109 MG/DL (ref 70–110)
HCT VFR BLD AUTO: 28.9 % (ref 40–54)
HGB BLD-MCNC: 9.9 G/DL (ref 14–18)
IMM GRANULOCYTES # BLD AUTO: 0.17 K/UL (ref 0–0.04)
IMM GRANULOCYTES NFR BLD AUTO: 2.5 % (ref 0–0.5)
LYMPHOCYTES # BLD AUTO: 1.5 K/UL (ref 1–4.8)
LYMPHOCYTES NFR BLD: 21.8 % (ref 18–48)
MCH RBC QN AUTO: 28.4 PG (ref 27–31)
MCHC RBC AUTO-ENTMCNC: 34.3 G/DL (ref 32–36)
MCV RBC AUTO: 83 FL (ref 82–98)
MONOCYTES # BLD AUTO: 0.8 K/UL (ref 0.3–1)
MONOCYTES NFR BLD: 12 % (ref 4–15)
NEUTROPHILS # BLD AUTO: 4.3 K/UL (ref 1.8–7.7)
NEUTROPHILS NFR BLD: 63.9 % (ref 38–73)
NRBC BLD-RTO: 0 /100 WBC
PLATELET # BLD AUTO: 198 K/UL (ref 150–350)
PMV BLD AUTO: 9.2 FL (ref 9.2–12.9)
POTASSIUM SERPL-SCNC: 3.6 MMOL/L (ref 3.5–5.1)
RBC # BLD AUTO: 3.48 M/UL (ref 4.6–6.2)
RETIRED EF AND QEF - SEE NOTES: 60 (ref 55–65)
SODIUM SERPL-SCNC: 139 MMOL/L (ref 136–145)
WBC # BLD AUTO: 6.69 K/UL (ref 3.9–12.7)

## 2019-09-30 PROCEDURE — 80048 BASIC METABOLIC PNL TOTAL CA: CPT

## 2019-09-30 PROCEDURE — 99999 PR PBB SHADOW E&M-EST. PATIENT-LVL V: ICD-10-PCS | Mod: PBBFAC,,, | Performed by: NURSE PRACTITIONER

## 2019-09-30 PROCEDURE — 99999 PR PBB SHADOW E&M-EST. PATIENT-LVL V: CPT | Mod: PBBFAC,,, | Performed by: NURSE PRACTITIONER

## 2019-09-30 PROCEDURE — 85025 COMPLETE CBC W/AUTO DIFF WBC: CPT

## 2019-09-30 PROCEDURE — 3008F PR BODY MASS INDEX (BMI) DOCUMENTED: ICD-10-PCS | Mod: CPTII,S$GLB,, | Performed by: NURSE PRACTITIONER

## 2019-09-30 PROCEDURE — 97032 APPL MODALITY 1+ESTIM EA 15: CPT

## 2019-09-30 PROCEDURE — 36415 COLL VENOUS BLD VENIPUNCTURE: CPT

## 2019-09-30 PROCEDURE — 97162 PT EVAL MOD COMPLEX 30 MIN: CPT

## 2019-09-30 PROCEDURE — 3008F BODY MASS INDEX DOCD: CPT | Mod: CPTII,S$GLB,, | Performed by: NURSE PRACTITIONER

## 2019-09-30 PROCEDURE — 3074F SYST BP LT 130 MM HG: CPT | Mod: CPTII,S$GLB,, | Performed by: NURSE PRACTITIONER

## 2019-09-30 PROCEDURE — 83540 ASSAY OF IRON: CPT

## 2019-09-30 PROCEDURE — 3074F PR MOST RECENT SYSTOLIC BLOOD PRESSURE < 130 MM HG: ICD-10-PCS | Mod: CPTII,S$GLB,, | Performed by: NURSE PRACTITIONER

## 2019-09-30 PROCEDURE — 3078F DIAST BP <80 MM HG: CPT | Mod: CPTII,S$GLB,, | Performed by: NURSE PRACTITIONER

## 2019-09-30 PROCEDURE — 99213 OFFICE O/P EST LOW 20 MIN: CPT | Mod: S$GLB,,, | Performed by: NURSE PRACTITIONER

## 2019-09-30 PROCEDURE — 82728 ASSAY OF FERRITIN: CPT

## 2019-09-30 PROCEDURE — 3078F PR MOST RECENT DIASTOLIC BLOOD PRESSURE < 80 MM HG: ICD-10-PCS | Mod: CPTII,S$GLB,, | Performed by: NURSE PRACTITIONER

## 2019-09-30 PROCEDURE — 99213 PR OFFICE/OUTPT VISIT, EST, LEVL III, 20-29 MIN: ICD-10-PCS | Mod: S$GLB,,, | Performed by: NURSE PRACTITIONER

## 2019-09-30 NOTE — PLAN OF CARE
[facility]  Physical Therapy Initial Evaluation    Name: Guillermo Castillo  Clinic Number: [id number]    Therapy Diagnosis:   Encounter Diagnoses   Name Primary?    Right elbow pain     Muscle weakness of right arm     Right shoulder pain, unspecified chronicity     Decreased  strength of right hand      Physician: Ruby Ortega, NP    Physician Orders: PT Eval and Treat   Medical Diagnosis from Referral: Right forearm pain, Right elbow pain  Evaluation Date: 9/30/2019  Authorization Period Expiration: 12/31/19  Plan of Care Expiration: 10/30/19  Visit # / Visits authorized: 1/15    Time In: 4:20pm  Time Out: 5:20pm  Total Billable Time: 8 minutes    Precautions: Diabetes    Subjective   Date of onset: May 2019  History of current condition - Guillermo reports: Since last seen pt reports that he F/U with orthopedist, who did not give him any time off of work but did give him a cortisone shot.  HE rpeorts he has been on vacation quite a bit this month and has had a dramatic decrease in pain and increase in  strength.  Very little elbow pain currently.  When he lifts the cardiac monitor at work he did notice on Wed night.  He reports that over the weekend he was in a hypotensive crisis and was admitted into hospital over the weekend.  He rpeorts that they though he had a heart attack but that they figured out that he was on too much BP meds.       Medical History:   Past Medical History:   Diagnosis Date    DM (diabetes mellitus) 05/2013     x 1 week ago 11/11/2015    DM (diabetes mellitus) 05/2013     03/01/2018 last tested    DM (diabetes mellitus) 05/2013     05/07/2019  last test 05/09/2019    Fatty liver     Gout     Hyperlipidemia     Hypertension     Hypertriglyceridemia     Mood disorder     Panic disorder     Type 2 diabetes mellitus     05/2013 BS       Surgical History:   Guillermo Castillo  has a past surgical history that includes  Appendectomy.    Medications:   Guillermo has a current medication list which includes the following prescription(s): aspirin, blood sugar diagnostic, cetirizine, duloxetine, duloxetine, fluticasone propionate, ibuprofen, insulin lispro, insulin syringe-needle u-100, metformin, omeprazole, rosuvastatin, and semaglutide.    Allergies:   Review of patient's allergies indicates:   Allergen Reactions    Hydrochlorothiazide (bulk)      Other reaction(s): gout    Simvastatin      Other reaction(s): aches/inc lfts        Imaging, x-rays: see in chart    Prior Therapy: yes  Social History: Pt lives with an adult   Occupation: EMT  Prior Level of Function: Independent no pain with most normal activities prior to May2019, when first seen at the end of July: maintaining all activities at work and home but painful and requires rest.  Current Level of Function: Able to  and lift with minimal pian during normal ADL's, has not yet returned to work full time where he will be required to lift and  repetitively.    Pain:  Current 0/10, worst 1/10, best 0/10   Location: right elbow   Description: Aching and Dull  Aggravating Factors: lifting, gripping  Easing Factors: rest    Pts goals: Increase strength, con't to decrease pain.    Objective     Posture: FHP rounded sh    ROM:     A/PROM Right   Sh extension  50/NT   Sh Flexion 165/170   Sh ABDuction 168/180   External Rotation T3/90   Sh Internal Rotation L lower scapula/86   Elbow flexion 145   Elbow extension 0   supination 75    pronation 90   Wrist flexion, elbow bent/straight 90/75   wrist extension, elbow bent/straight 78/70 mild pain with both        Strength:   Muscle (Myotome) Right Left   Elbow Flexors (C5) 5/5 5/5   Wrist Extensors (C6) 5/5  5/5   Elbow Extensors (C7) 5/5, 5/5   ER (arm at side) 5/5 5/5   IR (arm at side) 5/5 5/5    strength 105#, 95#, 95#, mild increased p! 116#, 100#, 109#   Intrinsic strength 4th/5th 4+/5 5/5      ECRL: 4+/5; mild  pain R, 5/5 L  ECRB: 4-/5; mild pain R, 5/5 L     Sensation: Intact BUE .    Function:  Quick DASH score today 20.5%    Quick DASH Shoulder Questionnaire           Score 1-5  1. Opening a tight jar       2  2. Do heavy household chores     3  3. Carry a shopping bag or briefcase     2  4. Wash your back      2  5. Use a knife to cut food     2  6. Recreactional activities requiring force   2  7. Social Limitation      2  8. Work/ADL limitation      1  9. Arm, Shoulder or hand Pain    2  10. Tingling       1  11. Sleeping Limitation      1      TREATMENT   Treatment Time In: 5:05  Treatment Time Out: 5:20  Total Treatment time separate from Evaluation: 8 minutes    Guillermo received FDN to R ECRB with prolonged estim x 8 min for twitch response, pt in supine position with forearm supported.  He tolerated tx well and reported no pain with gripping after FDN.    He was educated to start performing his active ROM and stretches from his last treatment episode.      Home Exercises and Patient Education Provided    Education provided:   - Con't with HEP,  with palms up versus palms down if possible.    Written Home Exercises Provided: Patient instructed to cont prior HEP.  Exercises were reviewed and Guillermo was able to demonstrate them prior to the end of the session.  Guillermo demonstrated good  understanding of the education provided.     See EMR under Patient Instructions for exercises provided prior visit.    Assessment   Guillermo is a 49 y.o. male referred to outpatient Physical Therapy with a medical diagnosis of Right forearm pain, Right elbow pain. Pt presents with signs and symptoms consistent with diagnosis.  He should benefit from physical therapy to address remaining strength deficits, ROM, and ability to perform work tasks without increased pain.    Pt prognosis is Good.   Pt will benefit from skilled outpatient Physical Therapy to address the deficits stated above and in the chart below, provide pt/family  education, and to maximize pt's level of independence.     Plan of care discussed with patient: Yes  Pt's spiritual, cultural and educational needs considered and patient is agreeable to the plan of care and goals as stated below:     Anticipated Barriers for therapy: Heavy lifting at work    Medical Necessity is demonstrated by the following  History  Co-morbidities and personal factors that may impact the plan of care Co-morbidities:   diabetes and HTN    Personal Factors:   lifestyle  work activities     high   Examination  Body Structures and Functions, activity limitations and participation restrictions that may impact the plan of care Body Regions:   upper extremities    Body Systems:    ROM  strength  fine motor skills    Participation Restrictions:   None    Activity limitations:   Learning and applying knowledge  no deficits    General Tasks and Commands  no deficits    Communication  no deficits    Mobility  fine hand use (grasping/picking up)    Self care  no deficits    Domestic Life  no deficits    Interactions/Relationships  no deficits    Life Areas  no deficits    Community and Social Life  no deficits         moderate   Clinical Presentation evolving clinical presentation with changing clinical characteristics moderate   Decision Making/ Complexity Score: moderate     Goals:  Short Term Goals: In 3 weeks   1.Pt to be educated on HEP.  2.Patient to increase RUE  strength by 5#, without pain.  3.Patient to report pain at 1/10 or less with return to normal work schedule.  4.Patient to take the UEFS with goals made PRN.  5. Pt to be educated on body mechanics awareness.     Long Term Goals: In 6 weeks  1.LTG for UEFS.  2. Patient to demo increase in all UE/hand strength to 5/5.  3. Patient to have decreased pain to 0/10 at all times.  4. Pt to increase R  strength to 10# greater than L.  5. Patient to perform daily activities including gripping, lifting loads, and required work duties without  pain or limitation.      Plan   Plan of care Certification: 9/30/2019 to 10/30/19.    Outpatient Physical Therapy 2 times weekly for 6 weeks to include the following interventions: Electrical Stimulation PRN, Manual Therapy, Moist Heat/ Ice, Neuromuscular Re-ed, Patient Education, Self Care, Therapeutic Activites, Therapeutic Exercise and FDN.     Екатерина Donovan, PT

## 2019-09-30 NOTE — PROGRESS NOTES
Subjective:       Patient ID: Guillermo Castillo is a 49 y.o. male.    Chief Complaint: Follow-up    Patient presents for a hospital follow.  Went to ER on 9/28/2019 for hypotension.  Active on the hypertension monitoring program.  Admitted for one night.  Potassium low.  Had supplements in the hospital.    Reports some chills and sweating.  Has been fatigue-possibly due to the hypotension.     Review of Systems   Constitutional: Positive for chills, fatigue and fever.   Respiratory: Negative for cough and shortness of breath.    Musculoskeletal: Negative for arthralgias and joint swelling.   Skin: Negative for color change and rash.   Psychiatric/Behavioral: Negative for agitation and confusion.       Objective:      Physical Exam   Constitutional: He is oriented to person, place, and time. Vital signs are normal. He appears well-developed and well-nourished.   HENT:   Head: Normocephalic and atraumatic.   Neck: Normal range of motion.   Cardiovascular: Normal rate and regular rhythm.   Pulmonary/Chest: Effort normal and breath sounds normal.   Musculoskeletal: Normal range of motion.   Neurological: He is alert and oriented to person, place, and time.   Skin: Skin is warm.   Psychiatric: He has a normal mood and affect. His behavior is normal.       Assessment:       1. Hospital discharge follow-up    2. Hypotension, unspecified hypotension type    3. Hypokalemia    4. Fatigue, unspecified type        Plan:         Hospital discharge follow-up    Hypotension, unspecified hypotension type    Hypokalemia  -     Basic metabolic panel; Future; Expected date: 09/30/2019    Fatigue, unspecified type  -     Basic metabolic panel; Future; Expected date: 09/30/2019  -     CBC auto differential; Future; Expected date: 09/30/2019      Labs today.  Keep follow up as scheduled or sooner if needed.

## 2019-10-01 DIAGNOSIS — D64.9 ANEMIA, UNSPECIFIED TYPE: Primary | ICD-10-CM

## 2019-10-02 ENCOUNTER — PATIENT OUTREACH (OUTPATIENT)
Dept: OTHER | Facility: OTHER | Age: 49
End: 2019-10-02

## 2019-10-02 LAB
FERRITIN SERPL-MCNC: 261 NG/ML (ref 20–300)
IRON SERPL-MCNC: 61 UG/DL (ref 45–160)
SATURATED IRON: 15 % (ref 20–50)
TOTAL IRON BINDING CAPACITY: 398 UG/DL (ref 250–450)
TRANSFERRIN SERPL-MCNC: 269 MG/DL (ref 200–375)

## 2019-10-02 NOTE — PROGRESS NOTES
Digital Medicine: Clinician Follow-Up    Patient was driving back from [place] and all of a sudden felt fatigue. He stopped and eat and he felt lightheaded. He took his BP and BG readings which were normal. He worked 9/26-9/27 and he was checking his pressure and it was 99/58. He would stand up and feel lightheaded and woozy. His partner took his BP and it was even lower, 88/50. He was given fluids and still had a low BP. He took his BP medications up until 9/28 and hasn't taken anything since. He does not feel dehydration was an issue. He did have a fever while in the ED. While he was on vacation, he consumed a high sodium meals by eating out at [employer] and [employer]. His last change in HTN medications was 7/10 by adding chlorthalidone. His BMP two weeks later was at goal. He started Ozempic 8/23 and has a reduction in his overall diet. He lost ten pounds since starting Ozempic.    Since 9/29, he's had night sweats that are causing him to be drenched in sweat and changing his clothes in the middle of the night. He mentioned this to his NP visit 9/30. He hasn't checked his BG readings during this time but he doesn't feel this is the issue. His low BG readings are normally associated with clammy feeling. He hasn't needed to use his Humalog in over a week.    The history is provided by the patient.     Follow Up  His ealth BP meter is not connecting to his phone. This is the a source of frustration and he was considering manually entering in his readings from his home cuff.                  Medication Adherence:       His HTN medications are on hold.          PLAN  patient verbalizes understanding    Patient did not want to discuss his BP cuff technical issues with our technical team this morning. He said he will call when he can take down the number and did not want me to place a task.    Patient will remain off his HTN medications at this time.    Patient will continue his DM medications. He will test his  BG when he experiences the night sweats to make sure this isn't hypoglycemia.          Topic    Lipid (Cholesterol) Test     Urine Protein Check        Last 5 Patient Entered Readings                                      Current 30 Day Average: 129/68     Recent Readings 9/27/2019 9/27/2019 9/27/2019 9/27/2019 9/27/2019    SBP (mmHg) 116 102 104 101 109    DBP (mmHg) 71 54 60 58 64    Pulse 82 78 73 75 84        Last 6 Patient Entered Readings                                          Most Recent A1c: 6.2% on 9/29/2019  (Goal: 7%)     Recent Readings 10/1/2019 10/1/2019 9/30/2019 9/28/2019 9/26/2019    Blood Glucose (mg/dL) 137 126 103 191 118             Diabetes Medications             insulin lispro (HUMALOG U-100 INSULIN) 100 unit/mL injection Use via sliding scale    metFORMIN (GLUCOPHAGE-XR) 500 MG 24 hr tablet TAKE 2 TABLETS BY MOUTH ONCE DAILY    semaglutide (OZEMPIC) 0.25 mg or 0.5 mg(2 mg/1.5 mL) PnIj Inject 0.25 mg into the skin every 7 days.

## 2019-10-03 DIAGNOSIS — D64.9 ANEMIA, UNSPECIFIED TYPE: Primary | ICD-10-CM

## 2019-10-04 ENCOUNTER — CLINICAL SUPPORT (OUTPATIENT)
Dept: REHABILITATION | Facility: HOSPITAL | Age: 49
End: 2019-10-04
Payer: COMMERCIAL

## 2019-10-04 DIAGNOSIS — M25.521 RIGHT ELBOW PAIN: ICD-10-CM

## 2019-10-04 DIAGNOSIS — R29.898 DECREASED GRIP STRENGTH OF RIGHT HAND: ICD-10-CM

## 2019-10-04 DIAGNOSIS — M25.511 RIGHT SHOULDER PAIN, UNSPECIFIED CHRONICITY: ICD-10-CM

## 2019-10-04 DIAGNOSIS — M62.81 MUSCLE WEAKNESS OF RIGHT ARM: ICD-10-CM

## 2019-10-04 PROCEDURE — 97110 THERAPEUTIC EXERCISES: CPT

## 2019-10-04 PROCEDURE — 97014 ELECTRIC STIMULATION THERAPY: CPT

## 2019-10-04 NOTE — PROGRESS NOTES
Physical Therapy Daily Treatment Note     Name: Guillermo Castillo  Clinic Number: [id number]    Therapy Diagnosis:   Encounter Diagnoses   Name Primary?    Right elbow pain     Muscle weakness of right arm     Right shoulder pain, unspecified chronicity     Decreased  strength of right hand      Physician: Ruby Ortega NP    Visit Date: 10/4/2019    Physician Orders: PT Eval and Treat   Medical Diagnosis from Referral: Right elbow pain, right forearm pain  Evaluation Date: 7/31/2019  Authorization Period Expiration: 12/31/2019  Plan of Care Expiration: 8/30/2019  Visit # / Visits authorized: 11/25      Time In: 8:30am  Time Out: 9:40am  Total Billable Time: 32 minutes    Precautions: Standard    Subjective     Pt reports:  No problems after re-evaluation.  He reports that he is still having some pain with gripping - decreased after FDN.  Soreness after today's tx session.    He was compliant with home exercise program.  Response to previous treatment: good  Functional change:decreased pain with gripping    Pain:  NT/10  Location: right elbow      Objective     Guillermo received therapeutic exercises to develop ROM for 27 minutes including:    AROM - elbow flexed, wrist flexion 3x10  AROM - elbow flexed, palm to body 3x10  AROM - elbow extended, wrist flexion 3x10  AROM - elbow extended, palm to body 3x10  Passive stretch forearm, elbow ext UD 3x30s  Doorway stretch 3/1'  Prone LT isometric 2'  Prone scapular retraction isometric 2'  Supination pronation 1#, 3/10  Wrist ext<>flexion 1#, 3/10      Guillermo received the following manual therapy techniques of soft tissue mobilization/manual to the: R upper quadrant for 5 minutes, including: B Subscapular releases, UT/levator scap, & scapular tilting    FDN to ECRL, ECRB with prolonged estim for twitch response, along with gentle piston and twist at common extensor tendon.  Pt in supine with elbow supported.     Guillermo received the following supervised modalities  after being cleared for contradictions: IFC Electrical Stimulation:  Guillermo received IFC Electrical Stimulation for pain control applied to the R elbow. Pt received stimulation at 40 % scan at a frequency of  for 15 minutes. Guillermo tolerated treatment well without any adverse effects.      Guillermo received cold pack for 15 minutes to R elbow with IFC.      Home Exercises Provided and Patient Education Provided     Education provided:   - Continue with HEP, monitor sx's, try and  underhand at work.    Written Home Exercises Provided: Patient instructed to cont prior HEP.  Exercises were reviewed and Guillermo was able to demonstrate them prior to the end of the session.  Guillermo demonstrated good  understanding of the education provided.     See EMR under Patient Instructions for exercises provided previously.    Assessment     Pt's symptoms overall decreased with soreness after strengthening ex's but no increased pian.    Guillermo is progressing well towards his goals.   Pt prognosis is Good.     Pt will continue to benefit from skilled outpatient physical therapy to address the deficits listed in the problem list box on initial evaluation, provide pt/family education and to maximize pt's level of independence in the home and community environment.     Pt's spiritual, cultural and educational needs considered and pt agreeable to plan of care and goals.     Anticipated barriers to physical therapy: history of elbow injury, pain    Goals:   Short Term Goals: In 3 weeks   1.Pt to be educated on HEP.  2.Patient to increase RUE  strength by 5#, without pain.  3.Patient to report pain at 1/10 or less with return to normal work schedule.  4.Patient to take the UEFS with goals made PRN.  5. Pt to be educated on body mechanics awareness.     Long Term Goals: In 6 weeks  1.LTG for UEFS.  2. Patient to demo increase in all UE/hand strength to 5/5.  3. Patient to have decreased pain to 0/10 at all times.  4. Pt to  increase R  strength to 10# greater than L.  5. Patient to perform daily activities including gripping, lifting loads, and required work duties without pain or limitation.    Plan     Continue with tx, progress with strengthening as tolerated by pt, monitor response to tx today.  Progress with scapular and shoulder strengthening as well as elbow/wrist.    Екатерина Donovan, PT

## 2019-10-07 ENCOUNTER — CLINICAL SUPPORT (OUTPATIENT)
Dept: REHABILITATION | Facility: HOSPITAL | Age: 49
End: 2019-10-07
Payer: COMMERCIAL

## 2019-10-07 DIAGNOSIS — M25.521 RIGHT ELBOW PAIN: ICD-10-CM

## 2019-10-07 DIAGNOSIS — M62.81 MUSCLE WEAKNESS OF RIGHT ARM: ICD-10-CM

## 2019-10-07 DIAGNOSIS — M25.511 RIGHT SHOULDER PAIN, UNSPECIFIED CHRONICITY: ICD-10-CM

## 2019-10-07 DIAGNOSIS — R29.898 DECREASED GRIP STRENGTH OF RIGHT HAND: ICD-10-CM

## 2019-10-07 PROCEDURE — 97014 ELECTRIC STIMULATION THERAPY: CPT

## 2019-10-07 PROCEDURE — 97110 THERAPEUTIC EXERCISES: CPT

## 2019-10-07 NOTE — PROGRESS NOTES
Physical Therapy Daily Treatment Note     Name: Guillermo Castillo  Clinic Number: [id number]    Therapy Diagnosis:   Encounter Diagnoses   Name Primary?    Right elbow pain     Muscle weakness of right arm     Right shoulder pain, unspecified chronicity     Decreased  strength of right hand      Physician: Ruby Ortega NP    Visit Date: 10/7/2019    Physician Orders: PT Eval and Treat   Medical Diagnosis from Referral: Right elbow pain, right forearm pain  Evaluation Date: 7/31/2019  Authorization Period Expiration: 12/31/2019  Plan of Care Expiration: 8/30/2019  Visit # / Visits authorized: 12/25      Time In: 9:30am  Time Out: 10:40am  Total Billable Time: 30 minutes    Precautions: Standard    Subjective     Pt reports: Still having osme pain with gripping no change after today's tx session - still very sore,  Encouraged ex and gripping underhand at work.    He was compliant with home exercise program.  Response to previous treatment: good  Functional change:decreased pain with gripping    Pain:  NT/10  Location: right elbow      Objective     Guillermo received therapeutic exercises to develop ROM for 27 minutes including:    AROM - elbow flexed, wrist flexion 3x10  AROM - elbow flexed, palm to body 3x10  AROM - elbow extended, wrist flexion 3x10  AROM - elbow extended, palm to body 3x10  Passive stretch forearm, elbow ext UD 3x30s  Doorway stretch 3/1'  Prone LT isometric 2'  Prone scapular retraction isometric 2'  Supination pronation 1#, 3/10  Wrist ext<>flexion 1#, 3/10  Weldon V 10/2 #10 each  Bicep/tricep #10, 10/2 each      Guillermo received the following manual therapy techniques of soft tissue mobilization/manual to the: R upper quadrant for 5 minutes, including: B Subscapular releases, UT/levator scap, & scapular tilting    FDN to ECRL, ECRB with prolonged estim for twitch response, along with gentle piston and twist at common extensor tendon.  Pt in supine with elbow supported.     Guillermo  received the following supervised modalities after being cleared for contradictions: IFC Electrical Stimulation:  Guillermo received IFC Electrical Stimulation for pain control applied to the R elbow. Pt received stimulation at 40 % scan at a frequency of  for 15 minutes. Guillermo tolerated treatment well without any adverse effects.      Guillermo received cold pack for 15 minutes to R elbow with IFC.      Home Exercises Provided and Patient Education Provided     Education provided:   - Continue with HEP, monitor sx's, try and  underhand at work.    Written Home Exercises Provided: Patient instructed to cont prior HEP.  Exercises were reviewed and Guillermo was able to demonstrate them prior to the end of the session.  Guillermo demonstrated good  understanding of the education provided.     See EMR under Patient Instructions for exercises provided previously.    Assessment     Pt's symptoms overall improved, no c/o with shoulder strengthening added today.  Cues for scapular stabilization.    Guillermo is progressing well towards his goals.   Pt prognosis is Good.     Pt will continue to benefit from skilled outpatient physical therapy to address the deficits listed in the problem list box on initial evaluation, provide pt/family education and to maximize pt's level of independence in the home and community environment.     Pt's spiritual, cultural and educational needs considered and pt agreeable to plan of care and goals.     Anticipated barriers to physical therapy: history of elbow injury, pain    Goals:   Short Term Goals: In 3 weeks   1.Pt to be educated on HEP.  2.Patient to increase RUE  strength by 5#, without pain.  3.Patient to report pain at 1/10 or less with return to normal work schedule.  4.Patient to take the UEFS with goals made PRN.  5. Pt to be educated on body mechanics awareness.     Long Term Goals: In 6 weeks  1.LTG for UEFS.  2. Patient to demo increase in all UE/hand strength to 5/5.  3.  Patient to have decreased pain to 0/10 at all times.  4. Pt to increase R  strength to 10# greater than L.  5. Patient to perform daily activities including gripping, lifting loads, and required work duties without pain or limitation.    Plan     Continue with tx, progress with strengthening as tolerated by pt, monitor response to tx today.  Progress with scapular and shoulder strengthening as well as elbow/wrist.    Екатерина Donovan, PT

## 2019-10-08 ENCOUNTER — TELEPHONE (OUTPATIENT)
Dept: URGENT CARE | Facility: CLINIC | Age: 49
End: 2019-10-08

## 2019-10-08 ENCOUNTER — PATIENT OUTREACH (OUTPATIENT)
Dept: OTHER | Facility: OTHER | Age: 49
End: 2019-10-08

## 2019-10-08 ENCOUNTER — LAB VISIT (OUTPATIENT)
Dept: LAB | Facility: HOSPITAL | Age: 49
End: 2019-10-08
Payer: COMMERCIAL

## 2019-10-08 ENCOUNTER — TELEPHONE (OUTPATIENT)
Dept: INTERNAL MEDICINE | Facility: CLINIC | Age: 49
End: 2019-10-08

## 2019-10-08 DIAGNOSIS — D64.9 ANEMIA, UNSPECIFIED TYPE: ICD-10-CM

## 2019-10-08 DIAGNOSIS — R53.83 FATIGUE, UNSPECIFIED TYPE: Primary | ICD-10-CM

## 2019-10-08 LAB
BASOPHILS # BLD AUTO: 0.03 K/UL (ref 0–0.2)
BASOPHILS NFR BLD: 0.6 % (ref 0–1.9)
DIFFERENTIAL METHOD: ABNORMAL
EOSINOPHIL # BLD AUTO: 0.1 K/UL (ref 0–0.5)
EOSINOPHIL NFR BLD: 2.7 % (ref 0–8)
ERYTHROCYTE [DISTWIDTH] IN BLOOD BY AUTOMATED COUNT: 15.8 % (ref 11.5–14.5)
HCT VFR BLD AUTO: 28.3 % (ref 40–54)
HGB BLD-MCNC: 8.7 G/DL (ref 14–18)
IMM GRANULOCYTES # BLD AUTO: 0.06 K/UL (ref 0–0.04)
IMM GRANULOCYTES NFR BLD AUTO: 1.2 % (ref 0–0.5)
LYMPHOCYTES # BLD AUTO: 1.8 K/UL (ref 1–4.8)
LYMPHOCYTES NFR BLD: 37.2 % (ref 18–48)
MCH RBC QN AUTO: 28.2 PG (ref 27–31)
MCHC RBC AUTO-ENTMCNC: 30.7 G/DL (ref 32–36)
MCV RBC AUTO: 92 FL (ref 82–98)
MONOCYTES # BLD AUTO: 0.4 K/UL (ref 0.3–1)
MONOCYTES NFR BLD: 8.3 % (ref 4–15)
NEUTROPHILS # BLD AUTO: 2.4 K/UL (ref 1.8–7.7)
NEUTROPHILS NFR BLD: 50 % (ref 38–73)
NRBC BLD-RTO: 0 /100 WBC
PLATELET # BLD AUTO: 243 K/UL (ref 150–350)
PMV BLD AUTO: 10.3 FL (ref 9.2–12.9)
RBC # BLD AUTO: 3.09 M/UL (ref 4.6–6.2)
VIT B12 SERPL-MCNC: 441 PG/ML (ref 210–950)
WBC # BLD AUTO: 4.81 K/UL (ref 3.9–12.7)

## 2019-10-08 PROCEDURE — 85025 COMPLETE CBC W/AUTO DIFF WBC: CPT

## 2019-10-08 PROCEDURE — 36415 COLL VENOUS BLD VENIPUNCTURE: CPT

## 2019-10-08 PROCEDURE — 82607 VITAMIN B-12: CPT

## 2019-10-08 NOTE — TELEPHONE ENCOUNTER
Please inform patient that I spoke with Dr. Siddiqui regarding his recent labs.  He recommended hematology appointment.  Please schedule.

## 2019-10-09 ENCOUNTER — INITIAL CONSULT (OUTPATIENT)
Dept: HEMATOLOGY/ONCOLOGY | Facility: CLINIC | Age: 49
End: 2019-10-09
Payer: COMMERCIAL

## 2019-10-09 ENCOUNTER — LAB VISIT (OUTPATIENT)
Dept: LAB | Facility: HOSPITAL | Age: 49
End: 2019-10-09
Attending: INTERNAL MEDICINE
Payer: COMMERCIAL

## 2019-10-09 VITALS
HEART RATE: 62 BPM | OXYGEN SATURATION: 97 % | HEIGHT: 72 IN | TEMPERATURE: 98 F | DIASTOLIC BLOOD PRESSURE: 83 MMHG | WEIGHT: 238.13 LBS | SYSTOLIC BLOOD PRESSURE: 131 MMHG | BODY MASS INDEX: 32.25 KG/M2

## 2019-10-09 DIAGNOSIS — D64.9 ANEMIA, UNSPECIFIED TYPE: Primary | ICD-10-CM

## 2019-10-09 DIAGNOSIS — D64.9 ANEMIA, UNSPECIFIED TYPE: ICD-10-CM

## 2019-10-09 LAB
ALBUMIN SERPL BCP-MCNC: 3.9 G/DL (ref 3.5–5.2)
ALP SERPL-CCNC: 92 U/L (ref 55–135)
ALT SERPL W/O P-5'-P-CCNC: 21 U/L (ref 10–44)
ANION GAP SERPL CALC-SCNC: 9 MMOL/L (ref 8–16)
AST SERPL-CCNC: 19 U/L (ref 10–40)
BASOPHILS # BLD AUTO: 0.02 K/UL (ref 0–0.2)
BASOPHILS NFR BLD: 0.4 % (ref 0–1.9)
BILIRUB SERPL-MCNC: 0.6 MG/DL (ref 0.1–1)
BUN SERPL-MCNC: 15 MG/DL (ref 6–20)
CALCIUM SERPL-MCNC: 8.8 MG/DL (ref 8.7–10.5)
CHLORIDE SERPL-SCNC: 106 MMOL/L (ref 95–110)
CO2 SERPL-SCNC: 25 MMOL/L (ref 23–29)
CREAT SERPL-MCNC: 1 MG/DL (ref 0.5–1.4)
DIFFERENTIAL METHOD: ABNORMAL
EOSINOPHIL # BLD AUTO: 0.1 K/UL (ref 0–0.5)
EOSINOPHIL NFR BLD: 2.5 % (ref 0–8)
ERYTHROCYTE [DISTWIDTH] IN BLOOD BY AUTOMATED COUNT: 15.7 % (ref 11.5–14.5)
EST. GFR  (AFRICAN AMERICAN): >60 ML/MIN/1.73 M^2
EST. GFR  (NON AFRICAN AMERICAN): >60 ML/MIN/1.73 M^2
GLUCOSE SERPL-MCNC: 86 MG/DL (ref 70–110)
HAPTOGLOB SERPL-MCNC: 60 MG/DL (ref 30–250)
HCT VFR BLD AUTO: 28.1 % (ref 40–54)
HGB BLD-MCNC: 9.1 G/DL (ref 14–18)
IMM GRANULOCYTES # BLD AUTO: 0.04 K/UL (ref 0–0.04)
IMM GRANULOCYTES NFR BLD AUTO: 0.8 % (ref 0–0.5)
LYMPHOCYTES # BLD AUTO: 1.9 K/UL (ref 1–4.8)
LYMPHOCYTES NFR BLD: 35.8 % (ref 18–48)
MCH RBC QN AUTO: 28.3 PG (ref 27–31)
MCHC RBC AUTO-ENTMCNC: 32.4 G/DL (ref 32–36)
MCV RBC AUTO: 87 FL (ref 82–98)
MONOCYTES # BLD AUTO: 0.5 K/UL (ref 0.3–1)
MONOCYTES NFR BLD: 9.4 % (ref 4–15)
NEUTROPHILS # BLD AUTO: 2.7 K/UL (ref 1.8–7.7)
NEUTROPHILS NFR BLD: 51.9 % (ref 38–73)
NRBC BLD-RTO: 0 /100 WBC
PATH REV BLD -IMP: NORMAL
PLATELET # BLD AUTO: 238 K/UL (ref 150–350)
PLATELET BLD QL SMEAR: ABNORMAL
PMV BLD AUTO: 8.8 FL (ref 9.2–12.9)
POTASSIUM SERPL-SCNC: 4.1 MMOL/L (ref 3.5–5.1)
PROT SERPL-MCNC: 6.8 G/DL (ref 6–8.4)
RBC # BLD AUTO: 3.22 M/UL (ref 4.6–6.2)
RETICS/RBC NFR AUTO: 4.9 % (ref 0.4–2)
SODIUM SERPL-SCNC: 140 MMOL/L (ref 136–145)
WBC # BLD AUTO: 5.23 K/UL (ref 3.9–12.7)

## 2019-10-09 PROCEDURE — 80053 COMPREHEN METABOLIC PANEL: CPT

## 2019-10-09 PROCEDURE — 85060 BLOOD SMEAR INTERPRETATION: CPT | Mod: ,,, | Performed by: PATHOLOGY

## 2019-10-09 PROCEDURE — 3079F PR MOST RECENT DIASTOLIC BLOOD PRESSURE 80-89 MM HG: ICD-10-PCS | Mod: CPTII,S$GLB,, | Performed by: INTERNAL MEDICINE

## 2019-10-09 PROCEDURE — 99999 PR PBB SHADOW E&M-EST. PATIENT-LVL IV: CPT | Mod: PBBFAC,,, | Performed by: INTERNAL MEDICINE

## 2019-10-09 PROCEDURE — 36415 COLL VENOUS BLD VENIPUNCTURE: CPT

## 2019-10-09 PROCEDURE — 99203 OFFICE O/P NEW LOW 30 MIN: CPT | Mod: S$GLB,,, | Performed by: INTERNAL MEDICINE

## 2019-10-09 PROCEDURE — 99203 PR OFFICE/OUTPT VISIT, NEW, LEVL III, 30-44 MIN: ICD-10-PCS | Mod: S$GLB,,, | Performed by: INTERNAL MEDICINE

## 2019-10-09 PROCEDURE — 82668 ASSAY OF ERYTHROPOIETIN: CPT

## 2019-10-09 PROCEDURE — 3008F BODY MASS INDEX DOCD: CPT | Mod: CPTII,S$GLB,, | Performed by: INTERNAL MEDICINE

## 2019-10-09 PROCEDURE — 85045 AUTOMATED RETICULOCYTE COUNT: CPT

## 2019-10-09 PROCEDURE — 99999 PR PBB SHADOW E&M-EST. PATIENT-LVL IV: ICD-10-PCS | Mod: PBBFAC,,, | Performed by: INTERNAL MEDICINE

## 2019-10-09 PROCEDURE — 3075F SYST BP GE 130 - 139MM HG: CPT | Mod: CPTII,S$GLB,, | Performed by: INTERNAL MEDICINE

## 2019-10-09 PROCEDURE — 85025 COMPLETE CBC W/AUTO DIFF WBC: CPT

## 2019-10-09 PROCEDURE — 3075F PR MOST RECENT SYSTOLIC BLOOD PRESS GE 130-139MM HG: ICD-10-PCS | Mod: CPTII,S$GLB,, | Performed by: INTERNAL MEDICINE

## 2019-10-09 PROCEDURE — 3008F PR BODY MASS INDEX (BMI) DOCUMENTED: ICD-10-PCS | Mod: CPTII,S$GLB,, | Performed by: INTERNAL MEDICINE

## 2019-10-09 PROCEDURE — 85060 PATHOLOGIST REVIEW: ICD-10-PCS | Mod: ,,, | Performed by: PATHOLOGY

## 2019-10-09 PROCEDURE — 83010 ASSAY OF HAPTOGLOBIN QUANT: CPT

## 2019-10-09 PROCEDURE — 3079F DIAST BP 80-89 MM HG: CPT | Mod: CPTII,S$GLB,, | Performed by: INTERNAL MEDICINE

## 2019-10-09 NOTE — LETTER
October 11, 2019      Ruby Ortega NP  [address]  [place] [address]           [facility]  [address]  [place] [address]  Phone: [phone]  Fax: [phone]          Patient: Guillermo Castillo   MR Number: [id number]   YOB: 1970   Date of Visit: 10/9/2019       Dear Ruby Ortega:    Thank you for referring Guillermo Castillo to me for evaluation. Attached you will find relevant portions of my assessment and plan of care.    If you have questions, please do not hesitate to call me. I look forward to following Guillermo Castillo along with you.    Sincerely,    Елена Park MD    Enclosure  CC:  No Recipients    If you would like to receive this communication electronically, please contact [email or web address] or [phone] to request more information on [employer] access.    For providers and/or their staff who would like to refer a patient to [facility], please contact us through our one-stop-shop provider referral line, [facility], at [phone].    If you feel you have received this communication in error or would no longer like to receive these types of communications, please e-mail [email or web address]

## 2019-10-09 NOTE — PATIENT INSTRUCTIONS
- labs today  - will call with results and RV pending these results  - tentative 1 month follow up with labs that day prior

## 2019-10-10 LAB — PATH REV BLD -IMP: NORMAL

## 2019-10-11 ENCOUNTER — CLINICAL SUPPORT (OUTPATIENT)
Dept: REHABILITATION | Facility: HOSPITAL | Age: 49
End: 2019-10-11
Payer: COMMERCIAL

## 2019-10-11 ENCOUNTER — PATIENT OUTREACH (OUTPATIENT)
Dept: OTHER | Facility: OTHER | Age: 49
End: 2019-10-11

## 2019-10-11 DIAGNOSIS — M62.81 MUSCLE WEAKNESS OF RIGHT ARM: ICD-10-CM

## 2019-10-11 DIAGNOSIS — I10 ESSENTIAL HYPERTENSION: ICD-10-CM

## 2019-10-11 DIAGNOSIS — E11.9 TYPE 2 DIABETES MELLITUS WITHOUT COMPLICATION, WITHOUT LONG-TERM CURRENT USE OF INSULIN: ICD-10-CM

## 2019-10-11 DIAGNOSIS — R29.898 DECREASED GRIP STRENGTH OF RIGHT HAND: ICD-10-CM

## 2019-10-11 DIAGNOSIS — M25.521 RIGHT ELBOW PAIN: ICD-10-CM

## 2019-10-11 DIAGNOSIS — M25.511 RIGHT SHOULDER PAIN, UNSPECIFIED CHRONICITY: ICD-10-CM

## 2019-10-11 LAB — EPO SERPL-ACNC: 50.5 MIU/ML (ref 2.6–18.5)

## 2019-10-11 PROCEDURE — 97110 THERAPEUTIC EXERCISES: CPT

## 2019-10-11 PROCEDURE — 97140 MANUAL THERAPY 1/> REGIONS: CPT

## 2019-10-11 RX ORDER — VALSARTAN 320 MG/1
320 TABLET ORAL DAILY
Qty: 30 TABLET | Refills: 3
Start: 2019-10-11 | End: 2020-03-30

## 2019-10-11 NOTE — PROGRESS NOTES
BB  Subjective:      DATE OF VISIT: 10/9/19     ?  Patient ID:?Guillermo Castillo is a 49 y.o. male.?? MR#: [id number]   ?   REFERRING PROVIDER: Ruby Ortega NP  [address] [place] [address]     ? Primary Care Providers:  Kip Siddiqui MD, MD (General)     CHIEF COMPLAINT: ?  Normocytic anemia??   ?   HPI    I had the pleasure meeting Mr. Castillo a 49-year-old referred for further evaluation of anemia.  Denies previous anemia, iron deficiency or need for oral/infusional iron or blood transfusion.  He has no history bleeding in stool or otherwise.  He has never had colonoscopy as he is 49 years old and denies family history of colorectal cancers.  He does not have fever, chills, night sweats or unintentional weight loss.  No history of recurrent infections/hospitalizations.  No bleeding or bruising issues.    Review of Systems    ?   A comprehensive 14-point review of systems was reviewed with patient and was negative other than as specified above.   ?   PAST MEDICAL HISTORY:   Past Medical History:   Diagnosis Date    DM (diabetes mellitus) 05/2013     x 1 week ago 11/11/2015    DM (diabetes mellitus) 05/2013     03/01/2018 last tested    DM (diabetes mellitus) 05/2013     05/07/2019  last test 05/09/2019    Fatty liver     Gout     Hyperlipidemia     Hypertension     Hypertriglyceridemia     Mood disorder     Panic disorder     Type 2 diabetes mellitus     05/2013 BS    ?     PAST SURGICAL HISTORY:   Past Surgical History:   Procedure Laterality Date    APPENDECTOMY        ?   ALLERGIES:   Allergies as of 10/09/2019 - Reviewed 10/09/2019   Allergen Reaction Noted    Hydrochlorothiazide (bulk)  08/08/2013    Simvastatin  08/08/2013      ?   MEDICATIONS:?   No outpatient medications have been marked as taking for the 10/9/19 encounter (Initial consult) with Елена Park MD.      ?   SOCIAL HISTORY:?   Social History     Tobacco Use    Smoking status: Never Smoker     Smokeless tobacco: Never Used   Substance Use Topics    Alcohol use: Not Currently     Comment: rarely      ?      ?   FAMILY HISTORY:   family history includes Cataracts in his maternal grandmother and mother; Coronary artery disease in his father; Diabetes in his father, maternal aunt, and paternal aunt; Hypertension in his maternal grandmother and mother; Lung cancer in his father.   ?   Father with lung cancer, smoker    Objective:      Physical Exam      ?   Vitals:    10/09/19 1110   BP: 131/83   Pulse: 62   Temp: 98 °F (36.7 °C)      ?   ECO  General appearance: Generally well appearing, in no acute distress.   Head, eyes, ears, nose, and throat: Pupils round and equally reactive to light. Oropharynx clear with moist mucous membranes.   Lymph: No palpable cervical or supraclavicular lymphadenopathy.   Cardiovascular: Regular rate and rhythm, S1, S2, no audible murmurs.   Respiratory: Lungs clear to auscultation bilaterally.   Abdomen: Bowel sounds present, soft, nontender, nondistended. No palpable hepatosplenomegaly.   Extremities: Warm, without edema.   Neurologic: Alert and oriented. Grossly normal strength, coordination, and gait.   Skin: No rashes, ecchymoses or petechial lesion.   ?      ?   Laboratory:  ?   Lab Visit on 10/09/2019   Component Date Value Ref Range Status    Pathologist Review 10/09/2019 Review required   Final    Retic 10/09/2019 4.9* 0.4 - 2.0 % Final    Haptoglobin 10/09/2019 60  30 - 250 mg/dL Final    Sodium 10/09/2019 140  136 - 145 mmol/L Final    Potassium 10/09/2019 4.1  3.5 - 5.1 mmol/L Final    Chloride 10/09/2019 106  95 - 110 mmol/L Final    CO2 10/09/2019 25  23 - 29 mmol/L Final    Glucose 10/09/2019 86  70 - 110 mg/dL Final    BUN, Bld 10/09/2019 15  6 - 20 mg/dL Final    Creatinine 10/09/2019 1.0  0.5 - 1.4 mg/dL Final    Calcium 10/09/2019 8.8  8.7 - 10.5 mg/dL Final    Total Protein 10/09/2019 6.8  6.0 - 8.4 g/dL Final    Albumin 10/09/2019 3.9   3.5 - 5.2 g/dL Final    Total Bilirubin 10/09/2019 0.6  0.1 - 1.0 mg/dL Final    Alkaline Phosphatase 10/09/2019 92  55 - 135 U/L Final    AST 10/09/2019 19  10 - 40 U/L Final    ALT 10/09/2019 21  10 - 44 U/L Final    Anion Gap 10/09/2019 9  8 - 16 mmol/L Final    eGFR if African American 10/09/2019 >60.0  >60 mL/min/1.73 m^2 Final    eGFR if non African American 10/09/2019 >60.0  >60 mL/min/1.73 m^2 Final    WBC 10/09/2019 5.23  3.90 - 12.70 K/uL Final    RBC 10/09/2019 3.22* 4.60 - 6.20 M/uL Final    Hemoglobin 10/09/2019 9.1* 14.0 - 18.0 g/dL Final    Hematocrit 10/09/2019 28.1* 40.0 - 54.0 % Final    Mean Corpuscular Volume 10/09/2019 87  82 - 98 fL Final    Mean Corpuscular Hemoglobin 10/09/2019 28.3  27.0 - 31.0 pg Final    Mean Corpuscular Hemoglobin Conc 10/09/2019 32.4  32.0 - 36.0 g/dL Final    RDW 10/09/2019 15.7* 11.5 - 14.5 % Final    Platelets 10/09/2019 238  150 - 350 K/uL Final    MPV 10/09/2019 8.8* 9.2 - 12.9 fL Final    Immature Granulocytes 10/09/2019 0.8* 0.0 - 0.5 % Final    Gran # (ANC) 10/09/2019 2.7  1.8 - 7.7 K/uL Final    Immature Grans (Abs) 10/09/2019 0.04  0.00 - 0.04 K/uL Final    Lymph # 10/09/2019 1.9  1.0 - 4.8 K/uL Final    Mono # 10/09/2019 0.5  0.3 - 1.0 K/uL Final    Eos # 10/09/2019 0.1  0.0 - 0.5 K/uL Final    Baso # 10/09/2019 0.02  0.00 - 0.20 K/uL Final    nRBC 10/09/2019 0  0 /100 WBC Final    Gran% 10/09/2019 51.9  38.0 - 73.0 % Final    Lymph% 10/09/2019 35.8  18.0 - 48.0 % Final    Mono% 10/09/2019 9.4  4.0 - 15.0 % Final    Eosinophil% 10/09/2019 2.5  0.0 - 8.0 % Final    Basophil% 10/09/2019 0.4  0.0 - 1.9 % Final    Platelet Estimate 10/09/2019 Appears normal   Final    Differential Method 10/09/2019 Automated   Final    Pathologist Review Peripheral Smear 10/09/2019 REVIEWED   Final          ?   Assessment/Plan:       1. Anemia, unspecified type          Plan:     # Normocytic anemia:  I reviewed his prior labs which are notable for  prior normal blood counts with hemoglobin 13-14 and new anemia as of January 2018.  Iron indices show 15% saturation with a normal ferritin.  He has no convincing evidence iron deficiency (transferrin and TIBC not notably elevated).  Reticulocyte count appropriately elevated.  Erythropoietin pending.  He has no renal disease her labs.  He does have a history of diabetes.  Normal haptoglobin and bilirubin making hemolysis unlikely.  Peripheral blood smear unremarkable aside from normocytic anemia.  No other cytopenias.  I am concerned about his anemia with hemoglobin 8-9 from a baseline prior to January 2018 of 13-14.  Cannot rule out some degree of iron deficiency given lower iron saturation since stores may not be depleted if acute blood loss. He has no obvious evidence of GI bleeding but given marked decline in hemoglobin I do recommend that he pursue colonoscopy/EGD further workup anemia.  Recommend trial of oral iron ferrous sulfate 325 mg b.i.d. and follow up in 1 month to reassess.        Follow-Up:   1 month with labs 1 week prior  Colonoscopy/egd ordered

## 2019-10-11 NOTE — PROGRESS NOTES
Digital Medicine: Clinician Follow-Up    Patient is anemic and is being worked up for a GI bleed. He's taken a BC powder 3-5 days a week for the past year. He started BC powder in 2011 after a gout flare-up. The GI bleed could be the cause of hypotension episode a few weeks back.    The history is provided by the patient.     Follow Up  Follow-up reason(s): reading review and medication change      Readings are trending down His BP and BG readings have remained at goal.    He is tolerating the higher dose of Ozempic.                  Medication Adherence:   He misses doses: never        INTERVENTION(S)  reviewed appropriate dose schedule    PLAN  patient verbalizes understanding    Patient will increase his BP readings. Patient will resume valsartan 320mg daily.    Patient will continue his current DM medications.           Topic    Lipid (Cholesterol) Test     Urine Protein Check        Last 5 Patient Entered Readings                                      Current 30 Day Average: 127/67     Recent Readings 10/5/2019 9/27/2019 9/27/2019 9/27/2019 9/27/2019    SBP (mmHg) 115 116 102 104 101    DBP (mmHg) 68 71 54 60 58    Pulse 71 82 78 73 75        Last 6 Patient Entered Readings                                          Most Recent A1c: 6.2% on 9/29/2019  (Goal: 7%)     Recent Readings 10/11/2019 10/10/2019 10/6/2019 10/5/2019 10/4/2019    Blood Glucose (mg/dL) 104 110 122 114 99             Diabetes Medications             insulin lispro (HUMALOG U-100 INSULIN) 100 unit/mL injection Use via sliding scale    metFORMIN (GLUCOPHAGE-XR) 500 MG 24 hr tablet TAKE 2 TABLETS BY MOUTH ONCE DAILY    semaglutide (OZEMPIC) 0.25 mg or 0.5 mg(2 mg/1.5 mL) PnIj Inject 0.25 mg into the skin every 7 days.

## 2019-10-14 ENCOUNTER — TELEPHONE (OUTPATIENT)
Dept: ENDOSCOPY | Facility: HOSPITAL | Age: 49
End: 2019-10-14

## 2019-10-14 ENCOUNTER — LAB VISIT (OUTPATIENT)
Dept: LAB | Facility: HOSPITAL | Age: 49
End: 2019-10-14
Payer: COMMERCIAL

## 2019-10-14 ENCOUNTER — CLINICAL SUPPORT (OUTPATIENT)
Dept: REHABILITATION | Facility: HOSPITAL | Age: 49
End: 2019-10-14
Payer: COMMERCIAL

## 2019-10-14 ENCOUNTER — OFFICE VISIT (OUTPATIENT)
Dept: INTERNAL MEDICINE | Facility: CLINIC | Age: 49
End: 2019-10-14
Payer: COMMERCIAL

## 2019-10-14 ENCOUNTER — TELEPHONE (OUTPATIENT)
Dept: HEMATOLOGY/ONCOLOGY | Facility: CLINIC | Age: 49
End: 2019-10-14

## 2019-10-14 VITALS
TEMPERATURE: 97 F | DIASTOLIC BLOOD PRESSURE: 68 MMHG | BODY MASS INDEX: 31.95 KG/M2 | HEIGHT: 72 IN | SYSTOLIC BLOOD PRESSURE: 120 MMHG | WEIGHT: 235.88 LBS | OXYGEN SATURATION: 98 % | HEART RATE: 68 BPM

## 2019-10-14 DIAGNOSIS — M62.81 MUSCLE WEAKNESS OF RIGHT ARM: ICD-10-CM

## 2019-10-14 DIAGNOSIS — M25.511 RIGHT SHOULDER PAIN, UNSPECIFIED CHRONICITY: ICD-10-CM

## 2019-10-14 DIAGNOSIS — D64.9 ANEMIA, UNSPECIFIED TYPE: ICD-10-CM

## 2019-10-14 DIAGNOSIS — R06.02 SHORTNESS OF BREATH: ICD-10-CM

## 2019-10-14 DIAGNOSIS — M25.521 RIGHT ELBOW PAIN: ICD-10-CM

## 2019-10-14 DIAGNOSIS — R29.898 DECREASED GRIP STRENGTH OF RIGHT HAND: ICD-10-CM

## 2019-10-14 DIAGNOSIS — I10 HYPERTENSION, UNSPECIFIED TYPE: ICD-10-CM

## 2019-10-14 DIAGNOSIS — E11.9 TYPE 2 DIABETES MELLITUS WITHOUT COMPLICATION, WITHOUT LONG-TERM CURRENT USE OF INSULIN: Primary | ICD-10-CM

## 2019-10-14 PROCEDURE — 99214 OFFICE O/P EST MOD 30 MIN: CPT | Mod: 25,S$GLB,, | Performed by: FAMILY MEDICINE

## 2019-10-14 PROCEDURE — 99999 PR PBB SHADOW E&M-EST. PATIENT-LVL IV: ICD-10-PCS | Mod: PBBFAC,,, | Performed by: FAMILY MEDICINE

## 2019-10-14 PROCEDURE — 3044F HG A1C LEVEL LT 7.0%: CPT | Mod: CPTII,S$GLB,, | Performed by: FAMILY MEDICINE

## 2019-10-14 PROCEDURE — 97140 MANUAL THERAPY 1/> REGIONS: CPT

## 2019-10-14 PROCEDURE — 90686 IIV4 VACC NO PRSV 0.5 ML IM: CPT | Mod: S$GLB,,, | Performed by: FAMILY MEDICINE

## 2019-10-14 PROCEDURE — 3008F PR BODY MASS INDEX (BMI) DOCUMENTED: ICD-10-PCS | Mod: CPTII,S$GLB,, | Performed by: FAMILY MEDICINE

## 2019-10-14 PROCEDURE — 90686 FLU VACCINE (QUAD) GREATER THAN OR EQUAL TO 3YO PRESERVATIVE FREE IM: ICD-10-PCS | Mod: S$GLB,,, | Performed by: FAMILY MEDICINE

## 2019-10-14 PROCEDURE — 97110 THERAPEUTIC EXERCISES: CPT

## 2019-10-14 PROCEDURE — 99999 PR PBB SHADOW E&M-EST. PATIENT-LVL IV: CPT | Mod: PBBFAC,,, | Performed by: FAMILY MEDICINE

## 2019-10-14 PROCEDURE — 97014 ELECTRIC STIMULATION THERAPY: CPT

## 2019-10-14 PROCEDURE — 3078F PR MOST RECENT DIASTOLIC BLOOD PRESSURE < 80 MM HG: ICD-10-PCS | Mod: CPTII,S$GLB,, | Performed by: FAMILY MEDICINE

## 2019-10-14 PROCEDURE — 99214 PR OFFICE/OUTPT VISIT, EST, LEVL IV, 30-39 MIN: ICD-10-PCS | Mod: 25,S$GLB,, | Performed by: FAMILY MEDICINE

## 2019-10-14 PROCEDURE — 3078F DIAST BP <80 MM HG: CPT | Mod: CPTII,S$GLB,, | Performed by: FAMILY MEDICINE

## 2019-10-14 PROCEDURE — 90471 IMMUNIZATION ADMIN: CPT | Mod: S$GLB,,, | Performed by: FAMILY MEDICINE

## 2019-10-14 PROCEDURE — 3008F BODY MASS INDEX DOCD: CPT | Mod: CPTII,S$GLB,, | Performed by: FAMILY MEDICINE

## 2019-10-14 PROCEDURE — 3074F SYST BP LT 130 MM HG: CPT | Mod: CPTII,S$GLB,, | Performed by: FAMILY MEDICINE

## 2019-10-14 PROCEDURE — 3044F PR MOST RECENT HEMOGLOBIN A1C LEVEL <7.0%: ICD-10-PCS | Mod: CPTII,S$GLB,, | Performed by: FAMILY MEDICINE

## 2019-10-14 PROCEDURE — 90471 FLU VACCINE (QUAD) GREATER THAN OR EQUAL TO 3YO PRESERVATIVE FREE IM: ICD-10-PCS | Mod: S$GLB,,, | Performed by: FAMILY MEDICINE

## 2019-10-14 PROCEDURE — 81003 URINALYSIS AUTO W/O SCOPE: CPT

## 2019-10-14 PROCEDURE — 3074F PR MOST RECENT SYSTOLIC BLOOD PRESSURE < 130 MM HG: ICD-10-PCS | Mod: CPTII,S$GLB,, | Performed by: FAMILY MEDICINE

## 2019-10-14 RX ORDER — FERROUS GLUCONATE 324(38)MG
324 TABLET ORAL
Qty: 30 TABLET | Refills: 2 | Status: SHIPPED | OUTPATIENT
Start: 2019-10-14 | End: 2020-05-21

## 2019-10-14 NOTE — TELEPHONE ENCOUNTER
Good afternoon,   I can add the egd to the colonoscopy, I just need a diagnosis code in order to do so.  Thanks for you time.

## 2019-10-14 NOTE — TELEPHONE ENCOUNTER
----- Message from Tammy Kramer MA sent at 10/14/2019  1:24 PM CDT -----  Regarding: endo colonscopy      ----- Message -----  From: Елена Park MD  Sent: 10/11/2019  12:35 AM CDT  To: Елена Park MD, #    Please assist in ordering I did not know what provider to put in order so put a random rn btw but be sure schedule both egd and colo for him please

## 2019-10-14 NOTE — Clinical Note
Dr Hackett am seeing Mr Castillo this morning. I dont see a November visit with you scheduled. Does he need that?Also do you know if egd/cscope ordered? Marilee

## 2019-10-14 NOTE — TELEPHONE ENCOUNTER
Endoscopy Scheduling Questionnaire:    1. Have you been admitted overnight to the hospital in the past 3 months? yes anemia  2. Have you had a cardiac stent placed in the past 12 months? no  3. Have you had a stroke or heart attack in the past 6 months? no  4. Have you had any chest pain in the past 3 months? no      If so, have you been evaluated by your PCP or Cardiologist? no  5. Do you take prescription weight loss medication? no  6. Have you been diagnosed with Diverticulitis within the past 3 months? no  7. Are you on dialysis? no  8. Are you diabetic? yes Do you have an insulin pump? no  9. Do you have any other health issues that you feel might limit your ability to safely have the procedure and/or sedation? no  10. Is the patient over 79 yo? no        If so, has the patient been seen by their PCP or GI in the last 6 months? N/A       -I have reviewed the last colonoscopy for recommendations regarding surveillance and bowel prep  is NA  -I have reviewed the patient's medications and allergies. He is not on high risk medication, A clearance request NA  -I have verified the pharmacy information. The prep being used is Miralax. The patient's prep instructions were sent via [facility] patient portal..    Date Endoscopy Scheduled: Date: 11- [place]

## 2019-10-14 NOTE — PROGRESS NOTES
Subjective:       Patient ID: Guillermo Castillo is a 49 y.o. male.    Chief Complaint: Follow-up    HPIhtn bp was too low w added chlorthal. Working w dig htn to control bp. Back on vals off chlorh and bps ok  hosp admit also noticed new onset anemia saw dr oglesby and scopes planned and f/u one month  Mc weeks no cp    Past Medical History:   Diagnosis Date    DM (diabetes mellitus) 05/2013     x 1 week ago 11/11/2015    DM (diabetes mellitus) 05/2013     03/01/2018 last tested    DM (diabetes mellitus) 05/2013     05/07/2019  last test 05/09/2019    Fatty liver     Gout     Hyperlipidemia     Hypertension     Hypertriglyceridemia     Mood disorder     Panic disorder     Type 2 diabetes mellitus     05/2013 BS     Past Surgical History:   Procedure Laterality Date    APPENDECTOMY       Family History   Problem Relation Age of Onset    Coronary artery disease Father     Diabetes Father     Lung cancer Father     Diabetes Maternal Aunt     Diabetes Paternal Aunt     Hypertension Maternal Grandmother     Cataracts Maternal Grandmother     Hypertension Mother     Cataracts Mother      Social History     Socioeconomic History    Marital status: Single     Spouse name: Not on file    Number of children: Not on file    Years of education: Not on file    Highest education level: Not on file   Occupational History    Not on file   Social Needs    Financial resource strain: Not on file    Food insecurity:     Worry: Not on file     Inability: Not on file    Transportation needs:     Medical: Not on file     Non-medical: Not on file   Tobacco Use    Smoking status: Never Smoker    Smokeless tobacco: Never Used   Substance and Sexual Activity    Alcohol use: Not Currently     Comment: rarely    Drug use: No    Sexual activity: Yes     Partners: Female   Lifestyle    Physical activity:     Days per week: Not on file     Minutes per session: Not on file    Stress: Not on file    Relationships    Social connections:     Talks on phone: Not on file     Gets together: Not on file     Attends Religion service: Not on file     Active member of club or organization: Not on file     Attends meetings of clubs or organizations: Not on file     Relationship status: Not on file   Other Topics Concern    Not on file   Social History Narrative    Not on file   '  Review of Systems  no cp  Objective:      Physical Exam   Constitutional: He appears well-developed and well-nourished.   Cardiovascular: Normal rate and regular rhythm.   Pulmonary/Chest: Effort normal and breath sounds normal.       Assessment:       1. Type 2 diabetes mellitus without complication, without long-term current use of insulin    2. Hypertension, unspecified type    3. Anemia, unspecified type ? ana     traore  Plan:       *msg *dr oglesby about f/u and scopes  D/c asa and nsaids for now*    Begin iron as planned;constipation precaut  Has f/u dm sched  Suspect traore related to anemia but stress echo\  ua today r/o gu source  Has raciel vidales cbc  Type 2 diabetes mellitus without complication, without long-term current use of insulin    Hypertension, unspecified type    Anemia, unspecified type  -     Urinalysis    Shortness of breath  -     Exercise stress echo; Future    Other orders  -     ferrous gluconate (FERGON) 324 MG tablet; Take 1 tablet (324 mg total) by mouth daily with breakfast.  Dispense: 30 tablet; Refill: 2    Flu shot

## 2019-10-14 NOTE — PROGRESS NOTES
Physical Therapy Daily Treatment Note     Name: Guillermo Castillo  Clinic Number: [id number]    Therapy Diagnosis:   Encounter Diagnoses   Name Primary?    Right elbow pain     Muscle weakness of right arm     Right shoulder pain, unspecified chronicity     Decreased  strength of right hand      Physician: Ruby Ortega NP    Visit Date: 10/11/2019    Physician Orders: PT Eval and Treat   Medical Diagnosis from Referral: Right elbow pain, right forearm pain  Evaluation Date: 7/31/2019  Authorization Period Expiration: 12/31/2019  Plan of Care Expiration: 8/30/2019  Visit # / Visits authorized: 13/25      Time In: 7:30am  Time Out: 8:30am  Total Billable Time:45 minutes    Precautions: Standard    Subjective     Pt reports: Elbow is still bothering him, but he had to go see a hematologist who told him that he either has a some type of GI bleed or possibly cancer.  Pt reports he is anemic and they are trying to figure out why.  He has undergone a lot of testing and they will be calling him today.    He was compliant with home exercise program.  Response to previous treatment: good  Functional change:decreased pain with gripping    Pain:  NT/10  Location: right elbow      Objective     Guillermo received therapeutic exercises to develop ROM for 30  minutes including:    AROM - elbow flexed, wrist flexion 3x10  AROM - elbow flexed, palm to body 3x10  AROM - elbow extended, wrist flexion 3x10  AROM - elbow extended, palm to body 3x10  Passive stretch forearm, elbow ext UD 3x30s  Doorway stretch 3/1'  Prone LT isometric 2'  Prone LT with lats 2', 2#  Supination pronation 2#, 3/10  Wrist ext<>flexion 2#, 3/10 palm down, and palm up   Juan J V 10/2 #10 each  Bicep/tricep #10, 10/2 each      Guillermo received the following manual therapy techniques of soft tissue mobilization/manual to the: R upper quadrant for 15 minutes, including: B Subscapular releases, UT/levator scap, & scapular tilting, and manual  releases/STM/clearing to forearm extensors    Home Exercises Provided and Patient Education Provided     Education provided:   - Continue with HEP, monitor sx's, try and  underhand at work.    Written Home Exercises Provided: Patient instructed to cont prior HEP.  Exercises were reviewed and Guillermo was able to demonstrate them prior to the end of the session.  Guillermo demonstrated good  understanding of the education provided.     See EMR under Patient Instructions for exercises provided previously.    Assessment     Pt able to progress with resistance without c/o today, he has increased tone over forearm extensors.    Guillermo is progressing well towards his goals.   Pt prognosis is Good.     Pt will continue to benefit from skilled outpatient physical therapy to address the deficits listed in the problem list box on initial evaluation, provide pt/family education and to maximize pt's level of independence in the home and community environment.     Pt's spiritual, cultural and educational needs considered and pt agreeable to plan of care and goals.     Anticipated barriers to physical therapy: history of elbow injury, pain    Goals:   Short Term Goals: In 3 weeks   1.Pt to be educated on HEP.  2.Patient to increase RUE  strength by 5#, without pain.  3.Patient to report pain at 1/10 or less with return to normal work schedule.  4.Patient to take the UEFS with goals made PRN.  5. Pt to be educated on body mechanics awareness.     Long Term Goals: In 6 weeks  1.LTG for UEFS.  2. Patient to demo increase in all UE/hand strength to 5/5.  3. Patient to have decreased pain to 0/10 at all times.  4. Pt to increase R  strength to 10# greater than L.  5. Patient to perform daily activities including gripping, lifting loads, and required work duties without pain or limitation.    Plan     Continue with tx, progress with strengthening as tolerated by pt, monitor response to tx today.  Progress with scapular and  shoulder strengthening as well as elbow/wrist.    Екатерина Donovan, PT

## 2019-10-14 NOTE — PROGRESS NOTES
Physical Therapy Daily Treatment Note     Name: Guillermo Castillo  Clinic Number: [id number]    Therapy Diagnosis:   Encounter Diagnoses   Name Primary?    Right elbow pain     Muscle weakness of right arm     Right shoulder pain, unspecified chronicity     Decreased  strength of right hand      Physician: Ruby Ortega NP    Visit Date: 10/14/2019    Physician Orders: PT Eval and Treat   Medical Diagnosis from Referral: Right elbow pain, right forearm pain  Evaluation Date: 7/31/2019  Authorization Period Expiration: 12/31/2019  Plan of Care Expiration: 8/30/2019  Visit # / Visits authorized: 13/25      Time In: 7:30am  Time Out: 8:40am  Total Billable Time:30 minutes    Precautions: Standard    Subjective     Pt reports: Elbow did OK over the weekend a little sore but no increased pian with strengthening ex's and additional weight.    He was compliant with home exercise program.  Response to previous treatment: good  Functional change:decreased pain with gripping    Pain:  NT/10  Location: right elbow      Objective     Guillermo received therapeutic exercises to develop ROM for 30  minutes including:    AROM - elbow flexed, wrist flexion 3x10  AROM - elbow flexed, palm to body 3x10  AROM - elbow extended, wrist flexion 3x10  AROM - elbow extended, palm to body 3x10  Passive stretch forearm, elbow ext UD 3x30s  Foam roll stretch 3'  Prone LT isometric 2'  Prone LT with lats 2', 2#  Supination pronation 2#, 3/10  Wrist ext<>flexion 2#, 3/10 palm down, and palm up   [place] V 10/2 #10 each  Bicep/tricep #10, 10/2 each    Guillermo received the following manual therapy techniques of soft tissue mobilization/manual to the: R upper quadrant for 15 minutes, including: B Subscapular releases, UT/levator scap, & scapular tilting, and manual releases/STM/clearing to forearm extensors, first rib mob on R post tx session.    Guillermo received the following supervised modalities after being cleared for contradictions: IFC  Electrical Stimulation:  Guillermo received IFC Electrical Stimulation for pain control applied to the R elbow. Pt received stimulation at 40 % scan at a frequency of  for 15 minutes, with cold pack to elbow as well. Guillermo tolerated treatment well without any adverse effects.      Home Exercises Provided and Patient Education Provided     Education provided:   - Continue with HEP, monitor sx's, try and  underhand at work.    Written Home Exercises Provided: Patient instructed to cont prior HEP.  Exercises were reviewed and Guillermo was able to demonstrate them prior to the end of the session.  Guillermo demonstrated good  understanding of the education provided.     See EMR under Patient Instructions for exercises provided previously.    Assessment     Some c/o pain over UT/levator with therex today.  Cues for scapular control.    Guillermo is progressing well towards his goals.   Pt prognosis is Good.     Pt will continue to benefit from skilled outpatient physical therapy to address the deficits listed in the problem list box on initial evaluation, provide pt/family education and to maximize pt's level of independence in the home and community environment.     Pt's spiritual, cultural and educational needs considered and pt agreeable to plan of care and goals.     Anticipated barriers to physical therapy: history of elbow injury, pain    Goals:   Short Term Goals: In 3 weeks   1.Pt to be educated on HEP.  2.Patient to increase RUE  strength by 5#, without pain.  3.Patient to report pain at 1/10 or less with return to normal work schedule.  4.Patient to take the UEFS with goals made PRN.  5. Pt to be educated on body mechanics awareness.     Long Term Goals: In 6 weeks  1.LTG for UEFS.  2. Patient to demo increase in all UE/hand strength to 5/5.  3. Patient to have decreased pain to 0/10 at all times.  4. Pt to increase R  strength to 10# greater than L.  5. Patient to perform daily activities  including gripping, lifting loads, and required work duties without pain or limitation.    Plan     Continue with tx, progress with strengthening as tolerated by pt, monitor response to tx today.  Progress with scapular and shoulder strengthening as well as elbow/wrist.    Екатерина Donovan, PT

## 2019-10-15 NOTE — TELEPHONE ENCOUNTER
Spoke with patient regarding procedure.  Patient is aware of egd being added to colonoscopy per provider's request.

## 2019-10-18 ENCOUNTER — CLINICAL SUPPORT (OUTPATIENT)
Dept: REHABILITATION | Facility: HOSPITAL | Age: 49
End: 2019-10-18
Payer: COMMERCIAL

## 2019-10-18 DIAGNOSIS — M62.81 MUSCLE WEAKNESS OF RIGHT ARM: ICD-10-CM

## 2019-10-18 DIAGNOSIS — M25.511 RIGHT SHOULDER PAIN, UNSPECIFIED CHRONICITY: ICD-10-CM

## 2019-10-18 DIAGNOSIS — M25.521 RIGHT ELBOW PAIN: ICD-10-CM

## 2019-10-18 DIAGNOSIS — R29.898 DECREASED GRIP STRENGTH OF RIGHT HAND: ICD-10-CM

## 2019-10-18 PROCEDURE — 97110 THERAPEUTIC EXERCISES: CPT

## 2019-10-18 PROCEDURE — 97014 ELECTRIC STIMULATION THERAPY: CPT

## 2019-10-18 PROCEDURE — 97140 MANUAL THERAPY 1/> REGIONS: CPT

## 2019-10-21 ENCOUNTER — CLINICAL SUPPORT (OUTPATIENT)
Dept: REHABILITATION | Facility: HOSPITAL | Age: 49
End: 2019-10-21
Payer: COMMERCIAL

## 2019-10-21 DIAGNOSIS — M25.511 RIGHT SHOULDER PAIN, UNSPECIFIED CHRONICITY: ICD-10-CM

## 2019-10-21 DIAGNOSIS — M25.521 RIGHT ELBOW PAIN: ICD-10-CM

## 2019-10-21 DIAGNOSIS — R29.898 DECREASED GRIP STRENGTH OF RIGHT HAND: ICD-10-CM

## 2019-10-21 DIAGNOSIS — M62.81 MUSCLE WEAKNESS OF RIGHT ARM: ICD-10-CM

## 2019-10-21 PROCEDURE — 97110 THERAPEUTIC EXERCISES: CPT

## 2019-10-21 PROCEDURE — 97140 MANUAL THERAPY 1/> REGIONS: CPT

## 2019-10-21 NOTE — PROGRESS NOTES
Physical Therapy Daily Treatment Note     Name: Guillermo Castillo  Clinic Number: [id number]    Therapy Diagnosis:   Encounter Diagnoses   Name Primary?    Right elbow pain     Muscle weakness of right arm     Right shoulder pain, unspecified chronicity     Decreased  strength of right hand      Physician: Ruby Ortega NP    Visit Date: 10/18/2019    Physician Orders: PT Eval and Treat   Medical Diagnosis from Referral: Right elbow pain, right forearm pain  Evaluation Date: 7/31/2019  Authorization Period Expiration: 12/31/2019  Plan of Care Expiration: 8/30/2019  Visit # / Visits authorized: 15/25      Time In: 11:15 am  Time Out: 12:30 pm  Total Billable Time: 40 minutes    Precautions: Standard    Subjective     Pt reports: Elbow did OK until thsi morning when they had to lift 2 large people - he reports some relief post tx session - soreness, cramping after FDN, re-needled then improved.  He reports that he has a GI series scheduled but he is thinking that some of the anemia and his other sx's may be related to his BP medicine.    He was compliant with home exercise program.  Response to previous treatment: good  Functional change:decreased pain with gripping    Pain:  NT/10  Location: right elbow      Objective     Guillermo received therapeutic exercises to develop ROM for 30 minutes including:    AROM - elbow flexed, wrist flexion 3x10  AROM - elbow flexed, palm to body 3x10  AROM - elbow extended, wrist flexion 3x10  AROM - elbow extended, palm to body 3x10  Passive stretch forearm, elbow ext UD 3x30s  Foam roll stretch 3'  Prone LT isometric 2'  Prone LT with lats 2', 2#  Supination pronation 2#, 3/10  Wrist ext<>flexion 2#, 3/10 palm down, and palm up     Guillermo received the following manual therapy techniques of soft tissue mobilization/manual to the: R upper quadrant for 10  minutes, including: B Subscapular releases, UT/levator scap, & scapular tilting, and manual releases/STM/clearing to forearm  extensors.    FDN with prolonged insertion and estim for twitch response at ECRB/ECRL area, then pecking at common extensor insertion x 2.    Guillermo received the following supervised modalities after being cleared for contradictions: IFC Electrical Stimulation:  Guillermo received IFC Electrical Stimulation for pain control applied to the R elbow. Pt received stimulation at 40 % scan at a frequency of  for 15 minutes, with cold pack to elbow as well. Guillermo tolerated treatment well without any adverse effects.      Home Exercises Provided and Patient Education Provided     Education provided:   - Continue with HEP, monitor sx's, try and  underhand at work.    Written Home Exercises Provided: Patient instructed to cont prior HEP.  Exercises were reviewed and Guillermo was able to demonstrate them prior to the end of the session.  Guillermo demonstrated good  understanding of the education provided.     See EMR under Patient Instructions for exercises provided previously.    Assessment     Cramping after initial FDN, improved after 2nd pecking over common extensor tendon, pt with con't discomfort but charmaine to tolerate increased weight today with strengthening ex's.    Guillermo is progressing well towards his goals.   Pt prognosis is Good.     Pt will continue to benefit from skilled outpatient physical therapy to address the deficits listed in the problem list box on initial evaluation, provide pt/family education and to maximize pt's level of independence in the home and community environment.     Pt's spiritual, cultural and educational needs considered and pt agreeable to plan of care and goals.     Anticipated barriers to physical therapy: history of elbow injury, pain    Goals:   Short Term Goals: In 3 weeks   1.Pt to be educated on HEP.  2.Patient to increase RUE  strength by 5#, without pain.  3.Patient to report pain at 1/10 or less with return to normal work schedule.  4.Patient to take the UEFS with goals  made PRN.  5. Pt to be educated on body mechanics awareness.     Long Term Goals: In 6 weeks  1.LTG for UEFS.  2. Patient to demo increase in all UE/hand strength to 5/5.  3. Patient to have decreased pain to 0/10 at all times.  4. Pt to increase R  strength to 10# greater than L.  5. Patient to perform daily activities including gripping, lifting loads, and required work duties without pain or limitation.    Plan     Continue with tx, progress with strengthening as tolerated by pt, monitor response to tx today.  Progress with scapular and shoulder strengthening as well as elbow/wrist.    Екатерина Donovan, PT

## 2019-10-22 NOTE — PROGRESS NOTES
Physical Therapy Daily Treatment Note     Name: Guillermo Castillo  Clinic Number: [id number]    Therapy Diagnosis:   Encounter Diagnoses   Name Primary?    Right elbow pain     Muscle weakness of right arm     Right shoulder pain, unspecified chronicity     Decreased  strength of right hand      Physician: Ruby Ortega NP    Visit Date: 10/21/2019    Physician Orders: PT Eval and Treat   Medical Diagnosis from Referral: Right elbow pain, right forearm pain  Evaluation Date: 7/31/2019  Authorization Period Expiration: 12/31/2019  Plan of Care Expiration: 8/30/2019  Visit # / Visits authorized: 15/25      Time In: 11:15 am  Time Out: 12:30 pm  Total Billable Time: 41 minutes    Precautions: Standard    Subjective     Pt reports: Elbow did OK until thsi morning when they had to lift 2 large people - he reports some relief post tx session - soreness, cramping after FDN, re-needled then improved.  He reports that he has a GI series scheduled but he is thinking that some of the anemia and his other sx's may be related to his BP medicine.    He was compliant with home exercise program.  Response to previous treatment: good  Functional change:decreased pain with gripping    Pain:  NT/10  Location: right elbow      Objective     Guillermo received therapeutic exercises to develop ROM for 33 minutes including:    AROM - elbow flexed, wrist flexion 3x10  AROM - elbow flexed, palm to body 3x10  AROM - elbow extended, wrist flexion 3x10  AROM - elbow extended, palm to body 3x10  Passive stretch forearm, elbow ext UD 3x30s  Foam roll stretch 3'  Prone LT isometric 2'  Prone LT with lats 2'  Supination pronation 3#, 3/10  Wrist ext<>flexion 3#, 3/10 palm down, and palm up   [place] V (no abd) 10# 2/10 ea  Bicep/tricep #10 2/10 ea      Guillermo received the following manual therapy techniques of soft tissue mobilization/manual to the: R upper quadrant for 8 minutes, including: B Subscapular releases, UT/levator scap, &  scapular tilting, and manual releases/STM/clearing to forearm extensors.    FDN with prolonged insertion and estim for twitch response at ECRB/ECRL area, then pecking at common extensor insertion x 2.      Home Exercises Provided and Patient Education Provided     Education provided:   - Continue with HEP, monitor sx's, try and  underhand at work.    Written Home Exercises Provided: Patient instructed to cont prior HEP.  Exercises were reviewed and Guillermo was able to demonstrate them prior to the end of the session.  Guillermo demonstrated good  understanding of the education provided.     See EMR under Patient Instructions for exercises provided previously.    Assessment     Decreased pain after manual and FDN with gripping.  Able to tolerate 3# again.  Cues for speed of ex's and scapular stability.    Guillermo is progressing well towards his goals.   Pt prognosis is Good.     Pt will continue to benefit from skilled outpatient physical therapy to address the deficits listed in the problem list box on initial evaluation, provide pt/family education and to maximize pt's level of independence in the home and community environment.     Pt's spiritual, cultural and educational needs considered and pt agreeable to plan of care and goals.     Anticipated barriers to physical therapy: history of elbow injury, pain    Goals:   Short Term Goals: In 3 weeks   1.Pt to be educated on HEP.  2.Patient to increase RUE  strength by 5#, without pain.  3.Patient to report pain at 1/10 or less with return to normal work schedule.  4.Patient to take the UEFS with goals made PRN.  5. Pt to be educated on body mechanics awareness.     Long Term Goals: In 6 weeks  1.LTG for UEFS.  2. Patient to demo increase in all UE/hand strength to 5/5.  3. Patient to have decreased pain to 0/10 at all times.  4. Pt to increase R  strength to 10# greater than L.  5. Patient to perform daily activities including gripping, lifting loads, and  required work duties without pain or limitation.    Plan     Continue with tx, progress with strengthening as tolerated by pt, monitor response to tx today.  Progress with scapular and shoulder strengthening as well as elbow/wrist.    Екатерина Donovan, PT

## 2019-10-24 ENCOUNTER — CLINICAL SUPPORT (OUTPATIENT)
Dept: REHABILITATION | Facility: HOSPITAL | Age: 49
End: 2019-10-24
Payer: COMMERCIAL

## 2019-10-24 ENCOUNTER — PATIENT OUTREACH (OUTPATIENT)
Dept: OTHER | Facility: OTHER | Age: 49
End: 2019-10-24

## 2019-10-24 DIAGNOSIS — R29.898 DECREASED GRIP STRENGTH OF RIGHT HAND: ICD-10-CM

## 2019-10-24 DIAGNOSIS — M25.521 RIGHT ELBOW PAIN: ICD-10-CM

## 2019-10-24 DIAGNOSIS — M25.511 RIGHT SHOULDER PAIN, UNSPECIFIED CHRONICITY: ICD-10-CM

## 2019-10-24 DIAGNOSIS — M62.81 MUSCLE WEAKNESS OF RIGHT ARM: ICD-10-CM

## 2019-10-24 DIAGNOSIS — E11.9 TYPE 2 DIABETES MELLITUS WITHOUT COMPLICATION, WITHOUT LONG-TERM CURRENT USE OF INSULIN: ICD-10-CM

## 2019-10-24 DIAGNOSIS — I10 HYPERTENSION, UNSPECIFIED TYPE: Primary | ICD-10-CM

## 2019-10-24 PROCEDURE — 97140 MANUAL THERAPY 1/> REGIONS: CPT

## 2019-10-24 PROCEDURE — 97110 THERAPEUTIC EXERCISES: CPT

## 2019-10-27 NOTE — PROGRESS NOTES
Physical Therapy Daily Treatment Note     Name: Guillermo Castillo  Clinic Number: [id number]    Therapy Diagnosis:   Encounter Diagnoses   Name Primary?    Right elbow pain     Muscle weakness of right arm     Right shoulder pain, unspecified chronicity     Decreased  strength of right hand      Physician: Ruby Ortega NP    Visit Date: 10/24/2019    Physician Orders: PT Eval and Treat   Medical Diagnosis from Referral: Right elbow pain, right forearm pain  Evaluation Date: 7/31/2019, 9/30/19  Authorization Period Expiration: 12/31/2019  Plan of Care Expiration: 10/30/2019  Visit # / Visits authorized: 17/25      Time In: 8:30 am  Time Out: 9:30 am  Total Billable Time: 41 minutes    Precautions: Standard    Subjective     Pt reports: Elbow feels like the pain is in a different place today, still tolerating strengthening without increased pain - encouraged con't with his ROM/HEP.  New ex tolerated well today.  Discussed with patient to try wearing a strap on R elbow during work to prevent his forearm mm's from pulling on his bone - reviewed and pt agreeable and will try.    He was compliant with home exercise program.  Response to previous treatment: good  Functional change:decreased pain with gripping    Pain:  NT/10  Location: right elbow      Objective     Guillermo received therapeutic exercises to develop ROM for 33 minutes including:    AROM - elbow flexed, wrist flexion 3x10  AROM - elbow flexed, palm to body 3x10  AROM - elbow extended, wrist flexion 3x10  AROM - elbow extended, palm to body 3x10  Passive stretch forearm, elbow ext UD 3x30s  Eccentric twist with flex bar red 10x  Foam roll stretch 3'  Prone LT isometric 2'  Prone LT with lats 2'  Supination pronation 3#, 3/10  Wrist ext<>flexion 3#, 3/10 palm down   [place] V (no abd) 10# 2/10 ea  Bicep/tricep #10 2/10 ea      Guillermo received the following manual therapy techniques of soft tissue mobilization/manual to the: R upper quadrant for 8  minutes, including: R Subscapular releases, UT/levator scap, & scapular tilting, and manual releases/STM/clearing to brachilais.    FDN with prolonged insertion and prolonged estim for twitch response at common extensor mm belly area, then pecking at common extensor at lateral epicondyle x 2.      Home Exercises Provided and Patient Education Provided     Education provided:   - Continue with HEP, monitor sx's, try tennis elbow strap - showed picture and pt reports he has been given a few of them to try.    Written Home Exercises Provided: Patient instructed to cont prior HEP.  Exercises were reviewed and Guillermo was able to demonstrate them prior to the end of the session.  Guillermo demonstrated good  understanding of the education provided.     See EMR under Patient Instructions for exercises provided previously.    Assessment     Decreased pain after manual and FDN with gripping.  Able to tolerate 3# again.  Some discomfort with twist bar ex's - cues for technique.    Guillermo is progressing well towards his goals.   Pt prognosis is Good.     Pt will continue to benefit from skilled outpatient physical therapy to address the deficits listed in the problem list box on initial evaluation, provide pt/family education and to maximize pt's level of independence in the home and community environment.     Pt's spiritual, cultural and educational needs considered and pt agreeable to plan of care and goals.     Anticipated barriers to physical therapy: history of elbow injury, pain    Goals:   Short Term Goals: In 3 weeks   1.Pt to be educated on HEP.  2.Patient to increase RUE  strength by 5#, without pain.  3.Patient to report pain at 1/10 or less with return to normal work schedule.  4.Patient to take the UEFS with goals made PRN.  5. Pt to be educated on body mechanics awareness.     Long Term Goals: In 6 weeks  1.LTG for UEFS.  2. Patient to demo increase in all UE/hand strength to 5/5.  3. Patient to have decreased  pain to 0/10 at all times.  4. Pt to increase R  strength to 10# greater than L.  5. Patient to perform daily activities including gripping, lifting loads, and required work duties without pain or limitation.    Plan     Continue with tx, progress with strengthening as tolerated by pt, monitor response to tx today and brace use.  Progress with scapular and shoulder strengthening as well as elbow/wrist.    Екатерина Donovan, PT

## 2019-10-29 ENCOUNTER — CLINICAL SUPPORT (OUTPATIENT)
Dept: REHABILITATION | Facility: HOSPITAL | Age: 49
End: 2019-10-29
Payer: COMMERCIAL

## 2019-10-29 DIAGNOSIS — M25.521 RIGHT ELBOW PAIN: ICD-10-CM

## 2019-10-29 DIAGNOSIS — M62.81 MUSCLE WEAKNESS OF RIGHT ARM: ICD-10-CM

## 2019-10-29 DIAGNOSIS — M25.511 RIGHT SHOULDER PAIN, UNSPECIFIED CHRONICITY: ICD-10-CM

## 2019-10-29 DIAGNOSIS — R29.898 DECREASED GRIP STRENGTH OF RIGHT HAND: ICD-10-CM

## 2019-10-29 PROCEDURE — 97014 ELECTRIC STIMULATION THERAPY: CPT

## 2019-10-29 PROCEDURE — 97110 THERAPEUTIC EXERCISES: CPT

## 2019-10-30 NOTE — PROGRESS NOTES
Physical Therapy Daily Treatment Note     Name: Guillermo Castillo  Clinic Number: [id number]    Therapy Diagnosis:   Encounter Diagnoses   Name Primary?    Right elbow pain     Muscle weakness of right arm     Right shoulder pain, unspecified chronicity     Decreased  strength of right hand      Physician: Ruby Ortega NP    Visit Date: 10/29/2019    Physician Orders: PT Eval and Treat   Medical Diagnosis from Referral: Right elbow pain, right forearm pain  Evaluation Date: 7/31/2019, 9/30/19  Authorization Period Expiration: 12/31/2019  Plan of Care Expiration: 10/30/2019  Visit # / Visits authorized: 17/25      Time In: 7:40 am  Time Out: 8:55 am  Total Billable Time: 45 minutes    Precautions: Standard    Subjective     Pt reports: Elbow feels a little better - did wear brace some but can't keep it on all of his shifts because it starts to irritate him.  He reports pain with flexbar ex's which is better after foam roll stretch.    He was compliant with home exercise program.  Response to previous treatment: good  Functional change:decreased pain with gripping    Pain:  NT/10  Location: right elbow      Objective     Guillermo received therapeutic exercises to develop ROM for 40 minutes including:    AROM - elbow flexed, wrist flexion 3x10  AROM - elbow flexed, palm to body 3x10  AROM - elbow extended, wrist flexion 3x10  AROM - elbow extended, palm to body 3x10  Passive stretch forearm, elbow ext UD 3x30s  Eccentric twist with flex bar red 10x, 5x  Foam roll stretch 3'  Prone LT isometric 2'  Prone LT with lats 2'  Supination pronation 3#, 3/10  Wrist ext<>flexion 3#, 3/10 palm down   Juan J V (no abd) 10# 2/10 ea    Guillermo received the following manual therapy techniques of soft tissue mobilization/manual to the: R upper quadrant for 5 minutes, including: R Subscapular releases, UT/levator scap, & scapular tilting.    FDN with prolonged insertion and prolonged estim at 2mHz for twitch response at common  extensor mm belly area, along with prolonged insertion and gentle piston at brachialis as well  - all with pt in supine position - total time 10 min. Guillermo tolerated treatment well without any adverse effects.        Guillermo received the following supervised modalities after being cleared for contradictions: IFC Electrical Stimulation:  Guillermo received IFC Electrical Stimulation for pain control applied to the R elbow. Pt received stimulation at 40 % scan at a frequency of  for 10 minutes. Guillermo tolerated treatment well without any adverse effects.        Home Exercises Provided and Patient Education Provided     Education provided:   - Continue with HEP, con't to wear tennis elbow strap     Written Home Exercises Provided: Patient instructed to cont prior HEP.  Exercises were reviewed and Guillermo was able to demonstrate them prior to the end of the session.  Guillermo demonstrated good  understanding of the education provided.     See EMR under Patient Instructions for exercises provided previously.    Assessment     Decreased pain after manual and FDN with gripping.  Decreased pain with flexbar after foam roll stretch.  Pt requires cues for therex with scpapular stability and UE positioning.    Guillermo is progressing well towards his goals.   Pt prognosis is Good.     Pt will continue to benefit from skilled outpatient physical therapy to address the deficits listed in the problem list box on initial evaluation, provide pt/family education and to maximize pt's level of independence in the home and community environment.     Pt's spiritual, cultural and educational needs considered and pt agreeable to plan of care and goals.     Anticipated barriers to physical therapy: history of elbow injury, pain    Goals:   Short Term Goals: In 3 weeks   1.Pt to be educated on HEP.  2.Patient to increase RUE  strength by 5#, without pain.  3.Patient to report pain at 1/10 or less with return to normal work  schedule.  4.Patient to take the UEFS with goals made PRN.  5. Pt to be educated on body mechanics awareness.     Long Term Goals: In 6 weeks  1.LTG for UEFS.  2. Patient to demo increase in all UE/hand strength to 5/5.  3. Patient to have decreased pain to 0/10 at all times.  4. Pt to increase R  strength to 10# greater than L.  5. Patient to perform daily activities including gripping, lifting loads, and required work duties without pain or limitation.    Plan     Continue with tx, progress with strengthening as tolerated by pt, monitor response to tx today and brace use.  Progress with scapular and shoulder strengthening as well as elbow/wrist.    Екатерина Donovan, PT

## 2019-10-31 ENCOUNTER — CLINICAL SUPPORT (OUTPATIENT)
Dept: REHABILITATION | Facility: HOSPITAL | Age: 49
End: 2019-10-31
Payer: COMMERCIAL

## 2019-10-31 DIAGNOSIS — M62.81 MUSCLE WEAKNESS OF RIGHT ARM: ICD-10-CM

## 2019-10-31 DIAGNOSIS — R29.898 DECREASED GRIP STRENGTH OF RIGHT HAND: ICD-10-CM

## 2019-10-31 DIAGNOSIS — M25.511 RIGHT SHOULDER PAIN, UNSPECIFIED CHRONICITY: ICD-10-CM

## 2019-10-31 DIAGNOSIS — M25.521 RIGHT ELBOW PAIN: ICD-10-CM

## 2019-10-31 PROCEDURE — 97110 THERAPEUTIC EXERCISES: CPT

## 2019-11-01 ENCOUNTER — PATIENT OUTREACH (OUTPATIENT)
Dept: ADMINISTRATIVE | Facility: HOSPITAL | Age: 49
End: 2019-11-01

## 2019-11-01 NOTE — PROGRESS NOTES
Physical Therapy Daily Treatment Note     Name: Guillermo Castillo  Clinic Number: [id number]    Therapy Diagnosis:   Encounter Diagnoses   Name Primary?    Right elbow pain     Muscle weakness of right arm     Right shoulder pain, unspecified chronicity     Decreased  strength of right hand      Physician: Ruby Ortega NP    Visit Date: 10/31/2019    Physician Orders: PT Eval and Treat   Medical Diagnosis from Referral: Right elbow pain, right forearm pain  Evaluation Date: 7/31/2019, 9/30/19  Authorization Period Expiration: 12/31/2019  Plan of Care Expiration: 10/30/2019  Visit # / Visits authorized: 19/25      Time In: 4:55 pm  Time Out: 5:45 pm  Total Billable Time: 45 minutes    Precautions: Standard    Subjective     Pt reports: Elbow feels a little better - did wear brace some but can't keep it on all of his shifts because it starts to irritate him.  He reports pain with flexbar ex's which is better after foam roll stretch.    He was compliant with home exercise program.  Response to previous treatment: good  Functional change:decreased pain with gripping    Pain:  1-2/10 at start of tx 3-4/10 post tx session  Location: right elbow      Objective     Guillermo received therapeutic exercises to develop ROM for 40 minutes including:    AROM - elbow flexed, wrist flexion 3x10  AROM - elbow flexed, palm to body 3x10  AROM - elbow extended, wrist flexion 3x10  AROM - elbow extended, palm to body 3x10  Passive stretch forearm, elbow ext UD 3x30s  Eccentric twist with flex bar red 10 x 2  Foam roll stretch 3'  Prone LT isometric 2'  Prone LT with lats 2'  Supination pronation 4#, 3/10  Wrist ext<>flexion 4#, 3/10 palm down   [place] V (no abd) 20# 2/10 ea    Guillermo received the following manual therapy techniques of soft tissue mobilization/manual to the: R upper quadrant for 5 minutes, including: R Subscapular releases, UT/levator scap, & scapular tilting.      Home Exercises Provided and Patient Education  Provided     Education provided:   - Continue with HEP, con't to wear tennis elbow strap     Written Home Exercises Provided: Patient instructed to cont prior HEP.  Exercises were reviewed and Guillermo was able to demonstrate them prior to the end of the session.  Guillermo demonstrated good  understanding of the education provided.     See EMR under Patient Instructions for exercises provided previously.    Assessment     Decreased pain today with some soreness with fingertip , palm down - as if lifting a cup.  He does report he is more sore post tx session.  Decreased mm tone noted throughout upper arm and forearm.    Guillermo is progressing well towards his goals.   Pt prognosis is Good.     Pt will continue to benefit from skilled outpatient physical therapy to address the deficits listed in the problem list box on initial evaluation, provide pt/family education and to maximize pt's level of independence in the home and community environment.     Pt's spiritual, cultural and educational needs considered and pt agreeable to plan of care and goals.     Anticipated barriers to physical therapy: history of elbow injury, pain    Goals:   Short Term Goals: In 3 weeks   1.Pt to be educated on HEP.  2.Patient to increase RUE  strength by 5#, without pain.  3.Patient to report pain at 1/10 or less with return to normal work schedule.  4.Patient to take the UEFS with goals made PRN.  5. Pt to be educated on body mechanics awareness.     Long Term Goals: In 6 weeks  1.LTG for UEFS.  2. Patient to demo increase in all UE/hand strength to 5/5.  3. Patient to have decreased pain to 0/10 at all times.  4. Pt to increase R  strength to 10# greater than L.  5. Patient to perform daily activities including gripping, lifting loads, and required work duties without pain or limitation.    Plan     Continue with tx, progress with strengthening as tolerated by pt.  Progress with scapular and shoulder strengthening as well as  elbow/wrist.    Екатерина Donovan, PT

## 2019-11-07 RX ORDER — AMLODIPINE BESYLATE 10 MG/1
10 TABLET ORAL DAILY
Qty: 30 TABLET | Refills: 5
Start: 2019-11-07 | End: 2020-03-16 | Stop reason: SDUPTHER

## 2019-11-07 NOTE — PROGRESS NOTES
Digital Medicine: Clinician Follow-Up    Called patient to discuss his elevated readings and see how he is tolerating valsartan.    The history is provided by the patient.     Follow Up  Follow-up reason(s): reading review, medication change and medication change follow-up      Readings are trending up   Medication Change: dose increase    Is patient tolerating med change?:  YesPatient had a stressful situation at work causing his high blood pressure.     Patient is tolerating valsartan at 320mg daily.      INTERVENTION(S)  reviewed appropriate dose schedule and recommended med change    PLAN  patient verbalizes understanding and patient amenable to changes    Patient will continue his current DM medications.    Patient will resume amlodipine 10mg daily. He will maintain valsartan 320mg daily.           Topic    Lipid (Cholesterol) Test        Last 5 Patient Entered Readings                                      Current 30 Day Average: 149/80     Recent Readings 11/7/2019 11/3/2019 10/30/2019 10/30/2019 10/15/2019    SBP (mmHg) 147 147 155 162 145    DBP (mmHg) 74 88 85 82 71    Pulse 72 72 67 65 68        Last 6 Patient Entered Readings                                          Most Recent A1c: 6.2% on 9/29/2019  (Goal: 7%)     Recent Readings 11/7/2019 11/3/2019 10/30/2019 10/25/2019 10/22/2019    Blood Glucose (mg/dL) 136 95 117 125 99           Hypertension Medications             valsartan (DIOVAN) 320 MG tablet Take 1 tablet (320 mg total) by mouth once daily.        Diabetes Medications             insulin lispro (HUMALOG U-100 INSULIN) 100 unit/mL injection Use via sliding scale    metFORMIN (GLUCOPHAGE-XR) 500 MG 24 hr tablet TAKE 2 TABLETS BY MOUTH ONCE DAILY    semaglutide (OZEMPIC) 0.25 mg or 0.5 mg(2 mg/1.5 mL) PnIj Inject 0.5 mg into the skin every 7 days.                           Medication Adherence Screening   He misses doses: never

## 2019-11-15 ENCOUNTER — OFFICE VISIT (OUTPATIENT)
Dept: INTERNAL MEDICINE | Facility: CLINIC | Age: 49
End: 2019-11-15
Payer: COMMERCIAL

## 2019-11-15 VITALS
SYSTOLIC BLOOD PRESSURE: 136 MMHG | TEMPERATURE: 97 F | DIASTOLIC BLOOD PRESSURE: 78 MMHG | WEIGHT: 230.19 LBS | HEART RATE: 71 BPM | HEIGHT: 72 IN | OXYGEN SATURATION: 99 % | BODY MASS INDEX: 31.18 KG/M2

## 2019-11-15 DIAGNOSIS — E11.9 TYPE 2 DIABETES MELLITUS WITHOUT COMPLICATION, WITHOUT LONG-TERM CURRENT USE OF INSULIN: Primary | ICD-10-CM

## 2019-11-15 DIAGNOSIS — I10 HYPERTENSION, UNSPECIFIED TYPE: ICD-10-CM

## 2019-11-15 DIAGNOSIS — E78.5 HYPERLIPIDEMIA, UNSPECIFIED HYPERLIPIDEMIA TYPE: ICD-10-CM

## 2019-11-15 DIAGNOSIS — D64.9 ANEMIA, UNSPECIFIED TYPE: ICD-10-CM

## 2019-11-15 PROCEDURE — 3044F PR MOST RECENT HEMOGLOBIN A1C LEVEL <7.0%: ICD-10-PCS | Mod: CPTII,S$GLB,, | Performed by: NURSE PRACTITIONER

## 2019-11-15 PROCEDURE — 3075F PR MOST RECENT SYSTOLIC BLOOD PRESS GE 130-139MM HG: ICD-10-PCS | Mod: CPTII,S$GLB,, | Performed by: NURSE PRACTITIONER

## 2019-11-15 PROCEDURE — 3075F SYST BP GE 130 - 139MM HG: CPT | Mod: CPTII,S$GLB,, | Performed by: NURSE PRACTITIONER

## 2019-11-15 PROCEDURE — 3008F BODY MASS INDEX DOCD: CPT | Mod: CPTII,S$GLB,, | Performed by: NURSE PRACTITIONER

## 2019-11-15 PROCEDURE — 99213 OFFICE O/P EST LOW 20 MIN: CPT | Mod: S$GLB,,, | Performed by: NURSE PRACTITIONER

## 2019-11-15 PROCEDURE — 3078F PR MOST RECENT DIASTOLIC BLOOD PRESSURE < 80 MM HG: ICD-10-PCS | Mod: CPTII,S$GLB,, | Performed by: NURSE PRACTITIONER

## 2019-11-15 PROCEDURE — 99999 PR PBB SHADOW E&M-EST. PATIENT-LVL V: ICD-10-PCS | Mod: PBBFAC,,, | Performed by: NURSE PRACTITIONER

## 2019-11-15 PROCEDURE — 99213 PR OFFICE/OUTPT VISIT, EST, LEVL III, 20-29 MIN: ICD-10-PCS | Mod: S$GLB,,, | Performed by: NURSE PRACTITIONER

## 2019-11-15 PROCEDURE — 3008F PR BODY MASS INDEX (BMI) DOCUMENTED: ICD-10-PCS | Mod: CPTII,S$GLB,, | Performed by: NURSE PRACTITIONER

## 2019-11-15 PROCEDURE — 3044F HG A1C LEVEL LT 7.0%: CPT | Mod: CPTII,S$GLB,, | Performed by: NURSE PRACTITIONER

## 2019-11-15 PROCEDURE — 3078F DIAST BP <80 MM HG: CPT | Mod: CPTII,S$GLB,, | Performed by: NURSE PRACTITIONER

## 2019-11-15 PROCEDURE — 99999 PR PBB SHADOW E&M-EST. PATIENT-LVL V: CPT | Mod: PBBFAC,,, | Performed by: NURSE PRACTITIONER

## 2019-11-15 NOTE — PROGRESS NOTES
Subjective:       Patient ID: Guillermo Castillo is a 49 y.o. male.    Chief Complaint: Follow-up (6 month ) and Results (Labs)    Patient presents for a 6 month follow up.  Lab are good.  A1C improved (4.9%).  Fatigue/dizziness improved.  Stopped chlorthalidone. Anemia stable.  Taking iron supplement daily.  Has EGD and colonoscopy scheduled for 11/27/2019.  Feeling better.  Blood pressure slightly elevated.  No medications this morning.      Review of Systems   Constitutional: Negative for chills and fatigue.   Respiratory: Negative for cough and shortness of breath.    Cardiovascular: Negative for chest pain.   Musculoskeletal: Negative for arthralgias and gait problem.   Skin: Negative for color change.   Psychiatric/Behavioral: Negative for agitation and confusion.       Objective:      Physical Exam   Constitutional: He is oriented to person, place, and time. Vital signs are normal. He appears well-developed and well-nourished.   HENT:   Head: Normocephalic and atraumatic.   Neck: Normal range of motion.   Cardiovascular: Normal rate and regular rhythm.   Pulmonary/Chest: Effort normal and breath sounds normal.   Musculoskeletal: Normal range of motion.   Neurological: He is alert and oriented to person, place, and time.   Skin: Skin is warm.   Psychiatric: He has a normal mood and affect. His behavior is normal.       Assessment:       1. Type 2 diabetes mellitus without complication, without long-term current use of insulin    2. Hypertension, unspecified type    3. Hyperlipidemia, unspecified hyperlipidemia type    4. Anemia, unspecified type        Plan:         Type 2 diabetes mellitus without complication, without long-term current use of insulin  -     MICROALBUMIN / CREATININE RATIO URINE; Future; Expected date: 11/15/2019  -     Hemoglobin A1c; Future; Expected date: 11/15/2019  -     Basic metabolic panel; Future; Expected date: 11/15/2019    Hypertension, unspecified type  -     Basic metabolic panel;  Future; Expected date: 11/15/2019    Hyperlipidemia, unspecified hyperlipidemia type  -     Basic metabolic panel; Future; Expected date: 11/15/2019    Anemia, unspecified type  -     CBC auto differential; Future; Expected date: 11/15/2019          Labs are stable.  No other concerns.  Will schedule visit and labs in 6 months with Dr. Siddiqui.

## 2019-11-18 ENCOUNTER — CLINICAL SUPPORT (OUTPATIENT)
Dept: REHABILITATION | Facility: HOSPITAL | Age: 49
End: 2019-11-18
Payer: COMMERCIAL

## 2019-11-18 DIAGNOSIS — M62.81 MUSCLE WEAKNESS OF RIGHT ARM: ICD-10-CM

## 2019-11-18 DIAGNOSIS — M25.511 RIGHT SHOULDER PAIN, UNSPECIFIED CHRONICITY: ICD-10-CM

## 2019-11-18 DIAGNOSIS — M25.521 RIGHT ELBOW PAIN: ICD-10-CM

## 2019-11-18 DIAGNOSIS — E11.9 TYPE 2 DIABETES MELLITUS WITHOUT COMPLICATION, WITHOUT LONG-TERM CURRENT USE OF INSULIN: ICD-10-CM

## 2019-11-18 DIAGNOSIS — R29.898 DECREASED GRIP STRENGTH OF RIGHT HAND: ICD-10-CM

## 2019-11-18 PROCEDURE — 97110 THERAPEUTIC EXERCISES: CPT

## 2019-11-18 PROCEDURE — 97140 MANUAL THERAPY 1/> REGIONS: CPT

## 2019-11-18 NOTE — PROGRESS NOTES
Patient will undergo a colonoscopy 11/20 to determine if his hypotensive episode was a GI bleed. If so, I will resume his previous HTN medications as his blood pressure is increasing.

## 2019-11-19 NOTE — PRE-PROCEDURE INSTRUCTIONS
I have consulted with NP regarding this patient. We are awaiting response from MD/anesthesia regarding upcoming colonoscopy procedure, as patient also has cardiac tests pending.

## 2019-11-20 ENCOUNTER — PATIENT MESSAGE (OUTPATIENT)
Dept: PREADMISSION TESTING | Facility: HOSPITAL | Age: 49
End: 2019-11-20

## 2019-11-20 ENCOUNTER — LAB VISIT (OUTPATIENT)
Dept: LAB | Facility: HOSPITAL | Age: 49
End: 2019-11-20
Attending: INTERNAL MEDICINE
Payer: COMMERCIAL

## 2019-11-20 DIAGNOSIS — D64.9 ANEMIA, UNSPECIFIED TYPE: ICD-10-CM

## 2019-11-20 LAB
BASOPHILS # BLD AUTO: 0.06 K/UL (ref 0–0.2)
BASOPHILS NFR BLD: 0.6 % (ref 0–1.9)
DIFFERENTIAL METHOD: ABNORMAL
EOSINOPHIL # BLD AUTO: 0.4 K/UL (ref 0–0.5)
EOSINOPHIL NFR BLD: 4 % (ref 0–8)
ERYTHROCYTE [DISTWIDTH] IN BLOOD BY AUTOMATED COUNT: 13.9 % (ref 11.5–14.5)
FERRITIN SERPL-MCNC: 139 NG/ML (ref 20–300)
HCT VFR BLD AUTO: 42 % (ref 40–54)
HGB BLD-MCNC: 13.5 G/DL (ref 14–18)
IMM GRANULOCYTES # BLD AUTO: 0.05 K/UL (ref 0–0.04)
IMM GRANULOCYTES NFR BLD AUTO: 0.5 % (ref 0–0.5)
IRON SERPL-MCNC: 57 UG/DL (ref 45–160)
LYMPHOCYTES # BLD AUTO: 3.3 K/UL (ref 1–4.8)
LYMPHOCYTES NFR BLD: 34.2 % (ref 18–48)
MCH RBC QN AUTO: 27.4 PG (ref 27–31)
MCHC RBC AUTO-ENTMCNC: 32.1 G/DL (ref 32–36)
MCV RBC AUTO: 85 FL (ref 82–98)
MONOCYTES # BLD AUTO: 0.8 K/UL (ref 0.3–1)
MONOCYTES NFR BLD: 8.5 % (ref 4–15)
NEUTROPHILS # BLD AUTO: 5.1 K/UL (ref 1.8–7.7)
NEUTROPHILS NFR BLD: 52.2 % (ref 38–73)
NRBC BLD-RTO: 0 /100 WBC
PLATELET # BLD AUTO: 206 K/UL (ref 150–350)
PMV BLD AUTO: 10.3 FL (ref 9.2–12.9)
RBC # BLD AUTO: 4.92 M/UL (ref 4.6–6.2)
RETICS/RBC NFR AUTO: 1.5 % (ref 0.4–2)
SATURATED IRON: 13 % (ref 20–50)
TOTAL IRON BINDING CAPACITY: 431 UG/DL (ref 250–450)
TRANSFERRIN SERPL-MCNC: 291 MG/DL (ref 200–375)
WBC # BLD AUTO: 9.73 K/UL (ref 3.9–12.7)

## 2019-11-20 PROCEDURE — 83540 ASSAY OF IRON: CPT

## 2019-11-20 PROCEDURE — 85025 COMPLETE CBC W/AUTO DIFF WBC: CPT

## 2019-11-20 PROCEDURE — 82728 ASSAY OF FERRITIN: CPT

## 2019-11-20 PROCEDURE — 85045 AUTOMATED RETICULOCYTE COUNT: CPT

## 2019-11-20 PROCEDURE — 36415 COLL VENOUS BLD VENIPUNCTURE: CPT

## 2019-11-20 RX ORDER — SODIUM, POTASSIUM,MAG SULFATES 17.5-3.13G
1 SOLUTION, RECONSTITUTED, ORAL ORAL DAILY
Qty: 1 KIT | Refills: 0 | Status: SHIPPED | OUTPATIENT
Start: 2019-11-20 | End: 2019-11-22

## 2019-11-20 NOTE — PRE-PROCEDURE INSTRUCTIONS
PAT call completed with patient and patient educated on the Suprep bowel prep, clear liquid diet and procedure instructions. Medical history discussed and pt instructed to take blood pressure medication with a small sip of water on the morning of procedure. Pt is diabetic, instructed to take AM oral dose of metformin and to check blood sugars frequently throughout the day. Pt no longer takes humalog insulin and is due for weekly dose of ozempic day before he starts his clear liquid diet. Pt endorses full understanding of teaching and I resent a copy of Suprep/Endoscopy Instructions to [facility]. Tentative procedure and 2nd prep times discussed. Will f/u with patient 72 hours prior to procedure and again 1 day prior to procedure. Final arrival times will be given at that time. Patient will be accompanied by a family member and is aware of policy to remain inhouse during entire visit. All questions and concerns addressed. Callback number provided for any future questions.    Prescription request for Suprep routed

## 2019-11-21 NOTE — PROGRESS NOTES
Physical Therapy Daily Treatment Note     Name: Guillermo Castillo  Clinic Number: [id number]    Therapy Diagnosis:   Encounter Diagnoses   Name Primary?    Right elbow pain     Muscle weakness of right arm     Right shoulder pain, unspecified chronicity     Decreased  strength of right hand      Physician: Ruby Ortega NP    Visit Date: 11/18/2019    Physician Orders: PT Eval and Treat   Medical Diagnosis from Referral: Right elbow pain, right forearm pain  Evaluation Date: 7/31/2019, 9/30/19  Authorization Period Expiration: 12/31/2019  Plan of Care Expiration: 10/30/2019  Visit # / Visits authorized: 20 /25      Time In: 8:32 am  Time Out: 9:15 am  Total Billable Time:30 minutes    Precautions: Standard    Subjective     Pt reports: Elbow has been really killing him, he hasn't really done much exercising and his brace has been misplaced so he hasn't been wearing it either.  He reports that he will F/U with MD next week.    He was compliant with home exercise program.  Response to previous treatment: good  Functional change: increased pain    Pain:  3-4/10  Location: right elbow      Objective     Guillermo received therapeutic exercises to develop ROM for 21 minutes including:    AROM - elbow flexed, wrist flexion 3x10  AROM - elbow flexed, palm to body 3x10  AROM - elbow extended, wrist flexion 3x10  AROM - elbow extended, palm to body 3x10  Passive stretch forearm, elbow ext UD 3x30s  Eccentric twist with flex bar red p! today  Foam roll stretch 3'  Prone LT isometric 2'  Prone LT with lats 2'      Palpation Assessment to determine the necessity for Functional Dry Needling  R forearm mm.   Patient provided verbal consent to Functional Dry Needling today, previously has provided written consent.  Guillermo received the following manual therapy techniques:  R Subscapular releases, UT/levator scap, & scapular tilting.    FDN to R ECRL/ECRB with prolonged estim for twitch response.  Gentle twist at common  extensor tendon as well.    Home Exercises Provided and Patient Education Provided     Education provided:   - DO HEP, get a brace to wear.    Written Handout on response to FDN provided: no, verbally communicated.  Guillermo demonstrated good  understanding of the education provided.     Written Home Exercises Provided: Patient instructed to cont prior HEP.  Exercises were reviewed and Guillermo was able to demonstrate them prior to the end of the session.  Guillermo demonstrated good  understanding of the education provided.     See EMR under Patient Instructions for exercises provided previously.    Assessment     Increased pain with all gripping activities, unable to complete full program.    -Patient demonstrated appropriate response to Functional Dry Needling.   Good rhythmical contractions observed with estim to treated muscle groups    Guillermo is progressing well towards his goals.   Pt prognosis is Good.     Pt will continue to benefit from skilled outpatient physical therapy to address the deficits listed in the problem list box on initial evaluation, provide pt/family education and to maximize pt's level of independence in the home and community environment.     Pt's spiritual, cultural and educational needs considered and pt agreeable to plan of care and goals.     Anticipated barriers to physical therapy: history of elbow injury, pain    Goals:   Short Term Goals: In 3 weeks   1.Pt to be educated on HEP.  2.Patient to increase RUE  strength by 5#, without pain.  3.Patient to report pain at 1/10 or less with return to normal work schedule.  4.Patient to take the UEFS with goals made PRN.  5. Pt to be educated on body mechanics awareness.     Long Term Goals: In 6 weeks  1.LTG for UEFS.  2. Patient to demo increase in all UE/hand strength to 5/5.  3. Patient to have decreased pain to 0/10 at all times.  4. Pt to increase R  strength to 10# greater than L.  5. Patient to perform daily activities  including gripping, lifting loads, and required work duties without pain or limitation.    Plan     Continue with tx, progress with strengthening as tolerated by pt.  Progress with scapular and shoulder strengthening as well as elbow/wrist.    Екатерина Donovan, PT

## 2019-11-22 ENCOUNTER — DOCUMENTATION ONLY (OUTPATIENT)
Dept: PREADMISSION TESTING | Facility: HOSPITAL | Age: 49
End: 2019-11-22

## 2019-11-22 NOTE — PRE-PROCEDURE INSTRUCTIONS
72 hour follow-up call completed with patient. We reviewed the clear liquid diet and all questions addressed. Pt plans to continue with procedure at this time. Suprep bowel prep has already been picked up from pharmacy and pt understands bowel prep instructions. Will call 1 day prior with final arrival time. Pt verbalizes understanding.

## 2019-11-22 NOTE — PROGRESS NOTES
The patient has a colonoscopy scheduled on 11/27/9 by Dr. Anne at [facility]. Chart review completed as requested by PAT nurse.   The patient is a 48 yo male who has PMH of DM, Obesity, Fatty liver, HTN, and HLD. In September, his BP was elevated and he was placed on Chlorthalidone along with home medications Norvasc and Valsartan. He then presented to ED with dizziness, SOB, and fatigue. He was found to be hypotensive. He was hospitalized overnight. Cardiac etiology was ruled out with unremarkable EKG and negative serial cardiac enzymes. Cardiac echo 9/30/19 showed no WMAs, normal LVEF, normal valve function. BP meds were held and BP and symptoms improved. On follow up, he was found to be anemic with Hgb 14.5>8.7. PCP ordered Heme/Onc consult and stress test. Stress test is still pending. He was seen by Heme/Onc who d/c ASA and prescribed oral iron. Heme/Onc recommended endoscopy to further w/u anemia. Hgb has since  improved to 13.5. According to EMR documentation, dizziness, SOB and fatigue have improved. Symptoms were likely related to anemia.   Discussed with anesthesia- proceed with Endoscopy. Hold BP medications am of procedure.

## 2019-11-26 ENCOUNTER — ANESTHESIA EVENT (OUTPATIENT)
Dept: ENDOSCOPY | Facility: HOSPITAL | Age: 49
End: 2019-11-26
Payer: COMMERCIAL

## 2019-11-26 NOTE — ANESTHESIA PREPROCEDURE EVALUATION
11/26/2019  Guillermo Castillo is a 49 y.o., male.    Anesthesia Evaluation    I have reviewed the Patient Summary Reports.    I have reviewed the Nursing Notes.   I have reviewed the Medications.     Review of Systems  Anesthesia Hx:  No problems with previous Anesthesia  History of prior surgery of interest to airway management or planning:  Denies Personal Hx of Anesthesia complications.   Social:  Non-Smoker    Hematology/Oncology:         -- Anemia: Hematology Comments: Improved.   Cardiovascular:   Hypertension hyperlipidemia ECG has been reviewed. Pt had new BP med added in 9/19.  Had spells of dizziness and low BP after, med d/c'd. Also found to be anemic at that time.  Had NEG cardiac w/u at that time, also.    Pulmonary:   Denies Asthma.  Denies Recent URI. Sleep Apnea, CPAP    Renal/:  Renal/ Normal     Hepatic/GI:   GERD Liver Disease, Fatty liver.   Neurological:  Neurology Normal    Endocrine:   Diabetes, well controlled, type 2    Psych:   Psychiatric History Panic/mood disorder         Physical Exam  General:  Obesity    Airway/Jaw/Neck:  Airway Findings: General Airway Assessment: Adult           Mental Status:  Mental Status Findings:  Cooperative, Alert and Oriented         Anesthesia Plan  Type of Anesthesia, risks & benefits discussed:  Anesthesia Type:  general  Patient's Preference:   Intra-op Monitoring Plan: standard ASA monitors  Intra-op Monitoring Plan Comments:   Post Op Pain Control Plan:   Post Op Pain Control Plan Comments:   Induction:   IV  Beta Blocker:  Patient is not currently on a Beta-Blocker (No further documentation required).       Informed Consent: Patient understands risks and agrees with Anesthesia plan.  Questions answered. Anesthesia consent signed with patient.  ASA Score: 2     Day of Surgery Review of History & Physical:    H&P update referred to the surgeon.          Ready For Surgery From Anesthesia Perspective.

## 2019-11-26 NOTE — PRE-PROCEDURE INSTRUCTIONS
Attempted to notify patient of final arrival time of 0600. No answer, voice mailbox full. Unable to leave message

## 2019-11-27 ENCOUNTER — HOSPITAL ENCOUNTER (OUTPATIENT)
Facility: HOSPITAL | Age: 49
Discharge: HOME | End: 2019-11-27
Attending: INTERNAL MEDICINE | Admitting: INTERNAL MEDICINE
Payer: COMMERCIAL

## 2019-11-27 ENCOUNTER — ANESTHESIA (OUTPATIENT)
Dept: ENDOSCOPY | Facility: HOSPITAL | Age: 49
End: 2019-11-27
Payer: COMMERCIAL

## 2019-11-27 VITALS
WEIGHT: 221.31 LBS | RESPIRATION RATE: 18 BRPM | HEIGHT: 72 IN | BODY MASS INDEX: 29.97 KG/M2 | DIASTOLIC BLOOD PRESSURE: 98 MMHG | HEART RATE: 64 BPM | SYSTOLIC BLOOD PRESSURE: 166 MMHG | TEMPERATURE: 98 F | OXYGEN SATURATION: 100 %

## 2019-11-27 DIAGNOSIS — D50.9 IDA (IRON DEFICIENCY ANEMIA): Primary | ICD-10-CM

## 2019-11-27 PROBLEM — G47.33 OSA (OBSTRUCTIVE SLEEP APNEA): Chronic | Status: ACTIVE | Noted: 2018-01-19

## 2019-11-27 LAB — POCT GLUCOSE: 77 MG/DL (ref 70–110)

## 2019-11-27 PROCEDURE — 45378 DIAGNOSTIC COLONOSCOPY: CPT | Mod: ,,, | Performed by: INTERNAL MEDICINE

## 2019-11-27 PROCEDURE — 43239 EGD BIOPSY SINGLE/MULTIPLE: CPT | Performed by: INTERNAL MEDICINE

## 2019-11-27 PROCEDURE — 45378 PR COLONOSCOPY,DIAGNOSTIC: ICD-10-PCS | Mod: ,,, | Performed by: INTERNAL MEDICINE

## 2019-11-27 PROCEDURE — 63600175 PHARM REV CODE 636 W HCPCS: Performed by: ANESTHESIOLOGY

## 2019-11-27 PROCEDURE — 43239 EGD BIOPSY SINGLE/MULTIPLE: CPT | Mod: 51,,, | Performed by: INTERNAL MEDICINE

## 2019-11-27 PROCEDURE — D9220A PRA ANESTHESIA: Mod: PT,CRNA,, | Performed by: NURSE ANESTHETIST, CERTIFIED REGISTERED

## 2019-11-27 PROCEDURE — D9220A PRA ANESTHESIA: ICD-10-PCS | Mod: PT,CRNA,, | Performed by: NURSE ANESTHETIST, CERTIFIED REGISTERED

## 2019-11-27 PROCEDURE — 43239 PR EGD, FLEX, W/BIOPSY, SGL/MULTI: ICD-10-PCS | Mod: 51,,, | Performed by: INTERNAL MEDICINE

## 2019-11-27 PROCEDURE — 88305 TISSUE EXAM BY PATHOLOGIST: ICD-10-PCS | Mod: 26,,, | Performed by: PATHOLOGY

## 2019-11-27 PROCEDURE — 88305 TISSUE EXAM BY PATHOLOGIST: CPT | Performed by: PATHOLOGY

## 2019-11-27 PROCEDURE — D9220A PRA ANESTHESIA: ICD-10-PCS | Mod: PT,ANES,, | Performed by: ANESTHESIOLOGY

## 2019-11-27 PROCEDURE — 27201012 HC FORCEPS, HOT/COLD, DISP: Performed by: INTERNAL MEDICINE

## 2019-11-27 PROCEDURE — 37000008 HC ANESTHESIA 1ST 15 MINUTES: Performed by: INTERNAL MEDICINE

## 2019-11-27 PROCEDURE — 63600175 PHARM REV CODE 636 W HCPCS: Performed by: NURSE ANESTHETIST, CERTIFIED REGISTERED

## 2019-11-27 PROCEDURE — 88305 TISSUE EXAM BY PATHOLOGIST: CPT | Mod: 26,,, | Performed by: PATHOLOGY

## 2019-11-27 PROCEDURE — D9220A PRA ANESTHESIA: Mod: PT,ANES,, | Performed by: ANESTHESIOLOGY

## 2019-11-27 PROCEDURE — 45378 DIAGNOSTIC COLONOSCOPY: CPT | Performed by: INTERNAL MEDICINE

## 2019-11-27 PROCEDURE — 37000009 HC ANESTHESIA EA ADD 15 MINS: Performed by: INTERNAL MEDICINE

## 2019-11-27 PROCEDURE — 82962 GLUCOSE BLOOD TEST: CPT | Performed by: INTERNAL MEDICINE

## 2019-11-27 RX ORDER — SODIUM CHLORIDE, SODIUM LACTATE, POTASSIUM CHLORIDE, CALCIUM CHLORIDE 600; 310; 30; 20 MG/100ML; MG/100ML; MG/100ML; MG/100ML
INJECTION, SOLUTION INTRAVENOUS CONTINUOUS
Status: DISCONTINUED | OUTPATIENT
Start: 2019-11-27 | End: 2019-11-27 | Stop reason: HOSPADM

## 2019-11-27 RX ORDER — LIDOCAINE HYDROCHLORIDE 10 MG/ML
1 INJECTION, SOLUTION EPIDURAL; INFILTRATION; INTRACAUDAL; PERINEURAL ONCE
Status: DISCONTINUED | OUTPATIENT
Start: 2019-11-27 | End: 2019-11-27 | Stop reason: HOSPADM

## 2019-11-27 RX ORDER — PROPOFOL 10 MG/ML
VIAL (ML) INTRAVENOUS
Status: DISCONTINUED | OUTPATIENT
Start: 2019-11-27 | End: 2019-11-27

## 2019-11-27 RX ORDER — LIDOCAINE HCL/PF 100 MG/5ML
SYRINGE (ML) INTRAVENOUS
Status: DISCONTINUED | OUTPATIENT
Start: 2019-11-27 | End: 2019-11-27

## 2019-11-27 RX ADMIN — LIDOCAINE HYDROCHLORIDE 100 MG: 20 INJECTION, SOLUTION INTRAVENOUS at 07:11

## 2019-11-27 RX ADMIN — SODIUM CHLORIDE, SODIUM LACTATE, POTASSIUM CHLORIDE, AND CALCIUM CHLORIDE: 600; 310; 30; 20 INJECTION, SOLUTION INTRAVENOUS at 07:11

## 2019-11-27 RX ADMIN — PROPOFOL 100 MG: 10 INJECTION, EMULSION INTRAVENOUS at 07:11

## 2019-11-27 RX ADMIN — SODIUM CHLORIDE, SODIUM LACTATE, POTASSIUM CHLORIDE, AND CALCIUM CHLORIDE: 600; 310; 30; 20 INJECTION, SOLUTION INTRAVENOUS at 06:11

## 2019-11-27 NOTE — H&P
PRE PROCEDURE H&P    Patient Name: Guillermo Castillo  MRN: [id number]  : 1970  Date of Procedure:  2019  Referring Physician: Елена Park MD  Primary Physician: Kip Siddiqui MD  Procedure Physician: Radha Anne MD       Planned Procedure: Colonoscopy and EGD  Diagnosis: iron deficiency anemia  Chief Complaint: Same as above    HPI: Patient is an 49 y.o. male is here for the above.     Last colonoscopy: no prior   Family history: negative   Anticoagulation: none     Past Medical History:   Past Medical History:   Diagnosis Date    DM (diabetes mellitus) 2013     x 1 week ago 2015    DM (diabetes mellitus) 2013     2018 last tested    DM (diabetes mellitus) 2013     2019  last test 2019    Fatty liver     Gout     Hyperlipidemia     Hypertension     Hypertriglyceridemia     Mood disorder     Panic disorder     Sleep apnea     wears CPAP    Type 2 diabetes mellitus     2013 BS        Past Surgical History:  Past Surgical History:   Procedure Laterality Date    APPENDECTOMY          Home Medications:  Prior to Admission medications    Medication Sig Start Date End Date Taking? Authorizing Provider   amLODIPine (NORVASC) 10 MG tablet Take 1 tablet (10 mg total) by mouth once daily. 19 Yes Kip Siddiqui MD   cetirizine (ZYRTEC) 10 MG tablet Take 10 mg by mouth daily as needed.    Yes Historical Provider, MD   DULoxetine (CYMBALTA) 60 MG capsule TAKE 1 CAPSULE BY MOUTH ONCE DAILY 9/3/19  Yes Kip Siddiqui MD   ferrous gluconate (FERGON) 324 MG tablet Take 1 tablet (324 mg total) by mouth daily with breakfast. 10/14/19  Yes Kip Siddiqui MD   metFORMIN (GLUCOPHAGE-XR) 500 MG 24 hr tablet TAKE 2 TABLETS BY MOUTH ONCE DAILY 19  Yes Kip Siddiqui MD   omeprazole (PRILOSEC) 40 MG capsule TAKE 1 CAPSULE (40 MG TOTAL) BY MOUTH ONCE DAILY.  Patient taking differently: Take 40 mg by mouth daily as needed.  10/31/18   "Yes Kip Siddiqui MD   rosuvastatin (CRESTOR) 10 MG tablet TAKE 1 TABLET BY MOUTH EVERY DAY 4/15/19  Yes Kip Siddiqui MD   semaglutide (OZEMPIC) 0.25 mg or 0.5 mg(2 mg/1.5 mL) PnIj Inject 0.5 mg into the skin every 7 days. 11/18/19  Yes Kip Siddiqui MD   valsartan (DIOVAN) 320 MG tablet Take 1 tablet (320 mg total) by mouth once daily. 10/11/19  Yes Kip Siddiqui MD   blood sugar diagnostic Strp 1 each by Misc.(Non-Drug; Combo Route) route 3 (three) times daily. Health Glucose Test Strips 4/1/19   Ruby Ortega NP   DULoxetine (CYMBALTA) 60 MG capsule Take 1 capsule (60 mg total) by mouth once daily. 9/3/19   Kip Siddiqui MD   fluticasone (FLONASE) 50 mcg/actuation nasal spray 2 sprays (100 mcg total) by Each Nare route once daily.  Patient taking differently: 2 sprays by Each Nare route daily as needed.  10/9/18   Yaneth Portillo PA-C   insulin lispro (HUMALOG U-100 INSULIN) 100 unit/mL injection Use via sliding scale 9/6/19   iKp Siddiqui MD   insulin syringe-needle U-100 0.3 mL 31 gauge x 5/16" Syrg Use as directed 1/29/19   Kip Siddiqui MD        Allergies:  Review of patient's allergies indicates:   Allergen Reactions    Hydrochlorothiazide (bulk)      Other reaction(s): gout    Simvastatin      Other reaction(s): aches/inc lfts        Social History:   Social History     Socioeconomic History    Marital status: Single     Spouse name: Not on file    Number of children: Not on file    Years of education: Not on file    Highest education level: Not on file   Occupational History    Not on file   Social Needs    Financial resource strain: Not on file    Food insecurity:     Worry: Not on file     Inability: Not on file    Transportation needs:     Medical: Not on file     Non-medical: Not on file   Tobacco Use    Smoking status: Never Smoker    Smokeless tobacco: Never Used   Substance and Sexual Activity    Alcohol use: Not Currently     Comment: rarely    Drug use: No "    Sexual activity: Yes     Partners: Female   Lifestyle    Physical activity:     Days per week: Not on file     Minutes per session: Not on file    Stress: Not on file   Relationships    Social connections:     Talks on phone: Not on file     Gets together: Not on file     Attends Gnosticist service: Not on file     Active member of club or organization: Not on file     Attends meetings of clubs or organizations: Not on file     Relationship status: Not on file   Other Topics Concern    Not on file   Social History Narrative    Not on file       Family History:  Family History   Problem Relation Age of Onset    Coronary artery disease Father     Diabetes Father     Lung cancer Father     Diabetes Maternal Aunt     Diabetes Paternal Aunt     Hypertension Maternal Grandmother     Cataracts Maternal Grandmother     Hypertension Mother     Cataracts Mother        ROS: No acute cardiac events, no acute respiratory complaints.     Physical Exam (all patients):    BP (!) 150/82 (Patient Position: Sitting)   Pulse 70   Temp 98 °F (36.7 °C)   Resp 16   Ht 6' (1.829 m)   Wt 100.4 kg (221 lb 5.5 oz)   SpO2 98%   BMI 30.02 kg/m²   Lungs: Clear to auscultation bilaterally, respirations unlabored  Heart: Regular rate and rhythm, S1 and S2 normal, no obvious murmurs  Abdomen:         Soft, non-tender, bowel sounds normal, no masses, no organomegaly    Lab Results   Component Value Date    WBC 9.73 11/20/2019    MCV 85 11/20/2019    RDW 13.9 11/20/2019     11/20/2019    INR 1.0 05/11/2012     11/12/2019    HGBA1C 4.9 11/12/2019    BUN 12 11/12/2019     11/12/2019    K 4.1 11/12/2019     11/12/2019        SEDATION PLAN: per anesthesia      History reviewed, vital signs satisfactory, cardiopulmonary status satisfactory, sedation options, risks and plans have been discussed with the patient  All their questions were answered and the patient agrees to the sedation procedures as planned  and the patient is deemed an appropriate candidate for the sedation as planned.    Procedure explained to patient, informed consent obtained and placed in chart.    Radha Anne  11/27/2019  6:55 AM

## 2019-11-27 NOTE — TRANSFER OF CARE
Anesthesia Transfer of Care Note    Patient: Guillermo Castillo    Procedure(s) Performed: Procedure(s) (LRB):  COLONOSCOPY (N/A)  EGD (ESOPHAGOGASTRODUODENOSCOPY) (N/A)    Patient location: PACU    Anesthesia Type: general    Transport from OR: Transported from OR on room air with adequate spontaneous ventilation    Post pain: adequate analgesia    Post assessment: no apparent anesthetic complications and tolerated procedure well    Post vital signs: stable    Level of consciousness: sedated    Nausea/Vomiting: no nausea/vomiting    Complications: none    Transfer of care protocol was followed      Last vitals:   Visit Vitals  /69 (BP Location: Left arm, Patient Position: Lying)   Pulse 69   Temp 36.7 °C (98 °F)   Resp 18   Ht 6' (1.829 m)   Wt 100.4 kg (221 lb 5.5 oz)   SpO2 98%   BMI 30.02 kg/m²

## 2019-11-27 NOTE — TRANSFER OF CARE
Anesthesia Transfer of Care Note    Patient: Guillermo Castillo    Procedure(s) Performed: Procedure(s) (LRB):  COLONOSCOPY (N/A)  EGD (ESOPHAGOGASTRODUODENOSCOPY) (N/A)    Patient location: PACU    Anesthesia Type: general    Transport from OR: Transported from OR on room air with adequate spontaneous ventilation    Post pain: adequate analgesia    Post assessment: no apparent anesthetic complications    Post vital signs: stable    Level of consciousness: sedated    Transfer of care protocol was followed      Last vitals:   Visit Vitals  BP (!) 166/98 (BP Location: Left arm, Patient Position: Sitting)   Pulse 64   Temp 36.7 °C (98 °F) (Temporal)   Resp 18   Ht 6' (1.829 m)   Wt 100.4 kg (221 lb 5.5 oz)   SpO2 100%   BMI 30.02 kg/m²

## 2019-11-27 NOTE — [employer] PATIENT INSTRUCTIONS
Discharge Summary/Instructions after an Endoscopic Procedure  Patient Name: Guillermo Castillo  Patient MRN: [id number]  Patient YOB: 1970 Wednesday, November 27, 2019  Radha Anne MD  RESTRICTIONS:  During your procedure today, you received medications for sedation.  These   medications may affect your judgment, balance and coordination.  Therefore,   for 24 hours, you have the following restrictions:   - DO NOT drive a car, operate machinery, make legal/financial decisions,   sign important papers or drink alcohol.    ACTIVITY:  Today: no heavy lifting, straining or running due to procedural   sedation/anesthesia.  The following day: return to full activity including work.  DIET:  Eat and drink normally unless instructed otherwise.     TREATMENT FOR COMMON SIDE EFFECTS:  - Mild abdominal pain, nausea, belching, bloating or excessive gas:  rest,   eat lightly and use a heating pad.  - Sore Throat: treat with throat lozenges and/or gargle with warm salt   water.  - Because air was used during the procedure, expelling large amounts of air   from your rectum or belching is normal.  - If a bowel prep was taken, you may not have a bowel movement for 1-3 days.    This is normal.  SYMPTOMS TO WATCH FOR AND REPORT TO YOUR PHYSICIAN:  1. Abdominal pain or bloating, other than gas cramps.  2. Chest pain.  3. Back pain.  4. Signs of infection such as: chills or fever occurring within 24 hours   after the procedure.  5. Rectal bleeding, which would show as bright red, maroon, or black stools.   (A tablespoon of blood from the rectum is not serious, especially if   hemorrhoids are present.)  6. Vomiting.  7. Weakness or dizziness.  GO DIRECTLY TO THE NEAREST EMERGENCY ROOM IF YOU HAVE ANY OF THE FOLLOWING:      Difficulty breathing              Chills and/or fever over 101 F   Persistent vomiting and/or vomiting blood   Severe abdominal pain   Severe chest pain   Black, tarry stools   Bleeding- more than one  tablespoon   Any other symptom or condition that you feel may need urgent attention  Your doctor recommends these additional instructions:  If any biopsies were taken, your doctors clinic will contact you in 1 to 2   weeks with any results.  - Patient has a contact number available for emergencies.  The signs and   symptoms of potential delayed complications were discussed with the   patient.  Return to normal activities tomorrow.  Written discharge   instructions were provided to the patient.   - Discharge patient to home (via wheelchair).   - Resume previous diet today.   - Continue present medications.   - Repeat colonoscopy in 10 years for screening purposes.  For questions, problems or results please call your physician Radha Anne MD at Work:  [phone]  If you have any questions about the above instructions, call the GI   department at [phone] or call the endoscopy unit at [phone]   from 7am until 3 pm.  [facility] [place], EMERGENCY ROOM PHONE NUMBER:   [phone]  IF A COMPLICATION OR EMERGENCY SITUATION ARISES AND YOU ARE UNABLE TO REACH   YOUR PHYSICIAN - GO DIRECTLY TO THE EMERGENCY ROOM.  I have read or have had read to me these discharge instructions for my   procedure and have received a written copy.  I understand these   instructions and will follow-up with my physician if I have any questions.     __________________________________       _____________________________________  Nurse Signature                                          Patient/Designated   Responsible Party Signature  MD Radha Vazquez MD  11/27/2019 7:55:08 AM  This report has been verified and signed electronically.  [employer]

## 2019-11-27 NOTE — DISCHARGE INSTRUCTIONS
Colonoscopy     A camera attached to a flexible tube with a viewing lens is used to take video pictures.     Colonoscopy is a test to view the inside of your lower digestive tract (colon and rectum). Sometimes it can show the last part of the small intestine (ileum). During the test, small pieces of tissue may be removed for testing. This is called a biopsy. Small growths, such as polyps, may also be removed.   Why is colonoscopy done?  The test is done to help look for colon cancer. And it can help find the source of abdominal pain, bleeding, and changes in bowel habits. It may be needed once a year, depending on factors such as your:  · Age  · Health history  · Family health history  · Symptoms  · Results from any prior colonoscopy  Risks and possible complications  These include:  · Bleeding               · A puncture or tear in the colon   · Risks of anesthesia  · A cancer lesion not being seen  Getting ready   To prepare for the test:  · Talk with your healthcare provider about the risks of the test (see below). Also ask your healthcare provider about alternatives to the test.  · Tell your healthcare provider about any medicines you take. Also tell him or her about any health conditions you may have.  · Make sure your rectum and colon are empty for the test. Follow the diet and bowel prep instructions exactly. If you dont, the test may need to be rescheduled.  · Plan for a friend or family member to drive you home after the test.     Colonoscopy provides an inside view of the entire colon.     You may discuss the results with your doctor right away or at a future visit.  During the test   The test is usually done in the hospital on an outpatient basis. This means you go home the same day. The procedure takes about 30 minutes. During that time:  · You are given relaxing (sedating) medicine through an IV line. You may be drowsy, or fully asleep.  · The healthcare provider will first give you a physical exam to  check for anal and rectal problems.  · Then the anus is lubricated and the scope inserted.  · If you are awake, you may have a feeling similar to needing to have a bowel movement. You may also feel pressure as air is pumped into the colon. Its OK to pass gas during the procedure.  · Biopsy, polyp removal, or other treatments may be done during the test.  After the test   You may have gas right after the test. It can help to try to pass it to help prevent later bloating. Your healthcare provider may discuss the results with you right away. Or you may need to schedule a follow-up visit to talk about the results. After the test, you can go back to your normal eating and other activities. You may be tired from the sedation and need to rest for a few hours.  Date Last Reviewed: 11/1/2016 © 2000-2017 [employer]. [address] [place] [address]. All rights reserved. This information is not intended as a substitute for professional medical care. Always follow your healthcare professional's instructions.        Esophagitis     With esophagitis, the lining of the esophagus is inflamed.   Do you often have burning pain in your chest? You may have esophagitis. This is when the lining of the esophagus becomes red and swollen (inflamed). The esophagus is the tube that connects your throat to your stomach. This sheet tells you more about esophagitis. It also explains your treatment options.  Main types of esophagitis  Reflux esophagitis. This is the more common type. It is caused by GERD (gastroesophageal reflux disease). Stomach contents with stomach acid flow back up into the esophagus. This happens over and over. It leads to inflammation. Risk factors can include:  · Being overweight  · Asthma  · Smoking  · Pregnancy  · Frequent vomiting  · Certain medicines (such as aspirin and other anti-inflammatories)  · Hiatal hernia  Infectious esophagitis. This is caused by an infection. You are more at risk  for this if you have a weakened immune system and poor nutrition. Antibiotic use can also be a factor. The infection is often due to the following:  · A type of fungus (typically candida)  · A virus, such as herpes simplex virus 1 (HSV-1) or cytomegalovirus (CMV)  Eosinophilic esophagitis. Foods or other things around you can give you an allergic reaction. This triggers an immune response and leads to esophagitis.  Pill-induced esophagitis. Certain types of medicines can cause inflammation and ulcers in the esophagus. These include doxycycline, aspirin, NSAIDs, alendronate, potassium, quinidine, iron.  Symptoms of esophagitis  The following symptoms can occur with esophagitis:  · Pain when swallowing, or trouble swallowing  · Pain behind your breastbone (heartburn)  · Acid regurgitation  · Chronic sore throat  · Gum Inflammation  · Cavities  · Bad breath  · Nausea  · Pain in your upper belly (abdomen)  · Bleeding (indicated by bright red vomit or black, tarry stool)  These symptoms occur more often with reflux esophagitis:  · Coughing, wheezing, or asthma  · Hoarseness  Diagnosis of esophagitis  Your healthcare provider will ask about your health history and symptoms. Youll also be examined. Sometimes certain tests are needed. These may include:  · Upper endoscopy. A thin, flexible tube with a tiny light and camera is used. It is inserted through the mouth down into the esophagus. This lets the provider look for damage. A small sample of tissue (biopsy) may also be removed. The sample is sent to a lab for testing.  · Upper GI X-ray with barium. An X-ray is done after you drink a substance called barium. Barium may make problems in the esophagus easier to see on an x-ray.  · Esophageal pH. A soft, thin tube is passed into the esophagus through the nose or mouth for 24 hours. It measures the acid level in the esophagus.  · Esophageal manometry. A soft, thin tube is passed into the esophagus through the nose or mouth.  It measures muscle contractions in the esophagus.  Treatment of esophagitis  Medicines. Different medicines can help treat esophagitis. The medicine used will depend on the type of esophagitis you have. Talk with your healthcare provider.  Lifestyle changes. Making the following changes can help reduce irritation and ease your symptoms:  · Avoid spicy foods (pepper, chili powder, browne). Also avoid hard foods (nuts, crackers, raw vegetables) and acidic foods and drinks (tomatoes, citrus fruits and juices). Other problem foods include chocolate, peppermint, nutmeg, and foods high in fat.  · Until you can swallow without pain, follow a combined liquid and soft diet. Try foods such as cooked cereals, mashed potatoes, and soups.  · Take small bites and chew your food thoroughly.  · Avoid large meals and heavy evening meals. Don't lie down within 2 to 3 hours of eating.  · Get to or stay at a healthy weight.  · Avoid alcohol, caffeine, and smoking or tobacco products.  · Brush and floss your teeth  · Raise your upper body by 4 to 6 inches when lying in bed. This can be done using a foam wedge. Or put blocks under the legs at the head of your bed.  Surgery. This may be needed for severe reflux esophagitis. Other noninvasive procedures to treat GERD and esophagitis are being studied. Your provider can tell you more.  Why treatment Is important  Without treatment, esophagitis can get worse. This is especially true with severe reflux esophagitis. For instance, continued symptoms can cause scarring of the esophagus. Over time, this can cause a narrowing the esophagus (stricture). This can make it hard to pass food down to the stomach. As symptoms go on they can also cause changes in the lining of the esophagus. These changes can put you at a slightly higher risk of cancer of the esophagus.   Date Last Reviewed: 7/1/2016  © 6488-5763 [employer]. [address] [place] [address], [place] [address]. All rights reserved.  This information is not intended as a substitute for professional medical care. Always follow your healthcare professional's instructions.

## 2019-11-27 NOTE — ANESTHESIA POSTPROCEDURE EVALUATION
Anesthesia Post Evaluation    Patient: Guillermo Castillo    Procedure(s) Performed: Procedure(s) (LRB):  COLONOSCOPY (N/A)  EGD (ESOPHAGOGASTRODUODENOSCOPY) (N/A)    Final Anesthesia Type: general    Patient location during evaluation: GI PACU  Patient participation: Yes- Able to Participate  Level of consciousness: awake and alert and oriented  Post-procedure vital signs: reviewed and stable  Pain management: adequate  Airway patency: patent    PONV status at discharge: No PONV  Anesthetic complications: no      Cardiovascular status: hemodynamically stable  Respiratory status: unassisted, spontaneous ventilation and room air  Hydration status: euvolemic  Follow-up not needed.          Vitals Value Taken Time   /91 11/27/2019  8:22 AM   Temp 36.8 °C (98.2 °F) 11/27/2019  7:50 AM   Pulse 64 11/27/2019  8:24 AM   Resp 22 11/27/2019  8:24 AM   SpO2 100 % 11/27/2019  8:24 AM   Vitals shown include unvalidated device data.      No case tracking events are documented in the log.      Pain/Beatriz Score: Beatriz Score: 9 (11/27/2019  7:50 AM)

## 2019-11-27 NOTE — [employer] PATIENT INSTRUCTIONS
Discharge Summary/Instructions after an Endoscopic Procedure  Patient Name: Guillermo Castillo  Patient MRN: [id number]  Patient YOB: 1970 Wednesday, November 27, 2019  Radha Anne MD  RESTRICTIONS:  During your procedure today, you received medications for sedation.  These   medications may affect your judgment, balance and coordination.  Therefore,   for 24 hours, you have the following restrictions:   - DO NOT drive a car, operate machinery, make legal/financial decisions,   sign important papers or drink alcohol.    ACTIVITY:  Today: no heavy lifting, straining or running due to procedural   sedation/anesthesia.  The following day: return to full activity including work.  DIET:  Eat and drink normally unless instructed otherwise.     TREATMENT FOR COMMON SIDE EFFECTS:  - Mild abdominal pain, nausea, belching, bloating or excessive gas:  rest,   eat lightly and use a heating pad.  - Sore Throat: treat with throat lozenges and/or gargle with warm salt   water.  - Because air was used during the procedure, expelling large amounts of air   from your rectum or belching is normal.  - If a bowel prep was taken, you may not have a bowel movement for 1-3 days.    This is normal.  SYMPTOMS TO WATCH FOR AND REPORT TO YOUR PHYSICIAN:  1. Abdominal pain or bloating, other than gas cramps.  2. Chest pain.  3. Back pain.  4. Signs of infection such as: chills or fever occurring within 24 hours   after the procedure.  5. Rectal bleeding, which would show as bright red, maroon, or black stools.   (A tablespoon of blood from the rectum is not serious, especially if   hemorrhoids are present.)  6. Vomiting.  7. Weakness or dizziness.  GO DIRECTLY TO THE NEAREST EMERGENCY ROOM IF YOU HAVE ANY OF THE FOLLOWING:      Difficulty breathing              Chills and/or fever over 101 F   Persistent vomiting and/or vomiting blood   Severe abdominal pain   Severe chest pain   Black, tarry stools   Bleeding- more than one  tablespoon   Any other symptom or condition that you feel may need urgent attention  Your doctor recommends these additional instructions:  If any biopsies were taken, your doctors clinic will contact you in 1 to 2   weeks with any results.  - Patient has a contact number available for emergencies.  The signs and   symptoms of potential delayed complications were discussed with the   patient.  Return to normal activities tomorrow.  Written discharge   instructions were provided to the patient.   - Discharge patient to home (via wheelchair).   - Resume previous diet today.   - Continue present medications.   - Await pathology results.  For questions, problems or results please call your physician Radha Anne MD at Work:  [phone]  If you have any questions about the above instructions, call the GI   department at [phone] or call the endoscopy unit at [phone]   from 7am until 3 pm.  [facility] [place], EMERGENCY ROOM PHONE NUMBER:   [phone]  IF A COMPLICATION OR EMERGENCY SITUATION ARISES AND YOU ARE UNABLE TO REACH   YOUR PHYSICIAN - GO DIRECTLY TO THE EMERGENCY ROOM.  I have read or have had read to me these discharge instructions for my   procedure and have received a written copy.  I understand these   instructions and will follow-up with my physician if I have any questions.     __________________________________       _____________________________________  Nurse Signature                                          Patient/Designated   Responsible Party Signature  MD Radha Vazquez MD  11/27/2019 7:51:23 AM  This report has been verified and signed electronically.  [employer]

## 2019-11-29 ENCOUNTER — PATIENT OUTREACH (OUTPATIENT)
Dept: OTHER | Facility: OTHER | Age: 49
End: 2019-11-29

## 2019-11-29 NOTE — PROGRESS NOTES
Digital Medicine: Clinician Follow-Up    Called patient to discuss how he is tolerating amlodipine.     The history is provided by the patient.     Follow Up  Follow-up reason(s): reading review and medication change follow-up      Readings are trending down   Medication Change: new med    Patient started new medication.    Is patient tolerating med change?:  YesPatient is tolerating amlodipine 10mg daily and his blood pressure is trending down nicely.    He will bring his iHealth cuff to the [facility] for further evaluation and possible cuff exchange.       INTERVENTION(S)  reviewed appropriate dose schedule    PLAN  patient verbalizes understanding    Patient would like to continue his current HTN medications and get his iHealth cuff working properly. He is manually entering in his readings at this time.     We discussed further HTN medications and he would prefer to remain off chlorthalidone. If further adjustments are needed, we discussed adding a beta blocker.       There are no preventive care reminders to display for this patient.    Last 5 Patient Entered Readings                                      Current 30 Day Average: 150/82     Recent Readings 11/28/2019 11/27/2019 11/15/2019 11/11/2019 11/7/2019    SBP (mmHg) 141 142 159 162 147    DBP (mmHg) 77 79 92 82 74    Pulse 68 66 67 72 72        Last 6 Patient Entered Readings                                          Most Recent A1c: 4.9% on 11/12/2019  (Goal: 7%)     Recent Readings 11/26/2019 11/15/2019 11/11/2019 11/7/2019 11/3/2019    Blood Glucose (mg/dL) 148 104 107 136 95           Hypertension Medications             amLODIPine (NORVASC) 10 MG tablet Take 1 tablet (10 mg total) by mouth once daily.    valsartan (DIOVAN) 320 MG tablet Take 1 tablet (320 mg total) by mouth once daily.        Diabetes Medications             insulin lispro (HUMALOG U-100 INSULIN) 100 unit/mL injection Use via sliding scale    metFORMIN (GLUCOPHAGE-XR) 500 MG 24 hr tablet  TAKE 2 TABLETS BY MOUTH ONCE DAILY    semaglutide (OZEMPIC) 0.25 mg or 0.5 mg(2 mg/1.5 mL) PnIj Inject 0.5 mg into the skin every 7 days.               Screenings

## 2019-12-02 ENCOUNTER — CLINICAL SUPPORT (OUTPATIENT)
Dept: REHABILITATION | Facility: HOSPITAL | Age: 49
End: 2019-12-02
Payer: COMMERCIAL

## 2019-12-02 DIAGNOSIS — R29.898 DECREASED GRIP STRENGTH OF RIGHT HAND: ICD-10-CM

## 2019-12-02 DIAGNOSIS — M62.81 MUSCLE WEAKNESS OF RIGHT ARM: ICD-10-CM

## 2019-12-02 DIAGNOSIS — M25.511 RIGHT SHOULDER PAIN, UNSPECIFIED CHRONICITY: ICD-10-CM

## 2019-12-02 DIAGNOSIS — M25.521 RIGHT ELBOW PAIN: ICD-10-CM

## 2019-12-02 PROCEDURE — 97110 THERAPEUTIC EXERCISES: CPT

## 2019-12-02 PROCEDURE — 97140 MANUAL THERAPY 1/> REGIONS: CPT

## 2019-12-02 NOTE — PROGRESS NOTES
Physical Therapy Daily Treatment Note     Name: Guillermo Castillo  Clinic Number: [id number]    Therapy Diagnosis:   Encounter Diagnoses   Name Primary?    Right elbow pain     Muscle weakness of right arm     Right shoulder pain, unspecified chronicity     Decreased  strength of right hand      Physician: Ruby Ortega NP    Visit Date: 12/2/2019    Physician Orders: PT Eval and Treat   Medical Diagnosis from Referral: Right elbow pain, right forearm pain  Evaluation Date: 7/31/2019, 9/30/19  Authorization Period Expiration: 12/31/2019  Plan of Care Expiration: 10/30/2019  Visit # / Visits authorized: 22/25      Time In: 9:35 am  Time Out: 10:30 am  Total Billable Time: 30 minutes    Precautions: Standard    Subjective     Pt reports: Elbow has been better he has been trying to do his ex's but tends to do it when he starts to hurt more.  Overall he does feel that he is improving, also notes that when he has to lift the stretcher at work he has more tension in his shoulder and arms and has some increase in sx's.    He was compliant with home exercise program.  Response to previous treatment: good  Functional change: increased pain    Pain:  3/10 at start, 2/10 at end  Location: right elbow      Objective     Guillermo received therapeutic exercises to develop ROM for 42 minutes including:    AROM - elbow flexed, wrist flexion 3x10  AROM - elbow flexed, palm to body 3x10  AROM - elbow extended, wrist flexion 3x10  AROM - elbow extended, palm to body 3x10  Passive stretch forearm, elbow ext UD 3x30s  Eccentric twist with flex bar yellow 2/10  Foam roll stretch 3'  Prone LT isometric 2/10  Prone LT with lats 2/10  Juan J V 2/10 10# (no abd)  Biceps/Triceps pulleys 20# 2/10  Wrist flex palm down 3# 2/10  Wrist flexion palm up 3# 2/10  Sup/pronation 3# 2/10      Palpation Assessment to determine the necessity for Functional Dry Needling  R forearm mm.   Patient provided verbal consent to Functional Dry Needling  today, previously has provided written consent.  Guillermo received the following manual therapy techniques:  R Subscapular releases, UT/levator scap, & scapular tilting.    FDN to R ECRL/ECRB with prolonged estim for twitch response.     Home Exercises Provided and Patient Education Provided     Education provided:   - DO HEP, find brace.    Written Handout on response to FDN provided: no, verbally communicated.  Guillermo demonstrated good  understanding of the education provided.     Written Home Exercises Provided: Patient instructed to cont prior HEP.  Exercises were reviewed and Guillermo was able to demonstrate them prior to the end of the session.  Guillermo demonstrated good  understanding of the education provided.     See EMR under Patient Instructions for exercises provided previously.    Assessment     Improved symptoms with decreased pain post tx session with decreased mm tone noted.  No c/o with gripping today.    -Patient demonstrated appropriate response to Functional Dry Needling.   Good rhythmical contractions observed with estim to treated muscle groups    Guillermo is progressing well towards his goals.   Pt prognosis is Good.     Pt will continue to benefit from skilled outpatient physical therapy to address the deficits listed in the problem list box on initial evaluation, provide pt/family education and to maximize pt's level of independence in the home and community environment.     Pt's spiritual, cultural and educational needs considered and pt agreeable to plan of care and goals.     Anticipated barriers to physical therapy: history of elbow injury, pain    Goals:   Short Term Goals: In 3 weeks   1.Pt to be educated on HEP.  2.Patient to increase RUE  strength by 5#, without pain.  3.Patient to report pain at 1/10 or less with return to normal work schedule.  4.Patient to take the UEFS with goals made PRN.  5. Pt to be educated on body mechanics awareness.     Long Term Goals: In 6 weeks  1.LTG for  UEFS.  2. Patient to demo increase in all UE/hand strength to 5/5.  3. Patient to have decreased pain to 0/10 at all times.  4. Pt to increase R  strength to 10# greater than L.  5. Patient to perform daily activities including gripping, lifting loads, and required work duties without pain or limitation.    Plan     Continue with tx, progress with strengthening as tolerated by pt.  Progress with scapular and shoulder strengthening as well as elbow/wrist.    Екатерина Donovan, PT

## 2019-12-05 ENCOUNTER — OFFICE VISIT (OUTPATIENT)
Dept: SPORTS MEDICINE | Facility: CLINIC | Age: 49
End: 2019-12-05
Payer: COMMERCIAL

## 2019-12-05 VITALS
WEIGHT: 221 LBS | HEIGHT: 72 IN | SYSTOLIC BLOOD PRESSURE: 150 MMHG | BODY MASS INDEX: 29.93 KG/M2 | HEART RATE: 74 BPM | DIASTOLIC BLOOD PRESSURE: 95 MMHG

## 2019-12-05 DIAGNOSIS — M25.521 RIGHT ELBOW PAIN: Primary | ICD-10-CM

## 2019-12-05 DIAGNOSIS — M77.11 RIGHT LATERAL EPICONDYLITIS: ICD-10-CM

## 2019-12-05 PROCEDURE — 3077F PR MOST RECENT SYSTOLIC BLOOD PRESSURE >= 140 MM HG: ICD-10-PCS | Mod: CPTII,S$GLB,, | Performed by: ORTHOPAEDIC SURGERY

## 2019-12-05 PROCEDURE — 99999 PR PBB SHADOW E&M-EST. PATIENT-LVL III: ICD-10-PCS | Mod: PBBFAC,,, | Performed by: ORTHOPAEDIC SURGERY

## 2019-12-05 PROCEDURE — 3008F BODY MASS INDEX DOCD: CPT | Mod: CPTII,S$GLB,, | Performed by: ORTHOPAEDIC SURGERY

## 2019-12-05 PROCEDURE — 99213 OFFICE O/P EST LOW 20 MIN: CPT | Mod: S$GLB,,, | Performed by: ORTHOPAEDIC SURGERY

## 2019-12-05 PROCEDURE — 99999 PR PBB SHADOW E&M-EST. PATIENT-LVL III: CPT | Mod: PBBFAC,,, | Performed by: ORTHOPAEDIC SURGERY

## 2019-12-05 PROCEDURE — 3008F PR BODY MASS INDEX (BMI) DOCUMENTED: ICD-10-PCS | Mod: CPTII,S$GLB,, | Performed by: ORTHOPAEDIC SURGERY

## 2019-12-05 PROCEDURE — 3080F DIAST BP >= 90 MM HG: CPT | Mod: CPTII,S$GLB,, | Performed by: ORTHOPAEDIC SURGERY

## 2019-12-05 PROCEDURE — 99213 PR OFFICE/OUTPT VISIT, EST, LEVL III, 20-29 MIN: ICD-10-PCS | Mod: S$GLB,,, | Performed by: ORTHOPAEDIC SURGERY

## 2019-12-05 PROCEDURE — 3080F PR MOST RECENT DIASTOLIC BLOOD PRESSURE >= 90 MM HG: ICD-10-PCS | Mod: CPTII,S$GLB,, | Performed by: ORTHOPAEDIC SURGERY

## 2019-12-05 PROCEDURE — 3077F SYST BP >= 140 MM HG: CPT | Mod: CPTII,S$GLB,, | Performed by: ORTHOPAEDIC SURGERY

## 2019-12-05 NOTE — PROGRESS NOTES
Subjective:     Patient ID: Guillermo Castillo is a 49 y.o. male.    Chief Complaint: Pain and Follow-up of the Right Elbow        For recheck right elbow    Pain is rated 1/10        Elbow Pain    The pain is present in the right elbow. The pain radiates to the right shoulder and right hand. The current episode started more than 1 month ago. The injury was the result of a action while at home. The problem occurs intermittently. The quality of the pain is described as aching. The pain is at a severity of 1/10. Pertinent negatives include no fever or itching. The symptoms are aggravated by activity, bending, bearing weight and lifting. He has tried NSAIDs and injection treatment for the symptoms. Physical therapy was effective.      Past Medical History:   Diagnosis Date    DM (diabetes mellitus)     Fatty liver     Gout     Hyperlipidemia     Hypertension     Hypertriglyceridemia     Mood disorder     Panic disorder     Sleep apnea     wears CPAP    Type 2 diabetes mellitus     05/2013 BS     Past Surgical History:   Procedure Laterality Date    APPENDECTOMY      COLONOSCOPY N/A 11/27/2019    Procedure: COLONOSCOPY;  Surgeon: Radha Anne MD;  Location: [facility];  Service: Endoscopy;  Laterality: N/A;    ESOPHAGOGASTRODUODENOSCOPY N/A 11/27/2019    Procedure: EGD (ESOPHAGOGASTRODUODENOSCOPY);  Surgeon: Radha Anne MD;  Location: [facility];  Service: Endoscopy;  Laterality: N/A;     Family History   Problem Relation Age of Onset    Coronary artery disease Father     Diabetes Father     Lung cancer Father     Diabetes Maternal Aunt     Diabetes Paternal Aunt     Hypertension Maternal Grandmother     Cataracts Maternal Grandmother     Hypertension Mother     Cataracts Mother      Social History     Socioeconomic History    Marital status: Single     Spouse name: Not on file    Number of children: Not on file    Years of education: Not on file    Highest education level: Not on file  "  Occupational History    Not on file   Social Needs    Financial resource strain: Not on file    Food insecurity:     Worry: Not on file     Inability: Not on file    Transportation needs:     Medical: Not on file     Non-medical: Not on file   Tobacco Use    Smoking status: Never Smoker    Smokeless tobacco: Never Used   Substance and Sexual Activity    Alcohol use: Not Currently     Comment: rarely    Drug use: No    Sexual activity: Yes     Partners: Female   Lifestyle    Physical activity:     Days per week: Not on file     Minutes per session: Not on file    Stress: Not on file   Relationships    Social connections:     Talks on phone: Not on file     Gets together: Not on file     Attends Sabianism service: Not on file     Active member of club or organization: Not on file     Attends meetings of clubs or organizations: Not on file     Relationship status: Not on file   Other Topics Concern    Not on file   Social History Narrative    Not on file     Medication List with Changes/Refills   Current Medications    AMLODIPINE (NORVASC) 10 MG TABLET    Take 1 tablet (10 mg total) by mouth once daily.    BLOOD SUGAR DIAGNOSTIC STRP    1 each by Misc.(Non-Drug; Combo Route) route 3 (three) times daily. Health Glucose Test Strips    CETIRIZINE (ZYRTEC) 10 MG TABLET    Take 10 mg by mouth daily as needed.     DULOXETINE (CYMBALTA) 60 MG CAPSULE    TAKE 1 CAPSULE BY MOUTH ONCE DAILY    DULOXETINE (CYMBALTA) 60 MG CAPSULE    Take 1 capsule (60 mg total) by mouth once daily.    FERROUS GLUCONATE (FERGON) 324 MG TABLET    Take 1 tablet (324 mg total) by mouth daily with breakfast.    FLUTICASONE (FLONASE) 50 MCG/ACTUATION NASAL SPRAY    2 sprays (100 mcg total) by Each Nare route once daily.    INSULIN LISPRO (HUMALOG U-100 INSULIN) 100 UNIT/ML INJECTION    Use via sliding scale    INSULIN SYRINGE-NEEDLE U-100 0.3 ML 31 GAUGE X 5/16" SYRG    Use as directed    METFORMIN (GLUCOPHAGE-XR) 500 MG 24 HR TABLET    " TAKE 2 TABLETS BY MOUTH ONCE DAILY    OMEPRAZOLE (PRILOSEC) 40 MG CAPSULE    TAKE 1 CAPSULE (40 MG TOTAL) BY MOUTH ONCE DAILY.    ROSUVASTATIN (CRESTOR) 10 MG TABLET    TAKE 1 TABLET BY MOUTH EVERY DAY    SEMAGLUTIDE (OZEMPIC) 0.25 MG OR 0.5 MG(2 MG/1.5 ML) PNIJ    Inject 0.5 mg into the skin every 7 days.    VALSARTAN (DIOVAN) 320 MG TABLET    Take 1 tablet (320 mg total) by mouth once daily.   Changed and/or Refilled Medications    Modified Medication Previous Medication    AMLODIPINE (NORVASC) 10 MG TABLET amLODIPine (NORVASC) 10 MG tablet       TAKE 1 TABLET BY MOUTH ONCE DAILY    TAKE 1 TABLET BY MOUTH ONCE DAILY     Review of patient's allergies indicates:   Allergen Reactions    Hydrochlorothiazide (bulk)      Other reaction(s): gout    Simvastatin      Other reaction(s): aches/inc lfts     Review of Systems   Constitution: Negative for fever.   HENT: Negative for sore throat.    Eyes: Negative for blurred vision.   Cardiovascular: Negative for dyspnea on exertion.   Respiratory: Negative for shortness of breath.    Hematologic/Lymphatic: Does not bruise/bleed easily.   Skin: Negative for itching.   Gastrointestinal: Negative for vomiting.   Genitourinary: Negative for dysuria.   Neurological: Negative for dizziness.   Psychiatric/Behavioral: The patient does not have insomnia.        Objective:   Body mass index is 29.97 kg/m².  Vitals:    12/05/19 1405   BP: (!) 150/95   Pulse: 74           General    Nursing note and vitals reviewed.  Constitutional: He is oriented to person, place, and time. He appears well-developed. No distress.   HENT:   Head: Normocephalic and atraumatic.   Eyes: EOM are normal.   Cardiovascular: Normal rate.    Pulmonary/Chest: Effort normal. No stridor.   Neurological: He is alert and oriented to person, place, and time.   Psychiatric: He has a normal mood and affect. His behavior is normal.             Right Hand/Wrist Exam     Other     Neuorologic Exam    Median Distribution:  normal  Ulnar Distribution: normal  Radial Distribution: normal      Right Elbow Exam     Inspection   Bruising: absent  Deformity: absent    Pain   The patient exhibits pain of the extensor musculature and lateral epicondyle    Tenderness   The patient is tender to palpation of the lateral epicondyle.     Range of Motion   Extension: 0   Flexion: 130   Pronation: normal   Supination: normal     Tests   Varus: negative  Valgus: negative  Tennis Elbow: mild      Left Elbow Exam     Range of Motion   Extension: 0   Flexion: 130   Pronation: normal   Supination: normal     Tests   Tennis Elbow: negative        Vascular Exam       Capillary Refill  Right Hand: normal capillary refill        Assessment:     Encounter Diagnoses   Name Primary?    Right elbow pain Yes    Right lateral epicondylitis         Plan:     Continue physical therapy lateral epicondylitis, 2 to 3 times a week for 4 weeks    Lateral epicondylitis strap/brace    Hold injections/surgery for now    Follow-up as needed    Guillermo Castillo is very agreeable with above noted plan, and all questions answered to full satisfaction today.

## 2019-12-06 ENCOUNTER — CLINICAL SUPPORT (OUTPATIENT)
Dept: REHABILITATION | Facility: HOSPITAL | Age: 49
End: 2019-12-06
Payer: COMMERCIAL

## 2019-12-06 DIAGNOSIS — M62.81 MUSCLE WEAKNESS OF RIGHT ARM: ICD-10-CM

## 2019-12-06 DIAGNOSIS — M25.511 RIGHT SHOULDER PAIN, UNSPECIFIED CHRONICITY: ICD-10-CM

## 2019-12-06 DIAGNOSIS — R29.898 DECREASED GRIP STRENGTH OF RIGHT HAND: ICD-10-CM

## 2019-12-06 DIAGNOSIS — M25.521 RIGHT ELBOW PAIN: ICD-10-CM

## 2019-12-06 PROCEDURE — 97140 MANUAL THERAPY 1/> REGIONS: CPT

## 2019-12-06 PROCEDURE — 97110 THERAPEUTIC EXERCISES: CPT

## 2019-12-06 NOTE — PROGRESS NOTES
Physical Therapy Daily Treatment Note     Name: Guillermo Castillo  Clinic Number: [id number]    Therapy Diagnosis:   Encounter Diagnoses   Name Primary?    Right elbow pain     Muscle weakness of right arm     Right shoulder pain, unspecified chronicity     Decreased  strength of right hand      Physician: Ruby Ortega NP    Visit Date: 12/6/2019    Physician Orders: PT Eval and Treat   Medical Diagnosis from Referral: Right elbow pain, right forearm pain  Evaluation Date: 7/31/2019, 9/30/19  Authorization Period Expiration: 12/31/2019  Plan of Care Expiration: 10/30/2019  Visit # / Visits authorized: 23/25      Time In: 7:35 am  Time Out: 8:30 am  Total Billable Time: 30 minutes    Precautions: Standard    Subjective     Pt reports: Elbow was better, but then starts hurting.  Thinks he may have been sleeping funny on his elbow/wrist - discussed night splint to keep wrist straight.  Pt has not yet found his elbow strap but has been doing more of his ex's.    He was compliant with home exercise program.  Response to previous treatment: good  Functional change: con't pain    Pain:  0-4/10  Location: right elbow      Objective     Guillermo received therapeutic exercises to develop ROM for 30 minutes including:    AROM - elbow flexed, wrist flexion 3x10  AROM - elbow flexed, palm to body 3x10  AROM - elbow extended, wrist flexion 3x10  AROM - elbow extended, palm to body 3x10  Passive stretch forearm, elbow ext UD 3x30s  Eccentric twist with flex bar yellow 2/10  Foam roll stretch 3'  Wrist flex palm down 3# 2/10  Wrist flexion palm up 3# 2/10  Sup/pronation 3# 2/10      Palpation Assessment to determine the necessity for Functional Dry Needling  R forearm mm.   Patient provided verbal consent to Functional Dry Needling today, previously has provided written consent.  Guillermo received the following manual therapy techniques:  R Subscapular releases, UT/levator scap, & scapular tilting.    FDN to R ECRL/ECRB with  prolonged estim for twitch response. Then prolonged insertion at lateral forearm/supinator mm.    Home Exercises Provided and Patient Education Provided     Education provided:   - DO HEP, find brace.      See EMR under Patient Instructions for exercises provided previously.    Assessment     See Progress note.   Increased Supination and pronation ROM post FDN with decreased pain on gripping after.    -Patient demonstrated appropriate response to Functional Dry Needling.   Good rhythmical contractions observed with estim to treated muscle groups    Guillermo is progressing well towards his goals.   Pt prognosis is Good.     Pt will continue to benefit from skilled outpatient physical therapy to address the deficits listed in the problem list box on initial evaluation, provide pt/family education and to maximize pt's level of independence in the home and community environment.     Pt's spiritual, cultural and educational needs considered and pt agreeable to plan of care and goals.     Anticipated barriers to physical therapy: history of elbow injury, pain    Goals:   Short Term Goals: In 3 weeks   1.Pt to be educated on HEP.  2.Patient to increase RUE  strength by 5#, without pain.  3.Patient to report pain at 1/10 or less with return to normal work schedule.  4.Patient to take the UEFS with goals made PRN.  5. Pt to be educated on body mechanics awareness.     Long Term Goals: In 6 weeks  1.LTG for UEFS.  2. Patient to demo increase in all UE/hand strength to 5/5.  3. Patient to have decreased pain to 0/10 at all times.  4. Pt to increase R  strength to 10# greater than L.  5. Patient to perform daily activities including gripping, lifting loads, and required work duties without pain or limitation.    Plan     Continue with tx, progress with strengthening as tolerated by pt.  Progress with scapular and shoulder strengthening as well as elbow/wrist.  See Progress note.    Екатерина Donovan, PT

## 2019-12-06 NOTE — PLAN OF CARE
Outpatient Therapy Updated Plan of Care     Visit Date: 12/6/2019  Name: Guillermo Castillo  Clinic Number: [id number]    Therapy Diagnosis:   Encounter Diagnoses   Name Primary?    Right elbow pain     Muscle weakness of right arm     Right shoulder pain, unspecified chronicity     Decreased  strength of right hand      Physician: Ruby Ortega, NP    Physician Orders: PT Eval and Treat   Medical Diagnosis from Referral: Right elbow pain, right forearm pain  Evaluation Date: 7/31/2019, 9/30/19  Authorization Period Expiration: 12/31/2019  Plan of Care Expiration: 10/30/2019  Visit # / Visits authorized: 23/25    Total Visits Received: 23  Cancelled Visits: 6  No Show Visits: 0    Current Certification Period:  9/30/19 to 10/30/19  Precautions:  standard  Visits from Evaluation Date:  23  Functional Level Prior to Evaluation:  Able to  and lift with minimal pain during normal ADL's, has not yet returned to work full time where he will be required to lift and  repetitively    Subjective     Update: Rates pain from 1-4/10, Increased pain with wide , increased pain/disomfort immediately post needling then improves over the next few days.  Better today after FDN to supinators.   Thinks he may have been sleeping funny on his elbow/wrist - discussed night splint to keep wrist straight.  Pt has not yet found his elbow strap but has been doing more of his ex's.    Objective     Update:   Elbow flexion 120   Elbow extension 0   supination 65   pronation 70   Wrist flexion, elbow straight 70 p!   wrist extension, elbow straight 70          Strength:   Muscle (Myotome) Right Left   Elbow Flexors (C5) 5/5 5/5   Wrist Extensors (C6) 5/5  5/5   Elbow Extensors (C7) 5/5, 5/5   ER (arm at side) 5/5 5/5   IR (arm at side) 5/5 5/5   Intrinsic strength 4th/5th 4+/5 5/5    strength  Pain with large , no pain with making a fist and squeezing  5/5        ECRL: 4+/5; mild pain R, 5/5 L  ECRB: 4+/5; mild pain R,  5/5 L     Sensation: Intact BUE .     Function: 54/80    UPPER EXTREMITY FUNCTIONAL SCALE         1. Any of your usual work, housework or school activities   3/4  2. Your usual hobbies, sporting     2/4  3. Lifting bag of groceries to waist     3/4  4. Lifting above your head      2/4  5. Grooming hair       4/4  6. Pushing up from a chair to stand     4/4  7. Preparing food       3/4  8. Driving        4/4  9. Vacuuming, sweeping, or raking     2/4  10. Getting dressed       4/4  11. Doing up buttons       4/4  12. Using tools or appliances      1/4  13. Opening doors       4/4  14. Cleaning        3/4  15. Tying shoes       4/4  16. Sleeping        3/4  17. Laundering clothes      1/4  18. Opening a jar       1/4  19. Throwing a ball       1/4  20. Carrying a suitcase      1/4    Current score 54/80    Assessment     Update: Pt has been inconsistent with HEP and brace use, but overall reports that he does feel that he is improving overall and that he does have less pain then when he first started therapy.    Previous Short Term Goals Status:       Short Term Goals: In 3 weeks   1.Pt to be educated on HEP. MET  2.Patient to increase RUE  strength by 5#, without pain.  3.Patient to report pain at 1/10 or less with return to normal work schedule.  4.Patient to take the UEFS with goals made PRN. MET  5. Pt to be educated on body mechanics awareness. MET     Long Term Goals: In 6 weeks  1.LTG for UEFS. MET  2. Patient to demo increase in all UE/hand strength to 5/5. Partially MET  3. Patient to have decreased pain to 0/10 at all times.  4. Pt to increase R  strength to 10# greater than L.  5. Patient to perform daily activities including gripping, lifting loads, and required work duties without pain or limitation.     New Short Term Goals Status:   Con't towards all goals not yet established.  Long Term Goal Status:   modified:  1.  Pt to improve score on UEFS by 5 points.  Reasons for Recertification of  Therapy:   Con't to progress towards all goals outlined above.    Plan     Updated Certification Period: 12/6/2019 to 1/6/19  Recommended Treatment Plan: 2 times per week for 6 weeks: Electrical Stimulation PRN, Manual Therapy, Moist Heat/ Ice, Neuromuscular Re-ed, Patient Education, Self Care, Therapeutic Activites, Therapeutic Exercise and FDN/PRN  Other Recommendations: Brace at night    Екатерина Donovan, PT  12/6/2019      I CERTIFY THE NEED FOR THESE SERVICES FURNISHED UNDER THIS PLAN OF TREATMENT AND WHILE UNDER MY CARE    Physician's comments:        Physician's Signature: ___________________________________________________

## 2019-12-09 ENCOUNTER — CLINICAL SUPPORT (OUTPATIENT)
Dept: REHABILITATION | Facility: HOSPITAL | Age: 49
End: 2019-12-09
Payer: COMMERCIAL

## 2019-12-09 DIAGNOSIS — M62.81 MUSCLE WEAKNESS OF RIGHT ARM: ICD-10-CM

## 2019-12-09 DIAGNOSIS — R29.898 DECREASED GRIP STRENGTH OF RIGHT HAND: ICD-10-CM

## 2019-12-09 DIAGNOSIS — M25.511 RIGHT SHOULDER PAIN, UNSPECIFIED CHRONICITY: ICD-10-CM

## 2019-12-09 DIAGNOSIS — M25.521 RIGHT ELBOW PAIN: ICD-10-CM

## 2019-12-09 LAB
FINAL PATHOLOGIC DIAGNOSIS: NORMAL
GROSS: NORMAL

## 2019-12-09 PROCEDURE — 97140 MANUAL THERAPY 1/> REGIONS: CPT

## 2019-12-09 PROCEDURE — 97110 THERAPEUTIC EXERCISES: CPT

## 2019-12-09 PROCEDURE — 97014 ELECTRIC STIMULATION THERAPY: CPT

## 2019-12-09 NOTE — PROGRESS NOTES
Physical Therapy Daily Treatment Note     Name: Guillermo Castillo  Clinic Number: [id number]    Therapy Diagnosis:   Encounter Diagnoses   Name Primary?    Right elbow pain     Muscle weakness of right arm     Right shoulder pain, unspecified chronicity     Decreased  strength of right hand      Physician: Ruby Ortega NP    Visit Date: 12/9/2019    Physician Orders: PT Eval and Treat   Medical Diagnosis from Referral: Right elbow pain, right forearm pain  Evaluation Date: 7/31/2019, 9/30/19  Authorization Period Expiration: 12/31/2019  Plan of Care Expiration: 10/30/2019  Visit # / Visits authorized: 24/25      Time In: 2:30 pm  Time Out: 3:40 pm  Total Billable Time: 50 minutes    Precautions: Standard    Subjective     Pt reports: He had a few days of no pain , then he started to hurt again - he thinks that he is sleeping funny on his wrist.  Discussed that he needs to find his brace to hold his wrist still while he sleeps.  Some increased soreness post tx session after manual/FDN.    He was compliant with home exercise program.  Response to previous treatment: good  Functional change: increased pain    Pain:  3-4/10 after manual and flex bar  Location: right elbow      Objective     Guillermo received therapeutic exercises to develop ROM for 42 minutes including:    AROM - elbow flexed, wrist flexion 3x10  AROM - elbow flexed, palm to body 3x10  AROM - elbow extended, wrist flexion 3x10  AROM - elbow extended, palm to body 3x10  Passive stretch forearm, elbow ext UD 3x30s  Eccentric twist with flex bar yellow 2/10  Foam roll stretch 3'  Prone LT isometric 2/10  Prone LT with lats 2/10  [place] V 2/10 20# (no abd)  Biceps/Triceps pulleys 20# 2/10  Wrist flex palm down 3# 2/10  Wrist flexion palm up 3# 2/10  Sup/pronation 3# 2/10      Palpation Assessment to determine the necessity for Functional Dry Needling  R forearm mm.   Patient provided verbal consent to Functional Dry Needling today, previously has  provided written consent.  Guillermo received the following manual therapy techniques:  R Subscapular releases, UT/levator scap, & scapular tilting.    FDN to R ECRL/ECRB with prolonged estim for twitch response, then to brachioradialis with prolonged estim.     Guillermo received the following supervised modalities after being cleared for contradictions: TENS:  Guillermo received TENS electrical stimulation for pain to the R elbow. Pt received continuous mode at a rate of 150 pps for 10 minutes. Guillermo tolerated treatment well without any adverse effects. MHP also applied to elbow along with TENS.    Home Exercises Provided and Patient Education Provided     Education provided:   - DO HEP, find brace.    Written Handout on response to FDN provided: no, verbally communicated.  Guillermo demonstrated good  understanding of the education provided.     Written Home Exercises Provided: Patient instructed to cont prior HEP.  Exercises were reviewed and Guillermo was able to demonstrate them prior to the end of the session.  Guillermo demonstrated good  understanding of the education provided.     See EMR under Patient Instructions for exercises provided previously.    Assessment     Increased soreness after FDN and manual releases, also increased pain with flex bar ex's.  Able to complete and improved sx's after modalities.  Pt able to increase strength with shoulder ex's.  Cues for technique and posture.    -Patient demonstrated appropriate response to Functional Dry Needling.   Good rhythmical contractions observed with estim to treated muscle groups    Guillermo is progressing well towards his goals.   Pt prognosis is Good.     Pt will continue to benefit from skilled outpatient physical therapy to address the deficits listed in the problem list box on initial evaluation, provide pt/family education and to maximize pt's level of independence in the home and community environment.     Pt's spiritual, cultural and educational needs  considered and pt agreeable to plan of care and goals.     Anticipated barriers to physical therapy: history of elbow injury, pain    Goals:   Short Term Goals: In 3 weeks   1.Pt to be educated on HEP.  2.Patient to increase RUE  strength by 5#, without pain.  3.Patient to report pain at 1/10 or less with return to normal work schedule.  4.Patient to take the UEFS with goals made PRN.  5. Pt to be educated on body mechanics awareness.     Long Term Goals: In 6 weeks  1.LTG for UEFS.  2. Patient to demo increase in all UE/hand strength to 5/5.  3. Patient to have decreased pain to 0/10 at all times.  4. Pt to increase R  strength to 10# greater than L.  5. Patient to perform daily activities including gripping, lifting loads, and required work duties without pain or limitation.    Plan     Continue with tx, progress with strengthening as tolerated by pt.  Progress with scapular and shoulder strengthening as well as elbow/wrist.    Екатерина Donovan, PT

## 2019-12-10 DIAGNOSIS — I10 ESSENTIAL HYPERTENSION: ICD-10-CM

## 2019-12-10 RX ORDER — AMLODIPINE BESYLATE 10 MG/1
TABLET ORAL
Qty: 90 TABLET | Refills: 3 | Status: SHIPPED | OUTPATIENT
Start: 2019-12-10 | End: 2020-11-03

## 2019-12-12 ENCOUNTER — CLINICAL SUPPORT (OUTPATIENT)
Dept: REHABILITATION | Facility: HOSPITAL | Age: 49
End: 2019-12-12
Payer: COMMERCIAL

## 2019-12-12 ENCOUNTER — CLINICAL SUPPORT (OUTPATIENT)
Dept: CARDIOLOGY | Facility: CLINIC | Age: 49
End: 2019-12-12
Attending: FAMILY MEDICINE
Payer: COMMERCIAL

## 2019-12-12 DIAGNOSIS — R06.02 SHORTNESS OF BREATH: ICD-10-CM

## 2019-12-12 DIAGNOSIS — M25.511 RIGHT SHOULDER PAIN, UNSPECIFIED CHRONICITY: ICD-10-CM

## 2019-12-12 DIAGNOSIS — M62.81 MUSCLE WEAKNESS OF RIGHT ARM: ICD-10-CM

## 2019-12-12 DIAGNOSIS — R29.898 DECREASED GRIP STRENGTH OF RIGHT HAND: ICD-10-CM

## 2019-12-12 DIAGNOSIS — M25.521 RIGHT ELBOW PAIN: ICD-10-CM

## 2019-12-12 LAB
DIASTOLIC DYSFUNCTION: NO
ESTIMATED PA SYSTOLIC PRESSURE: 19.48
RETIRED EF AND QEF - SEE NOTES: 55 (ref 55–65)

## 2019-12-12 PROCEDURE — 97140 MANUAL THERAPY 1/> REGIONS: CPT

## 2019-12-12 PROCEDURE — 93351 EXERCISE STRESS ECHO: ICD-10-PCS | Mod: S$GLB,,, | Performed by: INTERNAL MEDICINE

## 2019-12-12 PROCEDURE — 97110 THERAPEUTIC EXERCISES: CPT

## 2019-12-12 PROCEDURE — 93321 DOPPLER ECHO F-UP/LMTD STD: CPT | Mod: S$GLB,,, | Performed by: INTERNAL MEDICINE

## 2019-12-12 PROCEDURE — 93351 STRESS TTE COMPLETE: CPT | Mod: S$GLB,,, | Performed by: INTERNAL MEDICINE

## 2019-12-12 PROCEDURE — 93321 EXERCISE STRESS ECHO: ICD-10-PCS | Mod: S$GLB,,, | Performed by: INTERNAL MEDICINE

## 2019-12-12 NOTE — PROGRESS NOTES
Physical Therapy Daily Treatment Note     Name: Guillermo Castillo  Clinic Number: [id number]    Therapy Diagnosis:   Encounter Diagnoses   Name Primary?    Right elbow pain     Muscle weakness of right arm     Right shoulder pain, unspecified chronicity     Decreased  strength of right hand      Physician: Ruby Ortega NP    Visit Date: 12/12/2019    Physician Orders: PT Eval and Treat   Medical Diagnosis from Referral: Right elbow pain, right forearm pain  Evaluation Date: 7/31/2019, 9/30/19  Authorization Period Expiration: 12/31/2019  Plan of Care Expiration: 1/6/2020  Visit # / Visits authorized: 25/25      Time In: 8:15 am  Time Out: 9:15 am  Total Billable Time: 50 minutes    Precautions: Standard    Subjective     Pt reports: He is not having any pain currently sore after FDN, but did feel better after last tx session - found brace and will try and find night splint.    He was compliant with home exercise program.  Response to previous treatment: good  Functional change: decreased pain    Pain:  3-4/10 after manual   Location: right elbow      Objective     Guillermo received therapeutic exercises to develop ROM for 40 minutes including:    AROM - elbow flexed, wrist flexion 3x10  AROM - elbow flexed, palm to body 3x10  AROM - elbow extended, wrist flexion 3x10  AROM - elbow extended, palm to body 3x10  Passive stretch forearm, elbow ext UD 3x30s  Foam roll stretch 3'  Prone LT isometric 2/10  Prone LT with lats 2/10  [place] V 2/10 20# (no abd)  Biceps/Triceps pulleys 20# 2/10  Wrist flex palm down 3# 2/10  Wrist flexion palm up 3# 2/10  Sup/pronation 3# 2/10      Palpation Assessment to determine the necessity for Functional Dry Needling  R forearm mm.   Patient provided verbal consent to Functional Dry Needling today, previously has provided written consent.  Guillermo received the following manual therapy techniques:  R Subscapular releases, UT/levator scap, & scapular tilting.    FDN to R ECRL/ECRB  with prolonged estim for twitch response, then to brachioradialis with prolonged estim.     Home Exercises Provided and Patient Education Provided     Education provided:   - DO HEP, find brace.    Written Handout on response to FDN provided: no, verbally communicated.  Guillermo demonstrated good  understanding of the education provided.     Written Home Exercises Provided: Patient instructed to cont prior HEP.  Exercises were reviewed and Guillermo was able to demonstrate them prior to the end of the session.  Guillermo demonstrated good  understanding of the education provided.     See EMR under Patient Instructions for exercises provided previously.    Assessment     Increased soreness after FDN and manual releases,  Deferred flex bar due to flare of symptoms with gripping the bar.  Noted increased supination/pronation ROM.  Cues for posture and technique with therex    -Patient demonstrated appropriate response to Functional Dry Needling.   Good rhythmical contractions observed with estim to treated muscle groups    Guillermo is progressing well towards his goals.   Pt prognosis is Good.     Pt will continue to benefit from skilled outpatient physical therapy to address the deficits listed in the problem list box on initial evaluation, provide pt/family education and to maximize pt's level of independence in the home and community environment.     Pt's spiritual, cultural and educational needs considered and pt agreeable to plan of care and goals.     Anticipated barriers to physical therapy: history of elbow injury, pain    Goals:   Short Term Goals: In 3 weeks   1.Pt to be educated on HEP.  2.Patient to increase RUE  strength by 5#, without pain.  3.Patient to report pain at 1/10 or less with return to normal work schedule.  4.Patient to take the UEFS with goals made PRN.  5. Pt to be educated on body mechanics awareness.     Long Term Goals: In 6 weeks  1.LTG for UEFS.  2. Patient to demo increase in all UE/hand  strength to 5/5.  3. Patient to have decreased pain to 0/10 at all times.  4. Pt to increase R  strength to 10# greater than L.  5. Patient to perform daily activities including gripping, lifting loads, and required work duties without pain or limitation.    Plan     Continue with tx, progress with strengthening as tolerated by pt.  Progress with scapular and shoulder strengthening as well as elbow/wrist.    Екатерина Donovan, PT

## 2019-12-16 ENCOUNTER — CLINICAL SUPPORT (OUTPATIENT)
Dept: REHABILITATION | Facility: HOSPITAL | Age: 49
End: 2019-12-16
Payer: COMMERCIAL

## 2019-12-16 DIAGNOSIS — M25.511 RIGHT SHOULDER PAIN, UNSPECIFIED CHRONICITY: ICD-10-CM

## 2019-12-16 DIAGNOSIS — M25.521 RIGHT ELBOW PAIN: ICD-10-CM

## 2019-12-16 DIAGNOSIS — R29.898 DECREASED GRIP STRENGTH OF RIGHT HAND: ICD-10-CM

## 2019-12-16 DIAGNOSIS — M62.81 MUSCLE WEAKNESS OF RIGHT ARM: ICD-10-CM

## 2019-12-16 PROCEDURE — 97110 THERAPEUTIC EXERCISES: CPT

## 2019-12-16 NOTE — PROGRESS NOTES
Physical Therapy Daily Treatment Note     Name: Guillermo Castillo  Clinic Number: [id number]    Therapy Diagnosis:   Encounter Diagnoses   Name Primary?    Right elbow pain     Muscle weakness of right arm     Right shoulder pain, unspecified chronicity     Decreased  strength of right hand      Physician: Ruby Ortega NP    Visit Date: 12/16/2019    Physician Orders: PT Eval and Treat   Medical Diagnosis from Referral: Right elbow pain, right forearm pain  Evaluation Date: 7/31/2019, 9/30/19  Authorization Period Expiration: 12/31/2019  Plan of Care Expiration: 1/6/2020  Visit # / Visits authorized: 26/25      Time In: 9:30 am  Time Out: 10:30 am  Total Billable Time: 45 minutes    Precautions: Standard    Subjective     Pt reports: He is not having any pain currently he brings his brace to therapy and readjust where he is wearing it to go over the ECRB.  Pt reports pain at 1/10 today.    He was compliant with home exercise program.  Response to previous treatment: good  Functional change: decreased pain    Pain:  1/10 after manual   Location: right elbow      Objective     Guillermo received therapeutic exercises to develop ROM for 40 minutes including:    AROM - elbow flexed, wrist flexion 3x10  AROM - elbow flexed, palm to body 3x10  AROM - elbow extended, wrist flexion 3x10  AROM - elbow extended, palm to body 3x10  Passive stretch forearm, elbow ext UD 3x30s  Twist bar 10x  Foam roll stretch 3'  Prone LT isometric 2/10  Prone LT with lats 2/10  [place] V 2/10 20# (no abd)  Biceps/Triceps pulleys 20# 2/10  Wrist flex palm down 3# 2/10  Wrist flexion palm up 3# 2/10  Sup/pronation 3# 2/10      Guillermo received the following manual therapy techniques: Soft tissue Mobilization were applied to the: R forearm, subscap, lats and scapular tilting for 5 minutes.    Home Exercises Provided and Patient Education Provided     Education provided:   - DO HEP    Written Handout on response to FDN provided: no,  verbally communicated.  Guillermo demonstrated good  understanding of the education provided.     Written Home Exercises Provided: Patient instructed to cont prior HEP.  Exercises were reviewed and Guillermo was able to demonstrate them prior to the end of the session.  Guillermo demonstrated good  understanding of the education provided.     See EMR under Patient Instructions for exercises provided previously.    Assessment     Decreased mm tone noted in forearm mm today, with decreased pain level reported as well.      Guillermo is progressing well towards his goals.   Pt prognosis is Good.     Pt will continue to benefit from skilled outpatient physical therapy to address the deficits listed in the problem list box on initial evaluation, provide pt/family education and to maximize pt's level of independence in the home and community environment.     Pt's spiritual, cultural and educational needs considered and pt agreeable to plan of care and goals.     Anticipated barriers to physical therapy: history of elbow injury, pain    Goals:   Short Term Goals: In 3 weeks   1.Pt to be educated on HEP.  2.Patient to increase RUE  strength by 5#, without pain.  3.Patient to report pain at 1/10 or less with return to normal work schedule.  4.Patient to take the UEFS with goals made PRN.  5. Pt to be educated on body mechanics awareness.     Long Term Goals: In 6 weeks  1.LTG for UEFS.  2. Patient to demo increase in all UE/hand strength to 5/5.  3. Patient to have decreased pain to 0/10 at all times.  4. Pt to increase R  strength to 10# greater than L.  5. Patient to perform daily activities including gripping, lifting loads, and required work duties without pain or limitation.    Plan     Continue with tx, progress with strengthening as tolerated by pt.  Progress with scapular and shoulder strengthening as well as elbow/wrist.    Екатерина Donovan, PT

## 2019-12-18 NOTE — PROGRESS NOTES
Digital Medicine: Health  Follow-Up    Patient stated that he was doing well. Patient Avg BP has come down since our last encounter and BG numbers are within range. Patient isn't testing regularly but readings are within range. Patient stated that PharmD Ruby VALDES Made some corrections to his medication in September and things have been good since then. Patient reports no major changes to diet or activity levels. Will f/u in 6 weeks.    The history is provided by the patient.     Follow Up  Follow-up reason(s): reading review      Readings are trending down due to lifestyle change and medication adherence.        INTERVENTION(S)  encouragement/support      There are no preventive care reminders to display for this patient.    Last 5 Patient Entered Readings                                      Current 30 Day Average: 139/79     Recent Readings 12/17/2019 11/28/2019 11/27/2019 11/15/2019 11/11/2019    SBP (mmHg) 135 141 142 159 162    DBP (mmHg) 81 77 79 92 82    Pulse 77 68 66 67 72        Last 6 Patient Entered Readings                                          Most Recent A1c: 4.9% on 11/12/2019  (Goal: 7%)     Recent Readings 12/17/2019 11/26/2019 11/15/2019 11/11/2019 11/7/2019    Blood Glucose (mg/dL) 126 148 104 107 136                    Diet Screening   No change to diet.      Patient states that he has made no major changes to his diet. I had suggested meal prep, a box of protein bars, etc.     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     Medication Adherence Screening   He misses doses: never      Patient identified the following reasons for non-compliance: none    Patient reports no s/s or issues taking BP/DM medications as  prescribed.       SDOH

## 2020-01-20 ENCOUNTER — PATIENT OUTREACH (OUTPATIENT)
Dept: OTHER | Facility: OTHER | Age: 50
End: 2020-01-20

## 2020-01-20 DIAGNOSIS — I10 HYPERTENSION, UNSPECIFIED TYPE: Primary | Chronic | ICD-10-CM

## 2020-01-20 NOTE — LETTER
March 2, 2020     Guillermo Castillo  [address] [address]       Dear Guillermo,    Thank you for enrolling in [facility]s Digital Medicine Program. To participate, we ask that you submit information at least once weekly through your [employer] account and maintain regular contact with your Care Team. We have not received any data or heard from you in some time.     The Digital Medicine Care Team has attempted to reach you on multiple occasions to determine if you would like to continue participating in the program. While we encourage you to continue participating fully, we understand that circumstances may change.     To continue participating in the program, please contact me at [phone]. If we do not hear back, you will be un-enrolled, and your physician will be notified of your decision.    If you have submitted data and believe you are receiving this letter in error, please call the Digital Medicine Patient Support Line at [phone] for troubleshooting.      We look forward to hearing from you soon.    Sincerely,     Ruby York, PharmD.  Your Digital Medicine Pharmacist

## 2020-01-20 NOTE — PROGRESS NOTES
FAYEB to discuss his new cuff that he received on 1/17. His blood pressure readings appear the same. Will need to assess if he is comparing readings with a manual cuff from work.

## 2020-02-06 ENCOUNTER — PATIENT OUTREACH (OUTPATIENT)
Dept: OTHER | Facility: OTHER | Age: 50
End: 2020-02-06

## 2020-02-06 NOTE — PROGRESS NOTES
Digital Medicine: Health  Follow-Up    Patient reports that he is doing pretty well. Patient and I reviewed elevated readings and had found some things he can do differently with his BP readings. Patient stated that he's not waiting an hour after medications to take a reading. Patient isn't resting with cuff on or at all after placing cuff to take reading. Patient also hasn't charged cuff since he got it. Let patient know that PharmJACKIE VALDES Had reached out to discuss elevated BP readings with him. Patient stated he would charge his cuff, start resting with the cuff on for 5 minutes to get better readings, as well as try to wait at least an hour after taking meds to take a reading. Call was brief so will f/u on diet and activity at next outreach. Will f/u in 4 weeks.    The history is provided by the patient.     Follow Up  Follow-up reason(s): reading review and routine education      Readings are trending up       INTERVENTION(S)  reviewed monitoring technique and encouragement/support      There are no preventive care reminders to display for this patient.    Last 5 Patient Entered Readings                                      Current 30 Day Average: 150/88     Recent Readings 2/6/2020 2/4/2020 2/3/2020 2/3/2020 1/31/2020    SBP (mmHg) 164 165 150 157 141    DBP (mmHg) 106 87 88 101 78    Pulse 65 60 66 63 68        Last 6 Patient Entered Readings                                          Most Recent A1c: 4.9% on 11/12/2019  (Goal: 7%)     Recent Readings 2/6/2020 2/4/2020 2/3/2020 1/31/2020 1/28/2020    Blood Glucose (mg/dL) 129 122 179 192 162                    Diet Screening       Deferred.    Physical Activity Screening   When asked if exercising, patient responded: yes    Patient participates in the following activities: walking and yard/housework    He identified the following barriers to physical activity: lack of time    Patient gets most of his activity while at work. Patient had mentioned a bike  previously but did not get to ask about it at this encounter.    Medication Adherence Screening   He misses doses: never      Patient identified the following reasons for non-compliance: none    Pat ient reports no s/s or issues taking BP/DM medication as prescribed.      SDOH

## 2020-03-09 RX ORDER — ROSUVASTATIN CALCIUM 10 MG/1
10 TABLET, COATED ORAL DAILY
Qty: 30 TABLET | Refills: 11 | Status: SHIPPED | OUTPATIENT
Start: 2020-03-09 | End: 2021-01-26 | Stop reason: SDUPTHER

## 2020-03-16 RX ORDER — CHLORTHALIDONE 25 MG/1
25 TABLET ORAL DAILY
Qty: 90 TABLET | Refills: 1 | Status: SHIPPED | OUTPATIENT
Start: 2020-03-16 | End: 2020-04-07

## 2020-03-16 NOTE — PROGRESS NOTES
Digital Medicine: Clinician Follow-Up    Called patient to discuss his high blood pressure readings.    The history is provided by the patient.     Follow Up  Follow-up reason(s): reading review and medication change    Patient blood sugar readings are normally controlled. He had high readings 3/14 related to his diet. He hesitated when asked what caused the diet change so I dropped it but he did state it was his diet.    Patient's blood pressure is elevated since stopping chlorthalidone months ago from his hypotension episode. He has a drop in H/H most likely causing his hypotension episode. He's had extensive work-up for this and nothing was found.    Patient had no prior trouble with HCTZ or chlorthalidone increasing his gout attacks.       INTERVENTION(S)  reviewed appropriate dose schedule and encouragement/support    PLAN  patient verbalizes understanding and continue monitoring    Patient will resume chlorthalidone 25mg daily with a BMP 3/30. Patient will maintain his current dose of amlodipine and valsartan.     Patient will maintain his current diabetic medication regimen.       There are no preventive care reminders to display for this patient.    Last 5 Patient Entered Readings                                      Current 30 Day Average: 143/84     Recent Readings 3/14/2020 3/14/2020 3/14/2020 3/14/2020 3/3/2020    SBP (mmHg) 151 146 150 158 145    DBP (mmHg) 80 73 84 80 98    Pulse 86 81 66 68 66        Last 6 Patient Entered Readings                                          Most Recent A1c: 4.9% on 11/12/2019  (Goal: 7%)     Recent Readings 3/14/2020 3/14/2020 3/7/2020 2/24/2020 2/23/2020    Blood Glucose (mg/dL) 193 254 120 116 139           Hypertension Medications                  amLODIPine (NORVASC) 10 MG tablet TAKE 1 TABLET BY MOUTH ONCE DAILY    valsartan (DIOVAN) 320 MG tablet Take 1 tablet (320 mg total) by mouth once daily.        Diabetes Medications             insulin lispro (HUMALOG U-100  INSULIN) 100 unit/mL injection Use via sliding scale    metFORMIN (GLUCOPHAGE-XR) 500 MG 24 hr tablet TAKE 2 TABLETS BY MOUTH ONCE DAILY    semaglutide (OZEMPIC) 0.25 mg or 0.5 mg(2 mg/1.5 mL) PnIj Inject 0.5 mg into the skin every 7 days.               Screenings

## 2020-03-20 ENCOUNTER — PATIENT MESSAGE (OUTPATIENT)
Dept: PULMONOLOGY | Facility: CLINIC | Age: 50
End: 2020-03-20

## 2020-03-24 ENCOUNTER — OFFICE VISIT (OUTPATIENT)
Dept: SLEEP MEDICINE | Facility: CLINIC | Age: 50
End: 2020-03-24
Payer: COMMERCIAL

## 2020-03-24 VITALS
OXYGEN SATURATION: 100 % | HEIGHT: 72 IN | DIASTOLIC BLOOD PRESSURE: 81 MMHG | RESPIRATION RATE: 18 BRPM | SYSTOLIC BLOOD PRESSURE: 126 MMHG | BODY MASS INDEX: 29.93 KG/M2 | WEIGHT: 221 LBS | HEART RATE: 74 BPM | TEMPERATURE: 98 F

## 2020-03-24 DIAGNOSIS — E66.9 OBESITY (BMI 30.0-34.9): ICD-10-CM

## 2020-03-24 DIAGNOSIS — G47.33 OSA (OBSTRUCTIVE SLEEP APNEA): Primary | Chronic | ICD-10-CM

## 2020-03-24 DIAGNOSIS — G47.26 SHIFTING SLEEP-WORK SCHEDULE: ICD-10-CM

## 2020-03-24 PROCEDURE — 3008F BODY MASS INDEX DOCD: CPT | Mod: CPTII,,, | Performed by: NURSE PRACTITIONER

## 2020-03-24 PROCEDURE — 3008F PR BODY MASS INDEX (BMI) DOCUMENTED: ICD-10-PCS | Mod: CPTII,,, | Performed by: NURSE PRACTITIONER

## 2020-03-24 PROCEDURE — 3074F PR MOST RECENT SYSTOLIC BLOOD PRESSURE < 130 MM HG: ICD-10-PCS | Mod: CPTII,,, | Performed by: NURSE PRACTITIONER

## 2020-03-24 PROCEDURE — 3074F SYST BP LT 130 MM HG: CPT | Mod: CPTII,,, | Performed by: NURSE PRACTITIONER

## 2020-03-24 PROCEDURE — 3079F DIAST BP 80-89 MM HG: CPT | Mod: CPTII,,, | Performed by: NURSE PRACTITIONER

## 2020-03-24 PROCEDURE — 3079F PR MOST RECENT DIASTOLIC BLOOD PRESSURE 80-89 MM HG: ICD-10-PCS | Mod: CPTII,,, | Performed by: NURSE PRACTITIONER

## 2020-03-24 PROCEDURE — 99213 OFFICE O/P EST LOW 20 MIN: CPT | Mod: 95,,, | Performed by: NURSE PRACTITIONER

## 2020-03-24 PROCEDURE — 99213 PR OFFICE/OUTPT VISIT, EST, LEVL III, 20-29 MIN: ICD-10-PCS | Mod: 95,,, | Performed by: NURSE PRACTITIONER

## 2020-03-24 NOTE — PROGRESS NOTES
Subjective:      Patient ID: Guillermo Castillo is a 50 y.o. male.    Chief Complaint: Sleep Apnea    HPI  Presents to office for review of AutoPAP therapy. Patient states improved symptoms with use of AutoPAP. Sleeping more soundly. Waking up feeling more refreshed. Improved daytime sleepiness. Patient states he is benefiting from use of the AutoPAP.   He states he had coworker positive for Covid 19. Pt is asymptomatic and taking temp 2x day.    Patient Active Problem List   Diagnosis    Fatty liver    Type 2 diabetes mellitus    Mood disorder    Hypertriglyceridemia    Hyperlipidemia    Hypertension    Chronic gout    ARPAN (obstructive sleep apnea)    Obesity (BMI 30.0-34.9)    Shifting sleep-work schedule    Right elbow pain    Muscle weakness of right arm    Right shoulder pain    Decreased  strength of right hand    Anemia    PETROS (iron deficiency anemia)         /81   Pulse 74   Temp 97.5 °F (36.4 °C)   Resp 18   Ht 6' (1.829 m)   Wt 100.2 kg (221 lb)   SpO2 100%   BMI 29.97 kg/m²   Body mass index is 29.97 kg/m².    Review of Systems   Constitutional: Negative.    HENT: Negative.    Respiratory: Negative.    Cardiovascular: Negative.    Musculoskeletal: Negative.    Gastrointestinal: Negative.    Neurological: Negative.    Psychiatric/Behavioral: Negative.      Objective:      Physical Exam   Constitutional: He is oriented to person, place, and time. He appears well-developed and well-nourished.   HENT:   Head: Normocephalic and atraumatic.   Neck: Normal range of motion.   Pulmonary/Chest: Effort normal.   Neurological: He is alert and oriented to person, place, and time.   Psychiatric: He has a normal mood and affect. His behavior is normal. Judgment and thought content normal.     Personal Diagnostic Review  Review of PAP download. Special study media link attached to this encounter. Normal AHI and compliant.     Assessment:       1. ARPAN (obstructive sleep apnea)    2. Obesity  "(BMI 30.0-34.9)    3. Shifting sleep-work schedule        Outpatient Encounter Medications as of 3/24/2020   Medication Sig Dispense Refill    amLODIPine (NORVASC) 10 MG tablet TAKE 1 TABLET BY MOUTH ONCE DAILY 90 tablet 3    blood sugar diagnostic Strp 1 each by Misc.(Non-Drug; Combo Route) route 3 (three) times daily. Health Glucose Test Strips 100 each 11    cetirizine (ZYRTEC) 10 MG tablet Take 10 mg by mouth daily as needed.       chlorthalidone (HYGROTEN) 25 MG Tab Take 1 tablet (25 mg total) by mouth once daily. 90 tablet 1    DULoxetine (CYMBALTA) 60 MG capsule TAKE 1 CAPSULE BY MOUTH ONCE DAILY 30 capsule 11    DULoxetine (CYMBALTA) 60 MG capsule Take 1 capsule (60 mg total) by mouth once daily. 30 capsule 11    ferrous gluconate (FERGON) 324 MG tablet Take 1 tablet (324 mg total) by mouth daily with breakfast. 30 tablet 2    fluticasone (FLONASE) 50 mcg/actuation nasal spray 2 sprays (100 mcg total) by Each Nare route once daily. (Patient taking differently: 2 sprays by Each Nare route daily as needed. ) 1 Bottle 12    insulin lispro (HUMALOG U-100 INSULIN) 100 unit/mL injection Use via sliding scale 10 mL 1    insulin syringe-needle U-100 0.3 mL 31 gauge x 5/16" Syrg Use as directed 100 each 0    metFORMIN (GLUCOPHAGE-XR) 500 MG 24 hr tablet TAKE 2 TABLETS BY MOUTH ONCE DAILY 60 tablet 11    omeprazole (PRILOSEC) 40 MG capsule TAKE 1 CAPSULE (40 MG TOTAL) BY MOUTH ONCE DAILY. (Patient taking differently: Take 40 mg by mouth daily as needed. ) 30 capsule 11    rosuvastatin (CRESTOR) 10 MG tablet Take 1 tablet (10 mg total) by mouth once daily. 30 tablet 11    semaglutide (OZEMPIC) 0.25 mg or 0.5 mg(2 mg/1.5 mL) PnIj Inject 0.5 mg into the skin every 7 days. 1.5 mL 5    valsartan (DIOVAN) 320 MG tablet Take 1 tablet (320 mg total) by mouth once daily. 30 tablet 3     No facility-administered encounter medications on file as of 3/24/2020.      Orders Placed This Encounter   Procedures    " CPAP/BIPAP SUPPLIES     90 day supply with 3 refills.     Order Specific Question:   Type of mask:     Answer:   Nasal     Order Specific Question:   Headgear?     Answer:   Yes     Order Specific Question:   Tubing?     Answer:   Yes     Order Specific Question:   Humidifier chamber?     Answer:   Yes     Order Specific Question:   Chin strap?     Answer:   Yes     Order Specific Question:   Filters?     Answer:   Yes     Order Specific Question:   Cushions?     Answer:   Yes     Order Specific Question:   Length of need (1-99 months):     Answer:   99     Plan:        Problem List Items Addressed This Visit        Endocrine    Obesity (BMI 30.0-34.9)    Current Assessment & Plan     Weight loss and exercise to improve overall health.              Other    ARPAN (obstructive sleep apnea) - Primary (Chronic)    Overview     Autopap. Compliant with PAP and benefits from use. Follow up annually in the sleep clinic.           Relevant Orders    CPAP/BIPAP SUPPLIES    Shifting sleep-work schedule    Overview     Night shift. 2,3,2 days         Current Assessment & Plan     Obtaining 9 hr sleep on average             The patient location is: Home  The chief complaint leading to consultation is: arpan  Visit type: Virtual visit with synchronous audio and video  Total time spent with patient: 10 min   Each patient to whom he or she provides medical services by telemedicine is:  (1) informed of the relationship between the physician and patient and the respective role of any other health care provider with respect to management of the patient; and (2) notified that he or she may decline to receive medical services by telemedicine and may withdraw from such care at any time.

## 2020-03-25 ENCOUNTER — TELEPHONE (OUTPATIENT)
Dept: RADIOLOGY | Facility: HOSPITAL | Age: 50
End: 2020-03-25

## 2020-03-28 DIAGNOSIS — I10 ESSENTIAL HYPERTENSION: ICD-10-CM

## 2020-03-30 ENCOUNTER — LAB VISIT (OUTPATIENT)
Dept: LAB | Facility: HOSPITAL | Age: 50
End: 2020-03-30
Attending: NURSE PRACTITIONER
Payer: COMMERCIAL

## 2020-03-30 DIAGNOSIS — I10 HYPERTENSION, UNSPECIFIED TYPE: Chronic | ICD-10-CM

## 2020-03-30 LAB
ANION GAP SERPL CALC-SCNC: 11 MMOL/L (ref 8–16)
BUN SERPL-MCNC: 18 MG/DL (ref 6–20)
CALCIUM SERPL-MCNC: 9.6 MG/DL (ref 8.7–10.5)
CHLORIDE SERPL-SCNC: 100 MMOL/L (ref 95–110)
CO2 SERPL-SCNC: 27 MMOL/L (ref 23–29)
CREAT SERPL-MCNC: 1 MG/DL (ref 0.5–1.4)
EST. GFR  (AFRICAN AMERICAN): >60 ML/MIN/1.73 M^2
EST. GFR  (NON AFRICAN AMERICAN): >60 ML/MIN/1.73 M^2
GLUCOSE SERPL-MCNC: 96 MG/DL (ref 70–110)
POTASSIUM SERPL-SCNC: 3.3 MMOL/L (ref 3.5–5.1)
SODIUM SERPL-SCNC: 138 MMOL/L (ref 136–145)

## 2020-03-30 PROCEDURE — 80048 BASIC METABOLIC PNL TOTAL CA: CPT

## 2020-03-30 PROCEDURE — 36415 COLL VENOUS BLD VENIPUNCTURE: CPT

## 2020-03-30 RX ORDER — VALSARTAN 320 MG/1
TABLET ORAL
Qty: 90 TABLET | Refills: 4 | Status: SHIPPED | OUTPATIENT
Start: 2020-03-30 | End: 2021-06-19

## 2020-03-31 ENCOUNTER — PATIENT OUTREACH (OUTPATIENT)
Dept: OTHER | Facility: OTHER | Age: 50
End: 2020-03-31

## 2020-03-31 DIAGNOSIS — E11.9 TYPE 2 DIABETES MELLITUS WITHOUT COMPLICATION, WITHOUT LONG-TERM CURRENT USE OF INSULIN: Primary | Chronic | ICD-10-CM

## 2020-03-31 DIAGNOSIS — I10 HYPERTENSION, UNSPECIFIED TYPE: Chronic | ICD-10-CM

## 2020-03-31 NOTE — PROGRESS NOTES
1. HTN  Last 5 Patient Entered Readings                                      Current 30 Day Average: 131/78     Recent Readings 3/24/2020 3/22/2020 3/18/2020 3/18/2020 3/17/2020    SBP (mmHg) 126 116 119 134 127    DBP (mmHg) 61 62 68 76 83    Pulse 65 69 69 72 67        Hypertension Medications             amLODIPine (NORVASC) 10 MG tablet TAKE 1 TABLET BY MOUTH ONCE DAILY    chlorthalidone (HYGROTEN) 25 MG Tab Take 1 tablet (25 mg total) by mouth once daily.    valsartan (DIOVAN) 320 MG tablet Take 1 tablet by mouth once daily        Called patient to follow up regarding hypokalemia. Reviewed recent readings and labs (last BMP drawn on 3/30/20). Per 2017 ACC/ AHA HTN guidelines  (goal of BP < 130/80), current 30-day average is well controlled.  LVM, requested patient call back at his convenience. Would like to decrease chlorthalidone to 12.5mg, have patient increase intake of K+ rich foods, and repeat K+ lab once COVID-19 precautions are lifted.  Will continue to monitor. WCB in 1 week.       2. DM  Last 6 Patient Entered Readings                                          Most Recent A1c: 4.9% on 11/12/2019  (Goal: 7%)     Recent Readings 3/24/2020 3/23/2020 3/22/2020 3/18/2020 3/14/2020    Blood Glucose (mg/dL) 165 137 176 109 193        Diabetes Medications             insulin lispro (HUMALOG U-100 INSULIN) 100 unit/mL injection Use via sliding scale    metFORMIN (GLUCOPHAGE-XR) 500 MG 24 hr tablet TAKE 2 TABLETS BY MOUTH ONCE DAILY    semaglutide (OZEMPIC) 0.25 mg or 0.5 mg(2 mg/1.5 mL) PnIj Inject 0.5 mg into the skin every 7 days.        Called to follow up with patient, reviewed recent readings. Per AACE/ACE guidelines (goal A1C < 7%), DM is well controlled (A1C on 11/12/19 was 4.9%).  LVM, requested patient call back at his convenience.  Will continue to monitor. WCB in 1 week.

## 2020-04-07 RX ORDER — CHLORTHALIDONE 25 MG/1
TABLET ORAL
Qty: 45 TABLET | Refills: 1 | Status: SHIPPED | OUTPATIENT
Start: 2020-04-07 | End: 2020-05-05

## 2020-04-07 NOTE — PROGRESS NOTES
Digital Medicine: Clinician Follow-Up    The history is provided by the patient.     Follow Up  Follow-up reason(s): reading review and medication change      Medication Change: dose decrease      1. HTN  HPI:  Called patient to follow up regarding hypokalemia. Patient reports adherence to medication regimen daily and denies missed doses. Patient denies hypotensive s/sx (lightheadedness, dizziness, nausea, fatigue); patient denies hypertensive s/sx (SOB, CP, severe headaches, changes in vision, dizziness, fatigue, confusion, anxiety, nosebleeds).     Last 5 Patient Entered Readings                                      Current 30 Day Average: 130/75     Recent Readings 4/5/2020 3/24/2020 3/22/2020 3/18/2020 3/18/2020    SBP (mmHg) 134 126 116 119 134    DBP (mmHg) 76 61 62 68 76    Pulse 64 65 69 69 72        Assessment:  Reviewed recent readings and labs (last CMP drawn on 3/30/20). Per 2017 ACC/ AHA HTN guidelines (goal of BP < 130/80), current 30-day average is controlled. Within the past month, SBPs have ranged 116-158 and DBPs have ranged 61-94.    Plan:  Discussed with and instructed patient to decrease chlorthalidone to 12.5mg, patient confirms understanding.  Encouraged patient to increase intake of K+ rich foods, will send list.  Patients health  will be following up as scheduled.   I will continue to monitor regularly and will follow-up in 4 weeks, sooner if blood pressure begins to trend upward or downward. At that time, and once COVID-19 precautions are lifted, will schedule repeat K+.    Current medication regimen:  Hypertension Medications             amLODIPine (NORVASC) 10 MG tablet TAKE 1 TABLET BY MOUTH ONCE DAILY    chlorthalidone (HYGROTEN) 25 MG Tab Take 1/2 tablet (12.5 mg total) by mouth once daily.    valsartan (DIOVAN) 320 MG tablet Take 1 tablet by mouth once daily         Patient denies having questions or concerns. Patient has my contact information and knows to call with any  concerns or clinical changes.         2. DM  HPI:  Called patient to follow up regarding the DDMP (Diabetes Digital Medicine Program).  Patient reports adherence to medication regimen daily and denies missed doses.   Patient denies hypoglycemic s/sx (dizziness, extreme hunger, headaches, confusion, trouble concentrating, sweating, shaking, blurred vision, personality changes); patient denies hyperglycemic s/sx (increased thirst, increased urination, headaches, trouble concentrating, blurred vision, fatigue).    Patient reports not currently using Humalog.    Last 6 Patient Entered Readings                                          Most Recent A1c: 4.9% on 11/12/2019  (Goal: 7%)     Recent Readings 4/5/2020 3/24/2020 3/23/2020 3/22/2020 3/18/2020    Blood Glucose (mg/dL) 111 165 137 176 109        Assessment:  Reviewed recent readings. Per AACE/ACE guidelines (goal A1C < 7%), DM is well controlled (A1C on 11/12/20 was 4.9%). Next A1C will be due in 5/2020.    Plan:  Continue current medication regimen.   Patients health  will be following up as scheduled.   I will continue to monitor regularly and will follow-up in 4 weeks, sooner if blood sugar begins to trend upward or downward.     Current Medication Regimen:  Diabetes Medications             insulin lispro (HUMALOG U-100 INSULIN) 100 unit/mL injection Use via sliding scale    metFORMIN (GLUCOPHAGE-XR) 500 MG 24 hr tablet TAKE 2 TABLETS BY MOUTH ONCE DAILY    semaglutide (OZEMPIC) 0.25 mg or 0.5 mg(2 mg/1.5 mL) PnIj Inject 0.5 mg into the skin every 7 days.         Patient has my contact information and knows to call with any concerns or clinical changes.

## 2020-04-15 ENCOUNTER — PATIENT OUTREACH (OUTPATIENT)
Dept: OTHER | Facility: OTHER | Age: 50
End: 2020-04-15

## 2020-04-15 NOTE — PROGRESS NOTES
Digital Medicine: Health  Follow-Up    Patient submitted a low BG reading of 58 and then submitted a second reading of 118. Patient reports that he did not have enough blood on the strip.  Deleted reading from chart. Patient reports increasing activity as well as being more mindful with what he eats. Patient attributes elevated BG readings here and there to eating a candy bar. Encouraged patient to eat candy bars sparingly since they do elevate BG readings. Patient understood. Will f.u in 4-6 weeks.     The history is provided by the patient. No  was used.     Follow Up  Follow-up reason(s): reading review      Alert received.   Care Team received low BG alert.  Patient is not experiencing symptoms.  Reading was invalid because patient stated it was a mistake and there was not enough blood on the strip      INTERVENTION(S)  recommended diet modifications, recommend physical activity, reviewed monitoring technique and encouragement/support          Topic    Eye Exam        Last 5 Patient Entered Readings                                      Current 30 Day Average: 128/74     Recent Readings 4/11/2020 4/10/2020 4/5/2020 3/24/2020 3/22/2020    SBP (mmHg) 137 131 134 126 116    DBP (mmHg) 75 80 76 61 62    Pulse 75 66 64 65 69        Last 6 Patient Entered Readings                                          Most Recent A1c: 4.9% on 11/12/2019  (Goal: 7%)     Recent Readings 4/11/2020 4/11/2020 4/10/2020 4/5/2020 3/24/2020    Blood Glucose (mg/dL) 118 (No Data)  104 111 165                    Diet Screening   He has the following dietary restrictions: low sodium diet and diabeticHe cooks for self.    Patient does the shopping for groceries.      Barriers to a Healthy Diet: no barriers to healthy eating    Patient reports that he has been working on his diet. Patient attributes elevated BG readings to  having a candy bar more often than not. Kiara Beltran had sent patient a list of potassium rich  foods. Patient stated that he had not looked at it yet but he needs to and planned to do it today.     Physical Activity Screening   When asked if exercising, patient responded: yes    Patient participates in the following activities: walking, yard/housework and biking    He identified the following barriers to physical activity: no barriers to being active    Patient reports that he has been riding his bike more but it doesn't sound like he has a routine and he just goes whenever. Encouraged patient to try and be as active as possible. 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     Medication Adherence Screening   He did not miss a dose this month.    Patient identified the following reasons for non-compliance: None    Patient reports no s/s or issues taking BP/DM medications as prescribed.       SDOH

## 2020-04-24 ENCOUNTER — PATIENT MESSAGE (OUTPATIENT)
Dept: ADMINISTRATIVE | Facility: OTHER | Age: 50
End: 2020-04-24

## 2020-05-04 DIAGNOSIS — E11.9 TYPE 2 DIABETES MELLITUS WITHOUT COMPLICATION, WITHOUT LONG-TERM CURRENT USE OF INSULIN: ICD-10-CM

## 2020-05-04 RX ORDER — SEMAGLUTIDE 1.34 MG/ML
INJECTION, SOLUTION SUBCUTANEOUS
Qty: 2 ML | Refills: 11 | Status: SHIPPED | OUTPATIENT
Start: 2020-05-04 | End: 2020-09-22 | Stop reason: SDUPTHER

## 2020-05-05 ENCOUNTER — PATIENT OUTREACH (OUTPATIENT)
Dept: OTHER | Facility: OTHER | Age: 50
End: 2020-05-05

## 2020-05-05 NOTE — PROGRESS NOTES
Digital Medicine: Clinician Follow-Up    HPI    1. HTN  HPI:  Called to follow up since patient messaged me on 4/24/20, stating he was stopping chlorthalidone due to hypotensive s/sx; patient confirms he is feeling better and no longer feeling hypotensive. Patient reports adherence to medication regimen daily and denies missed doses. Patient denies hypotensive s/sx (lightheadedness, dizziness, nausea, fatigue); patient denies hypertensive s/sx (SOB, CP, severe headaches, changes in vision, dizziness, fatigue, confusion, anxiety, nosebleeds).     Patient states he has not been sleeping well, reports increase in job-related stress.    Last 5 Patient Entered Readings                                      Current 30 Day Average: 135/79     Recent Readings 5/5/2020 5/3/2020 4/23/2020 4/22/2020 4/22/2020    SBP (mmHg) 127 153 128 136 137    DBP (mmHg) 89 93 70 72 71    Pulse 79 60 66 75 66        Assessment:  Reviewed recent readings and labs (last BMP drawn on 3/30/20). Per 2017 ACC/ AHA HTN guidelines (goal of BP < 130/80), current 30-day average is slightly uncontrolled. Within the past month, SBPs have ranged 127-153 and DBPs have ranged 70-93.    Plan:  Continue current medication regimen.   CMP is scheduled for 5/15/20.  Patients health  will be following up as scheduled.   I will continue to monitor regularly and will follow-up in 4 weeks, sooner if blood pressure begins to trend upward or downward. If BP trends up, will discuss starting spironolactone.    Current medication regimen:  Hypertension Medications             amLODIPine (NORVASC) 10 MG tablet TAKE 1 TABLET BY MOUTH ONCE DAILY         valsartan (DIOVAN) 320 MG tablet Take 1 tablet by mouth once daily         Patient denies having questions or concerns. Patient has my contact information and knows to call with any concerns or clinical changes.         2. DM  HPI:  Called patient to follow up regarding the DDMP (Diabetes Digital Medicine  Program).  Patient reports adherence to medication regimen daily and denies missed doses.   Patient denies hypoglycemic s/sx (dizziness, extreme hunger, headaches, confusion, trouble concentrating, sweating, shaking, blurred vision, personality changes); patient denies hyperglycemic s/sx (increased thirst, increased urination, headaches, trouble concentrating, blurred vision, fatigue).    Last 6 Patient Entered Readings                                          Most Recent A1c: 4.9% on 11/12/2019  (Goal: 7%)     Recent Readings 5/5/2020 4/27/2020 4/25/2020 4/23/2020 4/16/2020    Blood Glucose (mg/dL) 120 115 116 129 144        Assessment:  Reviewed recent readings. Per AACE/ACE guidelines (goal A1C < 7%), DM is well controlled (A1C on 11/12/19 was 4.9%). Next A1C is scheduled for 5/15/20.    Plan:  Continue current medication regimen.   Patients health  will be following up as scheduled.   I will continue to monitor regularly and will follow-up in 4 weeks, sooner if blood sugar begins to trend upward or downward.     Current Medication Regimen:  Diabetes Medications             insulin lispro (HUMALOG U-100 INSULIN) 100 unit/mL injection Use via sliding scale    metFORMIN (GLUCOPHAGE-XR) 500 MG 24 hr tablet TAKE 2 TABLETS BY MOUTH ONCE DAILY    OZEMPIC 0.25 mg or 0.5 mg(2 mg/1.5 mL) PnIj INJECT 0.5 MG INTO THE SKIN EVERY 7 DAYS         Patient has my contact information and knows to call with any concerns or clinical changes.

## 2020-05-15 ENCOUNTER — LAB VISIT (OUTPATIENT)
Dept: LAB | Facility: HOSPITAL | Age: 50
End: 2020-05-15
Attending: FAMILY MEDICINE
Payer: COMMERCIAL

## 2020-05-15 DIAGNOSIS — I10 HYPERTENSION, UNSPECIFIED TYPE: ICD-10-CM

## 2020-05-15 DIAGNOSIS — E11.9 TYPE 2 DIABETES MELLITUS WITHOUT COMPLICATION, WITHOUT LONG-TERM CURRENT USE OF INSULIN: ICD-10-CM

## 2020-05-15 DIAGNOSIS — E78.5 HYPERLIPIDEMIA, UNSPECIFIED HYPERLIPIDEMIA TYPE: ICD-10-CM

## 2020-05-15 DIAGNOSIS — D64.9 ANEMIA, UNSPECIFIED TYPE: ICD-10-CM

## 2020-05-15 LAB
ANION GAP SERPL CALC-SCNC: 7 MMOL/L (ref 8–16)
BASOPHILS # BLD AUTO: 0.05 K/UL (ref 0–0.2)
BASOPHILS NFR BLD: 0.8 % (ref 0–1.9)
BUN SERPL-MCNC: 16 MG/DL (ref 6–20)
CALCIUM SERPL-MCNC: 9.2 MG/DL (ref 8.7–10.5)
CHLORIDE SERPL-SCNC: 108 MMOL/L (ref 95–110)
CO2 SERPL-SCNC: 25 MMOL/L (ref 23–29)
CREAT SERPL-MCNC: 0.9 MG/DL (ref 0.5–1.4)
DIFFERENTIAL METHOD: ABNORMAL
EOSINOPHIL # BLD AUTO: 0.3 K/UL (ref 0–0.5)
EOSINOPHIL NFR BLD: 4.1 % (ref 0–8)
ERYTHROCYTE [DISTWIDTH] IN BLOOD BY AUTOMATED COUNT: 14.2 % (ref 11.5–14.5)
EST. GFR  (AFRICAN AMERICAN): >60 ML/MIN/1.73 M^2
EST. GFR  (NON AFRICAN AMERICAN): >60 ML/MIN/1.73 M^2
ESTIMATED AVG GLUCOSE: 103 MG/DL (ref 68–131)
GLUCOSE SERPL-MCNC: 119 MG/DL (ref 70–110)
HBA1C MFR BLD HPLC: 5.2 % (ref 4–5.6)
HCT VFR BLD AUTO: 40.3 % (ref 40–54)
HGB BLD-MCNC: 12.6 G/DL (ref 14–18)
IMM GRANULOCYTES # BLD AUTO: 0.04 K/UL (ref 0–0.04)
IMM GRANULOCYTES NFR BLD AUTO: 0.6 % (ref 0–0.5)
LYMPHOCYTES # BLD AUTO: 2.2 K/UL (ref 1–4.8)
LYMPHOCYTES NFR BLD: 35 % (ref 18–48)
MCH RBC QN AUTO: 28.6 PG (ref 27–31)
MCHC RBC AUTO-ENTMCNC: 31.3 G/DL (ref 32–36)
MCV RBC AUTO: 92 FL (ref 82–98)
MONOCYTES # BLD AUTO: 0.5 K/UL (ref 0.3–1)
MONOCYTES NFR BLD: 8.1 % (ref 4–15)
NEUTROPHILS # BLD AUTO: 3.2 K/UL (ref 1.8–7.7)
NEUTROPHILS NFR BLD: 51.4 % (ref 38–73)
NRBC BLD-RTO: 0 /100 WBC
PLATELET # BLD AUTO: 221 K/UL (ref 150–350)
PMV BLD AUTO: 10.1 FL (ref 9.2–12.9)
POTASSIUM SERPL-SCNC: 4.3 MMOL/L (ref 3.5–5.1)
RBC # BLD AUTO: 4.4 M/UL (ref 4.6–6.2)
SODIUM SERPL-SCNC: 140 MMOL/L (ref 136–145)
WBC # BLD AUTO: 6.31 K/UL (ref 3.9–12.7)

## 2020-05-15 PROCEDURE — 80048 BASIC METABOLIC PNL TOTAL CA: CPT

## 2020-05-15 PROCEDURE — 85025 COMPLETE CBC W/AUTO DIFF WBC: CPT

## 2020-05-15 PROCEDURE — 83036 HEMOGLOBIN GLYCOSYLATED A1C: CPT

## 2020-05-15 PROCEDURE — 36415 COLL VENOUS BLD VENIPUNCTURE: CPT

## 2020-05-18 ENCOUNTER — TELEPHONE (OUTPATIENT)
Dept: INTERNAL MEDICINE | Facility: CLINIC | Age: 50
End: 2020-05-18

## 2020-05-18 NOTE — TELEPHONE ENCOUNTER
----- Message from Ruby Ortega NP sent at 5/18/2020  7:12 AM CDT -----  Please inform of lab reports.  Blood count shows signs of anemia.  Please ask if he's been taking his iron.  Other findings are stable.  He has upcoming follow up with Dr. Siddiqui.

## 2020-05-21 ENCOUNTER — OFFICE VISIT (OUTPATIENT)
Dept: INTERNAL MEDICINE | Facility: CLINIC | Age: 50
End: 2020-05-21
Payer: COMMERCIAL

## 2020-05-21 VITALS
TEMPERATURE: 96 F | BODY MASS INDEX: 32.1 KG/M2 | HEART RATE: 60 BPM | OXYGEN SATURATION: 99 % | SYSTOLIC BLOOD PRESSURE: 136 MMHG | DIASTOLIC BLOOD PRESSURE: 82 MMHG | WEIGHT: 237 LBS | HEIGHT: 72 IN | RESPIRATION RATE: 16 BRPM

## 2020-05-21 DIAGNOSIS — M1A.9XX0 CHRONIC GOUT WITHOUT TOPHUS, UNSPECIFIED CAUSE, UNSPECIFIED SITE: ICD-10-CM

## 2020-05-21 DIAGNOSIS — K76.0 FATTY LIVER: ICD-10-CM

## 2020-05-21 DIAGNOSIS — E78.5 HYPERLIPIDEMIA, UNSPECIFIED HYPERLIPIDEMIA TYPE: ICD-10-CM

## 2020-05-21 DIAGNOSIS — I10 HYPERTENSION, UNSPECIFIED TYPE: Chronic | ICD-10-CM

## 2020-05-21 DIAGNOSIS — E11.9 TYPE 2 DIABETES MELLITUS WITHOUT COMPLICATION, WITHOUT LONG-TERM CURRENT USE OF INSULIN: Primary | Chronic | ICD-10-CM

## 2020-05-21 PROCEDURE — 3044F HG A1C LEVEL LT 7.0%: CPT | Mod: CPTII,S$GLB,, | Performed by: FAMILY MEDICINE

## 2020-05-21 PROCEDURE — 3008F PR BODY MASS INDEX (BMI) DOCUMENTED: ICD-10-PCS | Mod: CPTII,S$GLB,, | Performed by: FAMILY MEDICINE

## 2020-05-21 PROCEDURE — 99999 PR PBB SHADOW E&M-EST. PATIENT-LVL III: ICD-10-PCS | Mod: PBBFAC,,, | Performed by: FAMILY MEDICINE

## 2020-05-21 PROCEDURE — 3044F PR MOST RECENT HEMOGLOBIN A1C LEVEL <7.0%: ICD-10-PCS | Mod: CPTII,S$GLB,, | Performed by: FAMILY MEDICINE

## 2020-05-21 PROCEDURE — 99999 PR PBB SHADOW E&M-EST. PATIENT-LVL III: CPT | Mod: PBBFAC,,, | Performed by: FAMILY MEDICINE

## 2020-05-21 PROCEDURE — 3075F PR MOST RECENT SYSTOLIC BLOOD PRESS GE 130-139MM HG: ICD-10-PCS | Mod: CPTII,S$GLB,, | Performed by: FAMILY MEDICINE

## 2020-05-21 PROCEDURE — 3079F DIAST BP 80-89 MM HG: CPT | Mod: CPTII,S$GLB,, | Performed by: FAMILY MEDICINE

## 2020-05-21 PROCEDURE — 3079F PR MOST RECENT DIASTOLIC BLOOD PRESSURE 80-89 MM HG: ICD-10-PCS | Mod: CPTII,S$GLB,, | Performed by: FAMILY MEDICINE

## 2020-05-21 PROCEDURE — 3008F BODY MASS INDEX DOCD: CPT | Mod: CPTII,S$GLB,, | Performed by: FAMILY MEDICINE

## 2020-05-21 PROCEDURE — 99214 PR OFFICE/OUTPT VISIT, EST, LEVL IV, 30-39 MIN: ICD-10-PCS | Mod: S$GLB,,, | Performed by: FAMILY MEDICINE

## 2020-05-21 PROCEDURE — 3075F SYST BP GE 130 - 139MM HG: CPT | Mod: CPTII,S$GLB,, | Performed by: FAMILY MEDICINE

## 2020-05-21 PROCEDURE — 99214 OFFICE O/P EST MOD 30 MIN: CPT | Mod: S$GLB,,, | Performed by: FAMILY MEDICINE

## 2020-05-21 NOTE — PROGRESS NOTES
Subjective:       Patient ID: Guillermo Castillo is a male.    Chief Complaint: Multiple issues see below    HPI Type 2 diabetes: a1c controlled. Tolerating medicine. No hypoglycemia; infreq humalog  Hypercholesterolemia:brenda crstor   Gout:  No c/o recent flares.   Hypertension: blood pressures at home nl.  Tolerating medicine.   Fatty liver nl . No etoh long time;   gerd controlled. No sympt    Anemia rsolved. Off fergon months. egd cscope ok. Had seen dr oglesby            Past Medical History   Diagnosis Date    Fatty liver     Gout     Hypertension     Hyperlipidemia     Hypertriglyceridemia     Mood disorder     Panic disorder     Type 2 diabetes mellitus      History reviewed. No pertinent past surgical history.  Family History   Problem Relation Age of Onset    Coronary artery disease Father     Diabetes Father     Lung cancer Father          Review of Systems   Respiratory: Negative for shortness of breath.    Cardiovascular: Negative for chest pain and leg swelling.       Objective:      Physical Exam   Constitutional: He is oriented to person, place, and time. He appears well-developed and well-nourished.   HENT:   Head: Normocephalic and atraumatic.   Eyes: Conjunctivae and EOM are normal. Pupils are equal, round, and reactive to light.   Neck: Normal range of motion. Neck supple. Carotid bruit is not present. no mass  Cardiovascular: Normal rate and regular rhythm.    Pulmonary/Chest: Effort normal and breath sounds normal.         Neurological: He is alert and oriented to person, place, and time.   Skin: Skin is warm and dry.   Psychiatric: He has a normal mood and affect. His behavior is normal. Judgment and thought content normal.         Bilateral feet with normal monofilament testing and no lesions.      .        Assessment:       1. Type 2 diabetes mellitus    2. Hypertension    3. Gout    4. Hypercholesteremia    5. Fatty liver      Hx anemia rsolved  Plan:     Lab and follow up 6 months    Type 2 diabetes mellitus without complication, without long-term current use of insulin  -     Comprehensive metabolic panel; Future; Expected date: 11/21/2020  -     CBC auto differential; Future; Expected date: 11/21/2020  -     Lipid Panel; Future; Expected date: 11/21/2020  -     Microalbumin/creatinine urine ratio; Future; Expected date: 11/21/2020    Hypertension, unspecified type    Hyperlipidemia, unspecified hyperlipidemia type    Fatty liver    Chronic gout without tophus, unspecified cause, unspecified site  -     Uric acid; Future; Expected date: 11/21/2020  -     Ferritin; Future; Expected date: 11/21/2020  -     Iron and TIBC; Future; Expected date: 11/21/2020

## 2020-06-01 ENCOUNTER — PATIENT OUTREACH (OUTPATIENT)
Dept: OTHER | Facility: OTHER | Age: 50
End: 2020-06-01

## 2020-06-02 ENCOUNTER — PATIENT OUTREACH (OUTPATIENT)
Dept: OTHER | Facility: OTHER | Age: 50
End: 2020-06-02

## 2020-06-02 NOTE — PROGRESS NOTES
1. HTN  Last 5 Patient Entered Readings                                      Current 30 Day Average: 144/84     Recent Readings 5/26/2020 5/25/2020 5/23/2020 5/12/2020 5/5/2020    SBP (mmHg) 145 152 148 138 127    DBP (mmHg) 83 82 82 77 89    Pulse 63 60 74 68 79        Hypertension Medications             amLODIPine (NORVASC) 10 MG tablet TAKE 1 TABLET BY MOUTH ONCE DAILY    valsartan (DIOVAN) 320 MG tablet Take 1 tablet by mouth once daily        Called patient to follow up. Reviewed recent readings and labs (last CMP drawn on 5/15/20). Per 2017 ACC/ AHA HTN guidelines  (goal of BP < 130/80), current 30-day average is uncontrolled. Patient is intolerant to chlorthalidone due to hypotensive s/sx. Would like to discuss starting spironolactone.  LVM, requested patient call back at his convenience.  Will continue to monitor. WCB in 2 weeks.         2. DM  Last 6 Patient Entered Readings                                          Most Recent A1c: 5.2% on 5/15/2020  (Goal: 7%)     Recent Readings 5/27/2020 5/26/2020 5/25/2020 5/23/2020 5/12/2020    Blood Glucose (mg/dL) 136 114 210 168 157        Diabetes Medications             insulin lispro (HUMALOG U-100 INSULIN) 100 unit/mL injection Use via sliding scale    metFORMIN (GLUCOPHAGE-XR) 500 MG 24 hr tablet TAKE 2 TABLETS BY MOUTH ONCE DAILY    OZEMPIC 0.25 mg or 0.5 mg(2 mg/1.5 mL) PnIj INJECT 0.5 MG INTO THE SKIN EVERY 7 DAYS        Called to follow up with patient, reviewed recent readings. Per AACE/ACE guidelines (goal A1C < 7%), DM is well controlled (A1C on 5/15/20 was 5.2%).  LVM, requested patient call back at his convenience.  Will continue to monitor. WCB in 2 weeks.

## 2020-06-18 ENCOUNTER — PATIENT OUTREACH (OUTPATIENT)
Dept: ADMINISTRATIVE | Facility: OTHER | Age: 50
End: 2020-06-18

## 2020-06-19 ENCOUNTER — OFFICE VISIT (OUTPATIENT)
Dept: OPHTHALMOLOGY | Facility: CLINIC | Age: 50
End: 2020-06-19
Payer: COMMERCIAL

## 2020-06-19 DIAGNOSIS — E11.9 DIABETES MELLITUS TYPE 2 WITHOUT RETINOPATHY: Primary | ICD-10-CM

## 2020-06-19 DIAGNOSIS — H52.4 BILATERAL PRESBYOPIA: ICD-10-CM

## 2020-06-19 LAB
LEFT EYE DM RETINOPATHY: NEGATIVE
RIGHT EYE DM RETINOPATHY: NEGATIVE

## 2020-06-19 PROCEDURE — 92014 PR EYE EXAM, EST PATIENT,COMPREHESV: ICD-10-PCS | Mod: S$GLB,,, | Performed by: OPTOMETRIST

## 2020-06-19 PROCEDURE — 92015 PR REFRACTION: ICD-10-PCS | Mod: S$GLB,,, | Performed by: OPTOMETRIST

## 2020-06-19 PROCEDURE — 99999 PR PBB SHADOW E&M-EST. PATIENT-LVL II: ICD-10-PCS | Mod: PBBFAC,,, | Performed by: OPTOMETRIST

## 2020-06-19 PROCEDURE — 99999 PR PBB SHADOW E&M-EST. PATIENT-LVL II: CPT | Mod: PBBFAC,,, | Performed by: OPTOMETRIST

## 2020-06-19 PROCEDURE — 92014 COMPRE OPH EXAM EST PT 1/>: CPT | Mod: S$GLB,,, | Performed by: OPTOMETRIST

## 2020-06-19 PROCEDURE — 92015 DETERMINE REFRACTIVE STATE: CPT | Mod: S$GLB,,, | Performed by: OPTOMETRIST

## 2020-06-19 NOTE — PROGRESS NOTES
HPI     Yearly diabetic eye exam  Dm since    Last edited by Yaneth Kate on 6/19/2020  1:28 PM. (History)            Assessment /Plan     For exam results, see Encounter Report.    Diabetes mellitus type 2 without retinopathy    Bilateral presbyopia      No Background Diabetic Retinopathy    Dispense Final Rx for glasses or may use OTC glasses.  RTC 1 year  Discussed above and answered questions.

## 2020-06-19 NOTE — PROGRESS NOTES
Chart reviewed.   Immunizations: Triggered Imm Registry     Orders placed: n/a  Upcoming appts to satisfy BEAR topics: n/a

## 2020-08-10 NOTE — PROGRESS NOTES
"Digital Medicine: Clinician Follow-Up    Called patient to discuss his elevated readings. He feels "life in general" is causing his high readings.    The history is provided by the patient.      Review of patient's allergies indicates:   -- Simvastatin     --  Other reaction(s): aches/inc lfts  Follow-up reason(s): routine follow up.     Hypertension    Readings are trending up   Patient is not experiencing signs/symptoms of hypotension.  Patient is not experiencing signs/symptoms of hypertension.          Last 5 Patient Entered Readings                                      Current 30 Day Average: 149/80     Recent Readings 8/6/2020 7/23/2020 7/23/2020 7/10/2020 7/2/2020    SBP (mmHg) 144 153 148 146 142    DBP (mmHg) 79 81 81 81 88    Pulse 67 58 58 67 62        Last 6 Patient Entered Readings                                          Most Recent A1c: 5.2% on 5/15/2020  (Goal: 7%)     Recent Readings 8/6/2020 7/23/2020 7/23/2020 7/11/2020 7/10/2020    Blood Glucose (mg/dL) 173 163 174 163 175                         Medication Adherence-Medication adherence was assessed.          ASSESSMENT(S)  Patient's A1C goal is less than or equal to 7 per 2020 ADA guidelines. Patient's most recent A1C result is at goal. Lab Results    Component                Value               Date                     HGBA1C                   5.2                 05/15/2020          .     Patients BP average is 149/80 mmHg, which is above goal. Patient's BP goal is less than or equal to 130/80 per 2017 ACC/AHA Hypertension Guidelines.       Hypertension Plan  Additional monitoring needed. Recommended increasing his HTN readings to 2-3 per week to assess if further medication adjustments are needed.  Continue current therapy.    Diabetes Plan  Continue current therapy.       Addressed any questions or concerns and patient has my contact information if needed prior to next outreach. Patient verbalizes understanding.            There are no " preventive care reminders to display for this patient.      Hypertension Medications             amLODIPine (NORVASC) 10 MG tablet TAKE 1 TABLET BY MOUTH ONCE DAILY    valsartan (DIOVAN) 320 MG tablet Take 1 tablet by mouth once daily        Diabetes Medications             insulin lispro (HUMALOG U-100 INSULIN) 100 unit/mL injection Use via sliding scale    metFORMIN (GLUCOPHAGE-XR) 500 MG 24 hr tablet TAKE 2 TABLETS BY MOUTH ONCE DAILY    OZEMPIC 0.25 mg or 0.5 mg(2 mg/1.5 mL) PnRudi INJECT 0.5 MG INTO THE SKIN EVERY 7 DAYS

## 2020-08-24 ENCOUNTER — PATIENT OUTREACH (OUTPATIENT)
Dept: OTHER | Facility: OTHER | Age: 50
End: 2020-08-24

## 2020-08-24 DIAGNOSIS — I10 HYPERTENSION, UNSPECIFIED TYPE: Primary | Chronic | ICD-10-CM

## 2020-08-24 RX ORDER — CARVEDILOL 3.12 MG/1
3.12 TABLET ORAL 2 TIMES DAILY WITH MEALS
Qty: 60 TABLET | Refills: 5 | Status: SHIPPED | OUTPATIENT
Start: 2020-08-24 | End: 2020-09-22 | Stop reason: SDUPTHER

## 2020-08-24 NOTE — PROGRESS NOTES
Digital Medicine: Clinician Follow-Up    Called patient to discuss his hypertension.    The history is provided by the patient.      Review of patient's allergies indicates:   -- Simvastatin     --  Other reaction(s): aches/inc lfts  Follow-up reason(s): routine follow up.     Hypertension    Readings are trending up   Patient is not experiencing signs/symptoms of hypotension.  Patient is not experiencing signs/symptoms of hypertension.          Last 5 Patient Entered Readings                                      Current 30 Day Average: 148/88     Recent Readings 8/24/2020 8/23/2020 8/22/2020 8/11/2020 8/6/2020    SBP (mmHg) 146 156 151 141 144    DBP (mmHg) 94 94 99 74 79    Pulse 63 63 62 70 67        Last 6 Patient Entered Readings                                          Most Recent A1c: 5.2% on 5/15/2020  (Goal: 7%)     Recent Readings 8/24/2020 8/22/2020 8/6/2020 7/23/2020 7/23/2020    Blood Glucose (mg/dL) 176 167 173 163 174           Screenings    ASSESSMENT(S)  Patients BP average is 148/88 mmHg, which is above goal. Patient's BP goal is less than or equal to 130/80 per 2017 ACC/AHA Hypertension Guidelines.       Hypertension Plan  Medication change. Add Coreg 3.125mg twice daily. Maintain his current amlodipine and valsartan doses.       Addressed any questions or concerns and patient has my contact information if needed prior to next outreach. Patient verbalizes understanding.            There are no preventive care reminders to display for this patient.      Hypertension Medications             amLODIPine (NORVASC) 10 MG tablet TAKE 1 TABLET BY MOUTH ONCE DAILY    carvediloL (COREG) 3.125 MG tablet Take 1 tablet (3.125 mg total) by mouth 2 (two) times daily with meals.    valsartan (DIOVAN) 320 MG tablet Take 1 tablet by mouth once daily        Diabetes Medications             insulin lispro (HUMALOG U-100 INSULIN) 100 unit/mL injection Use via sliding scale    metFORMIN (GLUCOPHAGE-XR) 500 MG 24 hr  tablet TAKE 2 TABLETS BY MOUTH ONCE DAILY    OZEMPIC 0.25 mg or 0.5 mg(2 mg/1.5 mL) PnIj INJECT 0.5 MG INTO THE SKIN EVERY 7 DAYS

## 2020-09-03 ENCOUNTER — PATIENT OUTREACH (OUTPATIENT)
Dept: OTHER | Facility: OTHER | Age: 50
End: 2020-09-03

## 2020-09-03 NOTE — PROGRESS NOTES
Digital Medicine: Clinician Follow-Up    Called patient to discuss how he is tolerating carvedilol.    The history is provided by the patient.   Follow-up reason(s): medication change follow-up.     Hypertension    Patient readings are stable   Patient is not experiencing signs/symptoms of hypotension.  Patient is not experiencing signs/symptoms of hypertension.      Patient did make medication change.    Is patient tolerating med change? yes      Additional Follow-up details: Patient's blood pressure and high pulse today was taken while driving in the car. This is most likely not an accurate reading.      Last 5 Patient Entered Readings                                      Current 30 Day Average: 148/88     Recent Readings 9/3/2020 9/1/2020 8/29/2020 8/24/2020 8/23/2020    SBP (mmHg) 148 145 153 146 156    DBP (mmHg) 91 85 90 94 94    Pulse 126 65 69 63 63        Last 6 Patient Entered Readings                                          Most Recent A1c: 5.2% on 5/15/2020  (Goal: 7%)     Recent Readings 9/1/2020 8/29/2020 8/27/2020 8/24/2020 8/22/2020    Blood Glucose (mg/dL) 163 144 170 176 167                         Medication Adherence-Medication adherence was assessed.          ASSESSMENT(S)  Patients BP average is 148/88 mmHg, which is above goal. Patient's BP goal is less than or equal to 130/80 per 2017 ACC/AHA Hypertension Guidelines.       Hypertension Plan  Continue current therapy. If he remains elevated in two weeks, I will increase carvedilol.       Addressed any questions or concerns and patient has my contact information if needed prior to next outreach. Patient verbalizes understanding.            There are no preventive care reminders to display for this patient.      Hypertension Medications             amLODIPine (NORVASC) 10 MG tablet TAKE 1 TABLET BY MOUTH ONCE DAILY    carvediloL (COREG) 3.125 MG tablet Take 1 tablet (3.125 mg total) by mouth 2 (two) times daily with meals.    valsartan  (DIOVAN) 320 MG tablet Take 1 tablet by mouth once daily        Diabetes Medications             insulin lispro (HUMALOG U-100 INSULIN) 100 unit/mL injection Use via sliding scale    metFORMIN (GLUCOPHAGE-XR) 500 MG 24 hr tablet TAKE 2 TABLETS BY MOUTH ONCE DAILY    OZEMPIC 0.25 mg or 0.5 mg(2 mg/1.5 mL) PnRudi INJECT 0.5 MG INTO THE SKIN EVERY 7 DAYS

## 2020-09-22 ENCOUNTER — PATIENT OUTREACH (OUTPATIENT)
Dept: OTHER | Facility: OTHER | Age: 50
End: 2020-09-22

## 2020-09-22 DIAGNOSIS — E11.9 TYPE 2 DIABETES MELLITUS WITHOUT COMPLICATION, WITHOUT LONG-TERM CURRENT USE OF INSULIN: ICD-10-CM

## 2020-09-22 DIAGNOSIS — I10 HYPERTENSION, UNSPECIFIED TYPE: Chronic | ICD-10-CM

## 2020-09-22 RX ORDER — CARVEDILOL 6.25 MG/1
6.25 TABLET ORAL 2 TIMES DAILY WITH MEALS
Qty: 60 TABLET | Refills: 5 | Status: SHIPPED | OUTPATIENT
Start: 2020-09-22 | End: 2020-11-03

## 2020-09-22 RX ORDER — SEMAGLUTIDE 1.34 MG/ML
0.5 INJECTION, SOLUTION SUBCUTANEOUS WEEKLY
Qty: 2 ML | Refills: 11 | Status: SHIPPED | OUTPATIENT
Start: 2020-09-22 | End: 2021-02-26 | Stop reason: DRUGHIGH

## 2020-09-22 NOTE — PROGRESS NOTES
Digital Medicine: Clinician Follow-Up    Called to discuss his PA with Ozempic and high blood pressure.    The history is provided by the patient.      Review of patient's allergies indicates:   -- Simvastatin     --  Other reaction(s): aches/inc lfts  Follow-up reason(s): routine follow up.     Hypertension    Readings are trending down   Diabetes    Patient readings are stable   Patient is not experiencing signs/symptoms of hypotension.  Patient is not experiencing signs/symptoms of hypertension.  Patient is not experiencing signs/symptoms of hypoglycemia.  Patient is not experiencing signs/symptoms of hyperglycemia.    Additional Follow-up details: Patient had a PA that was required with Ozempic.        Last 5 Patient Entered Readings                                      Current 30 Day Average: 146/87     Recent Readings 9/20/2020 9/13/2020 9/12/2020 9/12/2020 9/11/2020    SBP (mmHg) 151 141 138 161 147    DBP (mmHg) 98 75 90 87 82    Pulse 61 68 59 63 68        Last 6 Patient Entered Readings                                          Most Recent A1c: 5.2% on 5/15/2020  (Goal: 7%)     Recent Readings 9/22/2020 9/20/2020 9/13/2020 9/11/2020 9/10/2020    Blood Glucose (mg/dL) 172 133 158 122 161             Screenings    ASSESSMENT(S)  Patient's A1C goal is less than or equal to 7 per 2020 ADA guidelines. Patient's most recent A1C result is at goal. Lab Results    Component                Value               Date                     HGBA1C                   5.2                 05/15/2020          .     Patients BP average is 146/87 mmHg, which is above goal. Patient's BP goal is less than or equal to 130/80 per 2017 ACC/AHA Hypertension Guidelines.     Hypertension Plan  Medication change. Increase carvedilol to 6.25mg twice daily. Maintain current amlodipine and valsartan dose.    Diabetes Plan  Continue current therapy. Ozempic prescription was sent to [facility] mail order pharmacy to handle the PA.       Addressed  patient questions and patient has my contact information if needed prior to next outreach. Patient verbalizes understanding.            There are no preventive care reminders to display for this patient.  There are no preventive care reminders to display for this patient.    Hypertension Medications             amLODIPine (NORVASC) 10 MG tablet TAKE 1 TABLET BY MOUTH ONCE DAILY    carvediloL (COREG) 6.25 MG tablet Take 1 tablet (6.25 mg total) by mouth 2 (two) times daily with meals.    valsartan (DIOVAN) 320 MG tablet Take 1 tablet by mouth once daily        Diabetes Medications             insulin lispro (HUMALOG U-100 INSULIN) 100 unit/mL injection Use via sliding scale    metFORMIN (GLUCOPHAGE-XR) 500 MG 24 hr tablet TAKE 2 TABLETS BY MOUTH ONCE DAILY    semaglutide (OZEMPIC) 0.25 mg or 0.5 mg(2 mg/1.5 mL) PnIj Inject 0.5 mg into the skin once a week.

## 2020-09-23 ENCOUNTER — TELEPHONE (OUTPATIENT)
Dept: INTERNAL MEDICINE | Facility: CLINIC | Age: 50
End: 2020-09-23

## 2020-09-23 NOTE — TELEPHONE ENCOUNTER
----- Message from Luiza Patiño sent at 9/23/2020  1:55 PM CDT -----  Stephanie, with [employer], would like return call regarding prior authorization for Ozempic. Please call back  [phone], ref# [id number]. Md Susan

## 2020-09-28 ENCOUNTER — TELEPHONE (OUTPATIENT)
Dept: INTERNAL MEDICINE | Facility: CLINIC | Age: 50
End: 2020-09-28

## 2020-09-28 NOTE — TELEPHONE ENCOUNTER
Spoke with Cris (representative with [employer]). PA for Ozempic approved. Approval dates 9/24/2020-9/24/2021. Case ID# [id number]. FYI: pt uses [facility] for prescription.//[place]

## 2020-10-06 ENCOUNTER — PATIENT OUTREACH (OUTPATIENT)
Dept: OTHER | Facility: OTHER | Age: 50
End: 2020-10-06

## 2020-10-06 DIAGNOSIS — I10 HYPERTENSION, UNSPECIFIED TYPE: Chronic | ICD-10-CM

## 2020-10-12 NOTE — PROGRESS NOTES
Digital Medicine: Health  Follow-Up    The history is provided by the patient.             Reason for review: Blood glucose at goal and Blood pressure not at goal        Topics Covered on Call: physical activity and Diet    Additional Follow-up details: Patient was in the middle of driving in traffic so conversation was brief. Patient reports that he misplaced BP cuff and he thinks it might be in the bottom of his trunk. Patient plans to look for it today. Patient reports that he has had medication carvedilol added to his BP regimen. Patient reports no s/s or issues with the addition of carvedilol. Patient is having issues with insurance to get Ozempic added as well. Patient reports no changes to diet or activity levels. Will f/u in 4-6 weeks to check in.             Diet-no change to diet  No 24 hour dietary recall  No change to diet.  Patient reports eating or drinking the following: Patient reports making no major changes to his diet since our last encounter. 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       Physical Activity-no change to routine  No change to exercise routine.       Additional physical activity details: Patient is active with his job. Patient and I had discussed him starting ride his bike before COVID started. Patient reports that he has still not started this.       Medication Adherence-Medication adherence was assessed.      Substance, Sleep, Stress-Not assessed      Continue current diet/physical activity routine.  Instructed to charge device. Charge BP cuff if he hasn't used it in awhile       Addressed patient questions and patient has my contact information if needed prior to next outreach. Patient verbalizes understanding.             There are no preventive care reminders to display for this patient.      Last 5 Patient Entered Readings                                      Current 30 Day Average: 140/85     Recent Readings 10/4/2020  10/4/2020 9/29/2020 9/23/2020 9/20/2020    SBP (mmHg) 135 145 133 142 151    DBP (mmHg) 78 85 80 90 98    Pulse 53 54 76 66 61        Last 6 Patient Entered Readings                                          Most Recent A1c: 5.2% on 5/15/2020  (Goal: 7%)     Recent Readings 10/4/2020 9/29/2020 9/22/2020 9/20/2020 9/13/2020    Blood Glucose (mg/dL) 263 193 172 133 158

## 2020-10-15 ENCOUNTER — TELEPHONE (OUTPATIENT)
Dept: INTERNAL MEDICINE | Facility: CLINIC | Age: 50
End: 2020-10-15

## 2020-10-15 NOTE — TELEPHONE ENCOUNTER
----- Message from Rosa Mcintyre sent at 10/15/2020  1:41 PM CDT -----  Regarding: NEED PRIOR AUTHORIZATION  Contact: ALISA LONDONO/ [employer]  PLEASE CALL CONCERNING PRIOR AUTHORIZATION FOR OZEMPIC .25 MG. @ [phone]. REF # [id number] THANKS

## 2020-10-19 ENCOUNTER — TELEPHONE (OUTPATIENT)
Dept: INTERNAL MEDICINE | Facility: CLINIC | Age: 50
End: 2020-10-19

## 2020-10-19 NOTE — TELEPHONE ENCOUNTER
----- Message from Evon Noalsco sent at 10/19/2020  1:43 PM CDT -----  Regarding: Medication  Contact: Patient  .Type:  RX Refill Request    Who Called:  Patient  Refill or New Rx: Refill  RX Name and Strength: semaglutide (OZEMPIC) 0.25 mg or 0.5 mg(2 mg/1.5 mL) PnIj   How is the patient currently taking it? (ex. 1XDay):  Is this a 30 day or 90 day RX:   Preferred Pharmacy with phone number:.    [facility] [address] [place], [place] [address] [address] [address]  Phone: [phone] Fax: [phone]      Local or Mail Order: Local  Ordering Provider: Dr Siddiqui  Would the patient rather a call back or a response via [facility]? Call  Best Call Back Number: .[phone] (home)     Additional Information: Prior authorization Needed, please call at [phone] ( Prior Authorization)

## 2020-10-20 NOTE — PROGRESS NOTES
Digital Medicine: Clinician Follow-Up    The history is provided by the patient.         Last 5 Patient Entered Readings                                      Current 30 Day Average: 141/86     Recent Readings 10/20/2020 10/13/2020 10/13/2020 10/4/2020 10/4/2020    SBP (mmHg) 148 138 147 135 145    DBP (mmHg) 90 84 81 78 85    Pulse 70 59 61 53 54        Last 6 Patient Entered Readings                                          Most Recent A1c: 5.2% on 5/15/2020  (Goal: 7%)     Recent Readings 10/20/2020 10/13/2020 10/4/2020 9/29/2020 9/22/2020    Blood Glucose (mg/dL) 161 138 263 193 172               Depression Screening  Did not address depression screening.    Sleep Apnea Screening    Did not address sleep apnea screening.     Medication Affordability Screening  Did not address medication affordability screening.     Medication Adherence-Medication adherence was asssessed.    Patient identified the following reasons for non-adherence:  Schedule concerns. Missing evening dose because of long working hours.          ASSESSMENT(S)  Patient's A1C goal is less than or equal to 7. Patient's most recent A1C result is at goal. Lab Results    Component                Value               Date                     HGBA1C                   5.2                 05/15/2020          .     Patients BP average is 141/86 mmHg, which is above goal. Patient's BP goal is less than or equal to 130/80.     BP remains elevated however he is not consistently taking his evening carvedilol dose.    SMBG readings remain controlled.      Hypertension Plan  Continue current therapy. Work on consistently taking carvedilol in the evening to prevent missed doses.  Recommended adherence tool.    Diabetes Plan  Continue current therapy. Ozmepic was renewed and he is able to afford the medication.           There are no preventive care reminders to display for this patient.  There are no preventive care reminders to display for this  patient.      Hypertension Medications             amLODIPine (NORVASC) 10 MG tablet TAKE 1 TABLET BY MOUTH ONCE DAILY    carvediloL (COREG) 6.25 MG tablet Take 1 tablet (6.25 mg total) by mouth 2 (two) times daily with meals.    valsartan (DIOVAN) 320 MG tablet Take 1 tablet by mouth once daily        Diabetes Medications             insulin lispro (HUMALOG U-100 INSULIN) 100 unit/mL injection Use via sliding scale    metFORMIN (GLUCOPHAGE-XR) 500 MG 24 hr tablet TAKE 2 TABLETS BY MOUTH ONCE DAILY    semaglutide (OZEMPIC) 0.25 mg or 0.5 mg(2 mg/1.5 mL) PnRudi Inject 0.5 mg into the skin once a week.

## 2020-11-03 ENCOUNTER — PATIENT OUTREACH (OUTPATIENT)
Dept: OTHER | Facility: OTHER | Age: 50
End: 2020-11-03

## 2020-11-03 DIAGNOSIS — I10 HYPERTENSION, UNSPECIFIED TYPE: Chronic | ICD-10-CM

## 2020-11-03 RX ORDER — CARVEDILOL 12.5 MG/1
12.5 TABLET ORAL 2 TIMES DAILY WITH MEALS
Qty: 60 TABLET | Refills: 5 | Status: SHIPPED | OUTPATIENT
Start: 2020-11-03 | End: 2021-02-26 | Stop reason: SDUPTHER

## 2020-11-03 NOTE — PROGRESS NOTES
Digital Medicine: Clinician Follow-Up    Called patient to discuss his elevated BP readings.    The history is provided by the patient.   Follow-up reason(s): routine follow up.     Hypertension    Patient's blood pressure is stable.   Diabetes    Patient's blood glucose is stable.   Patient is not experiencing signs/symptoms of hypotension.  Patient is not experiencing signs/symptoms of hypertension.  Patient is not experiencing signs/symptoms of hypoglycemia.  Patient is not experiencing signs/symptoms of hyperglycemia.          Last 5 Patient Entered Readings                                      Current 30 Day Average: 144/82     Recent Readings 10/31/2020 10/31/2020 10/31/2020 10/31/2020 10/27/2020    SBP (mmHg) 143 143 157 146 153    DBP (mmHg) 76 75 76 75 90    Pulse 57 56 58 58 76        Last 6 Patient Entered Readings                                          Most Recent A1c: 5.2% on 5/15/2020  (Goal: 7%)     Recent Readings 11/1/2020 10/20/2020 10/13/2020 10/4/2020 9/29/2020    Blood Glucose (mg/dL) 193 161 138 263 193                  ASSESSMENT(S)  Patient's A1C goal is less than or equal to 7. Patient's most recent A1C result is at goal. Lab Results    Component                Value               Date                     HGBA1C                   5.2                 05/15/2020          .     Patients BP average is 144/82 mmHg, which is above goal. Patient's BP goal is less than or equal to 130/80.     Lack of SMBG readings. He needs to upload his latest tests.    BP elevated despite consistently taking carvedilol twice daily.      Hypertension Plan  Hypertension Medication Change. Increase carvedilol to 12.5mg BID. Maintain amlodipine and valsartan.    Diabetes Plan  Additional monitoring needed.  Continue current therapy.           There are no preventive care reminders to display for this patient.  There are no preventive care reminders to display for this patient.      Hypertension Medications              amLODIPine (NORVASC) 10 MG tablet Take 1 tablet by mouth once daily    carvediloL (COREG) 6.25 MG tablet Take 1 tablet (6.25 mg total) by mouth 2 (two) times daily with meals.    valsartan (DIOVAN) 320 MG tablet Take 1 tablet by mouth once daily        Diabetes Medications             insulin lispro (HUMALOG U-100 INSULIN) 100 unit/mL injection Use via sliding scale    metFORMIN (GLUCOPHAGE-XR) 500 MG 24 hr tablet TAKE 2 TABLETS BY MOUTH ONCE DAILY    semaglutide (OZEMPIC) 0.25 mg or 0.5 mg(2 mg/1.5 mL) PnRudi Inject 0.5 mg into the skin once a week.

## 2020-11-13 ENCOUNTER — LAB VISIT (OUTPATIENT)
Dept: LAB | Facility: HOSPITAL | Age: 50
End: 2020-11-13
Payer: COMMERCIAL

## 2020-11-13 ENCOUNTER — LAB VISIT (OUTPATIENT)
Dept: LAB | Facility: HOSPITAL | Age: 50
End: 2020-11-13
Attending: FAMILY MEDICINE
Payer: COMMERCIAL

## 2020-11-13 DIAGNOSIS — E11.9 TYPE 2 DIABETES MELLITUS WITHOUT COMPLICATION, WITHOUT LONG-TERM CURRENT USE OF INSULIN: Chronic | ICD-10-CM

## 2020-11-13 DIAGNOSIS — M1A.9XX0 CHRONIC GOUT WITHOUT TOPHUS, UNSPECIFIED CAUSE, UNSPECIFIED SITE: ICD-10-CM

## 2020-11-13 LAB
ALBUMIN SERPL BCP-MCNC: 4.3 G/DL (ref 3.5–5.2)
ALBUMIN/CREAT UR: 5.1 UG/MG (ref 0–30)
ALP SERPL-CCNC: 102 U/L (ref 55–135)
ALT SERPL W/O P-5'-P-CCNC: 19 U/L (ref 10–44)
ANION GAP SERPL CALC-SCNC: 8 MMOL/L (ref 8–16)
AST SERPL-CCNC: 16 U/L (ref 10–40)
BASOPHILS # BLD AUTO: 0.05 K/UL (ref 0–0.2)
BASOPHILS NFR BLD: 0.5 % (ref 0–1.9)
BILIRUB SERPL-MCNC: 0.4 MG/DL (ref 0.1–1)
BUN SERPL-MCNC: 18 MG/DL (ref 6–20)
CALCIUM SERPL-MCNC: 9.3 MG/DL (ref 8.7–10.5)
CHLORIDE SERPL-SCNC: 105 MMOL/L (ref 95–110)
CHOLEST SERPL-MCNC: 155 MG/DL (ref 120–199)
CHOLEST/HDLC SERPL: 4.7 {RATIO} (ref 2–5)
CO2 SERPL-SCNC: 28 MMOL/L (ref 23–29)
CREAT SERPL-MCNC: 0.9 MG/DL (ref 0.5–1.4)
CREAT UR-MCNC: 176 MG/DL (ref 23–375)
DIFFERENTIAL METHOD: ABNORMAL
EOSINOPHIL # BLD AUTO: 0.4 K/UL (ref 0–0.5)
EOSINOPHIL NFR BLD: 4 % (ref 0–8)
ERYTHROCYTE [DISTWIDTH] IN BLOOD BY AUTOMATED COUNT: 13.9 % (ref 11.5–14.5)
EST. GFR  (AFRICAN AMERICAN): >60 ML/MIN/1.73 M^2
EST. GFR  (NON AFRICAN AMERICAN): >60 ML/MIN/1.73 M^2
FERRITIN SERPL-MCNC: 72 NG/ML (ref 20–300)
GLUCOSE SERPL-MCNC: 106 MG/DL (ref 70–110)
HCT VFR BLD AUTO: 38.3 % (ref 40–54)
HDLC SERPL-MCNC: 33 MG/DL (ref 40–75)
HDLC SERPL: 21.3 % (ref 20–50)
HGB BLD-MCNC: 12.4 G/DL (ref 14–18)
IMM GRANULOCYTES # BLD AUTO: 0.07 K/UL (ref 0–0.04)
IMM GRANULOCYTES NFR BLD AUTO: 0.7 % (ref 0–0.5)
IRON SERPL-MCNC: 78 UG/DL (ref 45–160)
LDLC SERPL CALC-MCNC: 68.8 MG/DL (ref 63–159)
LYMPHOCYTES # BLD AUTO: 3.4 K/UL (ref 1–4.8)
LYMPHOCYTES NFR BLD: 35.9 % (ref 18–48)
MCH RBC QN AUTO: 27.2 PG (ref 27–31)
MCHC RBC AUTO-ENTMCNC: 32.4 G/DL (ref 32–36)
MCV RBC AUTO: 84 FL (ref 82–98)
MICROALBUMIN UR DL<=1MG/L-MCNC: 9 UG/ML
MONOCYTES # BLD AUTO: 0.8 K/UL (ref 0.3–1)
MONOCYTES NFR BLD: 8 % (ref 4–15)
NEUTROPHILS # BLD AUTO: 4.9 K/UL (ref 1.8–7.7)
NEUTROPHILS NFR BLD: 50.9 % (ref 38–73)
NONHDLC SERPL-MCNC: 122 MG/DL
NRBC BLD-RTO: 0 /100 WBC
PLATELET # BLD AUTO: 151 K/UL (ref 150–350)
PMV BLD AUTO: 10.8 FL (ref 9.2–12.9)
POTASSIUM SERPL-SCNC: 4.1 MMOL/L (ref 3.5–5.1)
PROT SERPL-MCNC: 7.4 G/DL (ref 6–8.4)
RBC # BLD AUTO: 4.56 M/UL (ref 4.6–6.2)
SATURATED IRON: 16 % (ref 20–50)
SODIUM SERPL-SCNC: 141 MMOL/L (ref 136–145)
TOTAL IRON BINDING CAPACITY: 474 UG/DL (ref 250–450)
TRANSFERRIN SERPL-MCNC: 320 MG/DL (ref 200–375)
TRIGL SERPL-MCNC: 266 MG/DL (ref 30–150)
URATE SERPL-MCNC: 6.4 MG/DL (ref 3.4–7)
WBC # BLD AUTO: 9.59 K/UL (ref 3.9–12.7)

## 2020-11-13 PROCEDURE — 83540 ASSAY OF IRON: CPT

## 2020-11-13 PROCEDURE — 82043 UR ALBUMIN QUANTITATIVE: CPT

## 2020-11-13 PROCEDURE — 80061 LIPID PANEL: CPT

## 2020-11-13 PROCEDURE — 36415 COLL VENOUS BLD VENIPUNCTURE: CPT

## 2020-11-13 PROCEDURE — 82728 ASSAY OF FERRITIN: CPT

## 2020-11-13 PROCEDURE — 84550 ASSAY OF BLOOD/URIC ACID: CPT

## 2020-11-13 PROCEDURE — 85025 COMPLETE CBC W/AUTO DIFF WBC: CPT

## 2020-11-13 PROCEDURE — 80053 COMPREHEN METABOLIC PANEL: CPT

## 2020-11-16 ENCOUNTER — PATIENT OUTREACH (OUTPATIENT)
Dept: OTHER | Facility: OTHER | Age: 50
End: 2020-11-16

## 2020-11-16 ENCOUNTER — TELEPHONE (OUTPATIENT)
Dept: INTERNAL MEDICINE | Facility: CLINIC | Age: 50
End: 2020-11-16

## 2020-11-16 NOTE — PROGRESS NOTES
Digital Medicine: Clinician Follow-Up    The history is provided by the patient.   Follow-up reason(s): medication change follow-up.     Hypertension    Readings are trending down   Diabetes    Patient's blood glucose is stable.   Patient is not experiencing signs/symptoms of hypotension.  Patient is not experiencing signs/symptoms of hypertension.  Patient is not experiencing signs/symptoms of hypoglycemia.  Patient is not experiencing signs/symptoms of hyperglycemia.    Additional Follow-up details: Patient is tolerating his higher carvedilol dose.    Patient did make medication change.    Is patient tolerating med change? yes            Last 5 Patient Entered Readings                                      Current 30 Day Average: 147/81     Recent Readings 11/13/2020 11/12/2020 11/12/2020 11/5/2020 10/31/2020    SBP (mmHg) 153 142 139 150 143    DBP (mmHg) 80 64 76 87 76    Pulse 60 55 66 63 57        Last 6 Patient Entered Readings                                          Most Recent A1c: 5.2% on 5/15/2020  (Goal: 7%)     Recent Readings 11/13/2020 11/12/2020 11/12/2020 11/7/2020 11/5/2020    Blood Glucose (mg/dL) 147 134 114 117 163               Depression Screening  Did not address depression screening.    Sleep Apnea Screening    Did not address sleep apnea screening.     Medication Affordability Screening  Did not address medication affordability screening.     Medication Adherence-Medication adherence was assessed.          ASSESSMENT(S)  Patient's A1C goal is less than or equal to 7. Patient's most recent A1C result is at goal. Lab Results    Component                Value               Date                     HGBA1C                   5.2                 05/15/2020          .     Patients BP average is 147/81 mmHg, which is above goal. Patient's BP goal is less than or equal to 130/80.     BP readings are trending down nicely.     Recent CMP is WNL.      Hypertension Plan  Continue current  therapy.    Diabetes Plan  Labs ordered. A1C Expected Lab Date: 11/24/2020  Continue current therapy.       Addressed patient questions and patient has my contact information if needed prior to next outreach. Patient verbalizes understanding.                 Topic    Hemoglobin A1C      There are no preventive care reminders to display for this patient.      Hypertension Medications             amLODIPine (NORVASC) 10 MG tablet Take 1 tablet by mouth once daily    carvediloL (COREG) 12.5 MG tablet Take 1 tablet (12.5 mg total) by mouth 2 (two) times daily with meals.    valsartan (DIOVAN) 320 MG tablet Take 1 tablet by mouth once daily        Diabetes Medications             insulin lispro (HUMALOG U-100 INSULIN) 100 unit/mL injection Use via sliding scale    metFORMIN (GLUCOPHAGE-XR) 500 MG 24 hr tablet TAKE 2 TABLETS BY MOUTH ONCE DAILY    semaglutide (OZEMPIC) 0.25 mg or 0.5 mg(2 mg/1.5 mL) PnIj Inject 0.5 mg into the skin once a week.

## 2020-11-16 NOTE — TELEPHONE ENCOUNTER
Spoke w/ pt, results given. Pt stated he has never seen a Hematologist, he was scheduled last year but did not keep the appt. Can you please place another referral for a Hematologist?

## 2020-11-18 DIAGNOSIS — D64.9 ANEMIA, UNSPECIFIED TYPE: Primary | ICD-10-CM

## 2020-11-24 ENCOUNTER — LAB VISIT (OUTPATIENT)
Dept: LAB | Facility: HOSPITAL | Age: 50
End: 2020-11-24
Attending: FAMILY MEDICINE
Payer: COMMERCIAL

## 2020-11-24 ENCOUNTER — OFFICE VISIT (OUTPATIENT)
Dept: INTERNAL MEDICINE | Facility: CLINIC | Age: 50
End: 2020-11-24
Payer: COMMERCIAL

## 2020-11-24 VITALS
SYSTOLIC BLOOD PRESSURE: 136 MMHG | DIASTOLIC BLOOD PRESSURE: 82 MMHG | OXYGEN SATURATION: 97 % | BODY MASS INDEX: 33.12 KG/M2 | HEIGHT: 72 IN | HEART RATE: 70 BPM | WEIGHT: 244.5 LBS | TEMPERATURE: 97 F

## 2020-11-24 DIAGNOSIS — K76.0 FATTY LIVER: ICD-10-CM

## 2020-11-24 DIAGNOSIS — E11.8 TYPE 2 DIABETES MELLITUS WITH COMPLICATION, WITHOUT LONG-TERM CURRENT USE OF INSULIN: ICD-10-CM

## 2020-11-24 DIAGNOSIS — E11.9 TYPE 2 DIABETES MELLITUS WITHOUT COMPLICATION, WITHOUT LONG-TERM CURRENT USE OF INSULIN: Primary | ICD-10-CM

## 2020-11-24 DIAGNOSIS — I10 HYPERTENSION, UNSPECIFIED TYPE: ICD-10-CM

## 2020-11-24 DIAGNOSIS — D64.9 ANEMIA, UNSPECIFIED TYPE: ICD-10-CM

## 2020-11-24 DIAGNOSIS — M1A.9XX0 CHRONIC GOUT WITHOUT TOPHUS, UNSPECIFIED CAUSE, UNSPECIFIED SITE: ICD-10-CM

## 2020-11-24 DIAGNOSIS — Z12.5 SCREENING FOR PROSTATE CANCER: ICD-10-CM

## 2020-11-24 PROCEDURE — 90471 IMMUNIZATION ADMIN: CPT | Mod: S$GLB,,, | Performed by: FAMILY MEDICINE

## 2020-11-24 PROCEDURE — 3075F SYST BP GE 130 - 139MM HG: CPT | Mod: CPTII,S$GLB,, | Performed by: FAMILY MEDICINE

## 2020-11-24 PROCEDURE — 36415 COLL VENOUS BLD VENIPUNCTURE: CPT

## 2020-11-24 PROCEDURE — 90686 IIV4 VACC NO PRSV 0.5 ML IM: CPT | Mod: S$GLB,,, | Performed by: FAMILY MEDICINE

## 2020-11-24 PROCEDURE — 90471 FLU VACCINE (QUAD) GREATER THAN OR EQUAL TO 3YO PRESERVATIVE FREE IM: ICD-10-PCS | Mod: S$GLB,,, | Performed by: FAMILY MEDICINE

## 2020-11-24 PROCEDURE — 3008F PR BODY MASS INDEX (BMI) DOCUMENTED: ICD-10-PCS | Mod: CPTII,S$GLB,, | Performed by: FAMILY MEDICINE

## 2020-11-24 PROCEDURE — 3008F BODY MASS INDEX DOCD: CPT | Mod: CPTII,S$GLB,, | Performed by: FAMILY MEDICINE

## 2020-11-24 PROCEDURE — 1126F PR PAIN SEVERITY QUANTIFIED, NO PAIN PRESENT: ICD-10-PCS | Mod: S$GLB,,, | Performed by: FAMILY MEDICINE

## 2020-11-24 PROCEDURE — 90686 FLU VACCINE (QUAD) GREATER THAN OR EQUAL TO 3YO PRESERVATIVE FREE IM: ICD-10-PCS | Mod: S$GLB,,, | Performed by: FAMILY MEDICINE

## 2020-11-24 PROCEDURE — 99214 OFFICE O/P EST MOD 30 MIN: CPT | Mod: 25,S$GLB,, | Performed by: FAMILY MEDICINE

## 2020-11-24 PROCEDURE — 3044F HG A1C LEVEL LT 7.0%: CPT | Mod: CPTII,S$GLB,, | Performed by: FAMILY MEDICINE

## 2020-11-24 PROCEDURE — 3075F PR MOST RECENT SYSTOLIC BLOOD PRESS GE 130-139MM HG: ICD-10-PCS | Mod: CPTII,S$GLB,, | Performed by: FAMILY MEDICINE

## 2020-11-24 PROCEDURE — 3079F DIAST BP 80-89 MM HG: CPT | Mod: CPTII,S$GLB,, | Performed by: FAMILY MEDICINE

## 2020-11-24 PROCEDURE — 99999 PR PBB SHADOW E&M-EST. PATIENT-LVL III: CPT | Mod: PBBFAC,,, | Performed by: FAMILY MEDICINE

## 2020-11-24 PROCEDURE — 83036 HEMOGLOBIN GLYCOSYLATED A1C: CPT

## 2020-11-24 PROCEDURE — 1126F AMNT PAIN NOTED NONE PRSNT: CPT | Mod: S$GLB,,, | Performed by: FAMILY MEDICINE

## 2020-11-24 PROCEDURE — 99214 PR OFFICE/OUTPT VISIT, EST, LEVL IV, 30-39 MIN: ICD-10-PCS | Mod: 25,S$GLB,, | Performed by: FAMILY MEDICINE

## 2020-11-24 PROCEDURE — 99999 PR PBB SHADOW E&M-EST. PATIENT-LVL III: ICD-10-PCS | Mod: PBBFAC,,, | Performed by: FAMILY MEDICINE

## 2020-11-24 PROCEDURE — 3044F PR MOST RECENT HEMOGLOBIN A1C LEVEL <7.0%: ICD-10-PCS | Mod: CPTII,S$GLB,, | Performed by: FAMILY MEDICINE

## 2020-11-24 PROCEDURE — 3079F PR MOST RECENT DIASTOLIC BLOOD PRESSURE 80-89 MM HG: ICD-10-PCS | Mod: CPTII,S$GLB,, | Performed by: FAMILY MEDICINE

## 2020-11-24 RX ORDER — METFORMIN HYDROCHLORIDE 500 MG/1
TABLET, EXTENDED RELEASE ORAL
Qty: 180 TABLET | Refills: 4
Start: 2020-11-24 | End: 2021-02-08 | Stop reason: SDUPTHER

## 2020-11-24 NOTE — PROGRESS NOTES
Subjective:       Patient ID: Guillermo Castillo is a male.    Chief Complaint: Multiple issues see below    HPI Type 2 diabetes: a1c pnd via digital med. Tolerating medicine. No hypoglycemia; not needing.2 metf in am and one in pm  Hypercholesterolemia:brenda crstor   Gout:  No c/o recent flares.   Hypertension: blood pressures at home nl.  Tolerating medicine.   Fatty liver nl . No etoh long time;   gerd controlled. No sympt    Mood fine on cymbalta    Anemia almost nl seeing dr oglesby        Past Medical History:   Diagnosis Date    Anemia 2020    dr oglesby    DM (diabetes mellitus)     Fatty liver     Gout     Hyperlipidemia     Hypertension     Hypertriglyceridemia     Mood disorder     Panic disorder     Sleep apnea     wears CPAP    Type 2 diabetes mellitus     05/2013 BS     Past Surgical History:   Procedure Laterality Date    APPENDECTOMY      COLONOSCOPY N/A 11/27/2019    Procedure: COLONOSCOPY;  Surgeon: Radha Anne MD;  Location: [facility];  Service: Endoscopy;  Laterality: N/A;    ESOPHAGOGASTRODUODENOSCOPY N/A 11/27/2019    Procedure: EGD (ESOPHAGOGASTRODUODENOSCOPY);  Surgeon: Radha Anne MD;  Location: [facility];  Service: Endoscopy;  Laterality: N/A;     Family History   Problem Relation Age of Onset    Coronary artery disease Father     Diabetes Father     Lung cancer Father     Diabetes Maternal Aunt     Diabetes Paternal Aunt     Hypertension Maternal Grandmother     Cataracts Maternal Grandmother     Hypertension Mother     Cataracts Mother      Social History     Socioeconomic History    Marital status: Single     Spouse name: Not on file    Number of children: Not on file    Years of education: Not on file    Highest education level: Not on file   Occupational History    Not on file   Social Needs    Financial resource strain: Not on file    Food insecurity     Worry: Not on file     Inability: Not on file    Transportation needs     Medical: Not on file      Non-medical: Not on file   Tobacco Use    Smoking status: Never Smoker    Smokeless tobacco: Never Used   Substance and Sexual Activity    Alcohol use: Not Currently     Comment: rarely    Drug use: No    Sexual activity: Yes     Partners: Female   Lifestyle    Physical activity     Days per week: Not on file     Minutes per session: Not on file    Stress: Not on file   Relationships    Social connections     Talks on phone: Not on file     Gets together: Not on file     Attends Restoration service: Not on file     Active member of club or organization: Not on file     Attends meetings of clubs or organizations: Not on file     Relationship status: Not on file   Other Topics Concern    Not on file   Social History Narrative    Not on file             Review of Systems   Respiratory: Negative for shortness of breath.    Cardiovascular: Negative for chest pain a      Objective:      Physical Exam   Constitutional: He is oriented to person, place, and time. He appears well-developed and well-nourished.   HENT:   Head: Normocephalic and atraumatic.   Eyes: Conjunctivae and EOM are normal. Pupils are equal, round, and reactive to light.   Neck: Normal range of motion. Neck supple. Carotid bruit is not present. no mass  Cardiovascular: Normal rate and regular rhythm.    Pulmonary/Chest: Effort normal and breath sounds normal.         Neurological: He is alert and oriented to person, place, and time.   Skin: Skin is warm and dry.   Psychiatric: He has a normal mood and affect. His behavior is normal. Judgment and thought content normal.               .        Assessment:       1. Type 2 diabetes mellitus    2. Hypertension    3. Gout    4. Hypercholesteremia    5. Fatty liver      Anemia  Mood d/o  Plan:     Lab and follow up 6 months  eunice  Dr oglesby as sched    A1c today(via digital dm pharm D)    Type 2 diabetes mellitus without complication, without long-term current use of insulin  -     Hemoglobin A1C; Future;  Expected date: 05/23/2021  -     Vitamin B12; Future; Expected date: 05/24/2021(on metformin)      Hypertension, unspecified type    Fatty liver    Chronic gout without tophus, unspecified cause, unspecified site  -     Uric Acid; Future; Expected date: 05/23/2021    Screening for prostate cancer  -     PSA, Screening; Future; Expected date: 05/23/2021    Anemia, unspecified type  -     CBC Auto Differential; Future; Expected date: 05/23/2021  -     Ferritin; Future; Expected date: 05/24/2021  -     Iron and TIBC; Future; Expected date: 05/24/2021    Other orders  -     Influenza - Quadrivalent (PF)

## 2020-11-25 LAB
ESTIMATED AVG GLUCOSE: 123 MG/DL (ref 68–131)
HBA1C MFR BLD HPLC: 5.9 % (ref 4–5.6)

## 2020-12-01 ENCOUNTER — OFFICE VISIT (OUTPATIENT)
Dept: HEMATOLOGY/ONCOLOGY | Facility: CLINIC | Age: 50
End: 2020-12-01
Payer: COMMERCIAL

## 2020-12-01 VITALS
TEMPERATURE: 98 F | SYSTOLIC BLOOD PRESSURE: 139 MMHG | OXYGEN SATURATION: 98 % | HEIGHT: 72 IN | BODY MASS INDEX: 33.12 KG/M2 | DIASTOLIC BLOOD PRESSURE: 84 MMHG | WEIGHT: 244.5 LBS | HEART RATE: 62 BPM

## 2020-12-01 DIAGNOSIS — D64.9 ANEMIA, UNSPECIFIED TYPE: ICD-10-CM

## 2020-12-01 PROCEDURE — 99999 PR PBB SHADOW E&M-EST. PATIENT-LVL V: CPT | Mod: PBBFAC,,, | Performed by: INTERNAL MEDICINE

## 2020-12-01 PROCEDURE — 1126F AMNT PAIN NOTED NONE PRSNT: CPT | Mod: S$GLB,,, | Performed by: INTERNAL MEDICINE

## 2020-12-01 PROCEDURE — 99213 PR OFFICE/OUTPT VISIT, EST, LEVL III, 20-29 MIN: ICD-10-PCS | Mod: S$GLB,,, | Performed by: INTERNAL MEDICINE

## 2020-12-01 PROCEDURE — 99999 PR PBB SHADOW E&M-EST. PATIENT-LVL V: ICD-10-PCS | Mod: PBBFAC,,, | Performed by: INTERNAL MEDICINE

## 2020-12-01 PROCEDURE — 1126F PR PAIN SEVERITY QUANTIFIED, NO PAIN PRESENT: ICD-10-PCS | Mod: S$GLB,,, | Performed by: INTERNAL MEDICINE

## 2020-12-01 PROCEDURE — 99213 OFFICE O/P EST LOW 20 MIN: CPT | Mod: S$GLB,,, | Performed by: INTERNAL MEDICINE

## 2020-12-01 PROCEDURE — 3008F BODY MASS INDEX DOCD: CPT | Mod: CPTII,S$GLB,, | Performed by: INTERNAL MEDICINE

## 2020-12-01 PROCEDURE — 3008F PR BODY MASS INDEX (BMI) DOCUMENTED: ICD-10-PCS | Mod: CPTII,S$GLB,, | Performed by: INTERNAL MEDICINE

## 2020-12-01 PROCEDURE — 3075F PR MOST RECENT SYSTOLIC BLOOD PRESS GE 130-139MM HG: ICD-10-PCS | Mod: CPTII,S$GLB,, | Performed by: INTERNAL MEDICINE

## 2020-12-01 PROCEDURE — 3079F DIAST BP 80-89 MM HG: CPT | Mod: CPTII,S$GLB,, | Performed by: INTERNAL MEDICINE

## 2020-12-01 PROCEDURE — 3079F PR MOST RECENT DIASTOLIC BLOOD PRESSURE 80-89 MM HG: ICD-10-PCS | Mod: CPTII,S$GLB,, | Performed by: INTERNAL MEDICINE

## 2020-12-01 PROCEDURE — 3075F SYST BP GE 130 - 139MM HG: CPT | Mod: CPTII,S$GLB,, | Performed by: INTERNAL MEDICINE

## 2020-12-01 RX ORDER — IRON,CARBONYL/ASCORBIC ACID 100-250 MG
1 TABLET ORAL DAILY
Qty: 90 TABLET | Refills: 3 | COMMUNITY
Start: 2020-12-01 | End: 2020-12-03 | Stop reason: SDUPTHER

## 2020-12-01 NOTE — PROGRESS NOTES
BB  Subjective:      DATE OF VISIT: 20     ?  Patient ID:?Guillermo Castillo is a 50 y.o. male.?? MR#: [id number]   ?   REFERRING PROVIDER: Kip Siddiqui MD  [id number] [facility] [place] [address]     ? Primary Care Providers:  Kip Siddiqui MD, MD (General)     CHIEF COMPLAINT: ?  Normocytic anemia??   ?   HPI    I had the pleasure seeing again in follow-up Mr. Castillo a pleasant 50 y.o. man followed for anemia.  Prior workup has included EGD/colonoscopy for mild low iron indices which was unrevealing.  Repeat labs show resolution of anemia.  He did briefly take oral iron supplement recommended.  He has not recently been using oral iron.  He does not have fever, chills, night sweats or unintentional weight loss.  No history of recurrent infections/hospitalizations.  No bleeding or bruising issues.    Review of Systems    ?   A comprehensive 14-point review of systems was reviewed with patient and was negative other than as specified above.   ?     Objective:      Physical Exam      ?   Vitals:    20 1412   BP: 139/84   Pulse: 62   Temp: 97.7 °F (36.5 °C)      ?   ECO  General appearance: Generally well appearing, in no acute distress.   Head, eyes, ears, nose, and throat: Pupils round and equally reactive to light. Oropharynx clear with moist mucous membranes.   Lymph: No palpable cervical or supraclavicular lymphadenopathy.   Cardiovascular: Regular rate and rhythm, S1, S2, no audible murmurs.   Respiratory: Lungs clear to auscultation bilaterally.   Abdomen: Bowel sounds present, soft, nontender, nondistended. No palpable hepatosplenomegaly.   Extremities: Warm, without edema.   Neurologic: Alert and oriented. Grossly normal strength, coordination, and gait.   Skin: No rashes, ecchymoses or petechial lesion.   ?      ?   Laboratory:  Lab Results   Component Value Date    WBC 9.59 2020    HGB 12.4 (L) 2020    HCT 38.3 (L) 2020    MCV 84 2020     2020       Lab  Results   Component Value Date    IRON 78 11/13/2020    TIBC 474 (H) 11/13/2020    FERRITIN 72 11/13/2020     Hemoglobin   Date Value Ref Range Status   11/13/2020 12.4 (L) 14.0 - 18.0 g/dL Final   05/15/2020 12.6 (L) 14.0 - 18.0 g/dL Final   11/20/2019 13.5 (L) 14.0 - 18.0 g/dL Final   11/12/2019 13.7 (L) 14.0 - 18.0 g/dL Final   10/09/2019 9.1 (L) 14.0 - 18.0 g/dL Final   10/08/2019 8.7 (L) 14.0 - 18.0 g/dL Final   09/30/2019 9.9 (L) 14.0 - 18.0 g/dL Final   09/28/2019 10.2 (L) 14.0 - 18.0 g/dL Final   01/28/2019 14.5 14.0 - 18.0 g/dL Final   03/16/2018 13.4 (L) 14.0 - 18.0 g/dL Final       ?   Assessment/Plan:       1. Anemia, unspecified type          Plan:     # Normocytic anemia:  Last seen 1 year ago with hemoglobin 9.1, normocytic.  Resolution of prior baseline hemoglobin 13 and subsequent mild downtrending hemoglobin most recently 12.4.  He did have brief oral iron use as recommended given mild low normal iron indices but has not continue this.  Most recent iron indices with downtrending ferritin and low iron saturation.  Recommended trial of oral iron.  Prior workup does include EGD/colonoscopy October 2019 which was unrevealing for bleeding source and no current clinical evidence of bleeding.  Recommended trial back on oral iron therapy and prescribed Icar daily.  Short interval follow-up after oral iron trial and if worsening anemia further workup may be warranted.      Follow-Up:   Patient Instructions   RV in 4 months with repeat labs 1 week prior

## 2020-12-01 NOTE — LETTER
December 1, 2020      Kip Siddiqui MD  [address]  [place] [address]           [facility]  [address] [facility] [address]  Phone: [phone]  Fax: [phone]          Patient: Guillermo Castillo   MR Number: [id number]   YOB: 1970   Date of Visit: 12/1/2020       Dear Dr. Kip Siddiqui:    Thank you for referring Guillermo Castillo to me for evaluation. Attached you will find relevant portions of my assessment and plan of care.    If you have questions, please do not hesitate to call me. I look forward to following Guillermo Castillo along with you.    Sincerely,    Елена Park MD    Enclosure  CC:  No Recipients    If you would like to receive this communication electronically, please contact [email or web address] or [phone] to request more information on [employer] Link access.    For providers and/or their staff who would like to refer a patient to [facility], please contact us through our one-stop-shop provider referral line, [facility], at [phone].    If you feel you have received this communication in error or would no longer like to receive these types of communications, please e-mail [email or web address]

## 2020-12-03 ENCOUNTER — TELEPHONE (OUTPATIENT)
Dept: INTERNAL MEDICINE | Facility: CLINIC | Age: 50
End: 2020-12-03

## 2020-12-03 DIAGNOSIS — D64.9 ANEMIA, UNSPECIFIED TYPE: ICD-10-CM

## 2020-12-04 RX ORDER — IRON,CARBONYL/ASCORBIC ACID 100-250 MG
1 TABLET ORAL DAILY
Qty: 90 TABLET | Refills: 3 | COMMUNITY
Start: 2020-12-04 | End: 2020-12-10 | Stop reason: SDUPTHER

## 2020-12-07 ENCOUNTER — PATIENT OUTREACH (OUTPATIENT)
Dept: OTHER | Facility: OTHER | Age: 50
End: 2020-12-07

## 2020-12-07 ENCOUNTER — PATIENT MESSAGE (OUTPATIENT)
Dept: HEMATOLOGY/ONCOLOGY | Facility: CLINIC | Age: 50
End: 2020-12-07

## 2020-12-07 DIAGNOSIS — E11.9 TYPE 2 DIABETES MELLITUS WITHOUT COMPLICATION, WITHOUT LONG-TERM CURRENT USE OF INSULIN: Primary | ICD-10-CM

## 2020-12-07 DIAGNOSIS — D64.9 ANEMIA, UNSPECIFIED TYPE: ICD-10-CM

## 2020-12-10 RX ORDER — IRON,CARBONYL/ASCORBIC ACID 100-250 MG
1 TABLET ORAL DAILY
Qty: 90 TABLET | Refills: 3 | COMMUNITY
Start: 2020-12-10 | End: 2020-12-14 | Stop reason: SDUPTHER

## 2020-12-11 ENCOUNTER — PATIENT MESSAGE (OUTPATIENT)
Dept: HEMATOLOGY/ONCOLOGY | Facility: CLINIC | Age: 50
End: 2020-12-11

## 2020-12-11 DIAGNOSIS — D64.9 ANEMIA, UNSPECIFIED TYPE: ICD-10-CM

## 2020-12-14 ENCOUNTER — TELEPHONE (OUTPATIENT)
Dept: HEMATOLOGY/ONCOLOGY | Facility: CLINIC | Age: 50
End: 2020-12-14

## 2020-12-14 RX ORDER — IRON,CARBONYL/ASCORBIC ACID 100-250 MG
1 TABLET ORAL DAILY
Qty: 90 TABLET | Refills: 3 | COMMUNITY
Start: 2020-12-14 | End: 2021-04-12 | Stop reason: SDUPTHER

## 2020-12-14 NOTE — TELEPHONE ENCOUNTER
----- Message from Nat Henley sent at 12/14/2020  1:55 PM CST -----  Regarding: Viktoria  Would like to consult with nurse regarding pt iron medication, please give a call back at [phone].

## 2020-12-15 ENCOUNTER — PATIENT OUTREACH (OUTPATIENT)
Dept: OTHER | Facility: OTHER | Age: 50
End: 2020-12-15

## 2021-01-17 ENCOUNTER — PATIENT MESSAGE (OUTPATIENT)
Dept: INTERNAL MEDICINE | Facility: CLINIC | Age: 51
End: 2021-01-17

## 2021-01-19 DIAGNOSIS — E11.9 TYPE 2 DIABETES MELLITUS WITHOUT COMPLICATION, WITHOUT LONG-TERM CURRENT USE OF INSULIN: ICD-10-CM

## 2021-01-20 ENCOUNTER — TELEPHONE (OUTPATIENT)
Dept: INTERNAL MEDICINE | Facility: CLINIC | Age: 51
End: 2021-01-20

## 2021-01-26 ENCOUNTER — PATIENT MESSAGE (OUTPATIENT)
Dept: INTERNAL MEDICINE | Facility: CLINIC | Age: 51
End: 2021-01-26

## 2021-02-08 DIAGNOSIS — E11.9 TYPE 2 DIABETES MELLITUS WITHOUT COMPLICATION, WITHOUT LONG-TERM CURRENT USE OF INSULIN: ICD-10-CM

## 2021-02-08 RX ORDER — METFORMIN HYDROCHLORIDE 500 MG/1
TABLET, EXTENDED RELEASE ORAL
Qty: 180 TABLET | Refills: 4
Start: 2021-02-08 | End: 2021-04-23 | Stop reason: SDUPTHER

## 2021-03-24 ENCOUNTER — OFFICE VISIT (OUTPATIENT)
Dept: SLEEP MEDICINE | Facility: CLINIC | Age: 51
End: 2021-03-24
Payer: COMMERCIAL

## 2021-03-24 VITALS — BODY MASS INDEX: 33.05 KG/M2 | HEIGHT: 72 IN | WEIGHT: 244 LBS

## 2021-03-24 DIAGNOSIS — G47.26 SHIFTING SLEEP-WORK SCHEDULE: Primary | ICD-10-CM

## 2021-03-24 DIAGNOSIS — E66.9 OBESITY (BMI 30.0-34.9): ICD-10-CM

## 2021-03-24 DIAGNOSIS — G47.33 OSA (OBSTRUCTIVE SLEEP APNEA): ICD-10-CM

## 2021-03-24 PROCEDURE — 3008F BODY MASS INDEX DOCD: CPT | Mod: CPTII,,, | Performed by: NURSE PRACTITIONER

## 2021-03-24 PROCEDURE — 99213 OFFICE O/P EST LOW 20 MIN: CPT | Mod: 95,,, | Performed by: NURSE PRACTITIONER

## 2021-03-24 PROCEDURE — 3008F PR BODY MASS INDEX (BMI) DOCUMENTED: ICD-10-PCS | Mod: CPTII,,, | Performed by: NURSE PRACTITIONER

## 2021-03-24 PROCEDURE — 99213 PR OFFICE/OUTPT VISIT, EST, LEVL III, 20-29 MIN: ICD-10-PCS | Mod: 95,,, | Performed by: NURSE PRACTITIONER

## 2021-04-05 ENCOUNTER — LAB VISIT (OUTPATIENT)
Dept: LAB | Facility: HOSPITAL | Age: 51
End: 2021-04-05
Attending: INTERNAL MEDICINE
Payer: COMMERCIAL

## 2021-04-05 DIAGNOSIS — D64.9 ANEMIA, UNSPECIFIED TYPE: ICD-10-CM

## 2021-04-05 LAB
BASOPHILS # BLD AUTO: 0.05 K/UL (ref 0–0.2)
BASOPHILS NFR BLD: 0.7 % (ref 0–1.9)
DIFFERENTIAL METHOD: ABNORMAL
EOSINOPHIL # BLD AUTO: 0.3 K/UL (ref 0–0.5)
EOSINOPHIL NFR BLD: 4.3 % (ref 0–8)
ERYTHROCYTE [DISTWIDTH] IN BLOOD BY AUTOMATED COUNT: 13.9 % (ref 11.5–14.5)
FERRITIN SERPL-MCNC: 133 NG/ML (ref 20–300)
HCT VFR BLD AUTO: 38.5 % (ref 40–54)
HGB BLD-MCNC: 13 G/DL (ref 14–18)
IMM GRANULOCYTES # BLD AUTO: 0.05 K/UL (ref 0–0.04)
IMM GRANULOCYTES NFR BLD AUTO: 0.7 % (ref 0–0.5)
IRON SERPL-MCNC: 61 UG/DL (ref 45–160)
LYMPHOCYTES # BLD AUTO: 2.2 K/UL (ref 1–4.8)
LYMPHOCYTES NFR BLD: 31.8 % (ref 18–48)
MCH RBC QN AUTO: 28.4 PG (ref 27–31)
MCHC RBC AUTO-ENTMCNC: 33.8 G/DL (ref 32–36)
MCV RBC AUTO: 84 FL (ref 82–98)
MONOCYTES # BLD AUTO: 0.6 K/UL (ref 0.3–1)
MONOCYTES NFR BLD: 9.1 % (ref 4–15)
NEUTROPHILS # BLD AUTO: 3.7 K/UL (ref 1.8–7.7)
NEUTROPHILS NFR BLD: 53.4 % (ref 38–73)
NRBC BLD-RTO: 0 /100 WBC
PLATELET # BLD AUTO: 257 K/UL (ref 150–450)
PMV BLD AUTO: 9 FL (ref 9.2–12.9)
RBC # BLD AUTO: 4.58 M/UL (ref 4.6–6.2)
SATURATED IRON: 14 % (ref 20–50)
TOTAL IRON BINDING CAPACITY: 451 UG/DL (ref 250–450)
TRANSFERRIN SERPL-MCNC: 305 MG/DL (ref 200–375)
WBC # BLD AUTO: 6.96 K/UL (ref 3.9–12.7)

## 2021-04-05 PROCEDURE — 82728 ASSAY OF FERRITIN: CPT | Performed by: INTERNAL MEDICINE

## 2021-04-05 PROCEDURE — 85025 COMPLETE CBC W/AUTO DIFF WBC: CPT | Performed by: INTERNAL MEDICINE

## 2021-04-05 PROCEDURE — 83540 ASSAY OF IRON: CPT | Performed by: INTERNAL MEDICINE

## 2021-04-05 PROCEDURE — 36415 COLL VENOUS BLD VENIPUNCTURE: CPT | Performed by: INTERNAL MEDICINE

## 2021-04-12 ENCOUNTER — OFFICE VISIT (OUTPATIENT)
Dept: HEMATOLOGY/ONCOLOGY | Facility: CLINIC | Age: 51
End: 2021-04-12
Payer: COMMERCIAL

## 2021-04-12 VITALS
HEIGHT: 72 IN | BODY MASS INDEX: 33.39 KG/M2 | HEART RATE: 62 BPM | WEIGHT: 246.5 LBS | DIASTOLIC BLOOD PRESSURE: 75 MMHG | OXYGEN SATURATION: 98 % | TEMPERATURE: 98 F | RESPIRATION RATE: 18 BRPM | SYSTOLIC BLOOD PRESSURE: 119 MMHG

## 2021-04-12 DIAGNOSIS — D64.9 ANEMIA, UNSPECIFIED TYPE: Primary | ICD-10-CM

## 2021-04-12 DIAGNOSIS — D50.9 IRON DEFICIENCY ANEMIA, UNSPECIFIED IRON DEFICIENCY ANEMIA TYPE: ICD-10-CM

## 2021-04-12 PROCEDURE — 99213 PR OFFICE/OUTPT VISIT, EST, LEVL III, 20-29 MIN: ICD-10-PCS | Mod: S$GLB,,, | Performed by: INTERNAL MEDICINE

## 2021-04-12 PROCEDURE — 3008F BODY MASS INDEX DOCD: CPT | Mod: CPTII,S$GLB,, | Performed by: INTERNAL MEDICINE

## 2021-04-12 PROCEDURE — 99999 PR PBB SHADOW E&M-EST. PATIENT-LVL V: CPT | Mod: PBBFAC,,, | Performed by: INTERNAL MEDICINE

## 2021-04-12 PROCEDURE — 1126F AMNT PAIN NOTED NONE PRSNT: CPT | Mod: S$GLB,,, | Performed by: INTERNAL MEDICINE

## 2021-04-12 PROCEDURE — 99999 PR PBB SHADOW E&M-EST. PATIENT-LVL V: ICD-10-PCS | Mod: PBBFAC,,, | Performed by: INTERNAL MEDICINE

## 2021-04-12 PROCEDURE — 99213 OFFICE O/P EST LOW 20 MIN: CPT | Mod: S$GLB,,, | Performed by: INTERNAL MEDICINE

## 2021-04-12 PROCEDURE — 1126F PR PAIN SEVERITY QUANTIFIED, NO PAIN PRESENT: ICD-10-PCS | Mod: S$GLB,,, | Performed by: INTERNAL MEDICINE

## 2021-04-12 PROCEDURE — 3008F PR BODY MASS INDEX (BMI) DOCUMENTED: ICD-10-PCS | Mod: CPTII,S$GLB,, | Performed by: INTERNAL MEDICINE

## 2021-04-12 RX ORDER — IRON,CARBONYL/ASCORBIC ACID 100-250 MG
1 TABLET ORAL DAILY
Qty: 90 TABLET | Refills: 3 | COMMUNITY
Start: 2021-04-12 | End: 2021-07-28 | Stop reason: SDUPTHER

## 2021-04-23 DIAGNOSIS — E11.9 TYPE 2 DIABETES MELLITUS WITHOUT COMPLICATION, WITHOUT LONG-TERM CURRENT USE OF INSULIN: ICD-10-CM

## 2021-04-23 RX ORDER — METFORMIN HYDROCHLORIDE 500 MG/1
TABLET, EXTENDED RELEASE ORAL
Qty: 180 TABLET | Refills: 4
Start: 2021-04-23 | End: 2021-04-30

## 2021-04-30 ENCOUNTER — PATIENT MESSAGE (OUTPATIENT)
Dept: INTERNAL MEDICINE | Facility: CLINIC | Age: 51
End: 2021-04-30

## 2021-05-10 ENCOUNTER — LAB VISIT (OUTPATIENT)
Dept: LAB | Facility: HOSPITAL | Age: 51
End: 2021-05-10
Payer: COMMERCIAL

## 2021-05-10 DIAGNOSIS — E11.9 TYPE 2 DIABETES MELLITUS WITHOUT COMPLICATION, WITHOUT LONG-TERM CURRENT USE OF INSULIN: ICD-10-CM

## 2021-05-10 DIAGNOSIS — D64.9 ANEMIA, UNSPECIFIED TYPE: ICD-10-CM

## 2021-05-10 DIAGNOSIS — Z12.5 SCREENING FOR PROSTATE CANCER: ICD-10-CM

## 2021-05-10 DIAGNOSIS — M1A.9XX0 CHRONIC GOUT WITHOUT TOPHUS, UNSPECIFIED CAUSE, UNSPECIFIED SITE: ICD-10-CM

## 2021-05-10 LAB
BASOPHILS # BLD AUTO: 0.03 K/UL (ref 0–0.2)
BASOPHILS NFR BLD: 0.4 % (ref 0–1.9)
COMPLEXED PSA SERPL-MCNC: 1.3 NG/ML (ref 0–4)
DIFFERENTIAL METHOD: ABNORMAL
EOSINOPHIL # BLD AUTO: 0.3 K/UL (ref 0–0.5)
EOSINOPHIL NFR BLD: 4.3 % (ref 0–8)
ERYTHROCYTE [DISTWIDTH] IN BLOOD BY AUTOMATED COUNT: 13.7 % (ref 11.5–14.5)
ESTIMATED AVG GLUCOSE: 108 MG/DL (ref 68–131)
HBA1C MFR BLD: 5.4 % (ref 4–5.6)
HCT VFR BLD AUTO: 37.2 % (ref 40–54)
HGB BLD-MCNC: 12.4 G/DL (ref 14–18)
IMM GRANULOCYTES # BLD AUTO: 0.06 K/UL (ref 0–0.04)
IMM GRANULOCYTES NFR BLD AUTO: 0.9 % (ref 0–0.5)
LYMPHOCYTES # BLD AUTO: 1.9 K/UL (ref 1–4.8)
LYMPHOCYTES NFR BLD: 26.9 % (ref 18–48)
MCH RBC QN AUTO: 28.5 PG (ref 27–31)
MCHC RBC AUTO-ENTMCNC: 33.3 G/DL (ref 32–36)
MCV RBC AUTO: 86 FL (ref 82–98)
MONOCYTES # BLD AUTO: 0.6 K/UL (ref 0.3–1)
MONOCYTES NFR BLD: 8.1 % (ref 4–15)
NEUTROPHILS # BLD AUTO: 4.2 K/UL (ref 1.8–7.7)
NEUTROPHILS NFR BLD: 59.4 % (ref 38–73)
NRBC BLD-RTO: 0 /100 WBC
PLATELET # BLD AUTO: 161 K/UL (ref 150–450)
PMV BLD AUTO: 10.6 FL (ref 9.2–12.9)
RBC # BLD AUTO: 4.35 M/UL (ref 4.6–6.2)
VIT B12 SERPL-MCNC: 288 PG/ML (ref 210–950)
WBC # BLD AUTO: 7 K/UL (ref 3.9–12.7)

## 2021-05-10 PROCEDURE — 36415 COLL VENOUS BLD VENIPUNCTURE: CPT | Performed by: FAMILY MEDICINE

## 2021-05-10 PROCEDURE — 84550 ASSAY OF BLOOD/URIC ACID: CPT | Performed by: FAMILY MEDICINE

## 2021-05-10 PROCEDURE — 84153 ASSAY OF PSA TOTAL: CPT | Performed by: FAMILY MEDICINE

## 2021-05-10 PROCEDURE — 82728 ASSAY OF FERRITIN: CPT | Performed by: FAMILY MEDICINE

## 2021-05-10 PROCEDURE — 82607 VITAMIN B-12: CPT | Performed by: FAMILY MEDICINE

## 2021-05-10 PROCEDURE — 85025 COMPLETE CBC W/AUTO DIFF WBC: CPT | Performed by: FAMILY MEDICINE

## 2021-05-10 PROCEDURE — 83540 ASSAY OF IRON: CPT | Performed by: FAMILY MEDICINE

## 2021-05-10 PROCEDURE — 83036 HEMOGLOBIN GLYCOSYLATED A1C: CPT | Performed by: FAMILY MEDICINE

## 2021-05-11 LAB
FERRITIN SERPL-MCNC: 97 NG/ML (ref 20–300)
IRON SERPL-MCNC: 55 UG/DL (ref 45–160)
SATURATED IRON: 13 % (ref 20–50)
TOTAL IRON BINDING CAPACITY: 434 UG/DL (ref 250–450)
TRANSFERRIN SERPL-MCNC: 293 MG/DL (ref 200–375)
URATE SERPL-MCNC: 7.4 MG/DL (ref 3.4–7)

## 2021-05-13 ENCOUNTER — OFFICE VISIT (OUTPATIENT)
Dept: INTERNAL MEDICINE | Facility: CLINIC | Age: 51
End: 2021-05-13
Payer: COMMERCIAL

## 2021-05-13 ENCOUNTER — PATIENT MESSAGE (OUTPATIENT)
Dept: INTERNAL MEDICINE | Facility: CLINIC | Age: 51
End: 2021-05-13

## 2021-05-13 VITALS
HEIGHT: 72 IN | WEIGHT: 243.63 LBS | SYSTOLIC BLOOD PRESSURE: 130 MMHG | HEART RATE: 72 BPM | DIASTOLIC BLOOD PRESSURE: 86 MMHG | TEMPERATURE: 98 F | OXYGEN SATURATION: 98 % | BODY MASS INDEX: 33 KG/M2

## 2021-05-13 DIAGNOSIS — F39 MOOD DISORDER: ICD-10-CM

## 2021-05-13 DIAGNOSIS — I10 ESSENTIAL HYPERTENSION: Primary | Chronic | ICD-10-CM

## 2021-05-13 DIAGNOSIS — D64.9 ANEMIA, UNSPECIFIED TYPE: ICD-10-CM

## 2021-05-13 DIAGNOSIS — K21.9 GASTROESOPHAGEAL REFLUX DISEASE, UNSPECIFIED WHETHER ESOPHAGITIS PRESENT: ICD-10-CM

## 2021-05-13 DIAGNOSIS — G47.33 OSA (OBSTRUCTIVE SLEEP APNEA): Chronic | ICD-10-CM

## 2021-05-13 DIAGNOSIS — E78.1 HYPERTRIGLYCERIDEMIA: ICD-10-CM

## 2021-05-13 DIAGNOSIS — E66.9 OBESITY (BMI 30.0-34.9): ICD-10-CM

## 2021-05-13 DIAGNOSIS — E11.9 TYPE 2 DIABETES MELLITUS WITHOUT COMPLICATION, WITHOUT LONG-TERM CURRENT USE OF INSULIN: ICD-10-CM

## 2021-05-13 DIAGNOSIS — E78.5 HYPERLIPIDEMIA, UNSPECIFIED HYPERLIPIDEMIA TYPE: ICD-10-CM

## 2021-05-13 DIAGNOSIS — K76.0 FATTY LIVER: ICD-10-CM

## 2021-05-13 DIAGNOSIS — M1A.9XX0 CHRONIC GOUT WITHOUT TOPHUS, UNSPECIFIED CAUSE, UNSPECIFIED SITE: ICD-10-CM

## 2021-05-13 PROCEDURE — 3044F PR MOST RECENT HEMOGLOBIN A1C LEVEL <7.0%: ICD-10-PCS | Mod: CPTII,S$GLB,, | Performed by: PHYSICIAN ASSISTANT

## 2021-05-13 PROCEDURE — 99214 OFFICE O/P EST MOD 30 MIN: CPT | Mod: S$GLB,,, | Performed by: PHYSICIAN ASSISTANT

## 2021-05-13 PROCEDURE — 3079F DIAST BP 80-89 MM HG: CPT | Mod: CPTII,S$GLB,, | Performed by: PHYSICIAN ASSISTANT

## 2021-05-13 PROCEDURE — 3044F HG A1C LEVEL LT 7.0%: CPT | Mod: CPTII,S$GLB,, | Performed by: PHYSICIAN ASSISTANT

## 2021-05-13 PROCEDURE — 99999 PR PBB SHADOW E&M-EST. PATIENT-LVL IV: ICD-10-PCS | Mod: PBBFAC,,, | Performed by: PHYSICIAN ASSISTANT

## 2021-05-13 PROCEDURE — 3008F BODY MASS INDEX DOCD: CPT | Mod: CPTII,S$GLB,, | Performed by: PHYSICIAN ASSISTANT

## 2021-05-13 PROCEDURE — 3079F PR MOST RECENT DIASTOLIC BLOOD PRESSURE 80-89 MM HG: ICD-10-PCS | Mod: CPTII,S$GLB,, | Performed by: PHYSICIAN ASSISTANT

## 2021-05-13 PROCEDURE — 1126F AMNT PAIN NOTED NONE PRSNT: CPT | Mod: S$GLB,,, | Performed by: PHYSICIAN ASSISTANT

## 2021-05-13 PROCEDURE — 3008F PR BODY MASS INDEX (BMI) DOCUMENTED: ICD-10-PCS | Mod: CPTII,S$GLB,, | Performed by: PHYSICIAN ASSISTANT

## 2021-05-13 PROCEDURE — 3075F PR MOST RECENT SYSTOLIC BLOOD PRESS GE 130-139MM HG: ICD-10-PCS | Mod: CPTII,S$GLB,, | Performed by: PHYSICIAN ASSISTANT

## 2021-05-13 PROCEDURE — 3075F SYST BP GE 130 - 139MM HG: CPT | Mod: CPTII,S$GLB,, | Performed by: PHYSICIAN ASSISTANT

## 2021-05-13 PROCEDURE — 1126F PR PAIN SEVERITY QUANTIFIED, NO PAIN PRESENT: ICD-10-PCS | Mod: S$GLB,,, | Performed by: PHYSICIAN ASSISTANT

## 2021-05-13 PROCEDURE — 99999 PR PBB SHADOW E&M-EST. PATIENT-LVL IV: CPT | Mod: PBBFAC,,, | Performed by: PHYSICIAN ASSISTANT

## 2021-05-13 PROCEDURE — 99214 PR OFFICE/OUTPT VISIT, EST, LEVL IV, 30-39 MIN: ICD-10-PCS | Mod: S$GLB,,, | Performed by: PHYSICIAN ASSISTANT

## 2021-05-13 RX ORDER — OMEPRAZOLE 40 MG/1
40 CAPSULE, DELAYED RELEASE ORAL DAILY PRN
Qty: 90 CAPSULE | Refills: 3 | Status: SHIPPED | OUTPATIENT
Start: 2021-05-13 | End: 2022-08-22 | Stop reason: SDUPTHER

## 2021-05-13 RX ORDER — SEMAGLUTIDE 1.34 MG/ML
1 INJECTION, SOLUTION SUBCUTANEOUS
Qty: 6 PEN | Refills: 1 | Status: SHIPPED | OUTPATIENT
Start: 2021-05-13 | End: 2021-06-23

## 2021-05-19 DIAGNOSIS — I10 HYPERTENSION, UNSPECIFIED TYPE: Chronic | ICD-10-CM

## 2021-05-21 ENCOUNTER — IMMUNIZATION (OUTPATIENT)
Dept: INTERNAL MEDICINE | Facility: CLINIC | Age: 51
End: 2021-05-21
Payer: COMMERCIAL

## 2021-05-21 DIAGNOSIS — Z23 NEED FOR VACCINATION: Primary | ICD-10-CM

## 2021-05-21 PROCEDURE — 91300 COVID-19, MRNA, LNP-S, PF, 30 MCG/0.3 ML DOSE VACCINE: CPT | Mod: PBBFAC | Performed by: FAMILY MEDICINE

## 2021-05-21 RX ORDER — CARVEDILOL 25 MG/1
25 TABLET ORAL 2 TIMES DAILY WITH MEALS
Qty: 180 TABLET | Refills: 1 | Status: SHIPPED | OUTPATIENT
Start: 2021-05-21 | End: 2021-12-28 | Stop reason: SDUPTHER

## 2021-06-11 ENCOUNTER — IMMUNIZATION (OUTPATIENT)
Dept: INTERNAL MEDICINE | Facility: CLINIC | Age: 51
End: 2021-06-11
Payer: COMMERCIAL

## 2021-06-11 DIAGNOSIS — Z23 NEED FOR VACCINATION: Primary | ICD-10-CM

## 2021-06-11 PROCEDURE — 91300 COVID-19, MRNA, LNP-S, PF, 30 MCG/0.3 ML DOSE VACCINE: CPT | Mod: PBBFAC | Performed by: FAMILY MEDICINE

## 2021-06-11 PROCEDURE — 0002A COVID-19, MRNA, LNP-S, PF, 30 MCG/0.3 ML DOSE VACCINE: CPT | Mod: PBBFAC | Performed by: FAMILY MEDICINE

## 2021-06-24 ENCOUNTER — TELEPHONE (OUTPATIENT)
Dept: PHARMACY | Facility: CLINIC | Age: 51
End: 2021-06-24

## 2021-07-27 DIAGNOSIS — I10 ESSENTIAL HYPERTENSION: ICD-10-CM

## 2021-07-28 RX ORDER — ROSUVASTATIN CALCIUM 10 MG/1
10 TABLET, COATED ORAL DAILY
Qty: 90 TABLET | Refills: 4 | Status: SHIPPED | OUTPATIENT
Start: 2021-07-28 | End: 2022-08-22 | Stop reason: SDUPTHER

## 2021-07-28 RX ORDER — DULOXETIN HYDROCHLORIDE 60 MG/1
60 CAPSULE, DELAYED RELEASE ORAL DAILY
Qty: 90 CAPSULE | Refills: 4 | Status: SHIPPED | OUTPATIENT
Start: 2021-07-28 | End: 2022-08-22 | Stop reason: SDUPTHER

## 2021-07-28 RX ORDER — AMLODIPINE BESYLATE 10 MG/1
10 TABLET ORAL DAILY
Qty: 90 TABLET | Refills: 4 | Status: SHIPPED | OUTPATIENT
Start: 2021-07-28 | End: 2022-08-22 | Stop reason: SDUPTHER

## 2021-09-03 ENCOUNTER — PATIENT OUTREACH (OUTPATIENT)
Dept: ADMINISTRATIVE | Facility: HOSPITAL | Age: 51
End: 2021-09-03

## 2021-09-04 ENCOUNTER — HOSPITAL ENCOUNTER (OUTPATIENT)
Facility: HOSPITAL | Age: 51
Discharge: HOME | End: 2021-09-05
Attending: FAMILY MEDICINE | Admitting: INTERNAL MEDICINE
Payer: COMMERCIAL

## 2021-09-04 DIAGNOSIS — R07.9 CHEST PAIN: Primary | ICD-10-CM

## 2021-09-04 PROCEDURE — 84484 ASSAY OF TROPONIN QUANT: CPT | Performed by: NURSE PRACTITIONER

## 2021-09-04 PROCEDURE — 99285 EMERGENCY DEPT VISIT HI MDM: CPT | Mod: 25

## 2021-09-04 PROCEDURE — 80179 DRUG ASSAY SALICYLATE: CPT | Performed by: FAMILY MEDICINE

## 2021-09-04 PROCEDURE — 83880 ASSAY OF NATRIURETIC PEPTIDE: CPT | Performed by: FAMILY MEDICINE

## 2021-09-04 PROCEDURE — 83690 ASSAY OF LIPASE: CPT | Performed by: FAMILY MEDICINE

## 2021-09-04 PROCEDURE — 93005 ELECTROCARDIOGRAM TRACING: CPT

## 2021-09-04 PROCEDURE — 85025 COMPLETE CBC W/AUTO DIFF WBC: CPT | Performed by: NURSE PRACTITIONER

## 2021-09-04 PROCEDURE — 93010 ELECTROCARDIOGRAM REPORT: CPT | Mod: ,,, | Performed by: INTERNAL MEDICINE

## 2021-09-04 PROCEDURE — 93010 EKG 12-LEAD: ICD-10-PCS | Mod: ,,, | Performed by: INTERNAL MEDICINE

## 2021-09-04 PROCEDURE — 80053 COMPREHEN METABOLIC PANEL: CPT | Performed by: NURSE PRACTITIONER

## 2021-09-05 VITALS
TEMPERATURE: 98 F | WEIGHT: 240 LBS | OXYGEN SATURATION: 98 % | BODY MASS INDEX: 32.51 KG/M2 | RESPIRATION RATE: 16 BRPM | HEART RATE: 65 BPM | DIASTOLIC BLOOD PRESSURE: 80 MMHG | SYSTOLIC BLOOD PRESSURE: 122 MMHG | HEIGHT: 72 IN

## 2021-09-05 PROBLEM — R07.9 CHEST PAIN: Status: RESOLVED | Noted: 2021-09-05 | Resolved: 2021-09-05

## 2021-09-05 PROBLEM — R07.9 CHEST PAIN: Status: ACTIVE | Noted: 2021-09-05

## 2021-09-05 LAB
ALBUMIN SERPL BCP-MCNC: 4.1 G/DL (ref 3.5–5.2)
ALP SERPL-CCNC: 90 U/L (ref 55–135)
ALT SERPL W/O P-5'-P-CCNC: 18 U/L (ref 10–44)
ANION GAP SERPL CALC-SCNC: 14 MMOL/L (ref 8–16)
AST SERPL-CCNC: 17 U/L (ref 10–40)
BASOPHILS # BLD AUTO: 0.07 K/UL (ref 0–0.2)
BASOPHILS NFR BLD: 0.8 % (ref 0–1.9)
BILIRUB SERPL-MCNC: 0.6 MG/DL (ref 0.1–1)
BNP SERPL-MCNC: 34 PG/ML (ref 0–99)
BUN SERPL-MCNC: 17 MG/DL (ref 6–20)
CALCIUM SERPL-MCNC: 9.6 MG/DL (ref 8.7–10.5)
CHLORIDE SERPL-SCNC: 106 MMOL/L (ref 95–110)
CO2 SERPL-SCNC: 20 MMOL/L (ref 23–29)
CREAT SERPL-MCNC: 0.9 MG/DL (ref 0.5–1.4)
CTP QC/QA: YES
DIFFERENTIAL METHOD: ABNORMAL
EOSINOPHIL # BLD AUTO: 0.5 K/UL (ref 0–0.5)
EOSINOPHIL NFR BLD: 6.3 % (ref 0–8)
ERYTHROCYTE [DISTWIDTH] IN BLOOD BY AUTOMATED COUNT: 13.5 % (ref 11.5–14.5)
EST. GFR  (AFRICAN AMERICAN): >60 ML/MIN/1.73 M^2
EST. GFR  (NON AFRICAN AMERICAN): >60 ML/MIN/1.73 M^2
GLUCOSE SERPL-MCNC: 112 MG/DL (ref 70–110)
HCT VFR BLD AUTO: 35.8 % (ref 40–54)
HGB BLD-MCNC: 12.1 G/DL (ref 14–18)
IMM GRANULOCYTES # BLD AUTO: 0.06 K/UL (ref 0–0.04)
IMM GRANULOCYTES NFR BLD AUTO: 0.7 % (ref 0–0.5)
LIPASE SERPL-CCNC: 34 U/L (ref 4–60)
LYMPHOCYTES # BLD AUTO: 2.7 K/UL (ref 1–4.8)
LYMPHOCYTES NFR BLD: 31.7 % (ref 18–48)
MCH RBC QN AUTO: 28.5 PG (ref 27–31)
MCHC RBC AUTO-ENTMCNC: 33.8 G/DL (ref 32–36)
MCV RBC AUTO: 84 FL (ref 82–98)
MONOCYTES # BLD AUTO: 0.7 K/UL (ref 0.3–1)
MONOCYTES NFR BLD: 8 % (ref 4–15)
NEUTROPHILS # BLD AUTO: 4.5 K/UL (ref 1.8–7.7)
NEUTROPHILS NFR BLD: 52.5 % (ref 38–73)
NRBC BLD-RTO: 0 /100 WBC
PLATELET # BLD AUTO: 230 K/UL (ref 150–450)
PMV BLD AUTO: 9.9 FL (ref 9.2–12.9)
POCT GLUCOSE: 108 MG/DL (ref 70–110)
POCT GLUCOSE: 153 MG/DL (ref 70–110)
POTASSIUM SERPL-SCNC: 4.2 MMOL/L (ref 3.5–5.1)
PROT SERPL-MCNC: 7.2 G/DL (ref 6–8.4)
RBC # BLD AUTO: 4.24 M/UL (ref 4.6–6.2)
SALICYLATES SERPL-MCNC: <5 MG/DL (ref 15–30)
SARS-COV-2 RDRP RESP QL NAA+PROBE: NEGATIVE
SODIUM SERPL-SCNC: 140 MMOL/L (ref 136–145)
TROPONIN I SERPL DL<=0.01 NG/ML-MCNC: 0.01 NG/ML (ref 0–0.03)
TROPONIN I SERPL DL<=0.01 NG/ML-MCNC: <0.006 NG/ML (ref 0–0.03)
TROPONIN I SERPL DL<=0.01 NG/ML-MCNC: <0.006 NG/ML (ref 0–0.03)
WBC # BLD AUTO: 8.6 K/UL (ref 3.9–12.7)

## 2021-09-05 PROCEDURE — 99220 PR INITIAL OBSERVATION CARE,LEVL III: ICD-10-PCS | Mod: ,,, | Performed by: INTERNAL MEDICINE

## 2021-09-05 PROCEDURE — 84484 ASSAY OF TROPONIN QUANT: CPT | Performed by: FAMILY MEDICINE

## 2021-09-05 PROCEDURE — 25000003 PHARM REV CODE 250: Performed by: FAMILY MEDICINE

## 2021-09-05 PROCEDURE — 25000003 PHARM REV CODE 250: Performed by: INTERNAL MEDICINE

## 2021-09-05 PROCEDURE — 99220 PR INITIAL OBSERVATION CARE,LEVL III: CPT | Mod: ,,, | Performed by: INTERNAL MEDICINE

## 2021-09-05 PROCEDURE — 97165 OT EVAL LOW COMPLEX 30 MIN: CPT

## 2021-09-05 PROCEDURE — U0002 COVID-19 LAB TEST NON-CDC: HCPCS | Performed by: EMERGENCY MEDICINE

## 2021-09-05 PROCEDURE — 97535 SELF CARE MNGMENT TRAINING: CPT

## 2021-09-05 PROCEDURE — 84484 ASSAY OF TROPONIN QUANT: CPT | Mod: 91 | Performed by: INTERNAL MEDICINE

## 2021-09-05 PROCEDURE — 97162 PT EVAL MOD COMPLEX 30 MIN: CPT

## 2021-09-05 PROCEDURE — G0378 HOSPITAL OBSERVATION PER HR: HCPCS

## 2021-09-05 PROCEDURE — 36415 COLL VENOUS BLD VENIPUNCTURE: CPT | Performed by: INTERNAL MEDICINE

## 2021-09-05 RX ORDER — ATORVASTATIN CALCIUM 10 MG/1
10 TABLET, FILM COATED ORAL DAILY
Status: DISCONTINUED | OUTPATIENT
Start: 2021-09-05 | End: 2021-09-05

## 2021-09-05 RX ORDER — GLUCAGON 1 MG
1 KIT INJECTION
Status: DISCONTINUED | OUTPATIENT
Start: 2021-09-05 | End: 2021-09-05 | Stop reason: HOSPADM

## 2021-09-05 RX ORDER — ASPIRIN 81 MG/1
81 TABLET ORAL DAILY
COMMUNITY
End: 2023-09-18 | Stop reason: ALTCHOICE

## 2021-09-05 RX ORDER — SODIUM,POTASSIUM PHOSPHATES 280-250MG
2 POWDER IN PACKET (EA) ORAL
Status: DISCONTINUED | OUTPATIENT
Start: 2021-09-05 | End: 2021-09-05 | Stop reason: HOSPADM

## 2021-09-05 RX ORDER — ASPIRIN 81 MG/1
81 TABLET ORAL DAILY
Status: DISCONTINUED | OUTPATIENT
Start: 2021-09-05 | End: 2021-09-05 | Stop reason: HOSPADM

## 2021-09-05 RX ORDER — INSULIN ASPART 100 [IU]/ML
0-5 INJECTION, SOLUTION INTRAVENOUS; SUBCUTANEOUS
Status: DISCONTINUED | OUTPATIENT
Start: 2021-09-05 | End: 2021-09-05 | Stop reason: HOSPADM

## 2021-09-05 RX ORDER — CARVEDILOL 12.5 MG/1
25 TABLET ORAL 2 TIMES DAILY WITH MEALS
Status: DISCONTINUED | OUTPATIENT
Start: 2021-09-05 | End: 2021-09-05 | Stop reason: HOSPADM

## 2021-09-05 RX ORDER — ACETAMINOPHEN 325 MG/1
650 TABLET ORAL EVERY 4 HOURS PRN
Status: DISCONTINUED | OUTPATIENT
Start: 2021-09-05 | End: 2021-09-05 | Stop reason: HOSPADM

## 2021-09-05 RX ORDER — POLYETHYLENE GLYCOL 3350 17 G/17G
17 POWDER, FOR SOLUTION ORAL DAILY PRN
Status: DISCONTINUED | OUTPATIENT
Start: 2021-09-05 | End: 2021-09-05 | Stop reason: HOSPADM

## 2021-09-05 RX ORDER — LOSARTAN POTASSIUM 50 MG/1
50 TABLET ORAL DAILY
Status: DISCONTINUED | OUTPATIENT
Start: 2021-09-05 | End: 2021-09-05 | Stop reason: HOSPADM

## 2021-09-05 RX ORDER — PRAVASTATIN SODIUM 20 MG/1
40 TABLET ORAL NIGHTLY
Status: DISCONTINUED | OUTPATIENT
Start: 2021-09-05 | End: 2021-09-05 | Stop reason: HOSPADM

## 2021-09-05 RX ORDER — HYDROCODONE BITARTRATE AND ACETAMINOPHEN 5; 325 MG/1; MG/1
1 TABLET ORAL EVERY 6 HOURS PRN
Status: DISCONTINUED | OUTPATIENT
Start: 2021-09-05 | End: 2021-09-05 | Stop reason: HOSPADM

## 2021-09-05 RX ORDER — LANOLIN ALCOHOL/MO/W.PET/CERES
800 CREAM (GRAM) TOPICAL
Status: DISCONTINUED | OUTPATIENT
Start: 2021-09-05 | End: 2021-09-05 | Stop reason: HOSPADM

## 2021-09-05 RX ORDER — IBUPROFEN 200 MG
24 TABLET ORAL
Status: DISCONTINUED | OUTPATIENT
Start: 2021-09-05 | End: 2021-09-05 | Stop reason: HOSPADM

## 2021-09-05 RX ORDER — SODIUM CHLORIDE 0.9 % (FLUSH) 0.9 %
10 SYRINGE (ML) INJECTION EVERY 8 HOURS
Status: DISCONTINUED | OUTPATIENT
Start: 2021-09-05 | End: 2021-09-05 | Stop reason: HOSPADM

## 2021-09-05 RX ORDER — AMLODIPINE BESYLATE 10 MG/1
10 TABLET ORAL DAILY
Status: DISCONTINUED | OUTPATIENT
Start: 2021-09-05 | End: 2021-09-05 | Stop reason: HOSPADM

## 2021-09-05 RX ORDER — DULOXETIN HYDROCHLORIDE 30 MG/1
60 CAPSULE, DELAYED RELEASE ORAL DAILY
Status: DISCONTINUED | OUTPATIENT
Start: 2021-09-05 | End: 2021-09-05 | Stop reason: HOSPADM

## 2021-09-05 RX ORDER — ONDANSETRON 8 MG/1
8 TABLET, ORALLY DISINTEGRATING ORAL EVERY 8 HOURS PRN
Status: DISCONTINUED | OUTPATIENT
Start: 2021-09-05 | End: 2021-09-05 | Stop reason: HOSPADM

## 2021-09-05 RX ORDER — TALC
6 POWDER (GRAM) TOPICAL NIGHTLY PRN
Status: DISCONTINUED | OUTPATIENT
Start: 2021-09-05 | End: 2021-09-05 | Stop reason: HOSPADM

## 2021-09-05 RX ORDER — IBUPROFEN 200 MG
16 TABLET ORAL
Status: DISCONTINUED | OUTPATIENT
Start: 2021-09-05 | End: 2021-09-05 | Stop reason: HOSPADM

## 2021-09-05 RX ADMIN — CARVEDILOL 25 MG: 12.5 TABLET, FILM COATED ORAL at 09:09

## 2021-09-05 RX ADMIN — CARVEDILOL 25 MG: 12.5 TABLET, FILM COATED ORAL at 04:09

## 2021-09-05 RX ADMIN — AMLODIPINE BESYLATE 10 MG: 10 TABLET ORAL at 09:09

## 2021-09-05 RX ADMIN — DULOXETINE HYDROCHLORIDE 60 MG: 30 CAPSULE, DELAYED RELEASE ORAL at 09:09

## 2021-09-05 RX ADMIN — LOSARTAN POTASSIUM 50 MG: 50 TABLET, FILM COATED ORAL at 09:09

## 2021-09-05 RX ADMIN — ASPIRIN 81 MG: 81 TABLET, COATED ORAL at 09:09

## 2021-09-05 RX ADMIN — NITROGLYCERIN 0.5 INCH: 20 OINTMENT TOPICAL at 12:09

## 2021-09-06 LAB
CV STRESS BASE HR: 56 BPM
DIASTOLIC BLOOD PRESSURE: 84 MMHG
OHS CV CPX 85 PERCENT MAX PREDICTED HEART RATE MALE: 145
OHS CV CPX MAX PREDICTED HEART RATE: 170
OHS CV CPX PATIENT IS FEMALE: 0
OHS CV CPX PATIENT IS MALE: 1
OHS CV CPX PEAK DIASTOLIC BLOOD PRESSURE: 78 MMHG
OHS CV CPX PEAK HEAR RATE: 122 BPM
OHS CV CPX PEAK RATE PRESSURE PRODUCT: NORMAL
OHS CV CPX PEAK SYSTOLIC BLOOD PRESSURE: 171 MMHG
OHS CV CPX PERCENT MAX PREDICTED HEART RATE ACHIEVED: 72
OHS CV CPX RATE PRESSURE PRODUCT PRESENTING: 8120
SYSTOLIC BLOOD PRESSURE: 145 MMHG

## 2021-09-07 ENCOUNTER — TELEPHONE (OUTPATIENT)
Dept: INTERNAL MEDICINE | Facility: CLINIC | Age: 51
End: 2021-09-07

## 2021-09-10 ENCOUNTER — OFFICE VISIT (OUTPATIENT)
Dept: INTERNAL MEDICINE | Facility: CLINIC | Age: 51
End: 2021-09-10
Payer: COMMERCIAL

## 2021-09-10 VITALS
HEART RATE: 74 BPM | TEMPERATURE: 98 F | DIASTOLIC BLOOD PRESSURE: 90 MMHG | OXYGEN SATURATION: 98 % | SYSTOLIC BLOOD PRESSURE: 140 MMHG | HEIGHT: 72 IN | BODY MASS INDEX: 32.07 KG/M2 | WEIGHT: 236.75 LBS

## 2021-09-10 DIAGNOSIS — I10 ESSENTIAL HYPERTENSION: Chronic | ICD-10-CM

## 2021-09-10 DIAGNOSIS — M79.602 LEFT ARM PAIN: Primary | ICD-10-CM

## 2021-09-10 PROCEDURE — 3077F SYST BP >= 140 MM HG: CPT | Mod: CPTII,S$GLB,, | Performed by: NURSE PRACTITIONER

## 2021-09-10 PROCEDURE — 1160F PR REVIEW ALL MEDS BY PRESCRIBER/CLIN PHARMACIST DOCUMENTED: ICD-10-PCS | Mod: CPTII,S$GLB,, | Performed by: NURSE PRACTITIONER

## 2021-09-10 PROCEDURE — 3080F DIAST BP >= 90 MM HG: CPT | Mod: CPTII,S$GLB,, | Performed by: NURSE PRACTITIONER

## 2021-09-10 PROCEDURE — 3044F HG A1C LEVEL LT 7.0%: CPT | Mod: CPTII,S$GLB,, | Performed by: NURSE PRACTITIONER

## 2021-09-10 PROCEDURE — 1159F PR MEDICATION LIST DOCUMENTED IN MEDICAL RECORD: ICD-10-PCS | Mod: CPTII,S$GLB,, | Performed by: NURSE PRACTITIONER

## 2021-09-10 PROCEDURE — 1159F MED LIST DOCD IN RCRD: CPT | Mod: CPTII,S$GLB,, | Performed by: NURSE PRACTITIONER

## 2021-09-10 PROCEDURE — 4010F PR ACE/ARB THEARPY RXD/TAKEN: ICD-10-PCS | Mod: CPTII,S$GLB,, | Performed by: NURSE PRACTITIONER

## 2021-09-10 PROCEDURE — 4010F ACE/ARB THERAPY RXD/TAKEN: CPT | Mod: CPTII,S$GLB,, | Performed by: NURSE PRACTITIONER

## 2021-09-10 PROCEDURE — 3044F PR MOST RECENT HEMOGLOBIN A1C LEVEL <7.0%: ICD-10-PCS | Mod: CPTII,S$GLB,, | Performed by: NURSE PRACTITIONER

## 2021-09-10 PROCEDURE — 99213 OFFICE O/P EST LOW 20 MIN: CPT | Mod: S$GLB,,, | Performed by: NURSE PRACTITIONER

## 2021-09-10 PROCEDURE — 1160F RVW MEDS BY RX/DR IN RCRD: CPT | Mod: CPTII,S$GLB,, | Performed by: NURSE PRACTITIONER

## 2021-09-10 PROCEDURE — 3008F PR BODY MASS INDEX (BMI) DOCUMENTED: ICD-10-PCS | Mod: CPTII,S$GLB,, | Performed by: NURSE PRACTITIONER

## 2021-09-10 PROCEDURE — 3080F PR MOST RECENT DIASTOLIC BLOOD PRESSURE >= 90 MM HG: ICD-10-PCS | Mod: CPTII,S$GLB,, | Performed by: NURSE PRACTITIONER

## 2021-09-10 PROCEDURE — 3077F PR MOST RECENT SYSTOLIC BLOOD PRESSURE >= 140 MM HG: ICD-10-PCS | Mod: CPTII,S$GLB,, | Performed by: NURSE PRACTITIONER

## 2021-09-10 PROCEDURE — 99213 PR OFFICE/OUTPT VISIT, EST, LEVL III, 20-29 MIN: ICD-10-PCS | Mod: S$GLB,,, | Performed by: NURSE PRACTITIONER

## 2021-09-10 PROCEDURE — 99999 PR PBB SHADOW E&M-EST. PATIENT-LVL V: CPT | Mod: PBBFAC,,, | Performed by: NURSE PRACTITIONER

## 2021-09-10 PROCEDURE — 3008F BODY MASS INDEX DOCD: CPT | Mod: CPTII,S$GLB,, | Performed by: NURSE PRACTITIONER

## 2021-09-10 PROCEDURE — 99999 PR PBB SHADOW E&M-EST. PATIENT-LVL V: ICD-10-PCS | Mod: PBBFAC,,, | Performed by: NURSE PRACTITIONER

## 2021-09-24 ENCOUNTER — LAB VISIT (OUTPATIENT)
Dept: LAB | Facility: HOSPITAL | Age: 51
End: 2021-09-24
Attending: INTERNAL MEDICINE
Payer: COMMERCIAL

## 2021-09-24 DIAGNOSIS — D50.9 IRON DEFICIENCY ANEMIA, UNSPECIFIED IRON DEFICIENCY ANEMIA TYPE: ICD-10-CM

## 2021-09-24 LAB
BASOPHILS # BLD AUTO: 0.04 K/UL (ref 0–0.2)
BASOPHILS NFR BLD: 0.4 % (ref 0–1.9)
DIFFERENTIAL METHOD: ABNORMAL
EOSINOPHIL # BLD AUTO: 0.6 K/UL (ref 0–0.5)
EOSINOPHIL NFR BLD: 6.2 % (ref 0–8)
ERYTHROCYTE [DISTWIDTH] IN BLOOD BY AUTOMATED COUNT: 13.5 % (ref 11.5–14.5)
FERRITIN SERPL-MCNC: 123 NG/ML (ref 20–300)
HCT VFR BLD AUTO: 38.5 % (ref 40–54)
HGB BLD-MCNC: 13.3 G/DL (ref 14–18)
IMM GRANULOCYTES # BLD AUTO: 0.09 K/UL (ref 0–0.04)
IMM GRANULOCYTES NFR BLD AUTO: 1 % (ref 0–0.5)
IRON SERPL-MCNC: 55 UG/DL (ref 45–160)
LYMPHOCYTES # BLD AUTO: 2.9 K/UL (ref 1–4.8)
LYMPHOCYTES NFR BLD: 32 % (ref 18–48)
MCH RBC QN AUTO: 28.8 PG (ref 27–31)
MCHC RBC AUTO-ENTMCNC: 34.5 G/DL (ref 32–36)
MCV RBC AUTO: 83 FL (ref 82–98)
MONOCYTES # BLD AUTO: 0.8 K/UL (ref 0.3–1)
MONOCYTES NFR BLD: 8.6 % (ref 4–15)
NEUTROPHILS # BLD AUTO: 4.6 K/UL (ref 1.8–7.7)
NEUTROPHILS NFR BLD: 51.8 % (ref 38–73)
NRBC BLD-RTO: 0 /100 WBC
PLATELET # BLD AUTO: 253 K/UL (ref 150–450)
PMV BLD AUTO: 9 FL (ref 9.2–12.9)
RBC # BLD AUTO: 4.62 M/UL (ref 4.6–6.2)
SATURATED IRON: 12 % (ref 20–50)
TOTAL IRON BINDING CAPACITY: 478 UG/DL (ref 250–450)
TRANSFERRIN SERPL-MCNC: 323 MG/DL (ref 200–375)
WBC # BLD AUTO: 8.93 K/UL (ref 3.9–12.7)

## 2021-09-24 PROCEDURE — 85025 COMPLETE CBC W/AUTO DIFF WBC: CPT | Performed by: INTERNAL MEDICINE

## 2021-09-24 PROCEDURE — 82728 ASSAY OF FERRITIN: CPT | Performed by: INTERNAL MEDICINE

## 2021-09-24 PROCEDURE — 84466 ASSAY OF TRANSFERRIN: CPT | Performed by: INTERNAL MEDICINE

## 2021-09-24 PROCEDURE — 36415 COLL VENOUS BLD VENIPUNCTURE: CPT | Performed by: INTERNAL MEDICINE

## 2021-09-29 RX ORDER — SEMAGLUTIDE 1.34 MG/ML
INJECTION, SOLUTION SUBCUTANEOUS
Qty: 3 PEN | Refills: 0 | Status: SHIPPED | OUTPATIENT
Start: 2021-09-29 | End: 2022-02-01 | Stop reason: SDUPTHER

## 2021-09-30 ENCOUNTER — OFFICE VISIT (OUTPATIENT)
Dept: HEMATOLOGY/ONCOLOGY | Facility: CLINIC | Age: 51
End: 2021-09-30
Payer: COMMERCIAL

## 2021-09-30 ENCOUNTER — IMMUNIZATION (OUTPATIENT)
Dept: PRIMARY CARE CLINIC | Facility: CLINIC | Age: 51
End: 2021-09-30
Payer: COMMERCIAL

## 2021-09-30 VITALS
DIASTOLIC BLOOD PRESSURE: 85 MMHG | HEIGHT: 72 IN | WEIGHT: 237.63 LBS | HEART RATE: 63 BPM | OXYGEN SATURATION: 99 % | SYSTOLIC BLOOD PRESSURE: 147 MMHG | TEMPERATURE: 98 F | BODY MASS INDEX: 32.19 KG/M2

## 2021-09-30 DIAGNOSIS — Z23 NEED FOR VACCINATION: Primary | ICD-10-CM

## 2021-09-30 DIAGNOSIS — D50.9 IRON DEFICIENCY ANEMIA, UNSPECIFIED IRON DEFICIENCY ANEMIA TYPE: Primary | ICD-10-CM

## 2021-09-30 PROCEDURE — 3008F BODY MASS INDEX DOCD: CPT | Mod: CPTII,S$GLB,, | Performed by: INTERNAL MEDICINE

## 2021-09-30 PROCEDURE — 3079F PR MOST RECENT DIASTOLIC BLOOD PRESSURE 80-89 MM HG: ICD-10-PCS | Mod: CPTII,S$GLB,, | Performed by: INTERNAL MEDICINE

## 2021-09-30 PROCEDURE — 99214 OFFICE O/P EST MOD 30 MIN: CPT | Mod: S$GLB,,, | Performed by: INTERNAL MEDICINE

## 2021-09-30 PROCEDURE — 3077F PR MOST RECENT SYSTOLIC BLOOD PRESSURE >= 140 MM HG: ICD-10-PCS | Mod: CPTII,S$GLB,, | Performed by: INTERNAL MEDICINE

## 2021-09-30 PROCEDURE — 3079F DIAST BP 80-89 MM HG: CPT | Mod: CPTII,S$GLB,, | Performed by: INTERNAL MEDICINE

## 2021-09-30 PROCEDURE — 1159F MED LIST DOCD IN RCRD: CPT | Mod: CPTII,S$GLB,, | Performed by: INTERNAL MEDICINE

## 2021-09-30 PROCEDURE — 4010F PR ACE/ARB THEARPY RXD/TAKEN: ICD-10-PCS | Mod: CPTII,S$GLB,, | Performed by: INTERNAL MEDICINE

## 2021-09-30 PROCEDURE — 0003A COVID-19, MRNA, LNP-S, PF, 30 MCG/0.3 ML DOSE VACCINE: CPT | Mod: CV19,PBBFAC | Performed by: NURSE PRACTITIONER

## 2021-09-30 PROCEDURE — 3077F SYST BP >= 140 MM HG: CPT | Mod: CPTII,S$GLB,, | Performed by: INTERNAL MEDICINE

## 2021-09-30 PROCEDURE — 3044F PR MOST RECENT HEMOGLOBIN A1C LEVEL <7.0%: ICD-10-PCS | Mod: CPTII,S$GLB,, | Performed by: INTERNAL MEDICINE

## 2021-09-30 PROCEDURE — 99214 PR OFFICE/OUTPT VISIT, EST, LEVL IV, 30-39 MIN: ICD-10-PCS | Mod: S$GLB,,, | Performed by: INTERNAL MEDICINE

## 2021-09-30 PROCEDURE — 3008F PR BODY MASS INDEX (BMI) DOCUMENTED: ICD-10-PCS | Mod: CPTII,S$GLB,, | Performed by: INTERNAL MEDICINE

## 2021-09-30 PROCEDURE — 4010F ACE/ARB THERAPY RXD/TAKEN: CPT | Mod: CPTII,S$GLB,, | Performed by: INTERNAL MEDICINE

## 2021-09-30 PROCEDURE — 99999 PR PBB SHADOW E&M-EST. PATIENT-LVL III: CPT | Mod: PBBFAC,,, | Performed by: INTERNAL MEDICINE

## 2021-09-30 PROCEDURE — 3044F HG A1C LEVEL LT 7.0%: CPT | Mod: CPTII,S$GLB,, | Performed by: INTERNAL MEDICINE

## 2021-09-30 PROCEDURE — 99999 PR PBB SHADOW E&M-EST. PATIENT-LVL III: ICD-10-PCS | Mod: PBBFAC,,, | Performed by: INTERNAL MEDICINE

## 2021-09-30 PROCEDURE — 1159F PR MEDICATION LIST DOCUMENTED IN MEDICAL RECORD: ICD-10-PCS | Mod: CPTII,S$GLB,, | Performed by: INTERNAL MEDICINE

## 2021-09-30 PROCEDURE — 91300 COVID-19, MRNA, LNP-S, PF, 30 MCG/0.3 ML DOSE VACCINE: CPT | Mod: PBBFAC | Performed by: NURSE PRACTITIONER

## 2021-10-04 ENCOUNTER — PATIENT MESSAGE (OUTPATIENT)
Dept: ADMINISTRATIVE | Facility: HOSPITAL | Age: 51
End: 2021-10-04

## 2021-10-04 ENCOUNTER — PATIENT OUTREACH (OUTPATIENT)
Dept: ADMINISTRATIVE | Facility: HOSPITAL | Age: 51
End: 2021-10-04

## 2021-10-07 ENCOUNTER — CLINICAL SUPPORT (OUTPATIENT)
Dept: REHABILITATION | Facility: HOSPITAL | Age: 51
End: 2021-10-07
Payer: COMMERCIAL

## 2021-10-07 DIAGNOSIS — M79.602 LEFT ARM PAIN: ICD-10-CM

## 2021-10-07 DIAGNOSIS — M25.60 DECREASED RANGE OF MOTION WITH DECREASED STRENGTH: ICD-10-CM

## 2021-10-07 DIAGNOSIS — R53.1 DECREASED RANGE OF MOTION WITH DECREASED STRENGTH: ICD-10-CM

## 2021-10-07 PROBLEM — M62.81 MUSCLE WEAKNESS OF RIGHT ARM: Status: RESOLVED | Noted: 2019-07-31 | Resolved: 2021-10-07

## 2021-10-07 PROBLEM — R29.898 DECREASED GRIP STRENGTH OF RIGHT HAND: Status: RESOLVED | Noted: 2019-07-31 | Resolved: 2021-10-07

## 2021-10-07 PROBLEM — M25.521 RIGHT ELBOW PAIN: Status: RESOLVED | Noted: 2019-07-31 | Resolved: 2021-10-07

## 2021-10-07 PROBLEM — M25.511 RIGHT SHOULDER PAIN: Status: RESOLVED | Noted: 2019-07-31 | Resolved: 2021-10-07

## 2021-10-07 PROCEDURE — 97162 PT EVAL MOD COMPLEX 30 MIN: CPT

## 2021-10-07 PROCEDURE — 97140 MANUAL THERAPY 1/> REGIONS: CPT

## 2021-10-08 ENCOUNTER — PATIENT OUTREACH (OUTPATIENT)
Dept: ADMINISTRATIVE | Facility: HOSPITAL | Age: 51
End: 2021-10-08

## 2021-10-11 ENCOUNTER — CLINICAL SUPPORT (OUTPATIENT)
Dept: REHABILITATION | Facility: HOSPITAL | Age: 51
End: 2021-10-11
Payer: COMMERCIAL

## 2021-10-11 DIAGNOSIS — M25.60 DECREASED RANGE OF MOTION WITH DECREASED STRENGTH: ICD-10-CM

## 2021-10-11 DIAGNOSIS — R53.1 DECREASED RANGE OF MOTION WITH DECREASED STRENGTH: ICD-10-CM

## 2021-10-11 PROCEDURE — 97140 MANUAL THERAPY 1/> REGIONS: CPT

## 2021-10-11 PROCEDURE — 97110 THERAPEUTIC EXERCISES: CPT

## 2021-10-12 ENCOUNTER — PATIENT OUTREACH (OUTPATIENT)
Dept: ADMINISTRATIVE | Facility: HOSPITAL | Age: 51
End: 2021-10-12

## 2021-10-13 ENCOUNTER — CLINICAL SUPPORT (OUTPATIENT)
Dept: REHABILITATION | Facility: HOSPITAL | Age: 51
End: 2021-10-13
Payer: COMMERCIAL

## 2021-10-13 DIAGNOSIS — M25.60 DECREASED RANGE OF MOTION WITH DECREASED STRENGTH: ICD-10-CM

## 2021-10-13 DIAGNOSIS — R53.1 DECREASED RANGE OF MOTION WITH DECREASED STRENGTH: ICD-10-CM

## 2021-10-13 PROCEDURE — 97140 MANUAL THERAPY 1/> REGIONS: CPT

## 2021-10-13 PROCEDURE — 97110 THERAPEUTIC EXERCISES: CPT

## 2021-10-20 ENCOUNTER — CLINICAL SUPPORT (OUTPATIENT)
Dept: REHABILITATION | Facility: HOSPITAL | Age: 51
End: 2021-10-20
Payer: COMMERCIAL

## 2021-10-20 DIAGNOSIS — R53.1 DECREASED RANGE OF MOTION WITH DECREASED STRENGTH: ICD-10-CM

## 2021-10-20 DIAGNOSIS — M25.60 DECREASED RANGE OF MOTION WITH DECREASED STRENGTH: ICD-10-CM

## 2021-10-20 PROCEDURE — 97110 THERAPEUTIC EXERCISES: CPT

## 2021-10-20 PROCEDURE — 97140 MANUAL THERAPY 1/> REGIONS: CPT

## 2021-10-21 ENCOUNTER — CLINICAL SUPPORT (OUTPATIENT)
Dept: REHABILITATION | Facility: HOSPITAL | Age: 51
End: 2021-10-21
Payer: COMMERCIAL

## 2021-10-21 ENCOUNTER — LAB VISIT (OUTPATIENT)
Dept: LAB | Facility: HOSPITAL | Age: 51
End: 2021-10-21
Payer: COMMERCIAL

## 2021-10-21 DIAGNOSIS — R53.1 DECREASED RANGE OF MOTION WITH DECREASED STRENGTH: ICD-10-CM

## 2021-10-21 DIAGNOSIS — M25.60 DECREASED RANGE OF MOTION WITH DECREASED STRENGTH: ICD-10-CM

## 2021-10-21 DIAGNOSIS — D50.9 IRON DEFICIENCY ANEMIA, UNSPECIFIED IRON DEFICIENCY ANEMIA TYPE: ICD-10-CM

## 2021-10-21 LAB
BASOPHILS # BLD AUTO: 0.05 K/UL (ref 0–0.2)
BASOPHILS NFR BLD: 0.5 % (ref 0–1.9)
DIFFERENTIAL METHOD: ABNORMAL
EOSINOPHIL # BLD AUTO: 0.5 K/UL (ref 0–0.5)
EOSINOPHIL NFR BLD: 4.7 % (ref 0–8)
ERYTHROCYTE [DISTWIDTH] IN BLOOD BY AUTOMATED COUNT: 13.2 % (ref 11.5–14.5)
FERRITIN SERPL-MCNC: 95 NG/ML (ref 20–300)
HCT VFR BLD AUTO: 37.1 % (ref 40–54)
HGB BLD-MCNC: 12.8 G/DL (ref 14–18)
IMM GRANULOCYTES # BLD AUTO: 0.1 K/UL (ref 0–0.04)
IMM GRANULOCYTES NFR BLD AUTO: 1 % (ref 0–0.5)
IRON SERPL-MCNC: 77 UG/DL (ref 45–160)
LYMPHOCYTES # BLD AUTO: 3.2 K/UL (ref 1–4.8)
LYMPHOCYTES NFR BLD: 31.7 % (ref 18–48)
MCH RBC QN AUTO: 28.6 PG (ref 27–31)
MCHC RBC AUTO-ENTMCNC: 34.5 G/DL (ref 32–36)
MCV RBC AUTO: 83 FL (ref 82–98)
MONOCYTES # BLD AUTO: 0.8 K/UL (ref 0.3–1)
MONOCYTES NFR BLD: 7.8 % (ref 4–15)
NEUTROPHILS # BLD AUTO: 5.5 K/UL (ref 1.8–7.7)
NEUTROPHILS NFR BLD: 54.3 % (ref 38–73)
NRBC BLD-RTO: 0 /100 WBC
PLATELET # BLD AUTO: 249 K/UL (ref 150–450)
PMV BLD AUTO: 9.2 FL (ref 9.2–12.9)
RBC # BLD AUTO: 4.47 M/UL (ref 4.6–6.2)
SATURATED IRON: 17 % (ref 20–50)
TOTAL IRON BINDING CAPACITY: 451 UG/DL (ref 250–450)
TRANSFERRIN SERPL-MCNC: 305 MG/DL (ref 200–375)
WBC # BLD AUTO: 10.19 K/UL (ref 3.9–12.7)

## 2021-10-21 PROCEDURE — 97110 THERAPEUTIC EXERCISES: CPT

## 2021-10-21 PROCEDURE — 97140 MANUAL THERAPY 1/> REGIONS: CPT

## 2021-10-21 PROCEDURE — 84466 ASSAY OF TRANSFERRIN: CPT | Performed by: INTERNAL MEDICINE

## 2021-10-21 PROCEDURE — 85025 COMPLETE CBC W/AUTO DIFF WBC: CPT | Performed by: INTERNAL MEDICINE

## 2021-10-21 PROCEDURE — 36415 COLL VENOUS BLD VENIPUNCTURE: CPT | Performed by: INTERNAL MEDICINE

## 2021-10-21 PROCEDURE — 82728 ASSAY OF FERRITIN: CPT | Performed by: INTERNAL MEDICINE

## 2021-10-22 ENCOUNTER — OFFICE VISIT (OUTPATIENT)
Dept: HEMATOLOGY/ONCOLOGY | Facility: CLINIC | Age: 51
End: 2021-10-22
Payer: COMMERCIAL

## 2021-10-22 VITALS
SYSTOLIC BLOOD PRESSURE: 145 MMHG | TEMPERATURE: 98 F | OXYGEN SATURATION: 99 % | WEIGHT: 241.38 LBS | BODY MASS INDEX: 32.69 KG/M2 | HEART RATE: 63 BPM | HEIGHT: 72 IN | DIASTOLIC BLOOD PRESSURE: 93 MMHG

## 2021-10-22 DIAGNOSIS — D50.9 IRON DEFICIENCY ANEMIA, UNSPECIFIED IRON DEFICIENCY ANEMIA TYPE: Primary | ICD-10-CM

## 2021-10-22 PROCEDURE — 1160F PR REVIEW ALL MEDS BY PRESCRIBER/CLIN PHARMACIST DOCUMENTED: ICD-10-PCS | Mod: CPTII,S$GLB,, | Performed by: INTERNAL MEDICINE

## 2021-10-22 PROCEDURE — 1159F PR MEDICATION LIST DOCUMENTED IN MEDICAL RECORD: ICD-10-PCS | Mod: CPTII,S$GLB,, | Performed by: INTERNAL MEDICINE

## 2021-10-22 PROCEDURE — 99999 PR PBB SHADOW E&M-EST. PATIENT-LVL IV: CPT | Mod: PBBFAC,,, | Performed by: INTERNAL MEDICINE

## 2021-10-22 PROCEDURE — 4010F ACE/ARB THERAPY RXD/TAKEN: CPT | Mod: CPTII,S$GLB,, | Performed by: INTERNAL MEDICINE

## 2021-10-22 PROCEDURE — 3080F DIAST BP >= 90 MM HG: CPT | Mod: CPTII,S$GLB,, | Performed by: INTERNAL MEDICINE

## 2021-10-22 PROCEDURE — 99213 PR OFFICE/OUTPT VISIT, EST, LEVL III, 20-29 MIN: ICD-10-PCS | Mod: S$GLB,,, | Performed by: INTERNAL MEDICINE

## 2021-10-22 PROCEDURE — 99213 OFFICE O/P EST LOW 20 MIN: CPT | Mod: S$GLB,,, | Performed by: INTERNAL MEDICINE

## 2021-10-22 PROCEDURE — 3077F PR MOST RECENT SYSTOLIC BLOOD PRESSURE >= 140 MM HG: ICD-10-PCS | Mod: CPTII,S$GLB,, | Performed by: INTERNAL MEDICINE

## 2021-10-22 PROCEDURE — 1160F RVW MEDS BY RX/DR IN RCRD: CPT | Mod: CPTII,S$GLB,, | Performed by: INTERNAL MEDICINE

## 2021-10-22 PROCEDURE — 3044F HG A1C LEVEL LT 7.0%: CPT | Mod: CPTII,S$GLB,, | Performed by: INTERNAL MEDICINE

## 2021-10-22 PROCEDURE — 99999 PR PBB SHADOW E&M-EST. PATIENT-LVL IV: ICD-10-PCS | Mod: PBBFAC,,, | Performed by: INTERNAL MEDICINE

## 2021-10-22 PROCEDURE — 3080F PR MOST RECENT DIASTOLIC BLOOD PRESSURE >= 90 MM HG: ICD-10-PCS | Mod: CPTII,S$GLB,, | Performed by: INTERNAL MEDICINE

## 2021-10-22 PROCEDURE — 4010F PR ACE/ARB THEARPY RXD/TAKEN: ICD-10-PCS | Mod: CPTII,S$GLB,, | Performed by: INTERNAL MEDICINE

## 2021-10-22 PROCEDURE — 3008F PR BODY MASS INDEX (BMI) DOCUMENTED: ICD-10-PCS | Mod: CPTII,S$GLB,, | Performed by: INTERNAL MEDICINE

## 2021-10-22 PROCEDURE — 1159F MED LIST DOCD IN RCRD: CPT | Mod: CPTII,S$GLB,, | Performed by: INTERNAL MEDICINE

## 2021-10-22 PROCEDURE — 3008F BODY MASS INDEX DOCD: CPT | Mod: CPTII,S$GLB,, | Performed by: INTERNAL MEDICINE

## 2021-10-22 PROCEDURE — 3044F PR MOST RECENT HEMOGLOBIN A1C LEVEL <7.0%: ICD-10-PCS | Mod: CPTII,S$GLB,, | Performed by: INTERNAL MEDICINE

## 2021-10-22 PROCEDURE — 3077F SYST BP >= 140 MM HG: CPT | Mod: CPTII,S$GLB,, | Performed by: INTERNAL MEDICINE

## 2021-10-27 ENCOUNTER — CLINICAL SUPPORT (OUTPATIENT)
Dept: REHABILITATION | Facility: HOSPITAL | Age: 51
End: 2021-10-27
Payer: COMMERCIAL

## 2021-10-27 DIAGNOSIS — M25.60 DECREASED RANGE OF MOTION WITH DECREASED STRENGTH: ICD-10-CM

## 2021-10-27 DIAGNOSIS — R53.1 DECREASED RANGE OF MOTION WITH DECREASED STRENGTH: ICD-10-CM

## 2021-10-27 PROCEDURE — 97110 THERAPEUTIC EXERCISES: CPT

## 2021-10-27 PROCEDURE — 97140 MANUAL THERAPY 1/> REGIONS: CPT

## 2021-10-29 ENCOUNTER — CLINICAL SUPPORT (OUTPATIENT)
Dept: REHABILITATION | Facility: HOSPITAL | Age: 51
End: 2021-10-29
Payer: COMMERCIAL

## 2021-10-29 DIAGNOSIS — M25.60 DECREASED RANGE OF MOTION WITH DECREASED STRENGTH: ICD-10-CM

## 2021-10-29 DIAGNOSIS — R53.1 DECREASED RANGE OF MOTION WITH DECREASED STRENGTH: ICD-10-CM

## 2021-10-29 PROCEDURE — 97110 THERAPEUTIC EXERCISES: CPT

## 2021-10-29 PROCEDURE — 97140 MANUAL THERAPY 1/> REGIONS: CPT

## 2021-11-01 ENCOUNTER — CLINICAL SUPPORT (OUTPATIENT)
Dept: REHABILITATION | Facility: HOSPITAL | Age: 51
End: 2021-11-01
Payer: COMMERCIAL

## 2021-11-01 DIAGNOSIS — R53.1 DECREASED RANGE OF MOTION WITH DECREASED STRENGTH: ICD-10-CM

## 2021-11-01 DIAGNOSIS — M25.60 DECREASED RANGE OF MOTION WITH DECREASED STRENGTH: ICD-10-CM

## 2021-11-01 PROCEDURE — 97140 MANUAL THERAPY 1/> REGIONS: CPT

## 2021-11-01 PROCEDURE — 97110 THERAPEUTIC EXERCISES: CPT

## 2021-11-03 ENCOUNTER — TELEPHONE (OUTPATIENT)
Dept: INTERNAL MEDICINE | Facility: CLINIC | Age: 51
End: 2021-11-03
Payer: COMMERCIAL

## 2021-11-10 ENCOUNTER — CLINICAL SUPPORT (OUTPATIENT)
Dept: REHABILITATION | Facility: HOSPITAL | Age: 51
End: 2021-11-10
Payer: COMMERCIAL

## 2021-11-10 DIAGNOSIS — M25.60 DECREASED RANGE OF MOTION WITH DECREASED STRENGTH: ICD-10-CM

## 2021-11-10 DIAGNOSIS — R53.1 DECREASED RANGE OF MOTION WITH DECREASED STRENGTH: ICD-10-CM

## 2021-11-10 PROCEDURE — 97110 THERAPEUTIC EXERCISES: CPT

## 2021-11-10 PROCEDURE — 97140 MANUAL THERAPY 1/> REGIONS: CPT

## 2021-11-24 ENCOUNTER — PATIENT MESSAGE (OUTPATIENT)
Dept: ADMINISTRATIVE | Facility: HOSPITAL | Age: 51
End: 2021-11-24
Payer: COMMERCIAL

## 2021-11-24 ENCOUNTER — PATIENT OUTREACH (OUTPATIENT)
Dept: ADMINISTRATIVE | Facility: HOSPITAL | Age: 51
End: 2021-11-24
Payer: COMMERCIAL

## 2021-11-29 ENCOUNTER — CLINICAL SUPPORT (OUTPATIENT)
Dept: REHABILITATION | Facility: HOSPITAL | Age: 51
End: 2021-11-29
Payer: COMMERCIAL

## 2021-11-29 DIAGNOSIS — R53.1 DECREASED RANGE OF MOTION WITH DECREASED STRENGTH: ICD-10-CM

## 2021-11-29 DIAGNOSIS — M25.60 DECREASED RANGE OF MOTION WITH DECREASED STRENGTH: ICD-10-CM

## 2021-11-29 PROCEDURE — 97140 MANUAL THERAPY 1/> REGIONS: CPT

## 2021-11-29 PROCEDURE — 97110 THERAPEUTIC EXERCISES: CPT

## 2021-12-06 NOTE — PROGRESS NOTES
Subjective:       Patient ID: Guillremo Castillo is a 47 y.o. male.    Chief Complaint: Foot Pain    HPI2 weeks right 1st toe pain redness. No trauma or fever. Happened after time out in sun then lots of red meat few days. Bit less pain but still some redness. Colchicine one day no help. Prn naproxn helps  Htn: no bp med today    Past Medical History:   Diagnosis Date    DM (diabetes mellitus) 05/2013     x 1 week ago 11/11/2015    Fatty liver     Gout     Hyperlipidemia     Hypertension     Hypertriglyceridemia     Mood disorder     Panic disorder     Type 2 diabetes mellitus     05/2013 BS     Past Surgical History:   Procedure Laterality Date    APPENDECTOMY       Family History   Problem Relation Age of Onset    Coronary artery disease Father     Diabetes Father     Lung cancer Father     Diabetes Maternal Aunt     Diabetes Paternal Aunt     Hypertension Maternal Grandmother      Social History     Social History    Marital status: Single     Spouse name: N/A    Number of children: N/A    Years of education: N/A     Social History Main Topics    Smoking status: Never Smoker    Smokeless tobacco: Never Used    Alcohol use Yes      Comment: rarely    Drug use: Unknown    Sexual activity: Not Asked     Other Topics Concern    None     Social History Narrative    None       Review of Systems    Objective:      Physical Exam  r 1st mtp mild swelling and eryth 2 cm laterally and ttp. No redness or tend around   Assessment:       1. Podagra    2. Essential hypertension    3. Type 2 diabetes mellitus without complication, without long-term current use of insulin        Plan:       **Note off work tonight  F/u after nov lab  Nurse bp check 2 weeks  If no better 2days followup sooner if any worsening or change in symptoms or lack of resolution;if inc redness f/u ;d/wd him next step may be steroids and doubtful but poss abx if inc redness  *  dont use naprox or ibup while taking indomet ()Side  effects and dosing discussed  Podagra    Essential hypertension    Type 2 diabetes mellitus without complication, without long-term current use of insulin    Other orders  -     indomethacin (INDOCIN) 50 MG capsule; 1 po tid x 2 days then tid prn only  Dispense: 30 capsule; Refill: 0         Kenalog Preparation: Kenalog

## 2021-12-08 ENCOUNTER — CLINICAL SUPPORT (OUTPATIENT)
Dept: REHABILITATION | Facility: HOSPITAL | Age: 51
End: 2021-12-08
Payer: COMMERCIAL

## 2021-12-08 DIAGNOSIS — M25.60 DECREASED RANGE OF MOTION WITH DECREASED STRENGTH: ICD-10-CM

## 2021-12-08 DIAGNOSIS — R53.1 DECREASED RANGE OF MOTION WITH DECREASED STRENGTH: ICD-10-CM

## 2021-12-08 PROCEDURE — 97140 MANUAL THERAPY 1/> REGIONS: CPT

## 2021-12-08 PROCEDURE — 97110 THERAPEUTIC EXERCISES: CPT

## 2021-12-28 DIAGNOSIS — D64.9 ANEMIA, UNSPECIFIED TYPE: ICD-10-CM

## 2021-12-28 DIAGNOSIS — I10 HYPERTENSION, UNSPECIFIED TYPE: Chronic | ICD-10-CM

## 2021-12-28 RX ORDER — IRON,CARBONYL/ASCORBIC ACID 100-250 MG
1 TABLET ORAL DAILY
Qty: 90 TABLET | Refills: 3 | Status: SHIPPED | OUTPATIENT
Start: 2021-12-28 | End: 2022-11-15 | Stop reason: SDUPTHER

## 2021-12-29 RX ORDER — CALCIUM CARB/VITAMIN D3/VIT K1 500-100-40
TABLET,CHEWABLE ORAL
Qty: 100 EACH | Refills: 0 | Status: SHIPPED | OUTPATIENT
Start: 2021-12-29 | End: 2024-03-11 | Stop reason: ALTCHOICE

## 2021-12-29 RX ORDER — CARVEDILOL 25 MG/1
25 TABLET ORAL 2 TIMES DAILY WITH MEALS
Qty: 180 TABLET | Refills: 1 | Status: SHIPPED | OUTPATIENT
Start: 2021-12-29 | End: 2022-08-22 | Stop reason: SDUPTHER

## 2022-01-14 ENCOUNTER — TELEPHONE (OUTPATIENT)
Dept: INTERNAL MEDICINE | Facility: CLINIC | Age: 52
End: 2022-01-14
Payer: COMMERCIAL

## 2022-01-14 ENCOUNTER — PATIENT OUTREACH (OUTPATIENT)
Dept: ADMINISTRATIVE | Facility: HOSPITAL | Age: 52
End: 2022-01-14
Payer: COMMERCIAL

## 2022-01-14 NOTE — PROGRESS NOTES
HTN Report: Patients home BP on 1/8/22 was 139/83. Remote BP will be entered. Patient has an appointment scheduled with PCP 3/28/22.

## 2022-01-25 ENCOUNTER — PATIENT MESSAGE (OUTPATIENT)
Dept: OTHER | Facility: OTHER | Age: 52
End: 2022-01-25
Payer: COMMERCIAL

## 2022-02-03 RX ORDER — SEMAGLUTIDE 1.34 MG/ML
INJECTION, SOLUTION SUBCUTANEOUS
Qty: 3 PEN | Refills: 0 | Status: SHIPPED | OUTPATIENT
Start: 2022-02-03 | End: 2022-04-19 | Stop reason: SDUPTHER

## 2022-02-24 ENCOUNTER — LAB VISIT (OUTPATIENT)
Dept: LAB | Facility: HOSPITAL | Age: 52
End: 2022-02-24
Attending: INTERNAL MEDICINE
Payer: COMMERCIAL

## 2022-02-24 DIAGNOSIS — D50.9 IRON DEFICIENCY ANEMIA, UNSPECIFIED IRON DEFICIENCY ANEMIA TYPE: ICD-10-CM

## 2022-02-24 LAB
BASOPHILS # BLD AUTO: 0.06 K/UL (ref 0–0.2)
BASOPHILS NFR BLD: 0.8 % (ref 0–1.9)
DIFFERENTIAL METHOD: ABNORMAL
EOSINOPHIL # BLD AUTO: 0.5 K/UL (ref 0–0.5)
EOSINOPHIL NFR BLD: 6.2 % (ref 0–8)
ERYTHROCYTE [DISTWIDTH] IN BLOOD BY AUTOMATED COUNT: 13.2 % (ref 11.5–14.5)
FERRITIN SERPL-MCNC: 102 NG/ML (ref 20–300)
HCT VFR BLD AUTO: 38 % (ref 40–54)
HGB BLD-MCNC: 12.8 G/DL (ref 14–18)
IMM GRANULOCYTES # BLD AUTO: 0.07 K/UL (ref 0–0.04)
IMM GRANULOCYTES NFR BLD AUTO: 0.9 % (ref 0–0.5)
IRON SERPL-MCNC: 68 UG/DL (ref 45–160)
LYMPHOCYTES # BLD AUTO: 2.2 K/UL (ref 1–4.8)
LYMPHOCYTES NFR BLD: 28.5 % (ref 18–48)
MCH RBC QN AUTO: 28.4 PG (ref 27–31)
MCHC RBC AUTO-ENTMCNC: 33.7 G/DL (ref 32–36)
MCV RBC AUTO: 84 FL (ref 82–98)
MONOCYTES # BLD AUTO: 0.6 K/UL (ref 0.3–1)
MONOCYTES NFR BLD: 7.4 % (ref 4–15)
NEUTROPHILS # BLD AUTO: 4.3 K/UL (ref 1.8–7.7)
NEUTROPHILS NFR BLD: 56.2 % (ref 38–73)
NRBC BLD-RTO: 0 /100 WBC
PLATELET # BLD AUTO: 127 K/UL (ref 150–450)
PMV BLD AUTO: 11.1 FL (ref 9.2–12.9)
RBC # BLD AUTO: 4.51 M/UL (ref 4.6–6.2)
SATURATED IRON: 15 % (ref 20–50)
TOTAL IRON BINDING CAPACITY: 453 UG/DL (ref 250–450)
TRANSFERRIN SERPL-MCNC: 306 MG/DL (ref 200–375)
WBC # BLD AUTO: 7.71 K/UL (ref 3.9–12.7)

## 2022-02-24 PROCEDURE — 85025 COMPLETE CBC W/AUTO DIFF WBC: CPT | Performed by: INTERNAL MEDICINE

## 2022-02-24 PROCEDURE — 84466 ASSAY OF TRANSFERRIN: CPT | Performed by: INTERNAL MEDICINE

## 2022-02-24 PROCEDURE — 36415 COLL VENOUS BLD VENIPUNCTURE: CPT | Mod: PO | Performed by: INTERNAL MEDICINE

## 2022-02-24 PROCEDURE — 82728 ASSAY OF FERRITIN: CPT | Performed by: INTERNAL MEDICINE

## 2022-02-28 ENCOUNTER — OFFICE VISIT (OUTPATIENT)
Dept: HEMATOLOGY/ONCOLOGY | Facility: CLINIC | Age: 52
End: 2022-02-28
Payer: COMMERCIAL

## 2022-02-28 VITALS
BODY MASS INDEX: 32.55 KG/M2 | DIASTOLIC BLOOD PRESSURE: 86 MMHG | WEIGHT: 240.31 LBS | TEMPERATURE: 97 F | HEIGHT: 72 IN | HEART RATE: 60 BPM | RESPIRATION RATE: 16 BRPM | SYSTOLIC BLOOD PRESSURE: 139 MMHG | OXYGEN SATURATION: 99 %

## 2022-02-28 DIAGNOSIS — D64.9 ANEMIA, UNSPECIFIED TYPE: ICD-10-CM

## 2022-02-28 DIAGNOSIS — D69.6 THROMBOCYTOPENIA: ICD-10-CM

## 2022-02-28 DIAGNOSIS — D50.9 IRON DEFICIENCY ANEMIA, UNSPECIFIED IRON DEFICIENCY ANEMIA TYPE: Primary | ICD-10-CM

## 2022-02-28 PROCEDURE — 99214 OFFICE O/P EST MOD 30 MIN: CPT | Mod: S$GLB,,,

## 2022-02-28 PROCEDURE — 99999 PR PBB SHADOW E&M-EST. PATIENT-LVL IV: ICD-10-PCS | Mod: PBBFAC,,,

## 2022-02-28 PROCEDURE — 99214 PR OFFICE/OUTPT VISIT, EST, LEVL IV, 30-39 MIN: ICD-10-PCS | Mod: S$GLB,,,

## 2022-02-28 PROCEDURE — 3075F SYST BP GE 130 - 139MM HG: CPT | Mod: CPTII,S$GLB,,

## 2022-02-28 PROCEDURE — 3079F PR MOST RECENT DIASTOLIC BLOOD PRESSURE 80-89 MM HG: ICD-10-PCS | Mod: CPTII,S$GLB,,

## 2022-02-28 PROCEDURE — 99999 PR PBB SHADOW E&M-EST. PATIENT-LVL IV: CPT | Mod: PBBFAC,,,

## 2022-02-28 PROCEDURE — 1160F RVW MEDS BY RX/DR IN RCRD: CPT | Mod: CPTII,S$GLB,,

## 2022-02-28 PROCEDURE — 1160F PR REVIEW ALL MEDS BY PRESCRIBER/CLIN PHARMACIST DOCUMENTED: ICD-10-PCS | Mod: CPTII,S$GLB,,

## 2022-02-28 PROCEDURE — 1159F PR MEDICATION LIST DOCUMENTED IN MEDICAL RECORD: ICD-10-PCS | Mod: CPTII,S$GLB,,

## 2022-02-28 PROCEDURE — 3008F PR BODY MASS INDEX (BMI) DOCUMENTED: ICD-10-PCS | Mod: CPTII,S$GLB,,

## 2022-02-28 PROCEDURE — 3075F PR MOST RECENT SYSTOLIC BLOOD PRESS GE 130-139MM HG: ICD-10-PCS | Mod: CPTII,S$GLB,,

## 2022-02-28 PROCEDURE — 1159F MED LIST DOCD IN RCRD: CPT | Mod: CPTII,S$GLB,,

## 2022-02-28 PROCEDURE — 3079F DIAST BP 80-89 MM HG: CPT | Mod: CPTII,S$GLB,,

## 2022-02-28 PROCEDURE — 3008F BODY MASS INDEX DOCD: CPT | Mod: CPTII,S$GLB,,

## 2022-02-28 NOTE — ASSESSMENT & PLAN NOTE
-Sat. Iron 15%; Ferritin 102  -Continue PO iron daily   -Discussed ways to maximize absorption of PO iron

## 2022-02-28 NOTE — PROGRESS NOTES
Subjective:      Patient ID: Car Castillo is a 52 y.o. male.    Chief Complaint: 3 month f/u PETROS    HPI:  Mr. Castillo is a pleasant 52-year-old  male with a PMHx of HTN, hyperlipidemia, ARPAN, DM, gout, and PETROS. He presents today for 3 month follow-up and lab review. He states he is doing well denies NVDC, sob, fatigue, hemoptysis, hematochezia, melena. He does note some darkening of stool when he takes oral iron.  He continues to take ICarC without difficulty.  Last EGD/colonoscopy  was unremarkable with the recommendation to repeat in 10 years.  Social History     Socioeconomic History    Marital status: Single   Tobacco Use    Smoking status: Never Smoker    Smokeless tobacco: Never Used   Substance and Sexual Activity    Alcohol use: Not Currently     Comment: rarely    Drug use: No    Sexual activity: Yes     Partners: Female       Family History   Problem Relation Age of Onset    Coronary artery disease Father     Diabetes Father     Lung cancer Father     Diabetes Maternal Aunt     Diabetes Paternal Aunt     Hypertension Maternal Grandmother     Cataracts Maternal Grandmother     Hypertension Mother     Cataracts Mother        Past Surgical History:   Procedure Laterality Date    APPENDECTOMY      COLONOSCOPY N/A 11/27/2019    Procedure: COLONOSCOPY;  Surgeon: Radha Anne MD;  [facility]: [facility];  Service: Endoscopy;  Laterality: N/A;    ESOPHAGOGASTRODUODENOSCOPY N/A 11/27/2019    Procedure: EGD (ESOPHAGOGASTRODUODENOSCOPY);  Surgeon: Radha Anne MD;  Location: [facility];  Service: Endoscopy;  Laterality: N/A;       Past Medical History:   Diagnosis Date    Anemia 2020    dr mendel CARRID-19 01/16/2021    DM (diabetes mellitus)     Fatty liver     GERD (gastroesophageal reflux disease)     Gout     Hyperlipidemia     Hypertension     Hypertriglyceridemia     Mood disorder     Panic disorder     Sleep apnea     wears CPAP    Type 2 diabetes mellitus      "05/2013 BS       Review of Systems   Constitutional: Negative for activity change, appetite change, chills, diaphoresis, fatigue, fever and unexpected weight change.   HENT: Negative for congestion, dental problem, facial swelling, mouth sores, nosebleeds, trouble swallowing and voice change.    Eyes: Negative for visual disturbance.   Respiratory: Negative for apnea, cough, choking, chest tightness, shortness of breath, wheezing and stridor.    Cardiovascular: Negative for chest pain, palpitations and leg swelling.   Gastrointestinal: Negative for abdominal distention, abdominal pain, anal bleeding, blood in stool, constipation, diarrhea, nausea, rectal pain and vomiting.   Genitourinary: Negative for decreased urine volume, difficulty urinating, dysuria, enuresis, frequency, hematuria and urgency.   Musculoskeletal: Negative for arthralgias, back pain, gait problem, joint swelling, myalgias and neck stiffness.   Neurological: Negative for dizziness, tremors, weakness, light-headedness and numbness.   Hematological: Negative for adenopathy. Does not bruise/bleed easily.   Psychiatric/Behavioral: The patient is not nervous/anxious.           Medication List with Changes/Refills   Current Medications    AMLODIPINE (NORVASC) 10 MG TABLET    Take 1 tablet by mouth once daily    ASPIRIN (ECOTRIN) 81 MG EC TABLET    Take 81 mg by mouth once daily.    CARVEDILOL (COREG) 25 MG TABLET    Take 1 tablet (25 mg total) by mouth 2 (two) times daily with meals.    DULOXETINE (CYMBALTA) 60 MG CAPSULE    Take 1 capsule by mouth once daily    INSULIN LISPRO (HUMALOG U-100 INSULIN) 100 UNIT/ML INJECTION    Use via sliding scale    INSULIN SYRINGE-NEEDLE U-100 0.3 ML 31 GAUGE X 5/16" SYRG    Use as directed    IRON-VITAMIN C 100-250 MG, ICAR-C, (ICAR-C) 100-250 MG TAB    Take 1 tablet by mouth once daily.    METFORMIN (GLUCOPHAGE-XR) 500 MG ER 24HR TABLET    Take 2 tablets (1,000 mg total) by mouth 2 (two) times daily with meals.    " OMEPRAZOLE (PRILOSEC) 40 MG CAPSULE    Take 1 capsule (40 mg total) by mouth daily as needed (Gerd).    ROSUVASTATIN (CRESTOR) 10 MG TABLET    Take 1 tablet (10 mg total) by mouth once daily.    SEMAGLUTIDE (OZEMPIC) 1 MG/DOSE (4 MG/3 ML)    Inject 1 mg into the skin every 7 days as directed.    VALSARTAN (DIOVAN) 320 MG TABLET    TAKE 1 TABLET BY MOUTH EVERY DAY        Objective:     Vitals:    02/28/22 1353   BP: 139/86   Pulse: 60   Resp: 16   Temp: 97.4 °F (36.3 °C)       Physical Exam  Constitutional:       Appearance: He is well-developed.   HENT:      Head: Normocephalic.      Right Ear: External ear normal.      Left Ear: External ear normal.      Mouth/Throat:      Mouth: Mucous membranes are moist.      Pharynx: Oropharynx is clear. No oropharyngeal exudate.   Eyes:      Conjunctiva/sclera: Conjunctivae normal.   Neck:      Thyroid: No thyromegaly.   Cardiovascular:      Rate and Rhythm: Normal rate and regular rhythm.      Heart sounds: No murmur heard.    No gallop.   Pulmonary:      Effort: Pulmonary effort is normal. No respiratory distress.      Breath sounds: No wheezing or rales.   Abdominal:      General: Bowel sounds are normal. There is no distension.      Palpations: Abdomen is soft. There is no mass.      Tenderness: There is no guarding or rebound.   Musculoskeletal:         General: Normal range of motion.   Lymphadenopathy:      Cervical: No cervical adenopathy.   Skin:     General: Skin is warm and dry.      Capillary Refill: Capillary refill takes less than 2 seconds.   Neurological:      Mental Status: He is alert and oriented to person, place, and time.   Psychiatric:         Behavior: Behavior normal.         Lab Results   Component Value Date    WBC 7.71 02/24/2022    HGB 12.8 (L) 02/24/2022    HCT 38.0 (L) 02/24/2022    MCV 84 02/24/2022     (L) 02/24/2022       Lab Results   Component Value Date     09/04/2021    K 4.2 09/04/2021     09/04/2021    CO2 20 (L)  09/04/2021    BUN 17 09/04/2021    CREATININE 0.9 09/04/2021    CALCIUM 9.6 09/04/2021    ANIONGAP 14 09/04/2021    ESTGFRAFRICA >60 09/04/2021    EGFRNONAA >60 09/04/2021     Lab Results   Component Value Date    ALT 18 09/04/2021    AST 17 09/04/2021    ALKPHOS 90 09/04/2021    BILITOT 0.6 09/04/2021       Assessment/Plan:     Problem List Items Addressed This Visit        Hematology    Thrombocytopenia     -Plt count 127k, mild thrombocytopenia  -Pt denies bleeding, bruising  -Discussed Sxs of bleeding and when to seek emergent tx  -R/o nutrient deficiencies; Pt had been excessive amounts of  mixed nuts but does not believe there were any walnuts.  -will continue to monitor           Relevant Orders    ZINC       Oncology    Anemia     -Hgb stable @ 12.8 g/dL today   -will continue to monitor           Relevant Orders    CBC Auto Differential    Comprehensive Metabolic Panel    Vitamin B12    Folate    COPPER, SERUM    PETROS (iron deficiency anemia) - Primary     -Sat. Iron 15%; Ferritin 102  -Continue PO iron daily   -Discussed ways to maximize absorption of PO iron           Relevant Orders    CBC Auto Differential    Comprehensive Metabolic Panel    Vitamin B12    Folate    COPPER, SERUM          Follow-up: In 3 months with labs. Please have have labs drawn 2 days before next clinic visit so we can review the results together.         Thank You,    NIRAJ Kovacs

## 2022-03-04 ENCOUNTER — PATIENT MESSAGE (OUTPATIENT)
Dept: INTERNAL MEDICINE | Facility: CLINIC | Age: 52
End: 2022-03-04
Payer: COMMERCIAL

## 2022-03-21 ENCOUNTER — LAB VISIT (OUTPATIENT)
Dept: LAB | Facility: HOSPITAL | Age: 52
End: 2022-03-21
Payer: COMMERCIAL

## 2022-03-21 ENCOUNTER — LAB VISIT (OUTPATIENT)
Dept: LAB | Facility: HOSPITAL | Age: 52
End: 2022-03-21
Attending: PHYSICIAN ASSISTANT
Payer: COMMERCIAL

## 2022-03-21 DIAGNOSIS — E78.1 HYPERTRIGLYCERIDEMIA: ICD-10-CM

## 2022-03-21 DIAGNOSIS — E78.5 HYPERLIPIDEMIA, UNSPECIFIED HYPERLIPIDEMIA TYPE: ICD-10-CM

## 2022-03-21 DIAGNOSIS — E11.9 TYPE 2 DIABETES MELLITUS WITHOUT COMPLICATION, WITHOUT LONG-TERM CURRENT USE OF INSULIN: ICD-10-CM

## 2022-03-21 DIAGNOSIS — D64.9 ANEMIA, UNSPECIFIED TYPE: ICD-10-CM

## 2022-03-21 LAB
ALBUMIN/CREAT UR: 11.6 UG/MG (ref 0–30)
BASOPHILS # BLD AUTO: 0.08 K/UL (ref 0–0.2)
BASOPHILS NFR BLD: 0.8 % (ref 0–1.9)
CHOLEST SERPL-MCNC: 167 MG/DL (ref 120–199)
CHOLEST/HDLC SERPL: 4.2 {RATIO} (ref 2–5)
CREAT UR-MCNC: 361 MG/DL (ref 23–375)
DIFFERENTIAL METHOD: ABNORMAL
EOSINOPHIL # BLD AUTO: 0.4 K/UL (ref 0–0.5)
EOSINOPHIL NFR BLD: 4.3 % (ref 0–8)
ERYTHROCYTE [DISTWIDTH] IN BLOOD BY AUTOMATED COUNT: 13.2 % (ref 11.5–14.5)
ESTIMATED AVG GLUCOSE: 117 MG/DL (ref 68–131)
HBA1C MFR BLD: 5.7 % (ref 4–5.6)
HCT VFR BLD AUTO: 40.1 % (ref 40–54)
HDLC SERPL-MCNC: 40 MG/DL (ref 40–75)
HDLC SERPL: 24 % (ref 20–50)
HGB BLD-MCNC: 13.8 G/DL (ref 14–18)
IMM GRANULOCYTES # BLD AUTO: 0.09 K/UL (ref 0–0.04)
IMM GRANULOCYTES NFR BLD AUTO: 0.9 % (ref 0–0.5)
LDLC SERPL CALC-MCNC: 96.6 MG/DL (ref 63–159)
LYMPHOCYTES # BLD AUTO: 2.4 K/UL (ref 1–4.8)
LYMPHOCYTES NFR BLD: 25.3 % (ref 18–48)
MCH RBC QN AUTO: 29 PG (ref 27–31)
MCHC RBC AUTO-ENTMCNC: 34.4 G/DL (ref 32–36)
MCV RBC AUTO: 84 FL (ref 82–98)
MICROALBUMIN UR DL<=1MG/L-MCNC: 42 UG/ML
MONOCYTES # BLD AUTO: 0.7 K/UL (ref 0.3–1)
MONOCYTES NFR BLD: 6.8 % (ref 4–15)
NEUTROPHILS # BLD AUTO: 5.9 K/UL (ref 1.8–7.7)
NEUTROPHILS NFR BLD: 61.9 % (ref 38–73)
NONHDLC SERPL-MCNC: 127 MG/DL
NRBC BLD-RTO: 0 /100 WBC
PLATELET # BLD AUTO: 259 K/UL (ref 150–450)
PMV BLD AUTO: 9.1 FL (ref 9.2–12.9)
RBC # BLD AUTO: 4.76 M/UL (ref 4.6–6.2)
TRIGL SERPL-MCNC: 152 MG/DL (ref 30–150)
WBC # BLD AUTO: 9.55 K/UL (ref 3.9–12.7)

## 2022-03-21 PROCEDURE — 82570 ASSAY OF URINE CREATININE: CPT | Performed by: PHYSICIAN ASSISTANT

## 2022-03-21 PROCEDURE — 82043 UR ALBUMIN QUANTITATIVE: CPT | Performed by: PHYSICIAN ASSISTANT

## 2022-03-21 PROCEDURE — 80061 LIPID PANEL: CPT | Performed by: PHYSICIAN ASSISTANT

## 2022-03-21 PROCEDURE — 85025 COMPLETE CBC W/AUTO DIFF WBC: CPT | Performed by: PHYSICIAN ASSISTANT

## 2022-03-21 PROCEDURE — 83036 HEMOGLOBIN GLYCOSYLATED A1C: CPT | Performed by: PHYSICIAN ASSISTANT

## 2022-03-21 PROCEDURE — 36415 COLL VENOUS BLD VENIPUNCTURE: CPT | Performed by: PHYSICIAN ASSISTANT

## 2022-03-22 ENCOUNTER — PATIENT OUTREACH (OUTPATIENT)
Dept: ADMINISTRATIVE | Facility: OTHER | Age: 52
End: 2022-03-22
Payer: COMMERCIAL

## 2022-03-22 NOTE — PROGRESS NOTES
Health Maintenance Due   Topic Date Due    Shingles Vaccine (1 of 2) Never done    Foot Exam  05/21/2021    Eye Exam  06/19/2021    Influenza Vaccine (1) 09/01/2021    COVID-19 Vaccine (4 - Booster for Pfizer series) 02/28/2022     Updates were requested from care everywhere.  Chart was reviewed for overdue Proactive [facility] Encounters (BEAR) topics (CRS, Breast Cancer Screening, Eye exam)  Health Maintenance has been updated.  LINKS immunization registry triggered.  Immunizations were reconciled.

## 2022-03-23 ENCOUNTER — OFFICE VISIT (OUTPATIENT)
Dept: SLEEP MEDICINE | Facility: CLINIC | Age: 52
End: 2022-03-23
Payer: COMMERCIAL

## 2022-03-23 VITALS
HEIGHT: 72 IN | OXYGEN SATURATION: 99 % | BODY MASS INDEX: 32.61 KG/M2 | DIASTOLIC BLOOD PRESSURE: 74 MMHG | WEIGHT: 240.75 LBS | SYSTOLIC BLOOD PRESSURE: 120 MMHG | HEART RATE: 71 BPM | RESPIRATION RATE: 16 BRPM

## 2022-03-23 DIAGNOSIS — G47.33 OSA (OBSTRUCTIVE SLEEP APNEA): Primary | Chronic | ICD-10-CM

## 2022-03-23 DIAGNOSIS — G47.26 SHIFTING SLEEP-WORK SCHEDULE: ICD-10-CM

## 2022-03-23 DIAGNOSIS — E66.9 OBESITY (BMI 30.0-34.9): ICD-10-CM

## 2022-03-23 PROCEDURE — 99214 PR OFFICE/OUTPT VISIT, EST, LEVL IV, 30-39 MIN: ICD-10-PCS | Mod: S$GLB,,, | Performed by: NURSE PRACTITIONER

## 2022-03-23 PROCEDURE — 3061F NEG MICROALBUMINURIA REV: CPT | Mod: CPTII,S$GLB,, | Performed by: NURSE PRACTITIONER

## 2022-03-23 PROCEDURE — 99999 PR PBB SHADOW E&M-EST. PATIENT-LVL IV: ICD-10-PCS | Mod: PBBFAC,,, | Performed by: NURSE PRACTITIONER

## 2022-03-23 PROCEDURE — 3074F SYST BP LT 130 MM HG: CPT | Mod: CPTII,S$GLB,, | Performed by: NURSE PRACTITIONER

## 2022-03-23 PROCEDURE — 3066F NEPHROPATHY DOC TX: CPT | Mod: CPTII,S$GLB,, | Performed by: NURSE PRACTITIONER

## 2022-03-23 PROCEDURE — 3066F PR DOCUMENTATION OF TREATMENT FOR NEPHROPATHY: ICD-10-PCS | Mod: CPTII,S$GLB,, | Performed by: NURSE PRACTITIONER

## 2022-03-23 PROCEDURE — 3008F BODY MASS INDEX DOCD: CPT | Mod: CPTII,S$GLB,, | Performed by: NURSE PRACTITIONER

## 2022-03-23 PROCEDURE — 1159F MED LIST DOCD IN RCRD: CPT | Mod: CPTII,S$GLB,, | Performed by: NURSE PRACTITIONER

## 2022-03-23 PROCEDURE — 1160F PR REVIEW ALL MEDS BY PRESCRIBER/CLIN PHARMACIST DOCUMENTED: ICD-10-PCS | Mod: CPTII,S$GLB,, | Performed by: NURSE PRACTITIONER

## 2022-03-23 PROCEDURE — 99214 OFFICE O/P EST MOD 30 MIN: CPT | Mod: S$GLB,,, | Performed by: NURSE PRACTITIONER

## 2022-03-23 PROCEDURE — 4010F ACE/ARB THERAPY RXD/TAKEN: CPT | Mod: CPTII,S$GLB,, | Performed by: NURSE PRACTITIONER

## 2022-03-23 PROCEDURE — 3078F PR MOST RECENT DIASTOLIC BLOOD PRESSURE < 80 MM HG: ICD-10-PCS | Mod: CPTII,S$GLB,, | Performed by: NURSE PRACTITIONER

## 2022-03-23 PROCEDURE — 1160F RVW MEDS BY RX/DR IN RCRD: CPT | Mod: CPTII,S$GLB,, | Performed by: NURSE PRACTITIONER

## 2022-03-23 PROCEDURE — 4010F PR ACE/ARB THEARPY RXD/TAKEN: ICD-10-PCS | Mod: CPTII,S$GLB,, | Performed by: NURSE PRACTITIONER

## 2022-03-23 PROCEDURE — 3008F PR BODY MASS INDEX (BMI) DOCUMENTED: ICD-10-PCS | Mod: CPTII,S$GLB,, | Performed by: NURSE PRACTITIONER

## 2022-03-23 PROCEDURE — 3044F PR MOST RECENT HEMOGLOBIN A1C LEVEL <7.0%: ICD-10-PCS | Mod: CPTII,S$GLB,, | Performed by: NURSE PRACTITIONER

## 2022-03-23 PROCEDURE — 99999 PR PBB SHADOW E&M-EST. PATIENT-LVL IV: CPT | Mod: PBBFAC,,, | Performed by: NURSE PRACTITIONER

## 2022-03-23 PROCEDURE — 3061F PR NEG MICROALBUMINURIA RESULT DOCUMENTED/REVIEW: ICD-10-PCS | Mod: CPTII,S$GLB,, | Performed by: NURSE PRACTITIONER

## 2022-03-23 PROCEDURE — 3074F PR MOST RECENT SYSTOLIC BLOOD PRESSURE < 130 MM HG: ICD-10-PCS | Mod: CPTII,S$GLB,, | Performed by: NURSE PRACTITIONER

## 2022-03-23 PROCEDURE — 3078F DIAST BP <80 MM HG: CPT | Mod: CPTII,S$GLB,, | Performed by: NURSE PRACTITIONER

## 2022-03-23 PROCEDURE — 1159F PR MEDICATION LIST DOCUMENTED IN MEDICAL RECORD: ICD-10-PCS | Mod: CPTII,S$GLB,, | Performed by: NURSE PRACTITIONER

## 2022-03-23 PROCEDURE — 3044F HG A1C LEVEL LT 7.0%: CPT | Mod: CPTII,S$GLB,, | Performed by: NURSE PRACTITIONER

## 2022-03-23 NOTE — PROGRESS NOTES
Subjective:      Patient ID: Car Castillo is a 52 y.o. male.    Chief Complaint: Sleep Apnea    HPI  Presents to office for review of AutoPAP therapy. Patient states improved symptoms with use of AutoPAP. Sleeping more soundly. Waking up feeling more refreshed. Improved daytime sleepiness. Patient states he is benefiting from use of the AutoPAP.   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    Patient Active Problem List   Diagnosis    Fatty liver    Type 2 diabetes mellitus    Mood disorder    Hypertriglyceridemia    Hyperlipidemia    Hypertension    Chronic gout    ARPAN (obstructive sleep apnea)    Obesity (BMI 30.0-34.9)    Shifting sleep-work schedule    Anemia    PERTOS (iron deficiency anemia)    Decreased range of motion with decreased strength    Thrombocytopenia     /74   Pulse 71   Resp 16   Ht 6' (1.829 m)   Wt 109.2 kg (240 lb 11.9 oz)   SpO2 99%   BMI 32.65 kg/m²   Body mass index is 32.65 kg/m².    Review of Systems   Constitutional: Negative.    HENT: Negative.    Respiratory: Negative.    Cardiovascular: Negative.    Musculoskeletal: Negative.    Gastrointestinal: Negative.    Psychiatric/Behavioral: Negative.      Objective:      Physical Exam  Constitutional:       Appearance: He is well-developed. He is obese.   HENT:      Head: Normocephalic and atraumatic.      Right Ear: External ear normal.      Left Ear: External ear normal.      Nose: Nose normal.   Eyes:      General: Lids are normal.      Conjunctiva/sclera: Conjunctivae normal.   Pulmonary:      Effort: Pulmonary effort is normal. No tachypnea, bradypnea, accessory muscle usage or respiratory distress.   Musculoskeletal:      Cervical back: Normal range of motion.   Skin:     Findings: No rash.   Neurological:      Mental Status: He is alert and oriented to person, place, and time.   Psychiatric:         Behavior: Behavior normal.         Thought Content: Thought content normal.         Judgment: Judgment normal.       Personal  "Diagnostic Review  Compliance Summary  2/13/2022 - 3/14/2022 (30 days)  Days with Device Usage 29 days  Days without Device Usage 1 day  Percent Days with Device Usage 96.7%  Cumulative Usage 11 days 16 hrs. 50 mins. 12 secs.  Maximum Usage (1 Day) 17 hrs. 55 mins. 13 secs.  Average Usage (All Days) 9 hrs. 21 mins. 40 secs.  Average Usage (Days Used) 9 hrs. 41 mins. 2 secs.  Minimum Usage (1 Day) 4 hrs. 12 mins. 57 secs.  Percent of Days with Usage >= 4 Hours 96.7%  Percent of Days with Usage < 4 Hours 3.3%  Date Range  Total Blower Time 11 days 19 hrs. 45 mins. 36 secs.  Average AHI 2.6  Auto-CPAP Summary  Auto-CPAP Mean Pressure 11.6 cmH2O  Auto-CPAP Peak Average Pressure 14.9 cmH2O  Device Pressure <= 90% of Time 15.0 cmH2O  Average Time in Large Leak Per Day 46 secs.      Assessment:       1. ARPAN (obstructive sleep apnea)    2. Shifting sleep-work schedule    3. Obesity (BMI 30.0-34.9)        Outpatient Encounter Medications as of 3/23/2022   Medication Sig Dispense Refill    amLODIPine (NORVASC) 10 MG tablet Take 1 tablet by mouth once daily 90 tablet 4    aspirin (ECOTRIN) 81 MG EC tablet Take 81 mg by mouth once daily.      carvediloL (COREG) 25 MG tablet Take 1 tablet (25 mg total) by mouth 2 (two) times daily with meals. 180 tablet 1    DULoxetine (CYMBALTA) 60 MG capsule Take 1 capsule by mouth once daily 90 capsule 4    insulin lispro (HUMALOG U-100 INSULIN) 100 unit/mL injection Use via sliding scale 10 mL 1    insulin syringe-needle U-100 0.3 mL 31 gauge x 5/16" Syrg Use as directed (Patient taking differently: Use as directed) 100 each 0    iron-vitamin C 100-250 mg, ICAR-C, (ICAR-C) 100-250 mg Tab Take 1 tablet by mouth once daily. 90 tablet 3    metFORMIN (GLUCOPHAGE-XR) 500 MG ER 24hr tablet Take 2 tablets (1,000 mg total) by mouth 2 (two) times daily with meals. 360 tablet 1    omeprazole (PRILOSEC) 40 MG capsule Take 1 capsule (40 mg total) by mouth daily as needed (Gerd). 90 capsule 3 "    rosuvastatin (CRESTOR) 10 MG tablet Take 1 tablet (10 mg total) by mouth once daily. 90 tablet 4    semaglutide (OZEMPIC) 1 mg/dose (4 mg/3 mL) Inject 1 mg into the skin every 7 days as directed. 3 pen 0    valsartan (DIOVAN) 320 MG tablet TAKE 1 TABLET BY MOUTH EVERY DAY 90 tablet 4     No facility-administered encounter medications on file as of 3/23/2022.     Orders Placed This Encounter   Procedures    CPAP/BIPAP SUPPLIES     Order Specific Question:   Length of need (1-99 months):     Answer:   99     Order Specific Question:   Choose ONE mask type and its corresponding cushions and/or pillows:     Answer:    Nasal Cushion Mask, 1 per 90 days:  Nasal Cushions, (6 per 90 days)     Order Specific Question:   Choose EITHER Heated or Non-Heated Tubjing     Answer:    Non-Heated Tubing, 1 per 90 days     Order Specific Question:   All other supplies as needed as listed below:     Answer:    Headgear, 1 per 180 days     Order Specific Question:   All other supplies as needed as listed below:     Answer:    Disposable Filter, 6 per 90 days     Order Specific Question:   All other supplies as needed as listed below:     Answer:    Non-Disposable Filter, 1 per 180 days     Order Specific Question:   All other supplies as needed as listed below:     Answer:    Humidifier Chamber, 1 per 180 days     Plan:        Problem List Items Addressed This Visit        Endocrine    Obesity (BMI 30.0-34.9)    Current Assessment & Plan     Weight loss and exercise to improve overall health.             Relevant Orders    CPAP/BIPAP SUPPLIES       Other    ARPAN (obstructive sleep apnea) - Primary (Chronic)    Overview     Autopap. Compliant with PAP and benefits from use. Follow up annually in the sleep clinic.             Relevant Orders    CPAP/BIPAP SUPPLIES    Shifting sleep-work schedule    Overview     Night shift.           Relevant Orders    CPAP/BIPAP SUPPLIES

## 2022-03-28 ENCOUNTER — OFFICE VISIT (OUTPATIENT)
Dept: INTERNAL MEDICINE | Facility: CLINIC | Age: 52
End: 2022-03-28
Payer: COMMERCIAL

## 2022-03-28 VITALS
SYSTOLIC BLOOD PRESSURE: 121 MMHG | TEMPERATURE: 98 F | WEIGHT: 237.19 LBS | HEIGHT: 72 IN | DIASTOLIC BLOOD PRESSURE: 72 MMHG | OXYGEN SATURATION: 97 % | HEART RATE: 60 BPM | BODY MASS INDEX: 32.13 KG/M2

## 2022-03-28 DIAGNOSIS — I10 ESSENTIAL HYPERTENSION: Primary | ICD-10-CM

## 2022-03-28 DIAGNOSIS — D50.9 IRON DEFICIENCY ANEMIA, UNSPECIFIED IRON DEFICIENCY ANEMIA TYPE: ICD-10-CM

## 2022-03-28 DIAGNOSIS — M1A.9XX0 CHRONIC GOUT WITHOUT TOPHUS, UNSPECIFIED CAUSE, UNSPECIFIED SITE: ICD-10-CM

## 2022-03-28 DIAGNOSIS — K76.0 FATTY LIVER: ICD-10-CM

## 2022-03-28 DIAGNOSIS — I10 PRIMARY HYPERTENSION: Chronic | ICD-10-CM

## 2022-03-28 DIAGNOSIS — E11.9 TYPE 2 DIABETES MELLITUS WITHOUT COMPLICATION, WITHOUT LONG-TERM CURRENT USE OF INSULIN: ICD-10-CM

## 2022-03-28 DIAGNOSIS — Z12.5 SCREENING FOR PROSTATE CANCER: ICD-10-CM

## 2022-03-28 PROCEDURE — 3061F NEG MICROALBUMINURIA REV: CPT | Mod: CPTII,S$GLB,, | Performed by: FAMILY MEDICINE

## 2022-03-28 PROCEDURE — 3074F SYST BP LT 130 MM HG: CPT | Mod: CPTII,S$GLB,, | Performed by: FAMILY MEDICINE

## 2022-03-28 PROCEDURE — 3078F PR MOST RECENT DIASTOLIC BLOOD PRESSURE < 80 MM HG: ICD-10-PCS | Mod: CPTII,S$GLB,, | Performed by: FAMILY MEDICINE

## 2022-03-28 PROCEDURE — 99214 OFFICE O/P EST MOD 30 MIN: CPT | Mod: S$GLB,,, | Performed by: FAMILY MEDICINE

## 2022-03-28 PROCEDURE — 3008F PR BODY MASS INDEX (BMI) DOCUMENTED: ICD-10-PCS | Mod: CPTII,S$GLB,, | Performed by: FAMILY MEDICINE

## 2022-03-28 PROCEDURE — 3066F PR DOCUMENTATION OF TREATMENT FOR NEPHROPATHY: ICD-10-PCS | Mod: CPTII,S$GLB,, | Performed by: FAMILY MEDICINE

## 2022-03-28 PROCEDURE — 3044F HG A1C LEVEL LT 7.0%: CPT | Mod: CPTII,S$GLB,, | Performed by: FAMILY MEDICINE

## 2022-03-28 PROCEDURE — 4010F PR ACE/ARB THEARPY RXD/TAKEN: ICD-10-PCS | Mod: CPTII,S$GLB,, | Performed by: FAMILY MEDICINE

## 2022-03-28 PROCEDURE — 99214 PR OFFICE/OUTPT VISIT, EST, LEVL IV, 30-39 MIN: ICD-10-PCS | Mod: S$GLB,,, | Performed by: FAMILY MEDICINE

## 2022-03-28 PROCEDURE — 3061F PR NEG MICROALBUMINURIA RESULT DOCUMENTED/REVIEW: ICD-10-PCS | Mod: CPTII,S$GLB,, | Performed by: FAMILY MEDICINE

## 2022-03-28 PROCEDURE — 3078F DIAST BP <80 MM HG: CPT | Mod: CPTII,S$GLB,, | Performed by: FAMILY MEDICINE

## 2022-03-28 PROCEDURE — 99999 PR PBB SHADOW E&M-EST. PATIENT-LVL III: CPT | Mod: PBBFAC,,, | Performed by: FAMILY MEDICINE

## 2022-03-28 PROCEDURE — 3044F PR MOST RECENT HEMOGLOBIN A1C LEVEL <7.0%: ICD-10-PCS | Mod: CPTII,S$GLB,, | Performed by: FAMILY MEDICINE

## 2022-03-28 PROCEDURE — 3008F BODY MASS INDEX DOCD: CPT | Mod: CPTII,S$GLB,, | Performed by: FAMILY MEDICINE

## 2022-03-28 PROCEDURE — 99999 PR PBB SHADOW E&M-EST. PATIENT-LVL III: ICD-10-PCS | Mod: PBBFAC,,, | Performed by: FAMILY MEDICINE

## 2022-03-28 PROCEDURE — 4010F ACE/ARB THERAPY RXD/TAKEN: CPT | Mod: CPTII,S$GLB,, | Performed by: FAMILY MEDICINE

## 2022-03-28 PROCEDURE — 3074F PR MOST RECENT SYSTOLIC BLOOD PRESSURE < 130 MM HG: ICD-10-PCS | Mod: CPTII,S$GLB,, | Performed by: FAMILY MEDICINE

## 2022-03-28 PROCEDURE — 3066F NEPHROPATHY DOC TX: CPT | Mod: CPTII,S$GLB,, | Performed by: FAMILY MEDICINE

## 2022-03-28 NOTE — PROGRESS NOTES
Subjective:       Patient ID: Guillermo Castillo is a male.    Chief Complaint: Multiple issues see below    HPI Type 2 diabetes: a1cok/ digital med. Tolerating medicine. ;   Hypercholesterolemia:brenda med   Gout:  No c/o recent flares.   Hypertension: blood pressures at home nl.  Tolerating medicine.   Fatty liver nl . No etoh long time;   gerd controlled. No sympt    Mood fine on cymbalta    Anemia / seeing dr oglesby    Years of bilateral calf and posterior thigh pain at rest relieved with movement no pain with exertion more so at night.  He is Concerned about poss.  restless leg syndrome.  He also has some involuntary movement while he is asleep.  Discussed his iron deficiency history and connection poss. To  diagnosis.  It did start before statins were started.  Discussed possible Requip or Mirapex but he declines and wants to monitor        Past Medical History:   Diagnosis Date    Anemia 2020    dr oglesby    DM (diabetes mellitus)     Fatty liver     Gout     Hyperlipidemia     Hypertension     Hypertriglyceridemia     Mood disorder     Panic disorder     Sleep apnea     wears CPAP    Type 2 diabetes mellitus     05/2013 BS     Past Surgical History:   Procedure Laterality Date    APPENDECTOMY      COLONOSCOPY N/A 11/27/2019    Procedure: COLONOSCOPY;  Surgeon: Radha Anne MD;  [facility];  Service: Endoscopy;  Laterality: N/A;    ESOPHAGOGASTRODUODENOSCOPY N/A 11/27/2019    Procedure: EGD (ESOPHAGOGASTRODUODENOSCOPY);  Surgeon: Radha Anne MD;  Location: [facility];  Service: Endoscopy;  Laterality: N/A;     Family History   Problem Relation Age of Onset    Coronary artery disease Father     Diabetes Father     Lung cancer Father     Diabetes Maternal Aunt     Diabetes Paternal Aunt     Hypertension Maternal Grandmother     Cataracts Maternal Grandmother     Hypertension Mother     Cataracts Mother      Social History     Socioeconomic History    Marital status: Single      Spouse name: Not on file    Number of children: Not on file    Years of education: Not on file    Highest education level: Not on file   Occupational History    Not on file   Social Needs    Financial resource strain: Not on file    Food insecurity     Worry: Not on file     Inability: Not on file    Transportation needs     Medical: Not on file     Non-medical: Not on file   Tobacco Use    Smoking status: Never Smoker    Smokeless tobacco: Never Used   Substance and Sexual Activity    Alcohol use: Not Currently     Comment: rarely    Drug use: No    Sexual activity: Yes     Partners: Female   Lifestyle    Physical activity     Days per week: Not on file     Minutes per session: Not on file    Stress: Not on file   Relationships    Social connections     Talks on phone: Not on file     Gets together: Not on file     Attends Protestant service: Not on file     Active member of club or organization: Not on file     Attends meetings of clubs or organizations: Not on file     Relationship status: Not on file   Other Topics Concern    Not on file   Social History Narrative    Not on file             Review of Systems   Respiratory: Negative for shortness of breath.    Cardiovascular: Negative for chest pain a      Objective:      Physical Exam   Constitutional: He is oriented to person, place, and time. He appears well-developed and well-nourished.   HENT:   Head: Normocephalic and atraumatic.   Eyes: Conjunctivae and EOM are normal. Pupils are equal, round, and reactive to light.   Neck: Normal range of motion. Neck supple. Carotid bruit is not present. no mass  Cardiovascular: Normal rate and regular rhythm.    Pulmonary/Chest: Effort normal and breath sounds normal.         Neurological: He is alert and oriented to person, place, and time.   Skin: Skin is warm and dry.   Psychiatric: He has a normal mood and affect. His behavior is normal. Judgment and thought content normal.               .  Bilateral  feet with normal monofilament testing and no lesions.  Bilat dors pedis pulses 2+         Assessment:       1. Type 2 diabetes mellitus    2. Hypertension    3. Gout    4. Hypercholesteremia    5. Fatty liver      Anemia  Mood d/o  Plan:     Lab and follow up 6 months  Ruby(eunice req by him)  Dr oglesby as sched    Notify if whe/ready to try requip    Follow up with Dr Gomez for diabetic eye exam    Essential hypertension    Type 2 diabetes mellitus without complication, without long-term current use of insulin  -     Hemoglobin A1C; Future; Expected date: 09/24/2022  -     Vitamin B12; Future; Expected date: 09/28/2022    Fatty liver    Chronic gout without tophus, unspecified cause, unspecified site  -     Uric Acid; Future; Expected date: 09/24/2022    Screening for prostate cancer  -     PSA, Screening; Future; Expected date: 09/24/2022    Primary hypertension    Iron deficiency anemia, unspecified iron deficiency anemia type

## 2022-04-06 ENCOUNTER — PATIENT MESSAGE (OUTPATIENT)
Dept: OTHER | Facility: OTHER | Age: 52
End: 2022-04-06
Payer: COMMERCIAL

## 2022-04-19 ENCOUNTER — OFFICE VISIT (OUTPATIENT)
Dept: OPHTHALMOLOGY | Facility: CLINIC | Age: 52
End: 2022-04-19
Payer: COMMERCIAL

## 2022-04-19 DIAGNOSIS — H52.4 BILATERAL PRESBYOPIA: ICD-10-CM

## 2022-04-19 DIAGNOSIS — E11.9 DIABETES MELLITUS TYPE 2 WITHOUT RETINOPATHY: Primary | ICD-10-CM

## 2022-04-19 PROCEDURE — 3044F PR MOST RECENT HEMOGLOBIN A1C LEVEL <7.0%: ICD-10-PCS | Mod: CPTII,S$GLB,, | Performed by: OPTOMETRIST

## 2022-04-19 PROCEDURE — 92014 PR EYE EXAM, EST PATIENT,COMPREHESV: ICD-10-PCS | Mod: S$GLB,,, | Performed by: OPTOMETRIST

## 2022-04-19 PROCEDURE — 3066F PR DOCUMENTATION OF TREATMENT FOR NEPHROPATHY: ICD-10-PCS | Mod: CPTII,S$GLB,, | Performed by: OPTOMETRIST

## 2022-04-19 PROCEDURE — 4010F ACE/ARB THERAPY RXD/TAKEN: CPT | Mod: CPTII,S$GLB,, | Performed by: OPTOMETRIST

## 2022-04-19 PROCEDURE — 99999 PR PBB SHADOW E&M-EST. PATIENT-LVL III: CPT | Mod: PBBFAC,,, | Performed by: OPTOMETRIST

## 2022-04-19 PROCEDURE — 1159F MED LIST DOCD IN RCRD: CPT | Mod: CPTII,S$GLB,, | Performed by: OPTOMETRIST

## 2022-04-19 PROCEDURE — 4010F PR ACE/ARB THEARPY RXD/TAKEN: ICD-10-PCS | Mod: CPTII,S$GLB,, | Performed by: OPTOMETRIST

## 2022-04-19 PROCEDURE — 3044F HG A1C LEVEL LT 7.0%: CPT | Mod: CPTII,S$GLB,, | Performed by: OPTOMETRIST

## 2022-04-19 PROCEDURE — 1159F PR MEDICATION LIST DOCUMENTED IN MEDICAL RECORD: ICD-10-PCS | Mod: CPTII,S$GLB,, | Performed by: OPTOMETRIST

## 2022-04-19 PROCEDURE — 92015 PR REFRACTION: ICD-10-PCS | Mod: S$GLB,,, | Performed by: OPTOMETRIST

## 2022-04-19 PROCEDURE — 92014 COMPRE OPH EXAM EST PT 1/>: CPT | Mod: S$GLB,,, | Performed by: OPTOMETRIST

## 2022-04-19 PROCEDURE — 99999 PR PBB SHADOW E&M-EST. PATIENT-LVL III: ICD-10-PCS | Mod: PBBFAC,,, | Performed by: OPTOMETRIST

## 2022-04-19 PROCEDURE — 92015 DETERMINE REFRACTIVE STATE: CPT | Mod: S$GLB,,, | Performed by: OPTOMETRIST

## 2022-04-19 PROCEDURE — 3061F NEG MICROALBUMINURIA REV: CPT | Mod: CPTII,S$GLB,, | Performed by: OPTOMETRIST

## 2022-04-19 PROCEDURE — 3066F NEPHROPATHY DOC TX: CPT | Mod: CPTII,S$GLB,, | Performed by: OPTOMETRIST

## 2022-04-19 PROCEDURE — 3061F PR NEG MICROALBUMINURIA RESULT DOCUMENTED/REVIEW: ICD-10-PCS | Mod: CPTII,S$GLB,, | Performed by: OPTOMETRIST

## 2022-04-19 NOTE — PROGRESS NOTES
HPI     Diabetic Eye Exam      Additional comments: Lab Results       Component                Value               Date                       HGBA1C                   5.7 (H)             03/21/2022                          Comments     Yearly diabetic eye exam.  Vision unchanged since last visit.              Last edited by Laya Kwan MA on 4/19/2022  3:03 PM. (History)            Assessment /Plan     For exam results, see Encounter Report.    Diabetes mellitus type 2 without retinopathy    Bilateral presbyopia      No Background Diabetic Retinopathy    Dispense Final Rx for glasses.  RTC 1 year  Discussed above and answered questions.

## 2022-04-21 RX ORDER — SEMAGLUTIDE 1.34 MG/ML
INJECTION, SOLUTION SUBCUTANEOUS
Qty: 3 PEN | Refills: 11 | Status: SHIPPED | OUTPATIENT
Start: 2022-04-21 | End: 2023-08-29

## 2022-04-26 ENCOUNTER — PATIENT MESSAGE (OUTPATIENT)
Dept: OTHER | Facility: OTHER | Age: 52
End: 2022-04-26
Payer: COMMERCIAL

## 2022-05-25 ENCOUNTER — LAB VISIT (OUTPATIENT)
Dept: LAB | Facility: HOSPITAL | Age: 52
End: 2022-05-25
Payer: COMMERCIAL

## 2022-05-25 DIAGNOSIS — D50.9 IRON DEFICIENCY ANEMIA, UNSPECIFIED IRON DEFICIENCY ANEMIA TYPE: ICD-10-CM

## 2022-05-25 DIAGNOSIS — D69.6 THROMBOCYTOPENIA: ICD-10-CM

## 2022-05-25 LAB
ALBUMIN SERPL BCP-MCNC: 4.2 G/DL (ref 3.5–5.2)
ALP SERPL-CCNC: 95 U/L (ref 55–135)
ALT SERPL W/O P-5'-P-CCNC: 24 U/L (ref 10–44)
ANION GAP SERPL CALC-SCNC: 12 MMOL/L (ref 8–16)
AST SERPL-CCNC: 14 U/L (ref 10–40)
BASOPHILS # BLD AUTO: 0.05 K/UL (ref 0–0.2)
BASOPHILS NFR BLD: 0.5 % (ref 0–1.9)
BILIRUB SERPL-MCNC: 0.6 MG/DL (ref 0.1–1)
BUN SERPL-MCNC: 10 MG/DL (ref 6–20)
CALCIUM SERPL-MCNC: 9.4 MG/DL (ref 8.7–10.5)
CHLORIDE SERPL-SCNC: 104 MMOL/L (ref 95–110)
CO2 SERPL-SCNC: 21 MMOL/L (ref 23–29)
CREAT SERPL-MCNC: 0.9 MG/DL (ref 0.5–1.4)
DIFFERENTIAL METHOD: ABNORMAL
EOSINOPHIL # BLD AUTO: 0.5 K/UL (ref 0–0.5)
EOSINOPHIL NFR BLD: 5 % (ref 0–8)
ERYTHROCYTE [DISTWIDTH] IN BLOOD BY AUTOMATED COUNT: 13.3 % (ref 11.5–14.5)
EST. GFR  (AFRICAN AMERICAN): >60 ML/MIN/1.73 M^2
EST. GFR  (NON AFRICAN AMERICAN): >60 ML/MIN/1.73 M^2
FOLATE SERPL-MCNC: 6.4 NG/ML (ref 4–24)
GLUCOSE SERPL-MCNC: 140 MG/DL (ref 70–110)
HCT VFR BLD AUTO: 36.9 % (ref 40–54)
HGB BLD-MCNC: 12.9 G/DL (ref 14–18)
IMM GRANULOCYTES # BLD AUTO: 0.07 K/UL (ref 0–0.04)
IMM GRANULOCYTES NFR BLD AUTO: 0.7 % (ref 0–0.5)
LYMPHOCYTES # BLD AUTO: 3 K/UL (ref 1–4.8)
LYMPHOCYTES NFR BLD: 32.1 % (ref 18–48)
MCH RBC QN AUTO: 29.3 PG (ref 27–31)
MCHC RBC AUTO-ENTMCNC: 35 G/DL (ref 32–36)
MCV RBC AUTO: 84 FL (ref 82–98)
MONOCYTES # BLD AUTO: 0.8 K/UL (ref 0.3–1)
MONOCYTES NFR BLD: 8.3 % (ref 4–15)
NEUTROPHILS # BLD AUTO: 5 K/UL (ref 1.8–7.7)
NEUTROPHILS NFR BLD: 53.4 % (ref 38–73)
NRBC BLD-RTO: 0 /100 WBC
PLATELET # BLD AUTO: 227 K/UL (ref 150–450)
PMV BLD AUTO: 9.3 FL (ref 9.2–12.9)
POTASSIUM SERPL-SCNC: 3.9 MMOL/L (ref 3.5–5.1)
PROT SERPL-MCNC: 7.2 G/DL (ref 6–8.4)
RBC # BLD AUTO: 4.4 M/UL (ref 4.6–6.2)
SODIUM SERPL-SCNC: 137 MMOL/L (ref 136–145)
VIT B12 SERPL-MCNC: 553 PG/ML (ref 210–950)
WBC # BLD AUTO: 9.41 K/UL (ref 3.9–12.7)

## 2022-05-25 PROCEDURE — 82607 VITAMIN B-12: CPT

## 2022-05-25 PROCEDURE — 82525 ASSAY OF COPPER: CPT

## 2022-05-25 PROCEDURE — 82746 ASSAY OF FOLIC ACID SERUM: CPT

## 2022-05-25 PROCEDURE — 80053 COMPREHEN METABOLIC PANEL: CPT

## 2022-05-25 PROCEDURE — 85025 COMPLETE CBC W/AUTO DIFF WBC: CPT

## 2022-05-25 PROCEDURE — 84630 ASSAY OF ZINC: CPT

## 2022-05-31 ENCOUNTER — OFFICE VISIT (OUTPATIENT)
Dept: HEMATOLOGY/ONCOLOGY | Facility: CLINIC | Age: 52
End: 2022-05-31
Payer: COMMERCIAL

## 2022-05-31 VITALS
SYSTOLIC BLOOD PRESSURE: 128 MMHG | DIASTOLIC BLOOD PRESSURE: 82 MMHG | BODY MASS INDEX: 32.31 KG/M2 | WEIGHT: 238.56 LBS | HEART RATE: 65 BPM | HEIGHT: 72 IN | OXYGEN SATURATION: 98 % | TEMPERATURE: 98 F

## 2022-05-31 DIAGNOSIS — D64.9 ANEMIA, UNSPECIFIED TYPE: Primary | ICD-10-CM

## 2022-05-31 DIAGNOSIS — D50.9 IRON DEFICIENCY ANEMIA, UNSPECIFIED IRON DEFICIENCY ANEMIA TYPE: ICD-10-CM

## 2022-05-31 DIAGNOSIS — D69.6 THROMBOCYTOPENIA: ICD-10-CM

## 2022-05-31 LAB — ZINC SERPL-MCNC: 79 UG/DL (ref 60–130)

## 2022-05-31 PROCEDURE — 3066F PR DOCUMENTATION OF TREATMENT FOR NEPHROPATHY: ICD-10-PCS | Mod: CPTII,S$GLB,,

## 2022-05-31 PROCEDURE — 99214 PR OFFICE/OUTPT VISIT, EST, LEVL IV, 30-39 MIN: ICD-10-PCS | Mod: S$GLB,,,

## 2022-05-31 PROCEDURE — 3079F DIAST BP 80-89 MM HG: CPT | Mod: CPTII,S$GLB,,

## 2022-05-31 PROCEDURE — 3066F NEPHROPATHY DOC TX: CPT | Mod: CPTII,S$GLB,,

## 2022-05-31 PROCEDURE — 3074F SYST BP LT 130 MM HG: CPT | Mod: CPTII,S$GLB,,

## 2022-05-31 PROCEDURE — 1159F PR MEDICATION LIST DOCUMENTED IN MEDICAL RECORD: ICD-10-PCS | Mod: CPTII,S$GLB,,

## 2022-05-31 PROCEDURE — 3008F PR BODY MASS INDEX (BMI) DOCUMENTED: ICD-10-PCS | Mod: CPTII,S$GLB,,

## 2022-05-31 PROCEDURE — 3061F NEG MICROALBUMINURIA REV: CPT | Mod: CPTII,S$GLB,,

## 2022-05-31 PROCEDURE — 3044F PR MOST RECENT HEMOGLOBIN A1C LEVEL <7.0%: ICD-10-PCS | Mod: CPTII,S$GLB,,

## 2022-05-31 PROCEDURE — 3061F PR NEG MICROALBUMINURIA RESULT DOCUMENTED/REVIEW: ICD-10-PCS | Mod: CPTII,S$GLB,,

## 2022-05-31 PROCEDURE — 4010F ACE/ARB THERAPY RXD/TAKEN: CPT | Mod: CPTII,S$GLB,,

## 2022-05-31 PROCEDURE — 99999 PR PBB SHADOW E&M-EST. PATIENT-LVL III: ICD-10-PCS | Mod: PBBFAC,,,

## 2022-05-31 PROCEDURE — 99999 PR PBB SHADOW E&M-EST. PATIENT-LVL III: CPT | Mod: PBBFAC,,,

## 2022-05-31 PROCEDURE — 1159F MED LIST DOCD IN RCRD: CPT | Mod: CPTII,S$GLB,,

## 2022-05-31 PROCEDURE — 3079F PR MOST RECENT DIASTOLIC BLOOD PRESSURE 80-89 MM HG: ICD-10-PCS | Mod: CPTII,S$GLB,,

## 2022-05-31 PROCEDURE — 3074F PR MOST RECENT SYSTOLIC BLOOD PRESSURE < 130 MM HG: ICD-10-PCS | Mod: CPTII,S$GLB,,

## 2022-05-31 PROCEDURE — 3008F BODY MASS INDEX DOCD: CPT | Mod: CPTII,S$GLB,,

## 2022-05-31 PROCEDURE — 99214 OFFICE O/P EST MOD 30 MIN: CPT | Mod: S$GLB,,,

## 2022-05-31 PROCEDURE — 4010F PR ACE/ARB THEARPY RXD/TAKEN: ICD-10-PCS | Mod: CPTII,S$GLB,,

## 2022-05-31 PROCEDURE — 3044F HG A1C LEVEL LT 7.0%: CPT | Mod: CPTII,S$GLB,,

## 2022-06-01 LAB — COPPER SERPL-MCNC: 773 UG/L (ref 665–1480)

## 2022-06-02 NOTE — TELEPHONE ENCOUNTER
Good Afternoon,  [facility] until 08/25/2020 contacted the patient about approval  
Yes

## 2022-06-12 NOTE — PROGRESS NOTES
Subjective:      Patient ID: Car Castillo is a 52 y.o. male.    Chief Complaint: 3 month f/u PETROS    HPI:  Mr. Castillo is a pleasant 52-year-old  male with a PMHx of HTN, hyperlipidemia, ARPAN, DM, gout, and PETROS. He presents today for 3 month follow-up and lab review. He states he is doing well denies NVDC, sob, fatigue, hemoptysis, hematochezia, melena. He continues to feel well and has no complaints today. He remains on ICa daily without difficulty.  Last EGD/colonoscopy . Colonoscopy unremarkable; EGD notes acute esophagitis with ulcer--recommendation to repeat in 10 years.  Social History     Socioeconomic History    Marital status: Single   Tobacco Use    Smoking status: Never Smoker    Smokeless tobacco: Never Used   Substance and Sexual Activity    Alcohol use: Not Currently     Comment: rarely    Drug use: No    Sexual activity: Yes     Partners: Female       Family History   Problem Relation Age of Onset    Coronary artery disease Father     Diabetes Father     Lung cancer Father     Diabetes Maternal Aunt     Diabetes Paternal Aunt     Hypertension Maternal Grandmother     Cataracts Maternal Grandmother     Hypertension Mother     Cataracts Mother        Past Surgical History:   Procedure Laterality Date    APPENDECTOMY      COLONOSCOPY N/A 11/27/2019    Procedure: COLONOSCOPY;  Surgeon: Radha Anne MD;  Location: [facility];  Service: Endoscopy;  Laterality: N/A;    ESOPHAGOGASTRODUODENOSCOPY N/A 11/27/2019    Procedure: EGD (ESOPHAGOGASTRODUODENOSCOPY);  Surgeon: Radha Anne MD;  Location: [facility];  Service: Endoscopy;  Laterality: N/A;       Past Medical History:   Diagnosis Date    Anemia 2020    dr mendel DWYER-19 01/16/2021    DM (diabetes mellitus)     Fatty liver     GERD (gastroesophageal reflux disease)     Gout     Hyperlipidemia     Hypertension     Hypertriglyceridemia     Mood disorder     Panic disorder     Sleep apnea     wears CPAP  "   Type 2 diabetes mellitus     05/2013 BS       Review of Systems   Constitutional: Negative for activity change, appetite change, chills, diaphoresis, fatigue, fever and unexpected weight change.   HENT: Negative for congestion, dental problem, facial swelling, mouth sores, nosebleeds, trouble swallowing and voice change.    Eyes: Negative for visual disturbance.   Respiratory: Negative for apnea, cough, choking, chest tightness, shortness of breath, wheezing and stridor.    Cardiovascular: Negative for chest pain, palpitations and leg swelling.   Gastrointestinal: Negative for abdominal distention, abdominal pain, anal bleeding, blood in stool, constipation, diarrhea, nausea, rectal pain and vomiting.   Genitourinary: Negative for decreased urine volume, difficulty urinating, dysuria, enuresis, frequency, hematuria and urgency.   Musculoskeletal: Negative for arthralgias, back pain, gait problem, joint swelling, myalgias and neck stiffness.   Neurological: Negative for dizziness, tremors, weakness, light-headedness and numbness.   Hematological: Negative for adenopathy. Does not bruise/bleed easily.   Psychiatric/Behavioral: The patient is not nervous/anxious.           Medication List with Changes/Refills   Current Medications    AMLODIPINE (NORVASC) 10 MG TABLET    Take 1 tablet by mouth once daily    ASPIRIN (ECOTRIN) 81 MG EC TABLET    Take 81 mg by mouth once daily.    CARVEDILOL (COREG) 25 MG TABLET    Take 1 tablet (25 mg total) by mouth 2 (two) times daily with meals.    DULOXETINE (CYMBALTA) 60 MG CAPSULE    Take 1 capsule by mouth once daily    INSULIN LISPRO (HUMALOG U-100 INSULIN) 100 UNIT/ML INJECTION    Use via sliding scale    INSULIN SYRINGE-NEEDLE U-100 0.3 ML 31 GAUGE X 5/16" SYRG    Use as directed    IRON-VITAMIN C 100-250 MG, ICAR-C, (ICAR-C) 100-250 MG TAB    Take 1 tablet by mouth once daily.    METFORMIN (GLUCOPHAGE-XR) 500 MG ER 24HR TABLET    Take 2 tablets (1,000 mg total) by mouth 2 " (two) times daily with meals.    OMEPRAZOLE (PRILOSEC) 40 MG CAPSULE    Take 1 capsule (40 mg total) by mouth daily as needed (Gerd).    ROSUVASTATIN (CRESTOR) 10 MG TABLET    Take 1 tablet (10 mg total) by mouth once daily.    SEMAGLUTIDE (OZEMPIC) 1 MG/DOSE (4 MG/3 ML)    Inject 1 mg into the skin every 7 days as directed.    VALSARTAN (DIOVAN) 320 MG TABLET    TAKE 1 TABLET BY MOUTH EVERY DAY        Objective:     Vitals:    05/31/22 1353   BP: 128/82   Pulse: 65   Temp: 98 °F (36.7 °C)       Physical Exam  Constitutional:       Appearance: He is well-developed.   HENT:      Head: Normocephalic.      Right Ear: External ear normal.      Left Ear: External ear normal.      Mouth/Throat:      Mouth: Mucous membranes are moist.      Pharynx: Oropharynx is clear. No oropharyngeal exudate.   Eyes:      Conjunctiva/sclera: Conjunctivae normal.   Neck:      Thyroid: No thyromegaly.   Cardiovascular:      Rate and Rhythm: Normal rate and regular rhythm.      Heart sounds: No murmur heard.    No gallop.   Pulmonary:      Effort: Pulmonary effort is normal. No respiratory distress.      Breath sounds: No wheezing or rales.   Abdominal:      General: Bowel sounds are normal. There is no distension.      Palpations: Abdomen is soft. There is no mass.      Tenderness: There is no guarding or rebound.   Musculoskeletal:         General: Normal range of motion.   Lymphadenopathy:      Cervical: No cervical adenopathy.   Skin:     General: Skin is warm and dry.      Capillary Refill: Capillary refill takes less than 2 seconds.   Neurological:      Mental Status: He is alert and oriented to person, place, and time.   Psychiatric:         Behavior: Behavior normal.         Lab Results   Component Value Date    WBC 9.41 05/25/2022    HGB 12.9 (L) 05/25/2022    HCT 36.9 (L) 05/25/2022    MCV 84 05/25/2022     05/25/2022       Lab Results   Component Value Date     05/25/2022    K 3.9 05/25/2022     05/25/2022     CO2 21 (L) 05/25/2022    BUN 10 05/25/2022    CREATININE 0.9 05/25/2022    CALCIUM 9.4 05/25/2022    ANIONGAP 12 05/25/2022    ESTGFRAFRICA >60 05/25/2022    EGFRNONAA >60 05/25/2022     Lab Results   Component Value Date    ALT 24 05/25/2022    AST 14 05/25/2022    ALKPHOS 95 05/25/2022    BILITOT 0.6 05/25/2022       Assessment/Plan:     Problem List Items Addressed This Visit        Hematology    Thrombocytopenia     -Plt count 227k WNL              Oncology    Anemia - Primary     -Hgb stable @ 12.9 g/dL today   -B12/folate WNL  -will continue to monitor           Relevant Orders    CBC Auto Differential    Comprehensive Metabolic Panel    Ferritin    Iron and TIBC    CBC Auto Differential    Comprehensive Metabolic Panel    Ferritin    Iron and TIBC    PETROS (iron deficiency anemia)     -Sat. Iron 15%; Ferritin 102  -Continue PO iron daily   -Discussed ways to maximize absorption of PO iron  -Plan to reassess at next visit           Relevant Orders    CBC Auto Differential    Comprehensive Metabolic Panel    Ferritin    Iron and TIBC          Follow-up: In 3 months with labs. Please have have labs drawn 2 days before next clinic visit so we can review the results together.         Thank You,    NIRAJ Kovacs

## 2022-06-12 NOTE — ASSESSMENT & PLAN NOTE
-Sat. Iron 15%; Ferritin 102  -Continue PO iron daily   -Discussed ways to maximize absorption of PO iron  -Plan to reassess at next visit

## 2022-07-08 DIAGNOSIS — I10 ESSENTIAL HYPERTENSION: ICD-10-CM

## 2022-07-08 RX ORDER — VALSARTAN 320 MG/1
320 TABLET ORAL DAILY
Qty: 90 TABLET | Refills: 2 | Status: SHIPPED | OUTPATIENT
Start: 2022-07-08 | End: 2023-04-05 | Stop reason: SDUPTHER

## 2022-07-08 NOTE — TELEPHONE ENCOUNTER
Care Due:                  Date            Visit Type   Department     Provider  --------------------------------------------------------------------------------                                MYCHART                              ANNUAL                              CHECKUP/PHY  [facility] INTERNAL  Last Visit: 03-      [facility]       Kip Siddiqui  Next Visit: None Scheduled  None         None Found                                                            Last  Test          Frequency    Reason                     Performed    Due Date  --------------------------------------------------------------------------------    HBA1C.......  6 months...  insulin, metFORMIN,        03- 09-                             semaglutide..............    [employer] Embedded Care Gaps. Reference number: [id number]. 7/08/2022   10:34:06 AM CDT

## 2022-07-08 NOTE — TELEPHONE ENCOUNTER
Refill Decision Note   Car Castillo  is requesting a refill authorization.  Brief Assessment and Rationale for Refill:  Approve     Medication Therapy Plan:  FLOS 9/26/2022    Medication Reconciliation Completed: No   Comments:     No Care Gaps recommended.     Note composed:6:52 PM 07/08/2022

## 2022-07-10 NOTE — PROGRESS NOTES
LMFCB to discuss how he is tolerating the higher dose of carvedilol and see if Ozempic was approved   Yes

## 2022-07-11 ENCOUNTER — TELEPHONE (OUTPATIENT)
Dept: PHARMACY | Facility: CLINIC | Age: 52
End: 2022-07-11
Payer: COMMERCIAL

## 2022-07-27 ENCOUNTER — PATIENT MESSAGE (OUTPATIENT)
Dept: OTHER | Facility: OTHER | Age: 52
End: 2022-07-27
Payer: COMMERCIAL

## 2022-08-10 NOTE — PROGRESS NOTES
Referral was faxed with facesheet.    Cassia MCGOVERN RN Specialty Triage 8/10/2022 1:42 PM     Subjective:       Patient ID: Guillermo Castillo is a 48 y.o. male.    Chief Complaint: Diarrhea and Abdominal Pain    Patient presents with abdominal pain and diarrhea that started about 4 days ago.  Went to ER and received IV fluids and prescription for Zofran.  Denies vomiting.  Reports diarrhea continues.  No episode today.        Diarrhea    This is a new problem. The current episode started in the past 7 days. The problem has been gradually improving. The stool consistency is described as watery. Associated symptoms include abdominal pain and arthralgias. Pertinent negatives include no headaches. Nothing aggravates the symptoms. There are no known risk factors. He has tried increased fluids for the symptoms. The treatment provided no relief.     Review of Systems   Constitutional: Positive for fatigue. Negative for activity change and unexpected weight change.   HENT: Positive for rhinorrhea. Negative for hearing loss and trouble swallowing.    Eyes: Negative for discharge and visual disturbance.   Respiratory: Negative for chest tightness and wheezing.    Cardiovascular: Negative for chest pain and palpitations.   Gastrointestinal: Positive for abdominal pain, diarrhea and nausea. Negative for blood in stool and constipation.   Endocrine: Negative for polydipsia and polyuria.   Genitourinary: Negative for difficulty urinating, hematuria and urgency.   Musculoskeletal: Positive for arthralgias. Negative for joint swelling and neck pain.   Skin: Negative for color change and rash.   Neurological: Negative for weakness and headaches.   Psychiatric/Behavioral: Negative for confusion and dysphoric mood.       Objective:      Physical Exam   Constitutional: He is oriented to person, place, and time. Vital signs are normal. He appears well-developed and well-nourished.   HENT:   Head: Normocephalic and atraumatic.   Neck: Normal range of motion.   Cardiovascular: Normal rate and regular rhythm.    Pulmonary/Chest:  Effort normal and breath sounds normal.   Abdominal: Soft. Bowel sounds are normal. There is tenderness in the periumbilical area.   Musculoskeletal: Normal range of motion.   Neurological: He is alert and oriented to person, place, and time.   Skin: Skin is warm.   Psychiatric: He has a normal mood and affect. His behavior is normal.       Assessment:       1. Abdominal pain, unspecified abdominal location    2. Diarrhea, unspecified type        Plan:       Abdominal pain, unspecified abdominal location  -     X-Ray Abdomen Flat And Erect; Future; Expected date: 03/19/2018    Diarrhea, unspecified type  -     Giardia / Cryptosporidum, EIA; Future; Expected date: 03/19/2018  -     Stool Exam-Ova,Cysts,Parasites; Future; Expected date: 03/19/2018  -     Stool culture; Future; Expected date: 03/19/2018  -     CLOSTRIDIUM DIFFICILE; Future; Expected date: 03/19/2018  -     WBC, Stool; Future; Expected date: 03/19/2018  -     X-Ray Abdomen Flat And Erect; Future; Expected date: 03/19/2018        X-ray today.  Instructed to collect stool and return to lab.  Instructed on clear liquid diet and then advance to soft.  RTC if no improvement.

## 2022-08-22 DIAGNOSIS — I10 ESSENTIAL HYPERTENSION: ICD-10-CM

## 2022-08-22 DIAGNOSIS — I10 HYPERTENSION, UNSPECIFIED TYPE: Chronic | ICD-10-CM

## 2022-08-22 DIAGNOSIS — K21.9 GASTROESOPHAGEAL REFLUX DISEASE, UNSPECIFIED WHETHER ESOPHAGITIS PRESENT: ICD-10-CM

## 2022-08-22 NOTE — TELEPHONE ENCOUNTER
Unable to retrieve patient chart and identify care gaps.  [facility] Embedded Care Gaps. Reference number: [id number]. 8/22/2022   3:02:25 PM CDT

## 2022-08-23 RX ORDER — ROSUVASTATIN CALCIUM 10 MG/1
10 TABLET, COATED ORAL DAILY
Qty: 90 TABLET | Refills: 1 | Status: SHIPPED | OUTPATIENT
Start: 2022-08-23 | End: 2023-02-20 | Stop reason: SDUPTHER

## 2022-08-23 RX ORDER — DULOXETIN HYDROCHLORIDE 60 MG/1
60 CAPSULE, DELAYED RELEASE ORAL DAILY
Qty: 90 CAPSULE | Refills: 2 | Status: SHIPPED | OUTPATIENT
Start: 2022-08-23 | End: 2023-07-27 | Stop reason: SDUPTHER

## 2022-08-23 RX ORDER — OMEPRAZOLE 40 MG/1
40 CAPSULE, DELAYED RELEASE ORAL DAILY PRN
Qty: 90 CAPSULE | Refills: 3 | Status: SHIPPED | OUTPATIENT
Start: 2022-08-23 | End: 2024-02-25 | Stop reason: SDUPTHER

## 2022-08-23 RX ORDER — AMLODIPINE BESYLATE 10 MG/1
10 TABLET ORAL DAILY
Qty: 90 TABLET | Refills: 2 | Status: SHIPPED | OUTPATIENT
Start: 2022-08-23 | End: 2023-07-27 | Stop reason: SDUPTHER

## 2022-08-23 RX ORDER — CARVEDILOL 25 MG/1
25 TABLET ORAL 2 TIMES DAILY WITH MEALS
Qty: 180 TABLET | Refills: 2 | Status: SHIPPED | OUTPATIENT
Start: 2022-08-23 | End: 2023-07-19 | Stop reason: SDUPTHER

## 2022-08-23 NOTE — TELEPHONE ENCOUNTER
Refill Decision Note   Car Casitllo  is requesting a refill authorization.  Brief Assessment and Rationale for Refill:  Approve     Medication Therapy Plan:  FLOS, LOV 3/2022    Medication Reconciliation Completed: No   Comments:     No Care Gaps recommended.     Note composed:8:28 AM 08/23/2022

## 2022-08-25 ENCOUNTER — LAB VISIT (OUTPATIENT)
Dept: LAB | Facility: HOSPITAL | Age: 52
End: 2022-08-25
Payer: COMMERCIAL

## 2022-08-25 DIAGNOSIS — D64.9 ANEMIA, UNSPECIFIED TYPE: ICD-10-CM

## 2022-08-25 DIAGNOSIS — D50.9 IRON DEFICIENCY ANEMIA, UNSPECIFIED IRON DEFICIENCY ANEMIA TYPE: ICD-10-CM

## 2022-08-25 LAB
ALBUMIN SERPL BCP-MCNC: 4.1 G/DL (ref 3.5–5.2)
ALP SERPL-CCNC: 91 U/L (ref 55–135)
ALT SERPL W/O P-5'-P-CCNC: 22 U/L (ref 10–44)
ANION GAP SERPL CALC-SCNC: 13 MMOL/L (ref 8–16)
AST SERPL-CCNC: 17 U/L (ref 10–40)
BASOPHILS # BLD AUTO: 0.06 K/UL (ref 0–0.2)
BASOPHILS NFR BLD: 0.8 % (ref 0–1.9)
BILIRUB SERPL-MCNC: 0.4 MG/DL (ref 0.1–1)
BUN SERPL-MCNC: 11 MG/DL (ref 6–20)
CALCIUM SERPL-MCNC: 9.7 MG/DL (ref 8.7–10.5)
CHLORIDE SERPL-SCNC: 103 MMOL/L (ref 95–110)
CO2 SERPL-SCNC: 24 MMOL/L (ref 23–29)
CREAT SERPL-MCNC: 0.9 MG/DL (ref 0.5–1.4)
DIFFERENTIAL METHOD: ABNORMAL
EOSINOPHIL # BLD AUTO: 0.5 K/UL (ref 0–0.5)
EOSINOPHIL NFR BLD: 6 % (ref 0–8)
ERYTHROCYTE [DISTWIDTH] IN BLOOD BY AUTOMATED COUNT: 13.3 % (ref 11.5–14.5)
EST. GFR  (NO RACE VARIABLE): >60 ML/MIN/1.73 M^2
FERRITIN SERPL-MCNC: 174 NG/ML (ref 20–300)
GLUCOSE SERPL-MCNC: 186 MG/DL (ref 70–110)
HCT VFR BLD AUTO: 35.5 % (ref 40–54)
HGB BLD-MCNC: 12.4 G/DL (ref 14–18)
IMM GRANULOCYTES # BLD AUTO: 0.05 K/UL (ref 0–0.04)
IMM GRANULOCYTES NFR BLD AUTO: 0.6 % (ref 0–0.5)
IRON SERPL-MCNC: 53 UG/DL (ref 45–160)
LYMPHOCYTES # BLD AUTO: 2.4 K/UL (ref 1–4.8)
LYMPHOCYTES NFR BLD: 29.8 % (ref 18–48)
MCH RBC QN AUTO: 29.5 PG (ref 27–31)
MCHC RBC AUTO-ENTMCNC: 34.9 G/DL (ref 32–36)
MCV RBC AUTO: 85 FL (ref 82–98)
MONOCYTES # BLD AUTO: 0.6 K/UL (ref 0.3–1)
MONOCYTES NFR BLD: 7.7 % (ref 4–15)
NEUTROPHILS # BLD AUTO: 4.3 K/UL (ref 1.8–7.7)
NEUTROPHILS NFR BLD: 55.1 % (ref 38–73)
NRBC BLD-RTO: 0 /100 WBC
PLATELET # BLD AUTO: 228 K/UL (ref 150–450)
PMV BLD AUTO: 9.2 FL (ref 9.2–12.9)
POTASSIUM SERPL-SCNC: 3.7 MMOL/L (ref 3.5–5.1)
PROT SERPL-MCNC: 7.1 G/DL (ref 6–8.4)
RBC # BLD AUTO: 4.2 M/UL (ref 4.6–6.2)
SATURATED IRON: 13 % (ref 20–50)
SODIUM SERPL-SCNC: 140 MMOL/L (ref 136–145)
TOTAL IRON BINDING CAPACITY: 423 UG/DL (ref 250–450)
TRANSFERRIN SERPL-MCNC: 286 MG/DL (ref 200–375)
WBC # BLD AUTO: 7.88 K/UL (ref 3.9–12.7)

## 2022-08-25 PROCEDURE — 80053 COMPREHEN METABOLIC PANEL: CPT

## 2022-08-25 PROCEDURE — 82728 ASSAY OF FERRITIN: CPT

## 2022-08-25 PROCEDURE — 36415 COLL VENOUS BLD VENIPUNCTURE: CPT

## 2022-08-25 PROCEDURE — 85025 COMPLETE CBC W/AUTO DIFF WBC: CPT

## 2022-08-25 PROCEDURE — 84466 ASSAY OF TRANSFERRIN: CPT

## 2022-08-31 ENCOUNTER — OFFICE VISIT (OUTPATIENT)
Dept: HEMATOLOGY/ONCOLOGY | Facility: CLINIC | Age: 52
End: 2022-08-31
Payer: COMMERCIAL

## 2022-08-31 VITALS
BODY MASS INDEX: 32.28 KG/M2 | OXYGEN SATURATION: 98 % | TEMPERATURE: 98 F | HEIGHT: 72 IN | WEIGHT: 238.31 LBS | DIASTOLIC BLOOD PRESSURE: 77 MMHG | HEART RATE: 62 BPM | SYSTOLIC BLOOD PRESSURE: 125 MMHG

## 2022-08-31 DIAGNOSIS — D64.9 ANEMIA, UNSPECIFIED TYPE: ICD-10-CM

## 2022-08-31 DIAGNOSIS — D50.9 IRON DEFICIENCY ANEMIA, UNSPECIFIED IRON DEFICIENCY ANEMIA TYPE: ICD-10-CM

## 2022-08-31 DIAGNOSIS — R11.0 NAUSEA: Primary | ICD-10-CM

## 2022-08-31 PROCEDURE — 3061F NEG MICROALBUMINURIA REV: CPT | Mod: CPTII,S$GLB,,

## 2022-08-31 PROCEDURE — 1159F PR MEDICATION LIST DOCUMENTED IN MEDICAL RECORD: ICD-10-PCS | Mod: CPTII,S$GLB,,

## 2022-08-31 PROCEDURE — 3008F BODY MASS INDEX DOCD: CPT | Mod: CPTII,S$GLB,,

## 2022-08-31 PROCEDURE — 3008F PR BODY MASS INDEX (BMI) DOCUMENTED: ICD-10-PCS | Mod: CPTII,S$GLB,,

## 2022-08-31 PROCEDURE — 3044F PR MOST RECENT HEMOGLOBIN A1C LEVEL <7.0%: ICD-10-PCS | Mod: CPTII,S$GLB,,

## 2022-08-31 PROCEDURE — 4010F ACE/ARB THERAPY RXD/TAKEN: CPT | Mod: CPTII,S$GLB,,

## 2022-08-31 PROCEDURE — 3061F PR NEG MICROALBUMINURIA RESULT DOCUMENTED/REVIEW: ICD-10-PCS | Mod: CPTII,S$GLB,,

## 2022-08-31 PROCEDURE — 3074F PR MOST RECENT SYSTOLIC BLOOD PRESSURE < 130 MM HG: ICD-10-PCS | Mod: CPTII,S$GLB,,

## 2022-08-31 PROCEDURE — 99999 PR PBB SHADOW E&M-EST. PATIENT-LVL IV: CPT | Mod: PBBFAC,,,

## 2022-08-31 PROCEDURE — 3078F DIAST BP <80 MM HG: CPT | Mod: CPTII,S$GLB,,

## 2022-08-31 PROCEDURE — 4010F PR ACE/ARB THEARPY RXD/TAKEN: ICD-10-PCS | Mod: CPTII,S$GLB,,

## 2022-08-31 PROCEDURE — 3074F SYST BP LT 130 MM HG: CPT | Mod: CPTII,S$GLB,,

## 2022-08-31 PROCEDURE — 3066F PR DOCUMENTATION OF TREATMENT FOR NEPHROPATHY: ICD-10-PCS | Mod: CPTII,S$GLB,,

## 2022-08-31 PROCEDURE — 3044F HG A1C LEVEL LT 7.0%: CPT | Mod: CPTII,S$GLB,,

## 2022-08-31 PROCEDURE — 99214 PR OFFICE/OUTPT VISIT, EST, LEVL IV, 30-39 MIN: ICD-10-PCS | Mod: S$GLB,,,

## 2022-08-31 PROCEDURE — 3078F PR MOST RECENT DIASTOLIC BLOOD PRESSURE < 80 MM HG: ICD-10-PCS | Mod: CPTII,S$GLB,,

## 2022-08-31 PROCEDURE — 1160F PR REVIEW ALL MEDS BY PRESCRIBER/CLIN PHARMACIST DOCUMENTED: ICD-10-PCS | Mod: CPTII,S$GLB,,

## 2022-08-31 PROCEDURE — 1160F RVW MEDS BY RX/DR IN RCRD: CPT | Mod: CPTII,S$GLB,,

## 2022-08-31 PROCEDURE — 99214 OFFICE O/P EST MOD 30 MIN: CPT | Mod: S$GLB,,,

## 2022-08-31 PROCEDURE — 1159F MED LIST DOCD IN RCRD: CPT | Mod: CPTII,S$GLB,,

## 2022-08-31 PROCEDURE — 3066F NEPHROPATHY DOC TX: CPT | Mod: CPTII,S$GLB,,

## 2022-08-31 PROCEDURE — 99999 PR PBB SHADOW E&M-EST. PATIENT-LVL IV: ICD-10-PCS | Mod: PBBFAC,,,

## 2022-08-31 RX ORDER — ONDANSETRON 8 MG/1
8 TABLET, ORALLY DISINTEGRATING ORAL EVERY 12 HOURS PRN
Qty: 30 TABLET | Refills: 2 | Status: SHIPPED | OUTPATIENT
Start: 2022-08-31 | End: 2023-08-31

## 2022-08-31 RX ORDER — SODIUM CHLORIDE 0.9 % (FLUSH) 0.9 %
10 SYRINGE (ML) INJECTION
Status: CANCELLED | OUTPATIENT
Start: 2022-09-10

## 2022-08-31 RX ORDER — SODIUM CHLORIDE 0.9 % (FLUSH) 0.9 %
10 SYRINGE (ML) INJECTION
Status: CANCELLED | OUTPATIENT
Start: 2022-08-31

## 2022-08-31 RX ORDER — HEPARIN 100 UNIT/ML
500 SYRINGE INTRAVENOUS
Status: CANCELLED | OUTPATIENT
Start: 2022-08-31

## 2022-08-31 RX ORDER — HEPARIN 100 UNIT/ML
500 SYRINGE INTRAVENOUS
Status: CANCELLED | OUTPATIENT
Start: 2022-09-10

## 2022-09-01 PROBLEM — R11.0 NAUSEA: Status: ACTIVE | Noted: 2022-09-01

## 2022-09-01 NOTE — PROGRESS NOTES
Subjective:      Patient ID: Car Castillo is a 52 y.o. male.    Chief Complaint: 3 month f/u PETROS    HPI:  Mr. Castillo is a pleasant 52-year-old  male with a PMHx of HTN, hyperlipidemia, ARPAN, DM, gout, and PETROS. He presents today for 3 month follow-up and lab review. Pt states overall he continues to feel well, however, he c/o nausea x last 5 days. Denies vomiting, diarrhea, constipation, sob, fatigue, hemoptysis, hematochezia, melena.  He remains on ICarC daily without difficulty.  Last EGD/colonoscopy . Colonoscopy unremarkable; EGD notes acute esophagitis with ulcer--recommendation to repeat in 10 years.    Social History     Socioeconomic History    Marital status: Single   Tobacco Use    Smoking status: Never    Smokeless tobacco: Never   Substance and Sexual Activity    Alcohol use: Not Currently     Comment: rarely    Drug use: No    Sexual activity: Yes     Partners: Female       Family History   Problem Relation Age of Onset    Coronary artery disease Father     Diabetes Father     Lung cancer Father     Diabetes Maternal Aunt     Diabetes Paternal Aunt     Hypertension Maternal Grandmother     Cataracts Maternal Grandmother     Hypertension Mother     Cataracts Mother        Past Surgical History:   Procedure Laterality Date    APPENDECTOMY      COLONOSCOPY N/A 11/27/2019    Procedure: COLONOSCOPY;  Surgeon: Radha Anne MD;  Location: [facility];  Service: Endoscopy;  Laterality: N/A;    ESOPHAGOGASTRODUODENOSCOPY N/A 11/27/2019    Procedure: EGD (ESOPHAGOGASTRODUODENOSCOPY);  Surgeon: Radha Anne MD;  Location: [facility];  Service: Endoscopy;  Laterality: N/A;       Past Medical History:   Diagnosis Date    Anemia 2020    dr mendel CARRID-19 01/16/2021    DM (diabetes mellitus)     Fatty liver     GERD (gastroesophageal reflux disease)     Gout     Hyperlipidemia     Hypertension     Hypertriglyceridemia     Mood disorder     Panic disorder     Sleep apnea     wears CPAP    Type 2  "diabetes mellitus     05/2013 BS       Review of Systems   Constitutional:  Negative for activity change, appetite change, chills, diaphoresis, fatigue, fever and unexpected weight change.   HENT:  Negative for congestion, dental problem, facial swelling, mouth sores, nosebleeds, trouble swallowing and voice change.    Eyes:  Negative for visual disturbance.   Respiratory:  Negative for apnea, cough, choking, chest tightness, shortness of breath, wheezing and stridor.    Cardiovascular:  Negative for chest pain, palpitations and leg swelling.   Gastrointestinal:  Positive for nausea. Negative for abdominal distention, abdominal pain, anal bleeding, blood in stool, constipation, diarrhea, rectal pain and vomiting.   Genitourinary:  Negative for decreased urine volume, difficulty urinating, dysuria, enuresis, frequency, hematuria and urgency.   Musculoskeletal:  Negative for arthralgias, back pain, gait problem, joint swelling, myalgias and neck stiffness.   Neurological:  Negative for dizziness, tremors, weakness, light-headedness and numbness.   Hematological:  Negative for adenopathy. Does not bruise/bleed easily.   Psychiatric/Behavioral:  The patient is not nervous/anxious.         Medication List with Changes/Refills   New Medications    ONDANSETRON (ZOFRAN-ODT) 8 MG TBDL    Take 1 tablet (8 mg total) by mouth every 12 (twelve) hours as needed (nausea).   Current Medications    AMLODIPINE (NORVASC) 10 MG TABLET    Take 1 tablet by mouth once daily    ASPIRIN (ECOTRIN) 81 MG EC TABLET    Take 81 mg by mouth once daily.    CARVEDILOL (COREG) 25 MG TABLET    Take 1 tablet (25 mg total) by mouth 2 (two) times daily with meals.    DULOXETINE (CYMBALTA) 60 MG CAPSULE    Take 1 capsule by mouth once daily    INSULIN LISPRO (HUMALOG U-100 INSULIN) 100 UNIT/ML INJECTION    Use via sliding scale    INSULIN SYRINGE-NEEDLE U-100 0.3 ML 31 GAUGE X 5/16" SYRG    Use as directed    IRON-VITAMIN C 100-250 MG, ICAR-C, (ICAR-C) " 100-250 MG TAB    Take 1 tablet by mouth once daily.    METFORMIN (GLUCOPHAGE-XR) 500 MG ER 24HR TABLET    Take 2 tablets (1,000 mg total) by mouth 2 (two) times daily with meals.    OMEPRAZOLE (PRILOSEC) 40 MG CAPSULE    Take 1 capsule (40 mg total) by mouth daily as needed (Gerd).    ROSUVASTATIN (CRESTOR) 10 MG TABLET    Take 1 tablet (10 mg total) by mouth once daily.    SEMAGLUTIDE (OZEMPIC) 1 MG/DOSE (4 MG/3 ML)    Inject 1 mg into the skin every 7 days as directed.    VALSARTAN (DIOVAN) 320 MG TABLET    Take 1 tablet (320 mg total) by mouth once daily.        Objective:     Vitals:    08/31/22 1353   BP: 125/77   Pulse: 62   Temp: 97.8 °F (36.6 °C)       Physical Exam  Constitutional:       Appearance: He is well-developed.   HENT:      Head: Normocephalic.      Right Ear: External ear normal.      Left Ear: External ear normal.      Mouth/Throat:      Mouth: Mucous membranes are moist.      Pharynx: Oropharynx is clear. No oropharyngeal exudate.   Eyes:      Conjunctiva/sclera: Conjunctivae normal.   Neck:      Thyroid: No thyromegaly.   Cardiovascular:      Rate and Rhythm: Normal rate and regular rhythm.      Heart sounds: No murmur heard.    No gallop.   Pulmonary:      Effort: Pulmonary effort is normal. No respiratory distress.      Breath sounds: No wheezing or rales.   Abdominal:      General: Bowel sounds are normal. There is no distension.      Palpations: Abdomen is soft. There is no mass.      Tenderness: There is no guarding or rebound.   Musculoskeletal:         General: Normal range of motion.   Lymphadenopathy:      Cervical: No cervical adenopathy.   Skin:     General: Skin is warm and dry.      Capillary Refill: Capillary refill takes less than 2 seconds.   Neurological:      Mental Status: He is alert and oriented to person, place, and time.   Psychiatric:         Behavior: Behavior normal.       Lab Results   Component Value Date    WBC 7.88 08/25/2022    HGB 12.4 (L) 08/25/2022    HCT  35.5 (L) 08/25/2022    MCV 85 08/25/2022     08/25/2022       Lab Results   Component Value Date     08/25/2022    K 3.7 08/25/2022     08/25/2022    CO2 24 08/25/2022    BUN 11 08/25/2022    CREATININE 0.9 08/25/2022    CALCIUM 9.7 08/25/2022    ANIONGAP 13 08/25/2022    ESTGFRAFRICA >60 05/25/2022    EGFRNONAA >60 05/25/2022     Lab Results   Component Value Date    ALT 22 08/25/2022    AST 17 08/25/2022    ALKPHOS 91 08/25/2022    BILITOT 0.4 08/25/2022       Assessment/Plan:     Problem List Items Addressed This Visit          Oncology    Anemia    Relevant Orders    CBC Auto Differential    Comprehensive Metabolic Panel    Ferritin    Iron and TIBC    HEMOGLOBIN ELECTROPHORESIS,HGB A2 KEYLA.    Thalasseima and Hemoglobinopathy Eval    Alpha-Globin Gene Analysis      PETROS (iron deficiency anemia)     Lab Results   Component Value Date    IRON 53 08/25/2022    TIBC 423 08/25/2022    FERRITIN 174 08/25/2022   Saturated iron 13%    Plan for Feraheme x 2 doses  Hold oral iron supplementation   F/u 8 weeks post completion of IV iron with cbc, cmp, and iron studies a few days prior         Relevant Orders    CBC Auto Differential    Comprehensive Metabolic Panel    Ferritin    Iron and TIBC    HEMOGLOBIN ELECTROPHORESIS,HGB A2 KEYLA.    Thalasseima and Hemoglobinopathy Eval    Alpha-Globin Gene Analysis       GI    Nausea - Primary     Pt declines IV hydration at this time  Recommend GI follow-up if nausea persists         Relevant Medications    ondansetron (ZOFRAN-ODT) 8 MG NIRAJ Toth  Hematology/Oncology

## 2022-09-01 NOTE — ASSESSMENT & PLAN NOTE
Lab Results   Component Value Date    IRON 53 08/25/2022    TIBC 423 08/25/2022    FERRITIN 174 08/25/2022   Saturated iron 13%    Plan for Feraheme x 2 doses  Hold oral iron supplementation   F/u 8 weeks post completion of IV iron with cbc, cmp, and iron studies a few days prior

## 2022-09-09 ENCOUNTER — INFUSION (OUTPATIENT)
Dept: INFUSION THERAPY | Facility: HOSPITAL | Age: 52
End: 2022-09-09
Payer: COMMERCIAL

## 2022-09-09 VITALS
RESPIRATION RATE: 16 BRPM | OXYGEN SATURATION: 96 % | DIASTOLIC BLOOD PRESSURE: 81 MMHG | TEMPERATURE: 98 F | HEART RATE: 54 BPM | SYSTOLIC BLOOD PRESSURE: 128 MMHG

## 2022-09-09 DIAGNOSIS — D50.9 IRON DEFICIENCY ANEMIA, UNSPECIFIED IRON DEFICIENCY ANEMIA TYPE: Primary | ICD-10-CM

## 2022-09-09 PROCEDURE — 96374 THER/PROPH/DIAG INJ IV PUSH: CPT

## 2022-09-09 PROCEDURE — 25000003 PHARM REV CODE 250

## 2022-09-09 PROCEDURE — 63600175 PHARM REV CODE 636 W HCPCS: Mod: JG

## 2022-09-09 RX ADMIN — FERUMOXYTOL 510 MG: 510 INJECTION INTRAVENOUS at 08:09

## 2022-09-09 NOTE — PLAN OF CARE
Patient tolerated Feraheme well today; no adverse reaction noted.  C/O chronic fatigue and nausea/diarrhea probably d/t po iron supplement.  IV discontinued with catheter intact and pressure dressing applied to the site.  Has f/u appt(s) scheduled per MD request.  No questions or concerns voiced.  NAD noted upon discharge.

## 2022-09-09 NOTE — DISCHARGE INSTRUCTIONS
[facility]  [address] [facility]  [address] [place] [address] Drive  [phone] phone     [phone] fax  Hours of Operation: Monday- Friday 8:00am- 5:00pm  After hours phone  [phone]  Hematology / Oncology Physicians on call:    Dr. Tramaine Lopez      Nurse Practitioners:    PAUL Stone NP Jessica Porter, PA Phaon Dunbar, NP      Please don't hesitate to call if you have any concerns.

## 2022-09-16 ENCOUNTER — INFUSION (OUTPATIENT)
Dept: INFUSION THERAPY | Facility: HOSPITAL | Age: 52
End: 2022-09-16
Payer: COMMERCIAL

## 2022-09-16 VITALS
TEMPERATURE: 98 F | RESPIRATION RATE: 16 BRPM | DIASTOLIC BLOOD PRESSURE: 76 MMHG | HEART RATE: 59 BPM | OXYGEN SATURATION: 96 % | SYSTOLIC BLOOD PRESSURE: 133 MMHG

## 2022-09-16 DIAGNOSIS — D50.9 IRON DEFICIENCY ANEMIA, UNSPECIFIED IRON DEFICIENCY ANEMIA TYPE: Primary | ICD-10-CM

## 2022-09-16 PROCEDURE — 63600175 PHARM REV CODE 636 W HCPCS: Mod: JG

## 2022-09-16 PROCEDURE — 96374 THER/PROPH/DIAG INJ IV PUSH: CPT

## 2022-09-16 PROCEDURE — 25000003 PHARM REV CODE 250

## 2022-09-16 RX ADMIN — SODIUM CHLORIDE: 0.9 INJECTION, SOLUTION INTRAVENOUS at 08:09

## 2022-09-16 RX ADMIN — FERUMOXYTOL 510 MG: 510 INJECTION INTRAVENOUS at 08:09

## 2022-09-16 NOTE — PLAN OF CARE
Problem: Adult Inpatient Plan of Care  Goal: Plan of Care Review  Outcome: Ongoing, Progressing  Flowsheets (Taken 9/16/2022 0838)  Plan of Care Reviewed With: patient  Goal: Patient-Specific Goal (Individualized)  Outcome: Ongoing, Progressing  Flowsheets (Taken 9/16/2022 0838)  Anxieties, Fears or Concerns: none  Individualized Care Needs: feet up  Patient-Specific Goals (Include Timeframe): tolerate treatment  Goal: Optimal Comfort and Wellbeing  Outcome: Ongoing, Progressing     Problem: Fall Injury Risk  Goal: Absence of Fall and Fall-Related Injury  Outcome: Ongoing, Progressing  Intervention: Identify and Manage Contributors  Flowsheets (Taken 9/16/2022 0838)  Self-Care Promotion: BADL personal routines maintained  Medication Review/Management: medications reviewed  Intervention: Promote Injury-Free Environment  Flowsheets (Taken 9/16/2022 0838)  Safety Promotion/Fall Prevention:   in recliner, wheels locked   nonskid shoes/socks when out of bed   room near unit station

## 2022-09-16 NOTE — DISCHARGE INSTRUCTIONS
[facility]  [address] [facility]  [address]  [phone] phone     [phone] fax  Hours of Operation: Monday- Friday 8:00am- 5:00pm  After hours phone  [phone]  Hematology / Oncology Physicians on call      PAM Pond Dr., Dr., NP Sydney Prescott, PAUL De Oliveira FNP    Please call with any concerns regarding your appointment today.

## 2022-09-26 ENCOUNTER — LAB VISIT (OUTPATIENT)
Dept: LAB | Facility: HOSPITAL | Age: 52
End: 2022-09-26
Attending: FAMILY MEDICINE
Payer: COMMERCIAL

## 2022-09-26 DIAGNOSIS — M1A.9XX0 CHRONIC GOUT WITHOUT TOPHUS, UNSPECIFIED CAUSE, UNSPECIFIED SITE: ICD-10-CM

## 2022-09-26 DIAGNOSIS — E11.9 TYPE 2 DIABETES MELLITUS WITHOUT COMPLICATION, WITHOUT LONG-TERM CURRENT USE OF INSULIN: ICD-10-CM

## 2022-09-26 DIAGNOSIS — Z12.5 SCREENING FOR PROSTATE CANCER: ICD-10-CM

## 2022-09-26 LAB
COMPLEXED PSA SERPL-MCNC: 1.5 NG/ML (ref 0–4)
ESTIMATED AVG GLUCOSE: 114 MG/DL (ref 68–131)
HBA1C MFR BLD: 5.6 % (ref 4–5.6)
URATE SERPL-MCNC: 7 MG/DL (ref 3.4–7)
VIT B12 SERPL-MCNC: 510 PG/ML (ref 210–950)

## 2022-09-26 PROCEDURE — 36415 COLL VENOUS BLD VENIPUNCTURE: CPT | Mod: PO | Performed by: FAMILY MEDICINE

## 2022-09-26 PROCEDURE — 82607 VITAMIN B-12: CPT | Performed by: FAMILY MEDICINE

## 2022-09-26 PROCEDURE — 84550 ASSAY OF BLOOD/URIC ACID: CPT | Performed by: FAMILY MEDICINE

## 2022-09-26 PROCEDURE — 84153 ASSAY OF PSA TOTAL: CPT | Performed by: FAMILY MEDICINE

## 2022-09-26 PROCEDURE — 83036 HEMOGLOBIN GLYCOSYLATED A1C: CPT | Performed by: FAMILY MEDICINE

## 2022-10-27 ENCOUNTER — PATIENT MESSAGE (OUTPATIENT)
Dept: OTHER | Facility: OTHER | Age: 52
End: 2022-10-27
Payer: COMMERCIAL

## 2022-11-15 DIAGNOSIS — D64.9 ANEMIA, UNSPECIFIED TYPE: ICD-10-CM

## 2022-11-15 RX ORDER — IRON,CARBONYL/ASCORBIC ACID 100-250 MG
1 TABLET ORAL DAILY
Qty: 90 TABLET | Refills: 3 | Status: CANCELLED | OUTPATIENT
Start: 2022-11-15

## 2022-11-16 RX ORDER — IRON,CARBONYL/ASCORBIC ACID 100-250 MG
1 TABLET ORAL DAILY
Qty: 90 TABLET | Refills: 3 | Status: SHIPPED | OUTPATIENT
Start: 2022-11-16

## 2022-11-18 ENCOUNTER — PATIENT MESSAGE (OUTPATIENT)
Dept: HEMATOLOGY/ONCOLOGY | Facility: CLINIC | Age: 52
End: 2022-11-18
Payer: COMMERCIAL

## 2022-11-18 ENCOUNTER — TELEPHONE (OUTPATIENT)
Dept: HEMATOLOGY/ONCOLOGY | Facility: CLINIC | Age: 52
End: 2022-11-18
Payer: COMMERCIAL

## 2022-11-18 NOTE — TELEPHONE ENCOUNTER
Attempted to reach pt, was unsuccessful. Lvm regarding appt cancellation and rescheduling. Advised pt to call our office, will also send pt message via portal .

## 2022-12-02 ENCOUNTER — LAB VISIT (OUTPATIENT)
Dept: LAB | Facility: HOSPITAL | Age: 52
End: 2022-12-02
Payer: COMMERCIAL

## 2022-12-02 DIAGNOSIS — D64.9 ANEMIA, UNSPECIFIED TYPE: ICD-10-CM

## 2022-12-02 DIAGNOSIS — D50.9 IRON DEFICIENCY ANEMIA, UNSPECIFIED IRON DEFICIENCY ANEMIA TYPE: ICD-10-CM

## 2022-12-02 LAB
ALBUMIN SERPL BCP-MCNC: 4.3 G/DL (ref 3.5–5.2)
ALP SERPL-CCNC: 105 U/L (ref 55–135)
ALT SERPL W/O P-5'-P-CCNC: 30 U/L (ref 10–44)
ANION GAP SERPL CALC-SCNC: 13 MMOL/L (ref 8–16)
AST SERPL-CCNC: 22 U/L (ref 10–40)
BASOPHILS # BLD AUTO: 0.06 K/UL (ref 0–0.2)
BASOPHILS NFR BLD: 0.7 % (ref 0–1.9)
BILIRUB SERPL-MCNC: 0.6 MG/DL (ref 0.1–1)
BUN SERPL-MCNC: 17 MG/DL (ref 6–20)
CALCIUM SERPL-MCNC: 9.8 MG/DL (ref 8.7–10.5)
CHLORIDE SERPL-SCNC: 103 MMOL/L (ref 95–110)
CO2 SERPL-SCNC: 24 MMOL/L (ref 23–29)
CREAT SERPL-MCNC: 1.2 MG/DL (ref 0.5–1.4)
DIFFERENTIAL METHOD: ABNORMAL
EOSINOPHIL # BLD AUTO: 0.4 K/UL (ref 0–0.5)
EOSINOPHIL NFR BLD: 4.6 % (ref 0–8)
ERYTHROCYTE [DISTWIDTH] IN BLOOD BY AUTOMATED COUNT: 13.2 % (ref 11.5–14.5)
EST. GFR  (NO RACE VARIABLE): >60 ML/MIN/1.73 M^2
GIANT PLATELETS BLD QL SMEAR: PRESENT
GLUCOSE SERPL-MCNC: 139 MG/DL (ref 70–110)
HCT VFR BLD AUTO: 38.6 % (ref 40–54)
HGB BLD-MCNC: 13.3 G/DL (ref 14–18)
IMM GRANULOCYTES # BLD AUTO: 0.1 K/UL (ref 0–0.04)
IMM GRANULOCYTES NFR BLD AUTO: 1.1 % (ref 0–0.5)
LYMPHOCYTES # BLD AUTO: 2.2 K/UL (ref 1–4.8)
LYMPHOCYTES NFR BLD: 25.2 % (ref 18–48)
MCH RBC QN AUTO: 29.7 PG (ref 27–31)
MCHC RBC AUTO-ENTMCNC: 34.5 G/DL (ref 32–36)
MCV RBC AUTO: 86 FL (ref 82–98)
MONOCYTES # BLD AUTO: 0.9 K/UL (ref 0.3–1)
MONOCYTES NFR BLD: 10.2 % (ref 4–15)
NEUTROPHILS # BLD AUTO: 5.2 K/UL (ref 1.8–7.7)
NEUTROPHILS NFR BLD: 58.2 % (ref 38–73)
NRBC BLD-RTO: 0 /100 WBC
PLATELET # BLD AUTO: 92 K/UL (ref 150–450)
PLATELET BLD QL SMEAR: ABNORMAL
PMV BLD AUTO: 10.2 FL (ref 9.2–12.9)
POTASSIUM SERPL-SCNC: 4 MMOL/L (ref 3.5–5.1)
PROT SERPL-MCNC: 7.4 G/DL (ref 6–8.4)
RBC # BLD AUTO: 4.48 M/UL (ref 4.6–6.2)
SODIUM SERPL-SCNC: 140 MMOL/L (ref 136–145)
WBC # BLD AUTO: 8.89 K/UL (ref 3.9–12.7)

## 2022-12-02 PROCEDURE — 80053 COMPREHEN METABOLIC PANEL: CPT

## 2022-12-02 PROCEDURE — 85025 COMPLETE CBC W/AUTO DIFF WBC: CPT

## 2022-12-02 PROCEDURE — 83020 HEMOGLOBIN ELECTROPHORESIS: CPT | Mod: 91

## 2022-12-02 PROCEDURE — 83021 HEMOGLOBIN CHROMOTOGRAPHY: CPT

## 2022-12-02 PROCEDURE — 82728 ASSAY OF FERRITIN: CPT

## 2022-12-02 PROCEDURE — 84466 ASSAY OF TRANSFERRIN: CPT

## 2022-12-03 LAB
FERRITIN SERPL-MCNC: 387 NG/ML (ref 20–300)
IRON SERPL-MCNC: 68 UG/DL (ref 45–160)
SATURATED IRON: 17 % (ref 20–50)
TOTAL IRON BINDING CAPACITY: 394 UG/DL (ref 250–450)
TRANSFERRIN SERPL-MCNC: 266 MG/DL (ref 200–375)

## 2022-12-05 LAB
HGB A2 MFR BLD HPLC: 2.5 % (ref 2.2–3.2)
HGB FRACT BLD ELPH-IMP: NORMAL
HGB FRACT BLD ELPH-IMP: NORMAL

## 2022-12-06 LAB
FERRITIN SERPL-MCNC: 288 MCG/L (ref 24–336)
HGB A MFR BLD ELPH: 97.5 % (ref 95.8–98)
HGB A2 MFR BLD: 2.5 % (ref 2–3.3)
HGB A2+XXX MFR BLD ELPH: NORMAL %
HGB F MFR BLD: 0 % (ref 0–0.9)
HGB XXX MFR BLD ELPH: NORMAL %
PATH REV BLD -IMP: NORMAL
PROVIDER SIGNING NAME: NORMAL

## 2022-12-09 ENCOUNTER — PATIENT MESSAGE (OUTPATIENT)
Dept: HEMATOLOGY/ONCOLOGY | Facility: CLINIC | Age: 52
End: 2022-12-09

## 2022-12-09 ENCOUNTER — OFFICE VISIT (OUTPATIENT)
Dept: HEMATOLOGY/ONCOLOGY | Facility: CLINIC | Age: 52
End: 2022-12-09
Payer: COMMERCIAL

## 2022-12-09 VITALS
BODY MASS INDEX: 33.06 KG/M2 | HEART RATE: 60 BPM | RESPIRATION RATE: 20 BRPM | TEMPERATURE: 98 F | OXYGEN SATURATION: 97 % | WEIGHT: 244.06 LBS | SYSTOLIC BLOOD PRESSURE: 138 MMHG | HEIGHT: 72 IN | DIASTOLIC BLOOD PRESSURE: 86 MMHG

## 2022-12-09 DIAGNOSIS — D64.9 ANEMIA, UNSPECIFIED TYPE: Primary | ICD-10-CM

## 2022-12-09 DIAGNOSIS — D50.9 IRON DEFICIENCY ANEMIA, UNSPECIFIED IRON DEFICIENCY ANEMIA TYPE: ICD-10-CM

## 2022-12-09 PROCEDURE — 99214 PR OFFICE/OUTPT VISIT, EST, LEVL IV, 30-39 MIN: ICD-10-PCS | Mod: S$GLB,,,

## 2022-12-09 PROCEDURE — 3008F PR BODY MASS INDEX (BMI) DOCUMENTED: ICD-10-PCS | Mod: CPTII,S$GLB,,

## 2022-12-09 PROCEDURE — 99999 PR PBB SHADOW E&M-EST. PATIENT-LVL IV: ICD-10-PCS | Mod: PBBFAC,,,

## 2022-12-09 PROCEDURE — 3079F PR MOST RECENT DIASTOLIC BLOOD PRESSURE 80-89 MM HG: ICD-10-PCS | Mod: CPTII,S$GLB,,

## 2022-12-09 PROCEDURE — 3075F SYST BP GE 130 - 139MM HG: CPT | Mod: CPTII,S$GLB,,

## 2022-12-09 PROCEDURE — 3075F PR MOST RECENT SYSTOLIC BLOOD PRESS GE 130-139MM HG: ICD-10-PCS | Mod: CPTII,S$GLB,,

## 2022-12-09 PROCEDURE — 1159F MED LIST DOCD IN RCRD: CPT | Mod: CPTII,S$GLB,,

## 2022-12-09 PROCEDURE — 1160F PR REVIEW ALL MEDS BY PRESCRIBER/CLIN PHARMACIST DOCUMENTED: ICD-10-PCS | Mod: CPTII,S$GLB,,

## 2022-12-09 PROCEDURE — 99999 PR PBB SHADOW E&M-EST. PATIENT-LVL IV: CPT | Mod: PBBFAC,,,

## 2022-12-09 PROCEDURE — 3044F PR MOST RECENT HEMOGLOBIN A1C LEVEL <7.0%: ICD-10-PCS | Mod: CPTII,S$GLB,,

## 2022-12-09 PROCEDURE — 3066F PR DOCUMENTATION OF TREATMENT FOR NEPHROPATHY: ICD-10-PCS | Mod: CPTII,S$GLB,,

## 2022-12-09 PROCEDURE — 3061F PR NEG MICROALBUMINURIA RESULT DOCUMENTED/REVIEW: ICD-10-PCS | Mod: CPTII,S$GLB,,

## 2022-12-09 PROCEDURE — 3008F BODY MASS INDEX DOCD: CPT | Mod: CPTII,S$GLB,,

## 2022-12-09 PROCEDURE — 3061F NEG MICROALBUMINURIA REV: CPT | Mod: CPTII,S$GLB,,

## 2022-12-09 PROCEDURE — 3044F HG A1C LEVEL LT 7.0%: CPT | Mod: CPTII,S$GLB,,

## 2022-12-09 PROCEDURE — 4010F PR ACE/ARB THEARPY RXD/TAKEN: ICD-10-PCS | Mod: CPTII,S$GLB,,

## 2022-12-09 PROCEDURE — 1159F PR MEDICATION LIST DOCUMENTED IN MEDICAL RECORD: ICD-10-PCS | Mod: CPTII,S$GLB,,

## 2022-12-09 PROCEDURE — 1160F RVW MEDS BY RX/DR IN RCRD: CPT | Mod: CPTII,S$GLB,,

## 2022-12-09 PROCEDURE — 3066F NEPHROPATHY DOC TX: CPT | Mod: CPTII,S$GLB,,

## 2022-12-09 PROCEDURE — 99214 OFFICE O/P EST MOD 30 MIN: CPT | Mod: S$GLB,,,

## 2022-12-09 PROCEDURE — 4010F ACE/ARB THERAPY RXD/TAKEN: CPT | Mod: CPTII,S$GLB,,

## 2022-12-09 PROCEDURE — 3079F DIAST BP 80-89 MM HG: CPT | Mod: CPTII,S$GLB,,

## 2022-12-09 NOTE — PROGRESS NOTES
Subjective:      Patient ID: Car Castillo is a 52 y.o. male.    Chief Complaint: 3 month f/u PETROS    HPI:  Mr. Castillo is a pleasant 52-year-old  male with HTN, hyperlipidemia, ARPAN on CPAP, DM, gout, and PETROS. He was initially referred to Hem/Onc clinic for worsening anemia November 2019 and seen by Dr. Park. At this time EGD/colonoscopy completed--Colonoscopy unremarkable; EGD notes acute esophagitis with ulcer--recommendation to repeat in 10 years. Baseline Hgb ranges 12-13. He presents today for follow-up. Pt states overall he continues to feel well. Denies vomiting, diarrhea, constipation, sob, fatigue, hemoptysis, hematochezia, melena.  He remains on ICarC daily without difficulty.      Interval History: Pt presents today for follow-up s/p Feraheme x 2 doses 09/2022. He states overall he is doing well, but does note continued fatigue. Per pt iron infusions went well but he did notice constipation after each infusion. Denies issues with appetite. Does note poor hydration and drinking 2 2L diet cokes daily and states in recent years his consumption has increased--in-depth discussion about health repercussions of this. Denies n/v/d/c, sob, fatigue, hemoptysis, hematochezia, melena.    Social History     Socioeconomic History    Marital status: Single   Tobacco Use    Smoking status: Never    Smokeless tobacco: Never   Substance and Sexual Activity    Alcohol use: Not Currently     Comment: rarely    Drug use: No    Sexual activity: Yes     Partners: Female       Family History   Problem Relation Age of Onset    Coronary artery disease Father     Diabetes Father     Lung cancer Father     Diabetes Maternal Aunt     Diabetes Paternal Aunt     Hypertension Maternal Grandmother     Cataracts Maternal Grandmother     Hypertension Mother     Cataracts Mother        Past Surgical History:   Procedure Laterality Date    APPENDECTOMY      COLONOSCOPY N/A 11/27/2019    Procedure: COLONOSCOPY;   Surgeon: Radha Anne MD;  Location: [facility];  Service: Endoscopy;  Laterality: N/A;    ESOPHAGOGASTRODUODENOSCOPY N/A 11/27/2019    Procedure: EGD (ESOPHAGOGASTRODUODENOSCOPY);  Surgeon: Radha Anne MD;  Location: [facility];  Service: Endoscopy;  Laterality: N/A;       Past Medical History:   Diagnosis Date    Anemia 2020    dr mendel CARRID-19 01/16/2021    DM (diabetes mellitus)     Fatty liver     GERD (gastroesophageal reflux disease)     Gout     Hyperlipidemia     Hypertension     Hypertriglyceridemia     Mood disorder     Panic disorder     Sleep apnea     wears CPAP    Type 2 diabetes mellitus     05/2013 BS       Review of Systems   Constitutional:  Positive for fatigue. Negative for activity change, appetite change, chills, diaphoresis, fever and unexpected weight change.   HENT:  Negative for congestion, dental problem, facial swelling, mouth sores, nosebleeds, trouble swallowing and voice change.    Eyes:  Negative for visual disturbance.   Respiratory:  Negative for apnea, cough, choking, chest tightness, shortness of breath, wheezing and stridor.    Cardiovascular:  Negative for chest pain, palpitations and leg swelling.   Gastrointestinal:  Negative for abdominal distention, abdominal pain, anal bleeding, blood in stool, constipation, diarrhea, nausea, rectal pain and vomiting.   Genitourinary:  Negative for decreased urine volume, difficulty urinating, dysuria, enuresis, frequency, hematuria and urgency.   Musculoskeletal:  Negative for arthralgias, back pain, gait problem, joint swelling, myalgias and neck stiffness.   Neurological:  Negative for dizziness, tremors, weakness, light-headedness and numbness.   Hematological:  Negative for adenopathy. Does not bruise/bleed easily.   Psychiatric/Behavioral:  The patient is not nervous/anxious.         Medication List with Changes/Refills   Current Medications    AMLODIPINE (NORVASC) 10 MG TABLET    Take 1 tablet by mouth once  "daily    ASPIRIN (ECOTRIN) 81 MG EC TABLET    Take 81 mg by mouth once daily.    CARVEDILOL (COREG) 25 MG TABLET    Take 1 tablet (25 mg total) by mouth 2 (two) times daily with meals.    DULOXETINE (CYMBALTA) 60 MG CAPSULE    Take 1 capsule by mouth once daily    INSULIN LISPRO (HUMALOG U-100 INSULIN) 100 UNIT/ML INJECTION    Use via sliding scale    INSULIN SYRINGE-NEEDLE U-100 0.3 ML 31 GAUGE X 5/16" SYRG    Use as directed    IRON-VITAMIN C 100-250 MG, ICAR-C, (ICAR-C) 100-250 MG TAB    Take 1 tablet by mouth once daily.    METFORMIN (GLUCOPHAGE-XR) 500 MG ER 24HR TABLET    Take 2 tablets (1,000 mg total) by mouth 2 (two) times daily with meals.    OMEPRAZOLE (PRILOSEC) 40 MG CAPSULE    Take 1 capsule (40 mg total) by mouth daily as needed (Gerd).    ONDANSETRON (ZOFRAN-ODT) 8 MG TBDL    Take 1 tablet (8 mg total) by mouth every 12 (twelve) hours as needed (nausea).    ROSUVASTATIN (CRESTOR) 10 MG TABLET    Take 1 tablet (10 mg total) by mouth once daily.    SEMAGLUTIDE (OZEMPIC) 1 MG/DOSE (4 MG/3 ML)    Inject 1 mg into the skin every 7 days as directed.    VALSARTAN (DIOVAN) 320 MG TABLET    Take 1 tablet (320 mg total) by mouth once daily.        Objective:     Vitals:    12/09/22 1023   BP: 138/86   Pulse: 60   Resp: 20   Temp: 97.9 °F (36.6 °C)       Physical Exam  Vitals reviewed.   Constitutional:       Appearance: He is well-developed.   HENT:      Head: Normocephalic.      Right Ear: External ear normal.      Left Ear: External ear normal.      Mouth/Throat:      Pharynx: No oropharyngeal exudate.   Eyes:      Conjunctiva/sclera: Conjunctivae normal.   Neck:      Thyroid: No thyromegaly.   Cardiovascular:      Rate and Rhythm: Normal rate.      Heart sounds: No murmur heard.    No gallop.   Pulmonary:      Effort: Pulmonary effort is normal. No respiratory distress.      Breath sounds: No wheezing or rales.   Abdominal:      General: There is no distension.      Palpations: There is no mass.      " Tenderness: There is no guarding or rebound.   Musculoskeletal:         General: Normal range of motion.   Lymphadenopathy:      Cervical: No cervical adenopathy.   Neurological:      Mental Status: He is alert and oriented to person, place, and time.   Psychiatric:         Mood and Affect: Mood normal.         Behavior: Behavior normal.         Thought Content: Thought content normal.         Judgment: Judgment normal.       Lab Results   Component Value Date    WBC 8.89 12/02/2022    HGB 13.3 (L) 12/02/2022    HCT 38.6 (L) 12/02/2022    MCV 86 12/02/2022    PLT 92 (L) 12/02/2022       Lab Results   Component Value Date     12/02/2022    K 4.0 12/02/2022     12/02/2022    CO2 24 12/02/2022    BUN 17 12/02/2022    CREATININE 1.2 12/02/2022    CALCIUM 9.8 12/02/2022    ANIONGAP 13 12/02/2022    ESTGFRAFRICA >60 05/25/2022    EGFRNONAA >60 05/25/2022     Lab Results   Component Value Date    ALT 30 12/02/2022    AST 22 12/02/2022    ALKPHOS 105 12/02/2022    BILITOT 0.6 12/02/2022       Assessment/Plan:     Problem List Items Addressed This Visit          Oncology    Anemia - Primary    Relevant Orders    CBC Auto Differential    Comprehensive Metabolic Panel    Ferritin    Iron and TIBC    PETROS (iron deficiency anemia)     Lab Results   Component Value Date    HGB 13.3 (L) 12/02/2022   Stable, mild normocytic anemia    Lab Results   Component Value Date    UIBC 296 05/11/2012    IRON 68 12/02/2022    TRANSFERRIN 266 12/02/2022    TIBC 394 12/02/2022    FESATURATED 17 (L) 12/02/2022    FERRITIN 387    S/p Feraheme x 2 doses  Okay to restart oral iron supplementation  Encouraged incorporation of iron rich foods in diet    Pt continues with mild anemia, workup thus far unrevealing  Plan to continue monitoring and consideration of BMBx with worsening cytopenias    Follow-up in 6 months with repeat labs           Med Onc Chart Routing      Follow up with physician    Follow up with KIMBERLY 6 months.   Infusion  scheduling note    Injection scheduling note    Labs CBC, CMP, ferritin and iron and TIBC   Lab interval:     Imaging    Pharmacy appointment    Other referrals         ROBERT Patiño, FNP-C  Hematology/Oncology

## 2022-12-09 NOTE — ASSESSMENT & PLAN NOTE
Lab Results   Component Value Date    HGB 13.3 (L) 12/02/2022   Stable, mild normocytic anemia    Lab Results   Component Value Date    UIBC 296 05/11/2012    IRON 68 12/02/2022    TRANSFERRIN 266 12/02/2022    TIBC 394 12/02/2022    FESATURATED 17 (L) 12/02/2022    FERRITIN 387    S/p Feraheme x 2 doses  Okay to restart oral iron supplementation  Encouraged incorporation of iron rich foods in diet    Pt continues with mild anemia, workup thus far unrevealing  Plan to continue monitoring and consideration of BMBx with worsening cytopenias    Follow-up in 6 months with repeat labs

## 2023-01-20 ENCOUNTER — LAB VISIT (OUTPATIENT)
Dept: LAB | Facility: HOSPITAL | Age: 53
End: 2023-01-20
Attending: NURSE PRACTITIONER
Payer: COMMERCIAL

## 2023-01-20 ENCOUNTER — OFFICE VISIT (OUTPATIENT)
Dept: INTERNAL MEDICINE | Facility: CLINIC | Age: 53
End: 2023-01-20
Payer: COMMERCIAL

## 2023-01-20 VITALS
RESPIRATION RATE: 18 BRPM | TEMPERATURE: 98 F | WEIGHT: 236.25 LBS | OXYGEN SATURATION: 97 % | HEART RATE: 72 BPM | DIASTOLIC BLOOD PRESSURE: 68 MMHG | SYSTOLIC BLOOD PRESSURE: 110 MMHG | BODY MASS INDEX: 32.04 KG/M2

## 2023-01-20 DIAGNOSIS — N30.00 ACUTE CYSTITIS WITHOUT HEMATURIA: ICD-10-CM

## 2023-01-20 DIAGNOSIS — R19.7 FREQUENT DIARRHEA: Primary | ICD-10-CM

## 2023-01-20 DIAGNOSIS — R19.7 FREQUENT DIARRHEA: ICD-10-CM

## 2023-01-20 PROCEDURE — 85025 COMPLETE CBC W/AUTO DIFF WBC: CPT | Performed by: NURSE PRACTITIONER

## 2023-01-20 PROCEDURE — 99213 OFFICE O/P EST LOW 20 MIN: CPT | Mod: S$GLB,,, | Performed by: NURSE PRACTITIONER

## 2023-01-20 PROCEDURE — 3008F PR BODY MASS INDEX (BMI) DOCUMENTED: ICD-10-PCS | Mod: CPTII,S$GLB,, | Performed by: NURSE PRACTITIONER

## 2023-01-20 PROCEDURE — 99999 PR PBB SHADOW E&M-EST. PATIENT-LVL V: CPT | Mod: PBBFAC,,, | Performed by: NURSE PRACTITIONER

## 2023-01-20 PROCEDURE — 36415 COLL VENOUS BLD VENIPUNCTURE: CPT | Mod: PO | Performed by: NURSE PRACTITIONER

## 2023-01-20 PROCEDURE — 3078F DIAST BP <80 MM HG: CPT | Mod: CPTII,S$GLB,, | Performed by: NURSE PRACTITIONER

## 2023-01-20 PROCEDURE — 80053 COMPREHEN METABOLIC PANEL: CPT | Performed by: NURSE PRACTITIONER

## 2023-01-20 PROCEDURE — 3074F SYST BP LT 130 MM HG: CPT | Mod: CPTII,S$GLB,, | Performed by: NURSE PRACTITIONER

## 2023-01-20 PROCEDURE — 99213 PR OFFICE/OUTPT VISIT, EST, LEVL III, 20-29 MIN: ICD-10-PCS | Mod: S$GLB,,, | Performed by: NURSE PRACTITIONER

## 2023-01-20 PROCEDURE — 1160F RVW MEDS BY RX/DR IN RCRD: CPT | Mod: CPTII,S$GLB,, | Performed by: NURSE PRACTITIONER

## 2023-01-20 PROCEDURE — 3008F BODY MASS INDEX DOCD: CPT | Mod: CPTII,S$GLB,, | Performed by: NURSE PRACTITIONER

## 2023-01-20 PROCEDURE — 1159F PR MEDICATION LIST DOCUMENTED IN MEDICAL RECORD: ICD-10-PCS | Mod: CPTII,S$GLB,, | Performed by: NURSE PRACTITIONER

## 2023-01-20 PROCEDURE — 3074F PR MOST RECENT SYSTOLIC BLOOD PRESSURE < 130 MM HG: ICD-10-PCS | Mod: CPTII,S$GLB,, | Performed by: NURSE PRACTITIONER

## 2023-01-20 PROCEDURE — 1159F MED LIST DOCD IN RCRD: CPT | Mod: CPTII,S$GLB,, | Performed by: NURSE PRACTITIONER

## 2023-01-20 PROCEDURE — 3078F PR MOST RECENT DIASTOLIC BLOOD PRESSURE < 80 MM HG: ICD-10-PCS | Mod: CPTII,S$GLB,, | Performed by: NURSE PRACTITIONER

## 2023-01-20 PROCEDURE — 1160F PR REVIEW ALL MEDS BY PRESCRIBER/CLIN PHARMACIST DOCUMENTED: ICD-10-PCS | Mod: CPTII,S$GLB,, | Performed by: NURSE PRACTITIONER

## 2023-01-20 PROCEDURE — 99999 PR PBB SHADOW E&M-EST. PATIENT-LVL V: ICD-10-PCS | Mod: PBBFAC,,, | Performed by: NURSE PRACTITIONER

## 2023-01-20 NOTE — PROGRESS NOTES
Subjective:       Patient ID: Car Castillo is a 52 y.o. male.    Chief Complaint: Diarrhea (X few weeks, intermittent sharp pains )    Patient presents with frequent diarrhea.  Started about one month ago.   Started off loose in the morning but started to get worse 2 days ago.     Reports he can hold solid foods.   No blood.  Reports stool is dark. Fever yesterday 100.8.  Some abdominal pain.       No vomiting.  Some nausea.      Tried Pepto last night --made symptoms worse.      Diarrhea   Pertinent negatives include no abdominal pain, arthralgias, chills or coughing.   Review of Systems   Constitutional:  Negative for appetite change, chills and fatigue.   Respiratory:  Negative for cough and shortness of breath.    Gastrointestinal:  Positive for diarrhea. Negative for abdominal pain and nausea.   Musculoskeletal:  Negative for arthralgias and gait problem.   Psychiatric/Behavioral:  Negative for agitation and confusion.        Objective:      Physical Exam  Vitals reviewed.   Constitutional:       Appearance: Normal appearance.   Cardiovascular:      Rate and Rhythm: Normal rate and regular rhythm.   Pulmonary:      Effort: Pulmonary effort is normal.      Breath sounds: Normal breath sounds.   Abdominal:      General: Bowel sounds are normal. There is no distension.   Musculoskeletal:         General: Normal range of motion.   Skin:     General: Skin is warm.   Neurological:      General: No focal deficit present.      Mental Status: He is alert and oriented to person, place, and time.   Psychiatric:         Mood and Affect: Mood normal.         Behavior: Behavior normal. Behavior is cooperative.       Assessment:       Problem List Items Addressed This Visit    None  Visit Diagnoses       Frequent diarrhea    -  Primary    Relevant Orders    Clostridium difficile EIA (Completed)    Giardia / Cryptosporidum, EIA (Completed)    Stool culture (Completed)    Stool Exam-Ova,Cysts,Parasites    WBC, Stool (Completed)     Occult blood x 1, stool (Completed)    Urinalysis (Completed)    CBC Auto Differential (Completed)    Comprehensive Metabolic Panel (Completed)    Ambulatory referral/consult to Gastroenterology            Plan:           Frequent diarrhea  -     Clostridium difficile EIA; Future; Expected date: 01/20/2023  -     Giardia / Cryptosporidum, EIA; Future; Expected date: 01/20/2023  -     Stool culture; Future; Expected date: 01/20/2023  -     Stool Exam-Ova,Cysts,Parasites; Future; Expected date: 01/20/2023  -     WBC, Stool; Future; Expected date: 01/20/2023  -     Occult blood x 1, stool; Future; Expected date: 01/20/2023  -     Urinalysis; Future; Expected date: 01/20/2023  -     CBC Auto Differential; Future; Expected date: 01/20/2023  -     Comprehensive Metabolic Panel; Future; Expected date: 01/20/2023  -     Ambulatory referral/consult to Gastroenterology; Future; Expected date: 01/27/2023         Clear liquid/soft diet.     Hydrate.     Recommend stool testing and start probiotics.     GI appointment

## 2023-01-20 NOTE — LETTER
January 20, 2023    Car Castillo  [address] [facility] [address] [address]             [facility]  Internal Medicine  [address] [place] [address]  Phone: [phone]  Fax: [phone]   January 20, 2023     Patient: Car Castillo   YOB: 1970   Date of Visit: 1/20/2023       To Whom it May Concern:    Car Castillo was seen in my clinic on 1/20/2023. He may return to work on 01/25/2023.    Please excuse him from any work missed.    If you have any questions or concerns, please don't hesitate to call.    Sincerely,         Ruby Ortega NP

## 2023-01-21 ENCOUNTER — LAB VISIT (OUTPATIENT)
Dept: LAB | Facility: HOSPITAL | Age: 53
End: 2023-01-21
Payer: COMMERCIAL

## 2023-01-21 DIAGNOSIS — R19.7 FREQUENT DIARRHEA: ICD-10-CM

## 2023-01-21 LAB
ALBUMIN SERPL BCP-MCNC: 4.2 G/DL (ref 3.5–5.2)
ALP SERPL-CCNC: 113 U/L (ref 55–135)
ALT SERPL W/O P-5'-P-CCNC: 26 U/L (ref 10–44)
ANION GAP SERPL CALC-SCNC: 8 MMOL/L (ref 8–16)
AST SERPL-CCNC: 18 U/L (ref 10–40)
BASOPHILS # BLD AUTO: 0.03 K/UL (ref 0–0.2)
BASOPHILS NFR BLD: 0.4 % (ref 0–1.9)
BILIRUB SERPL-MCNC: 0.7 MG/DL (ref 0.1–1)
BUN SERPL-MCNC: 13 MG/DL (ref 6–20)
C DIFF GDH STL QL: NEGATIVE
C DIFF TOX A+B STL QL IA: NEGATIVE
CALCIUM SERPL-MCNC: 9.5 MG/DL (ref 8.7–10.5)
CHLORIDE SERPL-SCNC: 103 MMOL/L (ref 95–110)
CO2 SERPL-SCNC: 26 MMOL/L (ref 23–29)
CREAT SERPL-MCNC: 0.8 MG/DL (ref 0.5–1.4)
DIFFERENTIAL METHOD: ABNORMAL
EOSINOPHIL # BLD AUTO: 1.3 K/UL (ref 0–0.5)
EOSINOPHIL NFR BLD: 16.3 % (ref 0–8)
ERYTHROCYTE [DISTWIDTH] IN BLOOD BY AUTOMATED COUNT: 14.1 % (ref 11.5–14.5)
EST. GFR  (NO RACE VARIABLE): >60 ML/MIN/1.73 M^2
GLUCOSE SERPL-MCNC: 146 MG/DL (ref 70–110)
HCT VFR BLD AUTO: 40.8 % (ref 40–54)
HGB BLD-MCNC: 13.1 G/DL (ref 14–18)
IMM GRANULOCYTES # BLD AUTO: 0.16 K/UL (ref 0–0.04)
IMM GRANULOCYTES NFR BLD AUTO: 2 % (ref 0–0.5)
LYMPHOCYTES # BLD AUTO: 2.2 K/UL (ref 1–4.8)
LYMPHOCYTES NFR BLD: 27.8 % (ref 18–48)
MCH RBC QN AUTO: 29.2 PG (ref 27–31)
MCHC RBC AUTO-ENTMCNC: 32.1 G/DL (ref 32–36)
MCV RBC AUTO: 91 FL (ref 82–98)
MONOCYTES # BLD AUTO: 1.1 K/UL (ref 0.3–1)
MONOCYTES NFR BLD: 13.3 % (ref 4–15)
NEUTROPHILS # BLD AUTO: 3.2 K/UL (ref 1.8–7.7)
NEUTROPHILS NFR BLD: 40.2 % (ref 38–73)
NRBC BLD-RTO: 0 /100 WBC
OB PNL STL: NEGATIVE
PLATELET # BLD AUTO: 159 K/UL (ref 150–450)
PMV BLD AUTO: 10.6 FL (ref 9.2–12.9)
POTASSIUM SERPL-SCNC: 3.9 MMOL/L (ref 3.5–5.1)
PROT SERPL-MCNC: 7.1 G/DL (ref 6–8.4)
RBC # BLD AUTO: 4.48 M/UL (ref 4.6–6.2)
SODIUM SERPL-SCNC: 137 MMOL/L (ref 136–145)
WBC # BLD AUTO: 8.02 K/UL (ref 3.9–12.7)
WBC #/AREA STL HPF: NORMAL /[HPF]

## 2023-01-21 PROCEDURE — 87045 FECES CULTURE AEROBIC BACT: CPT | Performed by: NURSE PRACTITIONER

## 2023-01-21 PROCEDURE — 82272 OCCULT BLD FECES 1-3 TESTS: CPT | Performed by: NURSE PRACTITIONER

## 2023-01-21 PROCEDURE — 89055 LEUKOCYTE ASSESSMENT FECAL: CPT | Performed by: NURSE PRACTITIONER

## 2023-01-21 PROCEDURE — 87209 SMEAR COMPLEX STAIN: CPT | Performed by: NURSE PRACTITIONER

## 2023-01-21 PROCEDURE — 87427 SHIGA-LIKE TOXIN AG IA: CPT | Mod: 59 | Performed by: NURSE PRACTITIONER

## 2023-01-21 PROCEDURE — 87046 STOOL CULTR AEROBIC BACT EA: CPT | Performed by: NURSE PRACTITIONER

## 2023-01-21 PROCEDURE — 87449 NOS EACH ORGANISM AG IA: CPT | Performed by: NURSE PRACTITIONER

## 2023-01-21 PROCEDURE — 87329 GIARDIA AG IA: CPT | Performed by: NURSE PRACTITIONER

## 2023-01-21 PROCEDURE — 87177 OVA AND PARASITES SMEARS: CPT | Performed by: NURSE PRACTITIONER

## 2023-01-22 LAB
CRYPTOSP AG STL QL IA: NEGATIVE
G LAMBLIA AG STL QL IA: NEGATIVE

## 2023-01-23 LAB
E COLI SXT1 STL QL IA: NEGATIVE
E COLI SXT2 STL QL IA: NEGATIVE

## 2023-01-23 RX ORDER — SULFAMETHOXAZOLE AND TRIMETHOPRIM 800; 160 MG/1; MG/1
1 TABLET ORAL 2 TIMES DAILY
Qty: 14 TABLET | Refills: 0 | Status: SHIPPED | OUTPATIENT
Start: 2023-01-23 | End: 2023-01-31

## 2023-01-25 ENCOUNTER — PATIENT MESSAGE (OUTPATIENT)
Dept: INTERNAL MEDICINE | Facility: CLINIC | Age: 53
End: 2023-01-25
Payer: COMMERCIAL

## 2023-01-25 LAB — BACTERIA STL CULT: NORMAL

## 2023-01-26 ENCOUNTER — LAB VISIT (OUTPATIENT)
Dept: LAB | Facility: HOSPITAL | Age: 53
End: 2023-01-26
Payer: COMMERCIAL

## 2023-01-26 ENCOUNTER — OFFICE VISIT (OUTPATIENT)
Dept: INTERNAL MEDICINE | Facility: CLINIC | Age: 53
End: 2023-01-26
Payer: COMMERCIAL

## 2023-01-26 VITALS
HEIGHT: 72 IN | BODY MASS INDEX: 32.52 KG/M2 | OXYGEN SATURATION: 99 % | SYSTOLIC BLOOD PRESSURE: 120 MMHG | WEIGHT: 240.06 LBS | DIASTOLIC BLOOD PRESSURE: 72 MMHG | HEART RATE: 67 BPM | TEMPERATURE: 98 F

## 2023-01-26 DIAGNOSIS — R19.7 DIARRHEA, UNSPECIFIED TYPE: ICD-10-CM

## 2023-01-26 DIAGNOSIS — N30.00 ACUTE CYSTITIS WITHOUT HEMATURIA: ICD-10-CM

## 2023-01-26 DIAGNOSIS — N30.00 ACUTE CYSTITIS WITHOUT HEMATURIA: Primary | ICD-10-CM

## 2023-01-26 LAB
BILIRUB UR QL STRIP: NEGATIVE
CLARITY UR: CLEAR
COLOR UR: YELLOW
GLUCOSE UR QL STRIP: NEGATIVE
HGB UR QL STRIP: NEGATIVE
KETONES UR QL STRIP: NEGATIVE
LEUKOCYTE ESTERASE UR QL STRIP: NEGATIVE
NITRITE UR QL STRIP: NEGATIVE
PH UR STRIP: 6 [PH] (ref 5–8)
PROT UR QL STRIP: ABNORMAL
SP GR UR STRIP: >=1.03 (ref 1–1.03)
URN SPEC COLLECT METH UR: ABNORMAL

## 2023-01-26 PROCEDURE — 1160F RVW MEDS BY RX/DR IN RCRD: CPT | Mod: CPTII,S$GLB,, | Performed by: PHYSICIAN ASSISTANT

## 2023-01-26 PROCEDURE — 1159F MED LIST DOCD IN RCRD: CPT | Mod: CPTII,S$GLB,, | Performed by: PHYSICIAN ASSISTANT

## 2023-01-26 PROCEDURE — 81003 URINALYSIS AUTO W/O SCOPE: CPT | Performed by: PHYSICIAN ASSISTANT

## 2023-01-26 PROCEDURE — 1160F PR REVIEW ALL MEDS BY PRESCRIBER/CLIN PHARMACIST DOCUMENTED: ICD-10-PCS | Mod: CPTII,S$GLB,, | Performed by: PHYSICIAN ASSISTANT

## 2023-01-26 PROCEDURE — 3074F SYST BP LT 130 MM HG: CPT | Mod: CPTII,S$GLB,, | Performed by: PHYSICIAN ASSISTANT

## 2023-01-26 PROCEDURE — 3008F BODY MASS INDEX DOCD: CPT | Mod: CPTII,S$GLB,, | Performed by: PHYSICIAN ASSISTANT

## 2023-01-26 PROCEDURE — 99214 OFFICE O/P EST MOD 30 MIN: CPT | Mod: S$GLB,,, | Performed by: PHYSICIAN ASSISTANT

## 2023-01-26 PROCEDURE — 1159F PR MEDICATION LIST DOCUMENTED IN MEDICAL RECORD: ICD-10-PCS | Mod: CPTII,S$GLB,, | Performed by: PHYSICIAN ASSISTANT

## 2023-01-26 PROCEDURE — 99999 PR PBB SHADOW E&M-EST. PATIENT-LVL V: CPT | Mod: PBBFAC,,, | Performed by: PHYSICIAN ASSISTANT

## 2023-01-26 PROCEDURE — 3074F PR MOST RECENT SYSTOLIC BLOOD PRESSURE < 130 MM HG: ICD-10-PCS | Mod: CPTII,S$GLB,, | Performed by: PHYSICIAN ASSISTANT

## 2023-01-26 PROCEDURE — 3078F PR MOST RECENT DIASTOLIC BLOOD PRESSURE < 80 MM HG: ICD-10-PCS | Mod: CPTII,S$GLB,, | Performed by: PHYSICIAN ASSISTANT

## 2023-01-26 PROCEDURE — 3078F DIAST BP <80 MM HG: CPT | Mod: CPTII,S$GLB,, | Performed by: PHYSICIAN ASSISTANT

## 2023-01-26 PROCEDURE — 3008F PR BODY MASS INDEX (BMI) DOCUMENTED: ICD-10-PCS | Mod: CPTII,S$GLB,, | Performed by: PHYSICIAN ASSISTANT

## 2023-01-26 PROCEDURE — 99214 PR OFFICE/OUTPT VISIT, EST, LEVL IV, 30-39 MIN: ICD-10-PCS | Mod: S$GLB,,, | Performed by: PHYSICIAN ASSISTANT

## 2023-01-26 PROCEDURE — 87086 URINE CULTURE/COLONY COUNT: CPT | Performed by: PHYSICIAN ASSISTANT

## 2023-01-26 PROCEDURE — 99999 PR PBB SHADOW E&M-EST. PATIENT-LVL V: ICD-10-PCS | Mod: PBBFAC,,, | Performed by: PHYSICIAN ASSISTANT

## 2023-01-26 RX ORDER — AZITHROMYCIN 500 MG/1
1000 TABLET, FILM COATED ORAL DAILY
Qty: 2 TABLET | Refills: 0 | Status: SHIPPED | OUTPATIENT
Start: 2023-01-26 | End: 2023-01-27

## 2023-01-26 NOTE — PROGRESS NOTES
"  Subjective:      Patient ID: Car Castillo is a 52 y.o. male.    Chief Complaint: Diarrhea    Diarrhea   This is a new problem. The current episode started in the past 7 days. The problem occurs more than 10 times per day. The problem has been unchanged. Associated symptoms include abdominal pain, bloating, a fever, increased flatus and sweats. Pertinent negatives include no arthralgias, chills, coughing, headaches, myalgias, URI, vomiting or weight loss. Nothing aggravates the symptoms. He has tried nothing for the symptoms. There is no history of bowel resection, inflammatory bowel disease, irritable bowel syndrome, malabsorption, a recent abdominal surgery or short gut syndrome.   Started on bactrim on Tuesday afternoon for UTI. So far cultures negative.     Patient Active Problem List   Diagnosis    Fatty liver    Type 2 diabetes mellitus    Mood disorder    Hypertriglyceridemia    Hyperlipidemia    Hypertension    Chronic gout    ARPAN (obstructive sleep apnea)    Obesity (BMI 30.0-34.9)    Shifting sleep-work schedule    Anemia    PETRSO (iron deficiency anemia)    Decreased range of motion with decreased strength    Thrombocytopenia    Nausea         Current Outpatient Medications:     amLODIPine (NORVASC) 10 MG tablet, Take 1 tablet by mouth once daily, Disp: 90 tablet, Rfl: 2    aspirin (ECOTRIN) 81 MG EC tablet, Take 81 mg by mouth once daily., Disp: , Rfl:     carvediloL (COREG) 25 MG tablet, Take 1 tablet (25 mg total) by mouth 2 (two) times daily with meals., Disp: 180 tablet, Rfl: 2    DULoxetine (CYMBALTA) 60 MG capsule, Take 1 capsule by mouth once daily, Disp: 90 capsule, Rfl: 2    insulin lispro (HUMALOG U-100 INSULIN) 100 unit/mL injection, Use via sliding scale, Disp: 10 mL, Rfl: 1    insulin syringe-needle U-100 0.3 mL 31 gauge x 5/16" Syrg, Use as directed (Patient taking differently: Use as directed), Disp: 100 each, Rfl: 0    iron-vitamin C 100-250 mg, ICAR-C, (ICAR-C) 100-250 mg Tab, Take 1 " tablet by mouth once daily., Disp: 90 tablet, Rfl: 3    metFORMIN (GLUCOPHAGE-XR) 500 MG ER 24hr tablet, Take 2 tablets (1,000 mg total) by mouth 2 (two) times daily with meals., Disp: 360 tablet, Rfl: 1    omeprazole (PRILOSEC) 40 MG capsule, Take 1 capsule (40 mg total) by mouth daily as needed (Gerd)., Disp: 90 capsule, Rfl: 3    ondansetron (ZOFRAN-ODT) 8 MG TbDL, Take 1 tablet (8 mg total) by mouth every 12 (twelve) hours as needed (nausea)., Disp: 30 tablet, Rfl: 2    rosuvastatin (CRESTOR) 10 MG tablet, Take 1 tablet (10 mg total) by mouth once daily., Disp: 90 tablet, Rfl: 1    semaglutide (OZEMPIC) 1 mg/dose (4 mg/3 mL), Inject 1 mg into the skin every 7 days as directed., Disp: 3 pen, Rfl: 11    sulfamethoxazole-trimethoprim 800-160mg (BACTRIM DS) 800-160 mg Tab, Take 1 tablet by mouth 2 (two) times daily. for 7 days, Disp: 14 tablet, Rfl: 0    valsartan (DIOVAN) 320 MG tablet, Take 1 tablet (320 mg total) by mouth once daily., Disp: 90 tablet, Rfl: 2    azithromycin (ZITHROMAX) 500 MG tablet, Take 2 tablets (1,000 mg total) by mouth once daily. for 1 day, Disp: 2 tablet, Rfl: 0    Review of Systems   Constitutional:  Positive for fever. Negative for activity change, appetite change, chills, diaphoresis, fatigue, unexpected weight change and weight loss.   HENT: Negative.  Negative for congestion, hearing loss, postnasal drip, rhinorrhea, sore throat, trouble swallowing and voice change.    Eyes: Negative.  Negative for visual disturbance.   Respiratory: Negative.  Negative for cough, choking, chest tightness and shortness of breath.    Cardiovascular:  Negative for chest pain, palpitations and leg swelling.   Gastrointestinal:  Positive for abdominal distention, abdominal pain, bloating, diarrhea, flatus and nausea. Negative for anal bleeding, blood in stool, constipation, rectal pain and vomiting.   Endocrine: Negative for cold intolerance, heat intolerance, polydipsia and polyuria.   Genitourinary:  Negative.  Negative for difficulty urinating and frequency.   Musculoskeletal:  Negative for arthralgias, back pain, gait problem, joint swelling and myalgias.   Skin:  Negative for color change, pallor, rash and wound.   Neurological:  Negative for dizziness, tremors, weakness, light-headedness, numbness and headaches.   Hematological:  Negative for adenopathy.   Psychiatric/Behavioral:  Negative for behavioral problems, confusion, self-injury, sleep disturbance and suicidal ideas. The patient is not nervous/anxious.    Objective:   /72 (BP Location: Left arm, Patient Position: Sitting, BP Method: Large (Manual))   Pulse 67   Temp 98 °F (36.7 °C) (Oral)   Ht 6' (1.829 m)   Wt 108.9 kg (240 lb 1.3 oz)   SpO2 99%   BMI 32.56 kg/m²     Physical Exam  Vitals reviewed.   Constitutional:       General: He is not in acute distress.     Appearance: Normal appearance. He is well-developed. He is not ill-appearing, toxic-appearing or diaphoretic.   HENT:      Head: Normocephalic and atraumatic.      Right Ear: External ear normal.      Left Ear: External ear normal.      Nose: Nose normal.   Eyes:      Conjunctiva/sclera: Conjunctivae normal.      Pupils: Pupils are equal, round, and reactive to light.   Cardiovascular:      Rate and Rhythm: Normal rate and regular rhythm.      Heart sounds: Normal heart sounds. No murmur heard.    No friction rub. No gallop.   Pulmonary:      Effort: Pulmonary effort is normal. No respiratory distress.      Breath sounds: Normal breath sounds. No wheezing or rales.   Chest:      Chest wall: No tenderness.   Abdominal:      General: Bowel sounds are increased. There is distension.      Palpations: Abdomen is soft. There is no mass.      Tenderness: There is no abdominal tenderness. There is no rebound.   Musculoskeletal:         General: Normal range of motion.      Cervical back: Normal range of motion and neck supple.   Lymphadenopathy:      Cervical: No cervical adenopathy.    Skin:     General: Skin is warm and dry.      Capillary Refill: Capillary refill takes less than 2 seconds.      Findings: No rash.   Neurological:      Mental Status: He is alert and oriented to person, place, and time.      Motor: No weakness.      Coordination: Coordination normal.      Gait: Gait normal.   Psychiatric:         Mood and Affect: Mood normal.         Behavior: Behavior normal.         Thought Content: Thought content normal.         Judgment: Judgment normal.     Lab Visit on 01/26/2023   Component Date Value Ref Range Status    Specimen UA 01/26/2023 Urine, Clean Catch   Final    Color, UA 01/26/2023 Yellow  Yellow, Straw, Xiomy Final    Appearance, UA 01/26/2023 Clear  Clear Final    pH, UA 01/26/2023 6.0  5.0 - 8.0 Final    Specific Gravity, UA 01/26/2023 >=1.030 (A)  1.005 - 1.030 Final    Protein, UA 01/26/2023 Trace (A)  Negative Final    Comment: Recommend a 24 hour urine protein or a urine   protein/creatinine ratio if globulin induced proteinuria is  clinically suspected.      Glucose, UA 01/26/2023 Negative  Negative Final    Ketones, UA 01/26/2023 Negative  Negative Final    Bilirubin (UA) 01/26/2023 Negative  Negative Final    Occult Blood UA 01/26/2023 Negative  Negative Final    Nitrite, UA 01/26/2023 Negative  Negative Final    Leukocytes, UA 01/26/2023 Negative  Negative Final   Lab Visit on 01/21/2023   Component Date Value Ref Range Status    C. diff Antigen 01/21/2023 Negative  Negative Final    C difficile Toxins A+B, EIA 01/21/2023 Negative  Negative Final    Testing not recommended for children <24 months old.    Giardia Antigen - EIA 01/21/2023 Negative  Negative Final    Cryptosporidium Antigen 01/21/2023 Negative  Negative Final    Stool Culture 01/21/2023 No Salmonella,Shigella,Vibrio,Campylobacter,Yersinia isolated.   Final    Stool WBC 01/21/2023 No neutrophils seen  No neutrophils seen Final    Occult Blood 01/21/2023 Negative  Negative Final    Shiga Toxin 1 E.coli  01/21/2023 Negative   Final    Shiga Toxin 2 E.coli 01/21/2023 Negative   Final   Lab Visit on 01/20/2023   Component Date Value Ref Range Status    Specimen UA 01/20/2023 Urine, Clean Catch   Final    Color, UA 01/20/2023 Yellow  Yellow, Straw, Xiomy Final    Appearance, UA 01/20/2023 Cloudy (A)  Clear Final    pH, UA 01/20/2023 5.0  5.0 - 8.0 Final    Specific Gravity, UA 01/20/2023 1.030  1.005 - 1.030 Final    Protein, UA 01/20/2023 Negative  Negative Final    Comment: Recommend a 24 hour urine protein or a urine   protein/creatinine ratio if globulin induced proteinuria is  clinically suspected.      Glucose, UA 01/20/2023 Negative  Negative Final    Ketones, UA 01/20/2023 Negative  Negative Final    Bilirubin (UA) 01/20/2023 Negative  Negative Final    Occult Blood UA 01/20/2023 Negative  Negative Final    Nitrite, UA 01/20/2023 Negative  Negative Final    Leukocytes, UA 01/20/2023 Negative  Negative Final    WBC, UA 01/20/2023 2  0 - 5 /hpf Final    Bacteria 01/20/2023 Many (A)  None-Occ /hpf Final    Hyaline Casts, UA 01/20/2023 9 (A)  0-1/lpf /lpf Final    Amorphous, UA 01/20/2023 Many (A)  None-Moderate Final    Microscopic Comment 01/20/2023 SEE COMMENT   Final    Comment: Other formed elements not mentioned in the report are not   present in the microscopic examination.      Lab Visit on 01/20/2023   Component Date Value Ref Range Status    WBC 01/20/2023 8.02  3.90 - 12.70 K/uL Final    RBC 01/20/2023 4.48 (L)  4.60 - 6.20 M/uL Final    Hemoglobin 01/20/2023 13.1 (L)  14.0 - 18.0 g/dL Final    Hematocrit 01/20/2023 40.8  40.0 - 54.0 % Final    MCV 01/20/2023 91  82 - 98 fL Final    MCH 01/20/2023 29.2  27.0 - 31.0 pg Final    MCHC 01/20/2023 32.1  32.0 - 36.0 g/dL Final    RDW 01/20/2023 14.1  11.5 - 14.5 % Final    Platelets 01/20/2023 159  150 - 450 K/uL Final    MPV 01/20/2023 10.6  9.2 - 12.9 fL Final    Immature Granulocytes 01/20/2023 2.0 (H)  0.0 - 0.5 % Final    Gran # (ANC) 01/20/2023 3.2  1.8  - 7.7 K/uL Final    Immature Grans (Abs) 01/20/2023 0.16 (H)  0.00 - 0.04 K/uL Final    Comment: Mild elevation in immature granulocytes is non specific and   can be seen in a variety of conditions including stress response,   acute inflammation, trauma and pregnancy. Correlation with other   laboratory and clinical findings is essential.      Lymph # 01/20/2023 2.2  1.0 - 4.8 K/uL Final    Mono # 01/20/2023 1.1 (H)  0.3 - 1.0 K/uL Final    Eos # 01/20/2023 1.3 (H)  0.0 - 0.5 K/uL Final    Baso # 01/20/2023 0.03  0.00 - 0.20 K/uL Final    nRBC 01/20/2023 0  0 /100 WBC Final    Gran % 01/20/2023 40.2  38.0 - 73.0 % Final    Lymph % 01/20/2023 27.8  18.0 - 48.0 % Final    Mono % 01/20/2023 13.3  4.0 - 15.0 % Final    Eosinophil % 01/20/2023 16.3 (H)  0.0 - 8.0 % Final    Basophil % 01/20/2023 0.4  0.0 - 1.9 % Final    Differential Method 01/20/2023 Automated   Final    Sodium 01/20/2023 137  136 - 145 mmol/L Final    Potassium 01/20/2023 3.9  3.5 - 5.1 mmol/L Final    Chloride 01/20/2023 103  95 - 110 mmol/L Final    CO2 01/20/2023 26  23 - 29 mmol/L Final    Glucose 01/20/2023 146 (H)  70 - 110 mg/dL Final    BUN 01/20/2023 13  6 - 20 mg/dL Final    Creatinine 01/20/2023 0.8  0.5 - 1.4 mg/dL Final    Calcium 01/20/2023 9.5  8.7 - 10.5 mg/dL Final    Total Protein 01/20/2023 7.1  6.0 - 8.4 g/dL Final    Albumin 01/20/2023 4.2  3.5 - 5.2 g/dL Final    Total Bilirubin 01/20/2023 0.7  0.1 - 1.0 mg/dL Final    Comment: For infants and newborns, interpretation of results should be based  on gestational age, weight and in agreement with clinical  observations.    Premature Infant recommended reference ranges:  Up to 24 hours.............<8.0 mg/dL  Up to 48 hours............<12.0 mg/dL  3-5 days..................<15.0 mg/dL  6-29 days.................<15.0 mg/dL      Alkaline Phosphatase 01/20/2023 113  55 - 135 U/L Final    AST 01/20/2023 18  10 - 40 U/L Final    ALT 01/20/2023 26  10 - 44 U/L Final    Anion Gap 01/20/2023  8  8 - 16 mmol/L Final    eGFR 01/20/2023 >60.0  >60 mL/min/1.73 m^2 Final       Assessment:     1. Acute cystitis without hematuria    2. Diarrhea, unspecified type      Plan:   Acute cystitis without hematuria  -     Urinalysis; Future; Expected date: 01/26/2023  -     Urine culture; Future    Diarrhea, unspecified type  -     azithromycin (ZITHROMAX) 500 MG tablet; Take 2 tablets (1,000 mg total) by mouth once daily. for 1 day  Dispense: 2 tablet; Refill: 0    -sooner apt with GI.   -bland foods  -immodium as needed    Follow up if symptoms worsen or fail to improve.

## 2023-01-27 LAB
BACTERIA UR CULT: NO GROWTH
O+P STL MICRO: NORMAL

## 2023-01-27 NOTE — TELEPHONE ENCOUNTER
I called the pt to see if he received the excuse he requested and there was no answer and his mailbox was full. //kah

## 2023-01-31 ENCOUNTER — TELEPHONE (OUTPATIENT)
Dept: INTERNAL MEDICINE | Facility: CLINIC | Age: 53
End: 2023-01-31

## 2023-01-31 ENCOUNTER — OFFICE VISIT (OUTPATIENT)
Dept: GASTROENTEROLOGY | Facility: CLINIC | Age: 53
End: 2023-01-31
Payer: COMMERCIAL

## 2023-01-31 ENCOUNTER — LAB VISIT (OUTPATIENT)
Dept: LAB | Facility: HOSPITAL | Age: 53
End: 2023-01-31
Attending: INTERNAL MEDICINE
Payer: COMMERCIAL

## 2023-01-31 VITALS
DIASTOLIC BLOOD PRESSURE: 76 MMHG | WEIGHT: 241.63 LBS | SYSTOLIC BLOOD PRESSURE: 130 MMHG | OXYGEN SATURATION: 98 % | HEIGHT: 72 IN | BODY MASS INDEX: 32.73 KG/M2 | HEART RATE: 62 BPM

## 2023-01-31 DIAGNOSIS — R19.7 FREQUENT DIARRHEA: ICD-10-CM

## 2023-01-31 DIAGNOSIS — R19.7 FREQUENT DIARRHEA: Primary | ICD-10-CM

## 2023-01-31 PROCEDURE — 1159F PR MEDICATION LIST DOCUMENTED IN MEDICAL RECORD: ICD-10-PCS | Mod: CPTII,S$GLB,, | Performed by: INTERNAL MEDICINE

## 2023-01-31 PROCEDURE — 3078F PR MOST RECENT DIASTOLIC BLOOD PRESSURE < 80 MM HG: ICD-10-PCS | Mod: CPTII,S$GLB,, | Performed by: INTERNAL MEDICINE

## 2023-01-31 PROCEDURE — 3075F SYST BP GE 130 - 139MM HG: CPT | Mod: CPTII,S$GLB,, | Performed by: INTERNAL MEDICINE

## 2023-01-31 PROCEDURE — 99999 PR PBB SHADOW E&M-EST. PATIENT-LVL IV: CPT | Mod: PBBFAC,,, | Performed by: INTERNAL MEDICINE

## 2023-01-31 PROCEDURE — 1159F MED LIST DOCD IN RCRD: CPT | Mod: CPTII,S$GLB,, | Performed by: INTERNAL MEDICINE

## 2023-01-31 PROCEDURE — 3075F PR MOST RECENT SYSTOLIC BLOOD PRESS GE 130-139MM HG: ICD-10-PCS | Mod: CPTII,S$GLB,, | Performed by: INTERNAL MEDICINE

## 2023-01-31 PROCEDURE — 3008F BODY MASS INDEX DOCD: CPT | Mod: CPTII,S$GLB,, | Performed by: INTERNAL MEDICINE

## 2023-01-31 PROCEDURE — 3008F PR BODY MASS INDEX (BMI) DOCUMENTED: ICD-10-PCS | Mod: CPTII,S$GLB,, | Performed by: INTERNAL MEDICINE

## 2023-01-31 PROCEDURE — 36415 COLL VENOUS BLD VENIPUNCTURE: CPT | Mod: PN | Performed by: INTERNAL MEDICINE

## 2023-01-31 PROCEDURE — 82784 ASSAY IGA/IGD/IGG/IGM EACH: CPT | Performed by: INTERNAL MEDICINE

## 2023-01-31 PROCEDURE — 99999 PR PBB SHADOW E&M-EST. PATIENT-LVL IV: ICD-10-PCS | Mod: PBBFAC,,, | Performed by: INTERNAL MEDICINE

## 2023-01-31 PROCEDURE — 99204 OFFICE O/P NEW MOD 45 MIN: CPT | Mod: S$GLB,,, | Performed by: INTERNAL MEDICINE

## 2023-01-31 PROCEDURE — 99204 PR OFFICE/OUTPT VISIT, NEW, LEVL IV, 45-59 MIN: ICD-10-PCS | Mod: S$GLB,,, | Performed by: INTERNAL MEDICINE

## 2023-01-31 PROCEDURE — 3078F DIAST BP <80 MM HG: CPT | Mod: CPTII,S$GLB,, | Performed by: INTERNAL MEDICINE

## 2023-01-31 RX ORDER — SODIUM, POTASSIUM,MAG SULFATES 17.5-3.13G
1 SOLUTION, RECONSTITUTED, ORAL ORAL DAILY
Qty: 1 KIT | Refills: 0 | Status: SHIPPED | OUTPATIENT
Start: 2023-01-31 | End: 2023-02-03

## 2023-01-31 RX ORDER — CHOLESTYRAMINE 4 G/4.8G
4 POWDER, FOR SUSPENSION ORAL 2 TIMES DAILY
Qty: 60 PACKET | Refills: 3 | Status: SHIPPED | OUTPATIENT
Start: 2023-01-31 | End: 2023-08-29

## 2023-01-31 NOTE — PROGRESS NOTES
[facility]  Gastroenterology    [place] the [place]  [address] [facility] [place] [address]    PCP: Kip Siddiqui MD    1/31/23    HPI       Diarrhea     Additional comments: Pt present today with c/o persistent diarrhea X12 wks           Last edited by Toni Milligan, MA on 1/31/2023 10:59 AM.      Reason for Visit: Diarrhea    Subjective:   Car Castillo is a 52 y.o. male here for evaluation of diarrhea. Symptoms started around New Years but worsened over the past two weeks. The first two weeks were manageable- he thought it was related to a seafood buffet he had. But then symptoms worsened and he is having Bms every 30 minutes or so all day. He has had to miss work for the past two weeks. Occasional fever up to 100.8 F. Stool studies negative for infection. He also had some Zithromax without improvement. There is mild abdominal cramping/discomfort related to having to have a BM. Colonoscopy in 2019 normal. Possible relation to stress. Imodium has not helped.       Past Medical History:   Diagnosis Date    Anemia 2020    dr mendel DWYER-19 01/16/2021    DM (diabetes mellitus)     Fatty liver     GERD (gastroesophageal reflux disease)     Gout     Hyperlipidemia     Hypertension     Hypertriglyceridemia     Mood disorder     Panic disorder     Sleep apnea     wears CPAP    Type 2 diabetes mellitus     05/2013 BS       Past Surgical History:   Procedure Laterality Date    APPENDECTOMY      COLONOSCOPY N/A 11/27/2019    Procedure: COLONOSCOPY;  Surgeon: Radha Anne MD;  Location: [facility];  Service: Endoscopy;  Laterality: N/A;    ESOPHAGOGASTRODUODENOSCOPY N/A 11/27/2019    Procedure: EGD (ESOPHAGOGASTRODUODENOSCOPY);  Surgeon: Radha Anne MD;  Location: [facility];  Service: Endoscopy;  Laterality: N/A;       Current Outpatient Medications on File Prior to Visit   Medication Sig Dispense Refill    amLODIPine (NORVASC) 10 MG tablet Take 1  "tablet by mouth once daily 90 tablet 2    aspirin (ECOTRIN) 81 MG EC tablet Take 81 mg by mouth once daily.      carvediloL (COREG) 25 MG tablet Take 1 tablet (25 mg total) by mouth 2 (two) times daily with meals. 180 tablet 2    DULoxetine (CYMBALTA) 60 MG capsule Take 1 capsule by mouth once daily 90 capsule 2    insulin lispro (HUMALOG U-100 INSULIN) 100 unit/mL injection Use via sliding scale 10 mL 1    insulin syringe-needle U-100 0.3 mL 31 gauge x 5/16" Syrg Use as directed (Patient taking differently: Use as directed) 100 each 0    iron-vitamin C 100-250 mg, ICAR-C, (ICAR-C) 100-250 mg Tab Take 1 tablet by mouth once daily. 90 tablet 3    metFORMIN (GLUCOPHAGE-XR) 500 MG ER 24hr tablet Take 2 tablets (1,000 mg total) by mouth 2 (two) times daily with meals. 360 tablet 1    omeprazole (PRILOSEC) 40 MG capsule Take 1 capsule (40 mg total) by mouth daily as needed (Gerd). 90 capsule 3    ondansetron (ZOFRAN-ODT) 8 MG TbDL Take 1 tablet (8 mg total) by mouth every 12 (twelve) hours as needed (nausea). 30 tablet 2    rosuvastatin (CRESTOR) 10 MG tablet Take 1 tablet (10 mg total) by mouth once daily. 90 tablet 1    semaglutide (OZEMPIC) 1 mg/dose (4 mg/3 mL) Inject 1 mg into the skin every 7 days as directed. 3 pen 11    valsartan (DIOVAN) 320 MG tablet Take 1 tablet (320 mg total) by mouth once daily. 90 tablet 2    sulfamethoxazole-trimethoprim 800-160mg (BACTRIM DS) 800-160 mg Tab Take 1 tablet by mouth 2 (two) times daily. for 7 days (Patient not taking: Reported on 1/31/2023) 14 tablet 0     No current facility-administered medications on file prior to visit.       Review of patient's allergies indicates:   Allergen Reactions    Simvastatin      Other reaction(s): aches/inc lfts       Social History     Socioeconomic History    Marital status: Single   Tobacco Use    Smoking status: Never    Smokeless tobacco: Never   Substance and Sexual Activity    Alcohol use: Not Currently     Comment: rarely    Drug use: " No    Sexual activity: Yes     Partners: Female       Family History   Problem Relation Age of Onset    Coronary artery disease Father     Diabetes Father     Lung cancer Father     Diabetes Maternal Aunt     Diabetes Paternal Aunt     Hypertension Maternal Grandmother     Cataracts Maternal Grandmother     Hypertension Mother     Cataracts Mother        Review of Systems   Constitutional:  Negative for appetite change, fever and unexpected weight change.   HENT:  Negative for sore throat and trouble swallowing.    Eyes:  Negative for visual disturbance.   Respiratory:  Negative for cough, shortness of breath and wheezing.    Cardiovascular:  Negative for chest pain, palpitations and leg swelling.   Gastrointestinal:  Positive for diarrhea. Negative for abdominal pain, blood in stool, constipation, nausea and vomiting.   Genitourinary:  Negative for dysuria.   Musculoskeletal:  Negative for arthralgias and myalgias.   Skin:  Negative for color change, pallor and rash.   Neurological:  Negative for dizziness and weakness.   Hematological:  Negative for adenopathy.   Psychiatric/Behavioral:  Negative for agitation.        Objective:   Vitals:   Vitals:    01/31/23 1059   BP: 130/76   Pulse: 62       Physical Exam  Vitals reviewed.   Constitutional:       General: He is not in acute distress.     Appearance: He is not diaphoretic.   HENT:      Head: Normocephalic and atraumatic.      Mouth/Throat:      Pharynx: No oropharyngeal exudate.   Eyes:      General: No scleral icterus.        Right eye: No discharge.         Left eye: No discharge.      Conjunctiva/sclera: Conjunctivae normal.      Pupils: Pupils are equal, round, and reactive to light.   Cardiovascular:      Rate and Rhythm: Normal rate and regular rhythm.      Heart sounds: Normal heart sounds. No murmur heard.    No friction rub. No gallop.   Pulmonary:      Effort: Pulmonary effort is normal. No respiratory distress.      Breath sounds: Normal breath  sounds. No stridor. No wheezing or rales.   Abdominal:      General: Bowel sounds are normal. There is no distension.      Palpations: Abdomen is soft. There is no mass.      Tenderness: There is no abdominal tenderness. There is no guarding.   Musculoskeletal:         General: Normal range of motion.      Cervical back: Normal range of motion.   Skin:     General: Skin is warm and dry.      Coloration: Skin is not pale.      Findings: No erythema or rash.   Neurological:      Mental Status: He is alert and oriented to person, place, and time.       IMPRESSION     Problem List Items Addressed This Visit    None  Visit Diagnoses       Frequent diarrhea    -  Primary    Relevant Orders    Case Request Endoscopy: COLONOSCOPY (Completed)    Ambulatory referral/consult to Endo Procedure     Tissue transglutaminase, IgA    IgA            PLANS:    - Symptoms present for about a month now  - Stool studies negative for infection  - Will check celiac panel  - Schedule for colonoscopy for further evaluation  - Cholestyramine trial in the interim  - Further recommendations pending the above  - Patient states his Metformin and Ozempic are old medications- not felt to the the cause at this time    Frequent diarrhea  -     Ambulatory referral/consult to Gastroenterology  -     Case Request Endoscopy: COLONOSCOPY  -     Ambulatory referral/consult to Endo Procedure ; Future; Expected date: 02/01/2023  -     Tissue transglutaminase, IgA; Future; Expected date: 01/31/2023  -     IgA; Future; Expected date: 01/31/2023    Other orders  -     sodium,potassium,mag sulfates (SUPREP BOWEL PREP KIT) 17.5-3.13-1.6 gram SolR; Take 177 mLs by mouth once daily. for 2 days  Dispense: 1 kit; Refill: 0  -     cholestyramine-aspartame (CHOLESTYRAMINE LIGHT) 4 gram PwPk; Take 1 packet (4 g total) by mouth 2 (two) times daily.  Dispense: 60 packet; Refill: 3      Princess Camara MD  Gastroenterology and Hepatology

## 2023-01-31 NOTE — TELEPHONE ENCOUNTER
----- Message from Kyle Sim, PharmD sent at 1/31/2023  1:43 PM CST -----  Hello,    Unfortunately I-CAR-C is on a national backorder. I do have iron 100 plus in stock. Would you like to change it to that or something else?    Thanks,    Kyle

## 2023-02-01 LAB — IGA SERPL-MCNC: 111 MG/DL (ref 40–350)

## 2023-02-03 ENCOUNTER — HOSPITAL ENCOUNTER (OUTPATIENT)
Dept: PREADMISSION TESTING | Facility: HOSPITAL | Age: 53
Discharge: HOME | End: 2023-02-03
Attending: INTERNAL MEDICINE
Payer: COMMERCIAL

## 2023-02-03 DIAGNOSIS — R19.7 FREQUENT DIARRHEA: ICD-10-CM

## 2023-02-20 ENCOUNTER — ANESTHESIA EVENT (OUTPATIENT)
Dept: ENDOSCOPY | Facility: HOSPITAL | Age: 53
End: 2023-02-20
Payer: COMMERCIAL

## 2023-02-20 ENCOUNTER — PATIENT MESSAGE (OUTPATIENT)
Dept: INTERNAL MEDICINE | Facility: CLINIC | Age: 53
End: 2023-02-20
Payer: COMMERCIAL

## 2023-02-20 DIAGNOSIS — E78.1 HYPERTRIGLYCERIDEMIA: ICD-10-CM

## 2023-02-20 DIAGNOSIS — E11.9 TYPE 2 DIABETES MELLITUS WITHOUT COMPLICATION, WITHOUT LONG-TERM CURRENT USE OF INSULIN: Primary | Chronic | ICD-10-CM

## 2023-02-20 NOTE — ANESTHESIA PREPROCEDURE EVALUATION
02/20/2023  Car Castillo is a 53 y.o., male.  Past Medical History:   Diagnosis Date    Anemia 2020    dr mendel CARRID-19 01/16/2021    DM (diabetes mellitus)     Fatty liver     GERD (gastroesophageal reflux disease)     Gout     Hyperlipidemia     Hypertension     Hypertriglyceridemia     Mood disorder     Panic disorder     Sleep apnea     wears CPAP    Type 2 diabetes mellitus     05/2013 BS     Past Surgical History:   Procedure Laterality Date    APPENDECTOMY      COLONOSCOPY N/A 11/27/2019    Procedure: COLONOSCOPY;  Surgeon: Radha Anne MD;  Location: [facility];  Service: Endoscopy;  Laterality: N/A;    ESOPHAGOGASTRODUODENOSCOPY N/A 11/27/2019    Procedure: EGD (ESOPHAGOGASTRODUODENOSCOPY);  Surgeon: Radha Anne MD;  Location: [facility];  Service: Endoscopy;  Laterality: N/A;       The EKG portion of this study is negative for ischemia. Sensitivity is reduced secondary to the target heart rate not being achieved.    Sensitivity is reduced secondary to the target heart rate not being achieved.    The blood pressure response to stress was normal.    There were no arrhythmias during stress.    The exercise capacity was above average.     Sinus bradycardia   Minimal voltage criteria for LVH, may be normal variant   Borderline Abnormal ECG   When compared with ECG of 12-DEC-2019 10:13,   Previous ECG has undetermined rhythm, needs review   Questionable change in The axis   Confirmed by INOCENCIA CARVAJAL, [facility] ([id number]) on 9/5/2021 11:37:11 PM     Pre-op Assessment    I have reviewed the Patient Summary Reports.     I have reviewed the Nursing Notes. I have reviewed the NPO Status.   I have reviewed the Medications.     Review of Systems  Anesthesia Hx:  No problems with previous Anesthesia    Social:  Non-Smoker    Cardiovascular:   Hypertension    Pulmonary:   Sleep Apnea     Education provided regarding risk of obstructive sleep apnea     Hepatic/GI:   GERD Liver Disease, Fatty Liver   Endocrine:   Diabetes, type 2  Obesity / BMI > 30  Psych:   Psychiatric History Mood, panic Disorder, Shift work syndrome         Physical Exam  General: Alert and Oriented    Airway:  Mallampati: II   Mouth Opening: Normal  TM Distance: Normal  Tongue: Normal  Neck ROM: Normal ROM    Dental:  Intact    Chest/Lungs:  Clear to auscultation, Normal Respiratory Rate    Heart:  Rate: Normal  Rhythm: Regular Rhythm        Anesthesia Plan  Type of Anesthesia, risks & benefits discussed:    Anesthesia Type: Gen Natural Airway  Intra-op Monitoring Plan: Standard ASA Monitors  Post Op Pain Control Plan: multimodal analgesia and IV/PO Opioids PRN  Induction:  IV  Informed Consent: Informed consent signed with the Patient and all parties understand the risks and agree with anesthesia plan.  All questions answered.   ASA Score: 2  Day of Surgery Review of History & Physical: H&P Update referred to the surgeon/provider.    Ready For Surgery From Anesthesia Perspective.     .

## 2023-02-20 NOTE — TELEPHONE ENCOUNTER
Care Due:                  Date            Visit Type   Department     Provider  --------------------------------------------------------------------------------                                MYCHART                              ANNUAL                              CHECKUP/PHY  [facility] INTERNAL  Last Visit: 03-      [facility]       Kip Siddiqui  Next Visit: None Scheduled  None         None Found                                                            Last  Test          Frequency    Reason                     Performed    Due Date  --------------------------------------------------------------------------------    Office Visit  12 months..  DULoxetine, amLODIPine,    03- 03-                             carvediloL, insulin,                             rosuvastatin,                             semaglutide, valsartan...    HBA1C.......  6 months...  insulin, semaglutide.....  09- 03-    Lipid Panel.  12 months..  rosuvastatin.............  03- 03-    [facility] Embedded Care Gaps. Reference number: [id number]. 2/20/2023   5:03:06 PM CST

## 2023-02-21 RX ORDER — ROSUVASTATIN CALCIUM 10 MG/1
10 TABLET, COATED ORAL DAILY
Qty: 90 TABLET | Refills: 0 | Status: SHIPPED | OUTPATIENT
Start: 2023-02-21 | End: 2023-02-21 | Stop reason: SDUPTHER

## 2023-02-21 RX ORDER — ROSUVASTATIN CALCIUM 10 MG/1
10 TABLET, COATED ORAL DAILY
Qty: 90 TABLET | Refills: 3 | Status: SHIPPED | OUTPATIENT
Start: 2023-02-21 | End: 2024-02-25 | Stop reason: SDUPTHER

## 2023-02-21 NOTE — TELEPHONE ENCOUNTER
Refill Decision Note   Car Castillo  is requesting a refill authorization.  Brief Assessment and Rationale for Refill:  Approve     Medication Therapy Plan:       Medication Reconciliation Completed: No   Comments:     Provider Staff:     Action is required for this patient.   Please see care gap opportunities below in Care Due Message.     Thanks!  [facility]     Appointments      Date Provider   Last Visit   3/28/2022 Kip Siddiqui MD   Next Visit   Visit date not found Kip Siddiqui MD     Note composed:12:53 PM 02/21/2023           Note composed:12:53 PM 02/21/2023

## 2023-02-22 ENCOUNTER — ANESTHESIA (OUTPATIENT)
Dept: ENDOSCOPY | Facility: HOSPITAL | Age: 53
End: 2023-02-22
Payer: COMMERCIAL

## 2023-02-22 ENCOUNTER — HOSPITAL ENCOUNTER (OUTPATIENT)
Facility: HOSPITAL | Age: 53
Discharge: HOME | End: 2023-02-22
Attending: INTERNAL MEDICINE | Admitting: INTERNAL MEDICINE
Payer: COMMERCIAL

## 2023-02-22 VITALS
WEIGHT: 235.88 LBS | DIASTOLIC BLOOD PRESSURE: 74 MMHG | HEART RATE: 58 BPM | SYSTOLIC BLOOD PRESSURE: 126 MMHG | HEIGHT: 72 IN | RESPIRATION RATE: 15 BRPM | OXYGEN SATURATION: 98 % | BODY MASS INDEX: 31.95 KG/M2 | TEMPERATURE: 98 F

## 2023-02-22 DIAGNOSIS — K52.9 CHRONIC DIARRHEA: Primary | ICD-10-CM

## 2023-02-22 LAB — POCT GLUCOSE: 147 MG/DL (ref 70–110)

## 2023-02-22 PROCEDURE — 88305 TISSUE EXAM BY PATHOLOGIST: CPT | Mod: 26,,, | Performed by: PATHOLOGY

## 2023-02-22 PROCEDURE — 88305 TISSUE EXAM BY PATHOLOGIST: ICD-10-PCS | Mod: 26,,, | Performed by: PATHOLOGY

## 2023-02-22 PROCEDURE — D9220A PRA ANESTHESIA: ICD-10-PCS | Mod: ,,, | Performed by: NURSE ANESTHETIST, CERTIFIED REGISTERED

## 2023-02-22 PROCEDURE — 45380 PR COLONOSCOPY,BIOPSY: ICD-10-PCS | Mod: 59,,, | Performed by: INTERNAL MEDICINE

## 2023-02-22 PROCEDURE — 45385 COLONOSCOPY W/LESION REMOVAL: CPT | Performed by: INTERNAL MEDICINE

## 2023-02-22 PROCEDURE — 27201089 HC SNARE, DISP (ANY): Performed by: INTERNAL MEDICINE

## 2023-02-22 PROCEDURE — 27201012 HC FORCEPS, HOT/COLD, DISP: Performed by: INTERNAL MEDICINE

## 2023-02-22 PROCEDURE — 45385 COLONOSCOPY W/LESION REMOVAL: CPT | Mod: ,,, | Performed by: INTERNAL MEDICINE

## 2023-02-22 PROCEDURE — 45380 COLONOSCOPY AND BIOPSY: CPT | Mod: 59 | Performed by: INTERNAL MEDICINE

## 2023-02-22 PROCEDURE — 88305 TISSUE EXAM BY PATHOLOGIST: CPT | Mod: 59 | Performed by: PATHOLOGY

## 2023-02-22 PROCEDURE — 63600175 PHARM REV CODE 636 W HCPCS: Performed by: NURSE ANESTHETIST, CERTIFIED REGISTERED

## 2023-02-22 PROCEDURE — 45385 PR COLONOSCOPY,REMV LESN,SNARE: ICD-10-PCS | Mod: ,,, | Performed by: INTERNAL MEDICINE

## 2023-02-22 PROCEDURE — 45380 COLONOSCOPY AND BIOPSY: CPT | Mod: 59,,, | Performed by: INTERNAL MEDICINE

## 2023-02-22 PROCEDURE — 37000008 HC ANESTHESIA 1ST 15 MINUTES: Performed by: INTERNAL MEDICINE

## 2023-02-22 PROCEDURE — D9220A PRA ANESTHESIA: Mod: ,,, | Performed by: NURSE ANESTHETIST, CERTIFIED REGISTERED

## 2023-02-22 PROCEDURE — 82962 GLUCOSE BLOOD TEST: CPT | Performed by: INTERNAL MEDICINE

## 2023-02-22 PROCEDURE — 63600175 PHARM REV CODE 636 W HCPCS: Performed by: INTERNAL MEDICINE

## 2023-02-22 RX ORDER — PROPOFOL 10 MG/ML
VIAL (ML) INTRAVENOUS
Status: DISCONTINUED | OUTPATIENT
Start: 2023-02-22 | End: 2023-02-22

## 2023-02-22 RX ORDER — SODIUM CHLORIDE, SODIUM LACTATE, POTASSIUM CHLORIDE, CALCIUM CHLORIDE 600; 310; 30; 20 MG/100ML; MG/100ML; MG/100ML; MG/100ML
INJECTION, SOLUTION INTRAVENOUS CONTINUOUS
Status: DISCONTINUED | OUTPATIENT
Start: 2023-02-22 | End: 2023-02-22 | Stop reason: HOSPADM

## 2023-02-22 RX ORDER — LIDOCAINE HCL/PF 100 MG/5ML
SYRINGE (ML) INTRAVENOUS
Status: DISCONTINUED | OUTPATIENT
Start: 2023-02-22 | End: 2023-02-22

## 2023-02-22 RX ADMIN — PROPOFOL 50 MG: 10 INJECTION, EMULSION INTRAVENOUS at 02:02

## 2023-02-22 RX ADMIN — SODIUM CHLORIDE, POTASSIUM CHLORIDE, SODIUM LACTATE AND CALCIUM CHLORIDE: 600; 310; 30; 20 INJECTION, SOLUTION INTRAVENOUS at 01:02

## 2023-02-22 RX ADMIN — Medication 20 MG: at 02:02

## 2023-02-22 NOTE — PLAN OF CARE
Discharge instructions reviewed with pt and family, handouts given, verbalized understanding with no further questions at this time. Dr. Camara spoke to pt at bedside, reviewed procedure and answered questions aware they are awaiting biopsy results with MD telephone number provided per AVS sheet. VSS on RA, no pain or nausea noted, tolerating po fluids without difficulty, no other complaints noted. Fall precautions reviewed, consents in chart, PIV to be removed at discharge.

## 2023-02-22 NOTE — TRANSFER OF CARE
Anesthesia Transfer of Care Note    Patient: Car Castillo    Procedure(s) Performed: Procedure(s) (LRB):  COLONOSCOPY (N/A)    Patient location: PACU    Anesthesia Type: general    Transport from OR: Transported from OR on room air with adequate spontaneous ventilation    Post pain: adequate analgesia    Post assessment: no apparent anesthetic complications and tolerated procedure well    Post vital signs: stable    Level of consciousness: awake, alert and oriented    Nausea/Vomiting: no nausea/vomiting    Complications: none    Transfer of care protocol was followed      Last vitals:   Visit Vitals  BP (!) 141/85   Pulse 65   Temp 36.7 °C (98 °F) (Oral)   Resp 15   Ht 6' (1.829 m)   Wt 107 kg (235 lb 14.3 oz)   SpO2 95%   BMI 31.99 kg/m²

## 2023-02-22 NOTE — DISCHARGE SUMMARY
[facility] 1st Fl  Discharge Note  Short Stay    Procedure(s) (LRB):  COLONOSCOPY (N/A)      OUTCOME: Patient tolerated treatment/procedure well without complication and is now ready for discharge.    DISPOSITION: Home or Self Care    FINAL DIAGNOSIS:  Chronic diarrhea    FOLLOWUP: With primary care provider    DISCHARGE INSTRUCTIONS:  No discharge procedures on file.

## 2023-02-22 NOTE — ANESTHESIA POSTPROCEDURE EVALUATION
Anesthesia Post Evaluation    Patient: Car Castillo    Procedure(s) Performed: Procedure(s) (LRB):  COLONOSCOPY (N/A)    Final Anesthesia Type: general      Patient location during evaluation: PACU  Patient participation: Yes- Able to Participate  Level of consciousness: awake and alert and oriented  Post-procedure vital signs: reviewed and stable  Pain management: adequate  Airway patency: patent    PONV status at discharge: No PONV  Anesthetic complications: no      Cardiovascular status: blood pressure returned to baseline, stable and hemodynamically stable  Respiratory status: unassisted  Hydration status: euvolemic  Follow-up not needed.          Vitals Value Taken Time   /66 02/22/23 1451     02/22/23 1508   Pulse 64 02/22/23 1451   Resp 14 02/22/23 1451   SpO2 98 % 02/22/23 1451         Event Time   Out of Recovery 15:05:00         Pain/Beatriz Score: Beatriz Score: 10 (2/22/2023  2:51 PM)

## 2023-02-22 NOTE — [employer] PATIENT INSTRUCTIONS
Discharge Summary/Instructions after an Endoscopic Procedure  Patient Name: Car Castillo  Patient MRN: [id number]  Patient YOB: 1970 Wednesday, February 22, 2023  Princess Camara MD  Dear patient,  As a result of recent federal legislation (The Federal Cures Act), you may   receive lab or pathology results from your procedure in your [facility]   account before your physician is able to contact you. Your physician or   their representative will relay the results to you with their   recommendations at their soonest availability.  Thank you,  RESTRICTIONS:  During your procedure today, you received medications for sedation.  These   medications may affect your judgment, balance and coordination.  Therefore,   for 24 hours, you have the following restrictions:   - DO NOT drive a car, operate machinery, make legal/financial decisions,   sign important papers or drink alcohol.    ACTIVITY:  Today: no heavy lifting, straining or running due to procedural   sedation/anesthesia.  The following day: return to full activity including work.  DIET:  Eat and drink normally unless instructed otherwise.     TREATMENT FOR COMMON SIDE EFFECTS:  - Mild abdominal pain, nausea, belching, bloating or excessive gas:  rest,   eat lightly and use a heating pad.  - Sore Throat: treat with throat lozenges and/or gargle with warm salt   water.  - Because air was used during the procedure, expelling large amounts of air   from your rectum or belching is normal.  - If a bowel prep was taken, you may not have a bowel movement for 1-3 days.    This is normal.  SYMPTOMS TO WATCH FOR AND REPORT TO YOUR PHYSICIAN:  1. Abdominal pain or bloating, other than gas cramps.  2. Chest pain.  3. Back pain.  4. Signs of infection such as: chills or fever occurring within 24 hours   after the procedure.  5. Rectal bleeding, which would show as bright red, maroon, or black stools.   (A tablespoon of blood from the rectum is not serious, especially  if   hemorrhoids are present.)  6. Vomiting.  7. Weakness or dizziness.  GO DIRECTLY TO THE NEAREST EMERGENCY ROOM IF YOU HAVE ANY OF THE FOLLOWING:      Difficulty breathing              Chills and/or fever over 101 F   Persistent vomiting and/or vomiting blood   Severe abdominal pain   Severe chest pain   Black, tarry stools   Bleeding- more than one tablespoon   Any other symptom or condition that you feel may need urgent attention  Your doctor recommends these additional instructions:  If any biopsies were taken, your doctors clinic will contact you in 1 to 2   weeks with any results.  - Discharge patient to home (via wheelchair).   - Resume previous diet.   - Continue present medications.   - Await pathology results.   - Repeat colonoscopy in 5 years for surveillance.   - Telephone GI clinic for pathology results in 2 weeks.   - Patient has a contact number available for emergencies.  The signs and   symptoms of potential delayed complications were discussed with the   patient.  Return to normal activities tomorrow.  Written discharge   instructions were provided to the patient.  For questions, problems or results please call your physician Princess Camara MD at Work:  [phone]  If you have any questions about the above instructions, call the GI   department at [phone] or call the endoscopy unit at [phone]   from 7am until 3 pm.  [facility], EMERGENCY ROOM PHONE NUMBER:   [phone]  IF A COMPLICATION OR EMERGENCY SITUATION ARISES AND YOU ARE UNABLE TO REACH   YOUR PHYSICIAN - GO DIRECTLY TO THE EMERGENCY ROOM.  I have read or have had read to me these discharge instructions for my   procedure and have received a written copy.  I understand these   instructions and will follow-up with my physician if I have any questions.     __________________________________       _____________________________________  Nurse Signature                                           Patient/Designated   Responsible Party Signature  MD Princess Ordonez MD  2/22/2023 2:35:57 PM  [employer]

## 2023-02-22 NOTE — H&P
Short Stay Endoscopy History and Physical    PCP - Kip Siddiqui MD    Procedure - Colonoscopy  ASA - 2  Mallampati - per anesthesia  History of Anesthesia problems - no  Family history Anesthesia problems -  no     HPI:  This is a 53 y.o. male here for evaluation of :   Active Hospital Problems    Diagnosis  POA    *Chronic diarrhea [K52.9]  No      Resolved Hospital Problems   No resolved problems to display.         Health Maintenance         Date Due Completion Date    Pneumococcal Vaccines (Age 0-64) (2 - PCV) 05/21/2015 5/21/2014    Shingles Vaccine (1 of 2) Never done ---    COVID-19 Vaccine (4 - Booster for Pfizer series) 11/25/2021 9/30/2021    TETANUS VACCINE 05/18/2022 5/18/2012    Influenza Vaccine (1) 09/01/2022 11/24/2020    Diabetes Urine Screening 03/21/2023 3/21/2022    Foot Exam 03/28/2023 3/28/2022 (Done)    Override on 3/28/2022: Done    Override on 5/21/2020: Done    Override on 1/29/2019: Done    Override on 12/19/2017: Done    Override on 9/19/2016: Done    Override on 5/1/2015: Done    Override on 5/21/2014: Done (Dr Siddiqui)    Lipid Panel 03/21/2023 3/21/2022    Hemoglobin A1c 03/26/2023 9/26/2022    Eye Exam 04/19/2023 4/19/2022    Override on 11/15/2016: Done    Override on 11/11/2015: Done    Override on 10/24/2012: Done    Low Dose Statin 02/21/2024 2/21/2023    Colorectal Cancer Screening 11/27/2029 11/27/2019              ROS:  CONSTITUTIONAL: Denies weight change,  fatigue, fevers, chills, night sweats.  CARDIOVASCULAR: Denies chest pain, shortness of breath, orthopnea and edema.  RESPIRATORY: Denies cough, hemoptysis, dyspnea, and wheezing.  GI: See HPI.    Medical History:   Past Medical History:   Diagnosis Date    Anemia 2020    dr oglesby    COVID-19 01/16/2021    DM (diabetes mellitus)     Fatty liver     GERD (gastroesophageal reflux disease)     Gout     Hyperlipidemia     Hypertension     Hypertriglyceridemia     Mood disorder     Panic disorder     Sleep apnea     wears  CPAP    Type 2 diabetes mellitus     05/2013 BS       Surgical History:   Past Surgical History:   Procedure Laterality Date    APPENDECTOMY      COLONOSCOPY N/A 11/27/2019    Procedure: COLONOSCOPY;  Surgeon: Radha Anne MD;  [facility];  Service: Endoscopy;  Laterality: N/A;    ESOPHAGOGASTRODUODENOSCOPY N/A 11/27/2019    Procedure: EGD (ESOPHAGOGASTRODUODENOSCOPY);  Surgeon: Radha Anne MD;  Location: [facility] ENDO;  Service: Endoscopy;  Laterality: N/A;       Family History:   Family History   Problem Relation Age of Onset    Coronary artery disease Father     Diabetes Father     Lung cancer Father     Diabetes Maternal Aunt     Diabetes Paternal Aunt     Hypertension Maternal Grandmother     Cataracts Maternal Grandmother     Hypertension Mother     Cataracts Mother        Social History:   Social History     Tobacco Use    Smoking status: Never    Smokeless tobacco: Never   Substance Use Topics    Alcohol use: Not Currently     Comment: rarely    Drug use: No       Allergies:   Review of patient's allergies indicates:   Allergen Reactions    Simvastatin      Other reaction(s): aches/inc lfts       Medications:   No current facility-administered medications on file prior to encounter.     Current Outpatient Medications on File Prior to Encounter   Medication Sig Dispense Refill    amLODIPine (NORVASC) 10 MG tablet Take 1 tablet by mouth once daily 90 tablet 2    carvediloL (COREG) 25 MG tablet Take 1 tablet (25 mg total) by mouth 2 (two) times daily with meals. 180 tablet 2    cholestyramine-aspartame (CHOLESTYRAMINE LIGHT) 4 gram PwPk Take 1 packet (4 g total) by mouth 2 (two) times daily. 60 packet 3    DULoxetine (CYMBALTA) 60 MG capsule Take 1 capsule by mouth once daily 90 capsule 2    insulin lispro (HUMALOG U-100 INSULIN) 100 unit/mL injection Use via sliding scale 10 mL 1    iron-vitamin C 100-250 mg, ICAR-C, (ICAR-C) 100-250 mg Tab Take 1 tablet by mouth once daily. 90 tablet 3     "metFORMIN (GLUCOPHAGE-XR) 500 MG ER 24hr tablet Take 2 tablets (1,000 mg total) by mouth 2 (two) times daily with meals. 360 tablet 1    omeprazole (PRILOSEC) 40 MG capsule Take 1 capsule (40 mg total) by mouth daily as needed (Gerd). 90 capsule 3    valsartan (DIOVAN) 320 MG tablet Take 1 tablet (320 mg total) by mouth once daily. 90 tablet 2    aspirin (ECOTRIN) 81 MG EC tablet Take 81 mg by mouth once daily.      insulin syringe-needle U-100 0.3 mL 31 gauge x 5/16" Syrg Use as directed (Patient taking differently: Use as directed) 100 each 0    ondansetron (ZOFRAN-ODT) 8 MG TbDL Take 1 tablet (8 mg total) by mouth every 12 (twelve) hours as needed (nausea). 30 tablet 2    semaglutide (OZEMPIC) 1 mg/dose (4 mg/3 mL) Inject 1 mg into the skin every 7 days as directed. 3 pen 11       Physical Exam:  Vital Signs: There were no vitals filed for this visit.  General Appearance: Well appearing in no acute distress  ENT: OP clear  Chest: CTA B  CV: RRR, no m/r/g  Abd: s/nt/nd/nabs  Ext: no edema    Labs:Reviewed    IMP:  Active Hospital Problems    Diagnosis  POA    *Chronic diarrhea [K52.9]  No      Resolved Hospital Problems   No resolved problems to display.         Plan:   I have explained the risks and benefits of colonoscopy to the patient including but not limited to bleeding, perforation, infection, and death. The patient wishes to proceed.    "

## 2023-02-27 LAB
FINAL PATHOLOGIC DIAGNOSIS: NORMAL
Lab: NORMAL

## 2023-02-28 NOTE — PROGRESS NOTES
HPI     NIDDM exam.   Decrease near visual acuity hard to see small print on cell phone.   Last eye exam 03/05/2018 TRF.   Patient wears reading glasses.   Update glasses RX.     Last edited by Christina Felipe on 5/9/2019  3:17 PM. (History)            Assessment /Plan     For exam results, see Encounter Report.    Diabetes mellitus type 2 without retinopathy    Bilateral presbyopia      No Background Diabetic Retinopathy    Dispense Final Rx for glasses.  RTC 1 year  Discussed above and answered questions.                    Report given to [facility]     Denise Vieyra RN  02/28/23 [id number]

## 2023-04-05 DIAGNOSIS — I10 ESSENTIAL HYPERTENSION: ICD-10-CM

## 2023-04-05 RX ORDER — VALSARTAN 320 MG/1
320 TABLET ORAL DAILY
Qty: 90 TABLET | Refills: 0 | Status: SHIPPED | OUTPATIENT
Start: 2023-04-05 | End: 2023-07-07 | Stop reason: SDUPTHER

## 2023-04-05 NOTE — TELEPHONE ENCOUNTER
No new care gaps identified.  [facility] Embedded Care Gaps. Reference number: [id number]. 4/05/2023   5:08:43 AM CDT

## 2023-04-05 NOTE — TELEPHONE ENCOUNTER
Refill Decision Note   Car Castillo  is requesting a refill authorization.  Brief Assessment and Rationale for Refill:  Approve     Medication Therapy Plan:         Comments:     Note composed:9:54 AM 04/05/2023             Appointments     Last Visit   3/28/2022 Kip Siddiqui MD   Next Visit   Visit date not found Kip Siddiqui MD

## 2023-06-07 ENCOUNTER — LAB VISIT (OUTPATIENT)
Dept: LAB | Facility: HOSPITAL | Age: 53
End: 2023-06-07
Payer: COMMERCIAL

## 2023-06-07 DIAGNOSIS — D64.9 ANEMIA, UNSPECIFIED TYPE: ICD-10-CM

## 2023-06-07 LAB
ALBUMIN SERPL BCP-MCNC: 4.4 G/DL (ref 3.5–5.2)
ALP SERPL-CCNC: 117 U/L (ref 55–135)
ALT SERPL W/O P-5'-P-CCNC: 26 U/L (ref 10–44)
ANION GAP SERPL CALC-SCNC: 10 MMOL/L (ref 8–16)
AST SERPL-CCNC: 21 U/L (ref 10–40)
BASOPHILS # BLD AUTO: 0.06 K/UL (ref 0–0.2)
BASOPHILS NFR BLD: 0.7 % (ref 0–1.9)
BILIRUB SERPL-MCNC: 0.5 MG/DL (ref 0.1–1)
BUN SERPL-MCNC: 11 MG/DL (ref 6–20)
CALCIUM SERPL-MCNC: 9.5 MG/DL (ref 8.7–10.5)
CHLORIDE SERPL-SCNC: 102 MMOL/L (ref 95–110)
CO2 SERPL-SCNC: 26 MMOL/L (ref 23–29)
CREAT SERPL-MCNC: 0.9 MG/DL (ref 0.5–1.4)
DIFFERENTIAL METHOD: ABNORMAL
EOSINOPHIL # BLD AUTO: 0.5 K/UL (ref 0–0.5)
EOSINOPHIL NFR BLD: 5.8 % (ref 0–8)
ERYTHROCYTE [DISTWIDTH] IN BLOOD BY AUTOMATED COUNT: 13.2 % (ref 11.5–14.5)
EST. GFR  (NO RACE VARIABLE): >60 ML/MIN/1.73 M^2
FERRITIN SERPL-MCNC: 348 NG/ML (ref 20–300)
GLUCOSE SERPL-MCNC: 153 MG/DL (ref 70–110)
HCT VFR BLD AUTO: 38.9 % (ref 40–54)
HGB BLD-MCNC: 13.2 G/DL (ref 14–18)
IMM GRANULOCYTES # BLD AUTO: 0.11 K/UL (ref 0–0.04)
IMM GRANULOCYTES NFR BLD AUTO: 1.2 % (ref 0–0.5)
IRON SERPL-MCNC: 65 UG/DL (ref 45–160)
LYMPHOCYTES # BLD AUTO: 2.9 K/UL (ref 1–4.8)
LYMPHOCYTES NFR BLD: 32 % (ref 18–48)
MCH RBC QN AUTO: 28.8 PG (ref 27–31)
MCHC RBC AUTO-ENTMCNC: 33.9 G/DL (ref 32–36)
MCV RBC AUTO: 85 FL (ref 82–98)
MONOCYTES # BLD AUTO: 0.7 K/UL (ref 0.3–1)
MONOCYTES NFR BLD: 8.1 % (ref 4–15)
NEUTROPHILS # BLD AUTO: 4.7 K/UL (ref 1.8–7.7)
NEUTROPHILS NFR BLD: 52.2 % (ref 38–73)
NRBC BLD-RTO: 0 /100 WBC
PLATELET # BLD AUTO: 157 K/UL (ref 150–450)
PLATELET BLD QL SMEAR: ABNORMAL
PMV BLD AUTO: 11.1 FL (ref 9.2–12.9)
POTASSIUM SERPL-SCNC: 4.3 MMOL/L (ref 3.5–5.1)
PROT SERPL-MCNC: 7.4 G/DL (ref 6–8.4)
RBC # BLD AUTO: 4.58 M/UL (ref 4.6–6.2)
SATURATED IRON: 17 % (ref 20–50)
SODIUM SERPL-SCNC: 138 MMOL/L (ref 136–145)
TOTAL IRON BINDING CAPACITY: 392 UG/DL (ref 250–450)
TRANSFERRIN SERPL-MCNC: 265 MG/DL (ref 200–375)
WBC # BLD AUTO: 9.06 K/UL (ref 3.9–12.7)

## 2023-06-07 PROCEDURE — 80053 COMPREHEN METABOLIC PANEL: CPT

## 2023-06-07 PROCEDURE — 84466 ASSAY OF TRANSFERRIN: CPT

## 2023-06-07 PROCEDURE — 85025 COMPLETE CBC W/AUTO DIFF WBC: CPT

## 2023-06-07 PROCEDURE — 36415 COLL VENOUS BLD VENIPUNCTURE: CPT | Mod: PO

## 2023-06-07 PROCEDURE — 82728 ASSAY OF FERRITIN: CPT

## 2023-06-09 ENCOUNTER — OFFICE VISIT (OUTPATIENT)
Dept: HEMATOLOGY/ONCOLOGY | Facility: CLINIC | Age: 53
End: 2023-06-09
Payer: COMMERCIAL

## 2023-06-09 VITALS
BODY MASS INDEX: 32.55 KG/M2 | DIASTOLIC BLOOD PRESSURE: 80 MMHG | WEIGHT: 240.31 LBS | SYSTOLIC BLOOD PRESSURE: 132 MMHG | OXYGEN SATURATION: 97 % | HEIGHT: 72 IN | TEMPERATURE: 98 F | HEART RATE: 58 BPM

## 2023-06-09 DIAGNOSIS — R11.0 NAUSEA: ICD-10-CM

## 2023-06-09 DIAGNOSIS — D50.9 IRON DEFICIENCY ANEMIA, UNSPECIFIED IRON DEFICIENCY ANEMIA TYPE: Primary | ICD-10-CM

## 2023-06-09 PROCEDURE — 99999 PR PBB SHADOW E&M-EST. PATIENT-LVL IV: ICD-10-PCS | Mod: PBBFAC,,,

## 2023-06-09 PROCEDURE — 3075F PR MOST RECENT SYSTOLIC BLOOD PRESS GE 130-139MM HG: ICD-10-PCS | Mod: CPTII,S$GLB,,

## 2023-06-09 PROCEDURE — 99214 PR OFFICE/OUTPT VISIT, EST, LEVL IV, 30-39 MIN: ICD-10-PCS | Mod: S$GLB,,,

## 2023-06-09 PROCEDURE — 1160F RVW MEDS BY RX/DR IN RCRD: CPT | Mod: CPTII,S$GLB,,

## 2023-06-09 PROCEDURE — 99214 OFFICE O/P EST MOD 30 MIN: CPT | Mod: S$GLB,,,

## 2023-06-09 PROCEDURE — 3075F SYST BP GE 130 - 139MM HG: CPT | Mod: CPTII,S$GLB,,

## 2023-06-09 PROCEDURE — 3079F PR MOST RECENT DIASTOLIC BLOOD PRESSURE 80-89 MM HG: ICD-10-PCS | Mod: CPTII,S$GLB,,

## 2023-06-09 PROCEDURE — 3008F BODY MASS INDEX DOCD: CPT | Mod: CPTII,S$GLB,,

## 2023-06-09 PROCEDURE — 3079F DIAST BP 80-89 MM HG: CPT | Mod: CPTII,S$GLB,,

## 2023-06-09 PROCEDURE — 1160F PR REVIEW ALL MEDS BY PRESCRIBER/CLIN PHARMACIST DOCUMENTED: ICD-10-PCS | Mod: CPTII,S$GLB,,

## 2023-06-09 PROCEDURE — 3008F PR BODY MASS INDEX (BMI) DOCUMENTED: ICD-10-PCS | Mod: CPTII,S$GLB,,

## 2023-06-09 PROCEDURE — 1159F PR MEDICATION LIST DOCUMENTED IN MEDICAL RECORD: ICD-10-PCS | Mod: CPTII,S$GLB,,

## 2023-06-09 PROCEDURE — 1159F MED LIST DOCD IN RCRD: CPT | Mod: CPTII,S$GLB,,

## 2023-06-09 PROCEDURE — 4010F ACE/ARB THERAPY RXD/TAKEN: CPT | Mod: CPTII,S$GLB,,

## 2023-06-09 PROCEDURE — 99999 PR PBB SHADOW E&M-EST. PATIENT-LVL IV: CPT | Mod: PBBFAC,,,

## 2023-06-09 PROCEDURE — 4010F PR ACE/ARB THEARPY RXD/TAKEN: ICD-10-PCS | Mod: CPTII,S$GLB,,

## 2023-06-09 NOTE — ASSESSMENT & PLAN NOTE
Lab Results   Component Value Date    HGB 13.2 (L) 06/07/2023   Stable, mild normocytic anemia    Lab Results   Component Value Date    UIBC 296 05/11/2012    IRON 65 06/07/2023    TRANSFERRIN 265 06/07/2023    TIBC 392 06/07/2023    FESATURATED 17 (L) 06/07/2023      Lab Results   Component Value Date    FERRITIN 348 (H) 06/07/2023       Continues on oral iron supplementation  Encouraged incorporation of iron rich foods in diet    Pt continues with mild anemia, workup thus far unrevealing, mild anemia noted as far back as 2010.  Plan to continue monitoring and consideration of BMBx with worsening cytopenias  Will order EGD to continue anemia workup and evaluation of persistent nv    Follow-up in 6 months or sooner with repeat labs

## 2023-06-09 NOTE — PROGRESS NOTES
Subjective:      Patient ID: Car Castillo is a 53 y.o. male.    Chief Complaint: 3 month f/u PETROS    HPI:  Mr. Castillo is a pleasant 52-year-old  male with HTN, hyperlipidemia, RAPAN on CPAP, DM, gout, and PETROS. He was initially referred to Hem/Onc clinic for worsening anemia November 2019 and seen by Dr. Park. At this time EGD/colonoscopy completed--Colonoscopy unremarkable; EGD notes acute esophagitis with ulcer--recommendation to repeat in 10 years. Baseline Hgb ranges 12-13. He presents today for follow-up. Pt states overall he continues to feel well. Denies vomiting, diarrhea, constipation, sob, fatigue, hemoptysis, hematochezia, melena.  He remains on ICarC daily without difficulty.      Interval History: Pt presents today for routine follow-up. 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 At last visit discussed poor hydration and drinking 2 2L diet cokes daily--he notes he reduced consumption for a while but has since gone back to increase soda intake. Denies n/v/d/c, sob, fatigue, hemoptysis, hematochezia, melena. He notes being seen by GI with persistent diarrhea--colonoscopy 02/0223 unrevealing as to cause. Pt notes persistent reflux, nausea with intermittent episodes of vomiting.    Social History     Socioeconomic History    Marital status: Single   Tobacco Use    Smoking status: Never    Smokeless tobacco: Never   Substance and Sexual Activity    Alcohol use: Not Currently     Comment: rarely    Drug use: No    Sexual activity: Yes     Partners: Female       Family History   Problem Relation Age of Onset    Coronary artery disease Father     Diabetes Father     Lung cancer Father     Diabetes Maternal Aunt     Diabetes Paternal Aunt     Hypertension Maternal Grandmother     Cataracts Maternal Grandmother     Hypertension Mother     Cataracts Mother        Past Surgical History:   Procedure Laterality Date     APPENDECTOMY      COLONOSCOPY N/A 11/27/2019    Procedure: COLONOSCOPY;  Surgeon: Radha Anne MD;  Location: [facility] ENDO;  Service: Endoscopy;  Laterality: N/A;    COLONOSCOPY N/A 2/22/2023    Procedure: COLONOSCOPY;  Surgeon: Princess Camara MD;  Location: [facility] ENDO;  Service: Endoscopy;  Laterality: N/A;    ESOPHAGOGASTRODUODENOSCOPY N/A 11/27/2019    Procedure: EGD (ESOPHAGOGASTRODUODENOSCOPY);  Surgeon: Radha Anne MD;  Location: [facility] ENDO;  Service: Endoscopy;  Laterality: N/A;       Past Medical History:   Diagnosis Date    Anemia 2020    dr mendel CARRID-19 01/16/2021    DM (diabetes mellitus)     Fatty liver     GERD (gastroesophageal reflux disease)     Gout     Hyperlipidemia     Hypertension     Hypertriglyceridemia     Mood disorder     Panic disorder     Sleep apnea     wears CPAP    Type 2 diabetes mellitus     05/2013 BS       Review of Systems   Constitutional:  Positive for fatigue. Negative for activity change, appetite change, chills, diaphoresis, fever and unexpected weight change.   HENT:  Negative for congestion, dental problem, facial swelling, mouth sores, nosebleeds, trouble swallowing and voice change.    Eyes:  Negative for visual disturbance.   Respiratory:  Negative for apnea, cough, choking, chest tightness, shortness of breath, wheezing and stridor.    Cardiovascular:  Negative for chest pain, palpitations and leg swelling.   Gastrointestinal:  Negative for abdominal distention, abdominal pain, anal bleeding, blood in stool, constipation, diarrhea, nausea, rectal pain and vomiting.   Genitourinary:  Negative for decreased urine volume, difficulty urinating, dysuria, enuresis, frequency, hematuria and urgency.   Musculoskeletal:  Negative for arthralgias, back pain, gait problem, joint swelling, myalgias and neck stiffness.   Neurological:  Negative for dizziness, tremors, weakness, light-headedness and numbness.   Hematological:  Negative for adenopathy.  "Does not bruise/bleed easily.   Psychiatric/Behavioral:  The patient is not nervous/anxious.         Medication List with Changes/Refills   Current Medications    AMLODIPINE (NORVASC) 10 MG TABLET    Take 1 tablet by mouth once daily    ASPIRIN (ECOTRIN) 81 MG EC TABLET    Take 81 mg by mouth once daily.    CARVEDILOL (COREG) 25 MG TABLET    Take 1 tablet (25 mg total) by mouth 2 (two) times daily with meals.    CHOLESTYRAMINE-ASPARTAME (CHOLESTYRAMINE LIGHT) 4 GRAM PWPK    Take 1 packet (4 g total) by mouth 2 (two) times daily.    DULOXETINE (CYMBALTA) 60 MG CAPSULE    Take 1 capsule by mouth once daily    INSULIN LISPRO (HUMALOG U-100 INSULIN) 100 UNIT/ML INJECTION    Use via sliding scale    INSULIN SYRINGE-NEEDLE U-100 0.3 ML 31 GAUGE X 5/16" SYRG    Use as directed    IRON-VITAMIN C 100-250 MG, ICAR-C, (ICAR-C) 100-250 MG TAB    Take 1 tablet by mouth once daily.    METFORMIN (GLUCOPHAGE-XR) 500 MG ER 24HR TABLET    Take 2 tablets (1,000 mg total) by mouth 2 (two) times daily with meals.    OMEPRAZOLE (PRILOSEC) 40 MG CAPSULE    Take 1 capsule (40 mg total) by mouth daily as needed (Gerd).    ONDANSETRON (ZOFRAN-ODT) 8 MG TBDL    Take 1 tablet (8 mg total) by mouth every 12 (twelve) hours as needed (nausea).    ROSUVASTATIN (CRESTOR) 10 MG TABLET    Take 1 tablet (10 mg total) by mouth once daily.    SEMAGLUTIDE (OZEMPIC) 1 MG/DOSE (4 MG/3 ML)    Inject 1 mg into the skin every 7 days as directed.    VALSARTAN (DIOVAN) 320 MG TABLET    Take 1 tablet (320 mg total) by mouth once daily.        Objective:     Vitals:    06/09/23 1131   BP: 132/80   Pulse: (!) 58   Temp: 98.1 °F (36.7 °C)       Physical Exam  Vitals reviewed.   Constitutional:       Appearance: He is well-developed.   HENT:      Head: Normocephalic.      Right Ear: External ear normal.      Left Ear: External ear normal.      Mouth/Throat:      Pharynx: No oropharyngeal exudate.   Eyes:      Conjunctiva/sclera: Conjunctivae normal.   Neck:      " Thyroid: No thyromegaly.   Cardiovascular:      Rate and Rhythm: Normal rate.      Heart sounds: No murmur heard.    No gallop.   Pulmonary:      Effort: Pulmonary effort is normal. No respiratory distress.      Breath sounds: No wheezing or rales.   Abdominal:      General: There is no distension.      Palpations: There is no mass.      Tenderness: There is no guarding or rebound.   Musculoskeletal:         General: Normal range of motion.   Lymphadenopathy:      Cervical: No cervical adenopathy.   Neurological:      Mental Status: He is alert and oriented to person, place, and time.   Psychiatric:         Mood and Affect: Mood normal.         Behavior: Behavior normal.         Thought Content: Thought content normal.         Judgment: Judgment normal.       Lab Results   Component Value Date    WBC 9.06 06/07/2023    HGB 13.2 (L) 06/07/2023    HCT 38.9 (L) 06/07/2023    MCV 85 06/07/2023     06/07/2023       Lab Results   Component Value Date     06/07/2023    K 4.3 06/07/2023     06/07/2023    CO2 26 06/07/2023    BUN 11 06/07/2023    CREATININE 0.9 06/07/2023    CALCIUM 9.5 06/07/2023    ANIONGAP 10 06/07/2023    ESTGFRAFRICA >60 05/25/2022    EGFRNONAA >60 05/25/2022     Lab Results   Component Value Date    ALT 26 06/07/2023    AST 21 06/07/2023    ALKPHOS 117 06/07/2023    BILITOT 0.5 06/07/2023       Assessment/Plan:     Problem List Items Addressed This Visit          Oncology    PETROS (iron deficiency anemia) - Primary     Lab Results   Component Value Date    HGB 13.2 (L) 06/07/2023   Stable, mild normocytic anemia    Lab Results   Component Value Date    UIBC 296 05/11/2012    IRON 65 06/07/2023    TRANSFERRIN 265 06/07/2023    TIBC 392 06/07/2023    FESATURATED 17 (L) 06/07/2023      Lab Results   Component Value Date    FERRITIN 348 (H) 06/07/2023     Continues on oral iron supplementation  Encouraged incorporation of iron rich foods in diet    Pt continues with mild anemia, workup  thus far unrevealing, mild anemia noted as far back as 2010.  Plan to continue monitoring and consideration of BMBx with worsening cytopenias  Will order EGD to continue anemia workup and evaluation of persistent nv    Follow-up in 6 months or sooner with repeat labs         Relevant Orders    Ambulatory referral/consult to Endo Procedure        GI    Nausea    Relevant Orders    Ambulatory referral/consult to Endo Procedure       Med Onc Chart Routing      Follow up with physician    Follow up with KIMBERLY 6 months. with labs a few days prior   Infusion scheduling note    Injection scheduling note    Labs CBC, CMP, ferritin and iron and TIBC   Scheduling:  Preferred lab:  Lab interval:     Imaging    Pharmacy appointment    Other referrals             FRANCIS ValenzuelaP-C  Hematology/Oncology

## 2023-07-06 DIAGNOSIS — I10 ESSENTIAL HYPERTENSION: ICD-10-CM

## 2023-07-06 RX ORDER — VALSARTAN 320 MG/1
320 TABLET ORAL DAILY
Qty: 90 TABLET | Refills: 0 | OUTPATIENT
Start: 2023-07-06

## 2023-07-06 NOTE — TELEPHONE ENCOUNTER
Care Due:                  Date            Visit Type   Department     Provider  --------------------------------------------------------------------------------                                MYCHART                              ANNUAL                              CHECKUP/PHY  [facility] INTERNAL  Last Visit: 03-      [facility]       Kip Siddiqui  Next Visit: None Scheduled  None         None Found                                                            Last  Test          Frequency    Reason                     Performed    Due Date  --------------------------------------------------------------------------------    Office Visit  12 months..  DULoxetine, amLODIPine,    03- 03-                             carvediloL, insulin,                             semaglutide, valsartan...    HBA1C.......  6 months...  insulin, semaglutide.....  09- 03-    [facility] Embedded Care Due Messages. Reference number: [id number].   7/06/2023 5:08:46 AM CDT

## 2023-07-07 DIAGNOSIS — I10 ESSENTIAL HYPERTENSION: ICD-10-CM

## 2023-07-07 RX ORDER — VALSARTAN 320 MG/1
320 TABLET ORAL DAILY
Qty: 90 TABLET | Refills: 0 | Status: SHIPPED | OUTPATIENT
Start: 2023-07-07 | End: 2023-08-22 | Stop reason: SDUPTHER

## 2023-07-07 NOTE — TELEPHONE ENCOUNTER
----- Message from Mita Reilly sent at 7/7/2023 10:12 AM CDT -----  Contact: Car  Type:  RX Refill Request    Who Called:  Car  Refill or New Rx: refill   RX Name and Strength: Valsartan (DIOVAN) 320 MG tablet  How is the patient currently taking it? (ex. 1XDay):1xday  Is this a 30 day or 90 day RX: 90  Preferred Pharmacy with phone number:   [facility]  [address]  [place] [address]  Phone: [phone] Fax: [phone]  Local or Mail Order: Local  Ordering Provider: Dr. Siddiqui  Would the patient rather a call back or a response via [facility]? Call back   Best Call Back Number: Please call him at [phone]  Additional Information:

## 2023-07-07 NOTE — TELEPHONE ENCOUNTER
No care due was identified.  [facility] Embedded Care Due Messages. Reference number: [id number].   7/07/2023 10:35:58 AM CDT

## 2023-07-19 ENCOUNTER — HOSPITAL ENCOUNTER (OUTPATIENT)
Dept: PREADMISSION TESTING | Facility: HOSPITAL | Age: 53
Discharge: HOME | End: 2023-07-19
Attending: INTERNAL MEDICINE
Payer: COMMERCIAL

## 2023-07-19 DIAGNOSIS — D50.9 IRON DEFICIENCY ANEMIA, UNSPECIFIED IRON DEFICIENCY ANEMIA TYPE: Primary | ICD-10-CM

## 2023-07-19 DIAGNOSIS — R11.0 NAUSEA: ICD-10-CM

## 2023-07-24 ENCOUNTER — ANESTHESIA EVENT (OUTPATIENT)
Dept: ENDOSCOPY | Facility: HOSPITAL | Age: 53
End: 2023-07-24
Payer: COMMERCIAL

## 2023-07-24 ENCOUNTER — HOSPITAL ENCOUNTER (OUTPATIENT)
Facility: HOSPITAL | Age: 53
Discharge: HOME | End: 2023-07-24
Attending: INTERNAL MEDICINE | Admitting: INTERNAL MEDICINE
Payer: COMMERCIAL

## 2023-07-24 ENCOUNTER — ANESTHESIA (OUTPATIENT)
Dept: ENDOSCOPY | Facility: HOSPITAL | Age: 53
End: 2023-07-24
Payer: COMMERCIAL

## 2023-07-24 VITALS
HEART RATE: 62 BPM | RESPIRATION RATE: 18 BRPM | BODY MASS INDEX: 32.51 KG/M2 | WEIGHT: 240 LBS | TEMPERATURE: 98 F | OXYGEN SATURATION: 99 % | HEIGHT: 72 IN | SYSTOLIC BLOOD PRESSURE: 138 MMHG | DIASTOLIC BLOOD PRESSURE: 82 MMHG

## 2023-07-24 DIAGNOSIS — D64.9 ANEMIA: ICD-10-CM

## 2023-07-24 PROBLEM — D62 ACUTE BLOOD LOSS ANEMIA: Status: ACTIVE | Noted: 2023-07-24

## 2023-07-24 LAB
GLUCOSE SERPL-MCNC: 123 MG/DL (ref 70–110)
POCT GLUCOSE: 123 MG/DL (ref 70–110)

## 2023-07-24 PROCEDURE — 88305 TISSUE EXAM BY PATHOLOGIST: CPT | Performed by: PATHOLOGY

## 2023-07-24 PROCEDURE — 37000008 HC ANESTHESIA 1ST 15 MINUTES: Performed by: INTERNAL MEDICINE

## 2023-07-24 PROCEDURE — 63600175 PHARM REV CODE 636 W HCPCS: Performed by: FAMILY MEDICINE

## 2023-07-24 PROCEDURE — 88305 TISSUE EXAM BY PATHOLOGIST: CPT | Mod: 26,,, | Performed by: PATHOLOGY

## 2023-07-24 PROCEDURE — 25000003 PHARM REV CODE 250: Performed by: FAMILY MEDICINE

## 2023-07-24 PROCEDURE — 43239 PR EGD, FLEX, W/BIOPSY, SGL/MULTI: ICD-10-PCS | Mod: ,,, | Performed by: INTERNAL MEDICINE

## 2023-07-24 PROCEDURE — 88305 TISSUE EXAM BY PATHOLOGIST: ICD-10-PCS | Mod: 26,,, | Performed by: PATHOLOGY

## 2023-07-24 PROCEDURE — 43239 EGD BIOPSY SINGLE/MULTIPLE: CPT | Performed by: INTERNAL MEDICINE

## 2023-07-24 PROCEDURE — 43239 EGD BIOPSY SINGLE/MULTIPLE: CPT | Mod: ,,, | Performed by: INTERNAL MEDICINE

## 2023-07-24 PROCEDURE — 27201012 HC FORCEPS, HOT/COLD, DISP: Performed by: INTERNAL MEDICINE

## 2023-07-24 RX ORDER — LIDOCAINE HYDROCHLORIDE 20 MG/ML
INJECTION, SOLUTION EPIDURAL; INFILTRATION; INTRACAUDAL; PERINEURAL
Status: DISCONTINUED | OUTPATIENT
Start: 2023-07-24 | End: 2023-07-24

## 2023-07-24 RX ORDER — PROPOFOL 10 MG/ML
VIAL (ML) INTRAVENOUS
Status: DISCONTINUED | OUTPATIENT
Start: 2023-07-24 | End: 2023-07-24

## 2023-07-24 RX ADMIN — SODIUM CHLORIDE, SODIUM LACTATE, POTASSIUM CHLORIDE, AND CALCIUM CHLORIDE: .6; .31; .03; .02 INJECTION, SOLUTION INTRAVENOUS at 12:07

## 2023-07-24 RX ADMIN — LIDOCAINE HYDROCHLORIDE 50 MG: 20 INJECTION, SOLUTION EPIDURAL; INFILTRATION; INTRACAUDAL; PERINEURAL at 12:07

## 2023-07-24 RX ADMIN — PROPOFOL 150 MG: 10 INJECTION, EMULSION INTRAVENOUS at 12:07

## 2023-07-24 NOTE — H&P
Endoscopy History and Physical    PCP - Kip Siddiqui MD  Referring Phys[place]  [id number] [place] [address]      ASA - per anesthesia  Mallampati - per anesthesia  History of Anesthesia problems - no  Family history Anesthesia problems -  no   Plan of anesthesia - General    HPI  53 y.o. male    Planned Procedure: EGD  Diagnosis: Anemia  Chief Complaint: Same as above        ROS:  Constitutional: No fevers, chills, No weight loss  CV: No chest pain  Pulm: No cough, No shortness of breath  GI: see HPI    Medical History:  has a past medical history of Anemia (2020), COVID-19 (01/16/2021), DM (diabetes mellitus), Fatty liver, GERD (gastroesophageal reflux disease), Gout, Hyperlipidemia, Hypertension, Hypertriglyceridemia, Mood disorder, Panic disorder, Sleep apnea, and Type 2 diabetes mellitus.    Surgical History:  has a past surgical history that includes Appendectomy; Colonoscopy (N/A, 11/27/2019); Esophagogastroduodenoscopy (N/A, 11/27/2019); and Colonoscopy (N/A, 2/22/2023).    Family History: family history includes Cataracts in his maternal grandmother and mother; Coronary artery disease in his father; Diabetes in his father, maternal aunt, and paternal aunt; Hypertension in his maternal grandmother and mother; Lung cancer in his father..    Social History:  reports that he has never smoked. He has never used smokeless tobacco. He reports that he does not currently use alcohol. He reports that he does not use drugs.    Review of patient's allergies indicates:   Allergen Reactions    Simvastatin      Other reaction(s): aches/inc lfts       Medications:   Medications Prior to Admission   Medication Sig Dispense Refill Last Dose    amLODIPine (NORVASC) 10 MG tablet Take 1 tablet by mouth once daily 90 tablet 2 7/23/2023    aspirin (ECOTRIN) 81 MG EC tablet Take 81 mg by mouth once daily.   7/23/2023    carvediloL (COREG) 25 MG tablet Take 1 tablet (25 mg total) by  "mouth 2 (two) times daily with meals. 60 tablet 0 7/23/2023    DULoxetine (CYMBALTA) 60 MG capsule Take 1 capsule by mouth once daily 90 capsule 2 7/23/2023    iron-vitamin C 100-250 mg, ICAR-C, (ICAR-C) 100-250 mg Tab Take 1 tablet by mouth once daily. 90 tablet 3 7/23/2023    metFORMIN (GLUCOPHAGE-XR) 500 MG ER 24hr tablet Take 2 tablets (1,000 mg total) by mouth 2 (two) times daily with meals. 360 tablet 1 7/23/2023    omeprazole (PRILOSEC) 40 MG capsule Take 1 capsule (40 mg total) by mouth daily as needed (Gerd). 90 capsule 3 Past Week    rosuvastatin (CRESTOR) 10 MG tablet Take 1 tablet (10 mg total) by mouth once daily. 90 tablet 3 7/23/2023    valsartan (DIOVAN) 320 MG tablet Take 1 tablet (320 mg total) by mouth once daily. 90 tablet 0 7/23/2023    cholestyramine-aspartame (CHOLESTYRAMINE LIGHT) 4 gram PwPk Take 1 packet (4 g total) by mouth 2 (two) times daily. 60 packet 3 More than a month    insulin lispro (HUMALOG U-100 INSULIN) 100 unit/mL injection Use via sliding scale 10 mL 1 More than a month    insulin syringe-needle U-100 0.3 mL 31 gauge x 5/16" Syrg Use as directed (Patient taking differently: Use as directed) 100 each 0     ondansetron (ZOFRAN-ODT) 8 MG TbDL Take 1 tablet (8 mg total) by mouth every 12 (twelve) hours as needed (nausea). 30 tablet 2 More than a month    semaglutide (OZEMPIC) 1 mg/dose (4 mg/3 mL) Inject 1 mg into the skin every 7 days as directed. 3 pen 11 7/17/2023       Physical Exam:    Vital Signs:   Vitals:    07/24/23 1106   BP: 133/74   Pulse: (!) 54   Resp: 17   Temp: 98.6 °F (37 °C)       General Appearance: Well appearing in no acute distress  Abdomen: Soft, non tender, non distended with normal bowel sounds, no masses    Labs:  Lab Results   Component Value Date    WBC 9.06 06/07/2023    HGB 13.2 (L) 06/07/2023    HCT 38.9 (L) 06/07/2023     06/07/2023    CHOL 167 03/21/2022    TRIG 152 (H) 03/21/2022    HDL 40 03/21/2022    ALT 26 06/07/2023    AST 21 " 06/07/2023     06/07/2023    K 4.3 06/07/2023     06/07/2023    CREATININE 0.9 06/07/2023    BUN 11 06/07/2023    CO2 26 06/07/2023    TSH 1.638 09/29/2019    PSA 1.5 09/26/2022    INR 1.0 05/11/2012    HGBA1C 5.6 09/26/2022       I have explained the risks and benefits of this endoscopic procedure to the patient including but not limited to bleeding, inflammation, infection, perforation, and death.    SEDATION PLAN: per anesthesia       History reviewed, vital signs satisfactory, cardiopulmonary status satisfactory, sedation options, risks and plans have been discussed with the patient  All their questions were answered and the patient agrees to the sedation procedures as planned and the patient is deemed an appropriate candidate for the sedation as planned.     The risks, benefits and alternatives of the procedure were discussed with the patient in detail. This discussion was had in the presence of endoscopy staff. The risks include, risks of adverse reaction to sedation requiring the use of reversal agents, bleeding requiring blood transfusion, perforation requiring surgical intervention and technical failure. Other risks include aspiration leading to respiratory distress and respiratory failure resulting in endotracheal intubation and mechanical ventilation including death. If anesthesia is being utilized for this procedure, it is up to the anesthesiologist to determine airway safety including elective endotracheal intubation. Questions were answered, they agree to proceed. There was no language barriers.       Procedure explained to patient, informed consent obtained and placed in chart.       Jt Vilchis MD

## 2023-07-24 NOTE — PLAN OF CARE
MD at bedside to discuss results with pt    VSS. ALBIN.   Denies pain/nausea  Discharge orders noted

## 2023-07-24 NOTE — TRANSFER OF CARE
Anesthesia Transfer of Care Note    Patient: Guillermo Castillo    Procedure(s) Performed: Procedure(s) (LRB):  EGD (ESOPHAGOGASTRODUODENOSCOPY) (N/A)    Patient location: GI    Anesthesia Type: MAC    Transport from OR: Transported from OR on room air with adequate spontaneous ventilation    Post pain: adequate analgesia    Post assessment: no apparent anesthetic complications    Post vital signs: stable    Level of consciousness: awake and responds to stimulation    Nausea/Vomiting: no nausea/vomiting    Complications: none    Transfer of care protocol was followed      Last vitals:   Visit Vitals  /74 (BP Location: Left arm, Patient Position: Lying)   Pulse (!) 54   Temp 37 °C (98.6 °F) (Temporal)   Resp 17   Ht 6' (1.829 m)   Wt 108.9 kg (240 lb)   SpO2 97%   BMI 32.55 kg/m²

## 2023-07-24 NOTE — ANESTHESIA POSTPROCEDURE EVALUATION
Anesthesia Post Evaluation    Patient: Guillermo Castillo    Procedure(s) Performed: Procedure(s) (LRB):  EGD (ESOPHAGOGASTRODUODENOSCOPY) (N/A)    Final Anesthesia Type: MAC      Patient location during evaluation: GI PACU  Patient participation: Yes- Able to Participate  Level of consciousness: awake and alert  Post-procedure vital signs: reviewed and stable  Pain management: adequate  Airway patency: patent    PONV status at discharge: No PONV  Anesthetic complications: no      Cardiovascular status: stable  Respiratory status: unassisted and spontaneous ventilation  Hydration status: euvolemic  Follow-up not needed.          Vitals Value Taken Time   /74 07/24/23 1106   Temp 37 °C (98.6 °F) 07/24/23 1106   Pulse 54 07/24/23 1106   Resp 17 07/24/23 1106   SpO2 97 % 07/24/23 1106         No case tracking events are documented in the log.      Pain/Beatriz Score: No data recorded

## 2023-07-24 NOTE — DISCHARGE SUMMARY
[facility] - Endoscopy (Hospital)  Discharge Note  Short Stay    Procedure(s) (LRB):  EGD (ESOPHAGOGASTRODUODENOSCOPY) (N/A)      OUTCOME: Patient tolerated treatment/procedure well without complication and is now ready for discharge.    DISPOSITION: Home or Self Care    FINAL DIAGNOSIS:  <principal problem not specified>    FOLLOWUP: With primary care provider    DISCHARGE INSTRUCTIONS:  No discharge procedures on file.     TIME SPENT ON DISCHARGE: 20 minutes

## 2023-07-24 NOTE — ANESTHESIA PREPROCEDURE EVALUATION
07/24/2023  Guillermo Castillo is a 53 y.o., male.      Pre-op Assessment    I have reviewed the Patient Summary Reports.     I have reviewed the Nursing Notes. I have reviewed the NPO Status.   I have reviewed the Medications.     Review of Systems  Anesthesia Hx:  No problems with previous Anesthesia    Social:  Non-Smoker    Cardiovascular:   Hypertension    Pulmonary:   Sleep Apnea    Education provided regarding risk of obstructive sleep apnea     Hepatic/GI:   GERD Liver Disease, Fatty Liver   Endocrine:   Diabetes, type 2  Obesity / BMI > 30  Psych:   Psychiatric History Mood, panic Disorder, Shift work syndrome         Physical Exam  General: Well nourished, Cooperative, Alert and Oriented    Airway:  Mallampati: II   Mouth Opening: Normal  TM Distance: Normal  Tongue: Normal  Neck ROM: Normal ROM    Dental:  Intact    Chest/Lungs:  Clear to auscultation    Heart:  Rhythm: Regular Rhythm  Sounds: Normal    Abdomen:  Normal        Anesthesia Plan  Type of Anesthesia, risks & benefits discussed:    Anesthesia Type: MAC  Intra-op Monitoring Plan: Standard ASA Monitors  Induction:  IV  Informed Consent: Informed consent signed with the Patient and all parties understand the risks and agree with anesthesia plan.  All questions answered.   ASA Score: 2  Day of Surgery Review of History & Physical: I have interviewed and examined the patient. I have reviewed the patient's H&P dated:     Ready For Surgery From Anesthesia Perspective.     .

## 2023-07-24 NOTE — PROVATION PATIENT INSTRUCTIONS
Discharge Summary/Instructions after an Endoscopic Procedure  Patient Name: Guillermo Castillo  Patient MRN: [id number]  Patient YOB: 1970 Monday, July 24, 2023 Jt Vilchis MD  Dear patient,  As a result of recent federal legislation (The Federal Cures Act), you may   receive lab or pathology results from your procedure in your [facility]   account before your physician is able to contact you. Your physician or   their representative will relay the results to you with their   recommendations at their soonest availability.  Thank you,  RESTRICTIONS:  During your procedure today, you received medications for sedation.  These   medications may affect your judgment, balance and coordination.  Therefore,   for 24 hours, you have the following restrictions:   - DO NOT drive a car, operate machinery, make legal/financial decisions,   sign important papers or drink alcohol.    ACTIVITY:  Today: no heavy lifting, straining or running due to procedural   sedation/anesthesia.  The following day: return to full activity including work.  DIET:  Eat and drink normally unless instructed otherwise.     TREATMENT FOR COMMON SIDE EFFECTS:  - Mild abdominal pain, nausea, belching, bloating or excessive gas:  rest,   eat lightly and use a heating pad.  - Sore Throat: treat with throat lozenges and/or gargle with warm salt   water.  - Because air was used during the procedure, expelling large amounts of air   from your rectum or belching is normal.  - If a bowel prep was taken, you may not have a bowel movement for 1-3 days.    This is normal.  SYMPTOMS TO WATCH FOR AND REPORT TO YOUR PHYSICIAN:  1. Abdominal pain or bloating, other than gas cramps.  2. Chest pain.  3. Back pain.  4. Signs of infection such as: chills or fever occurring within 24 hours   after the procedure.  5. Rectal bleeding, which would show as bright red, maroon, or black stools.   (A tablespoon of blood from the rectum is not serious, especially if    hemorrhoids are present.)  6. Vomiting.  7. Weakness or dizziness.  GO DIRECTLY TO THE NEAREST EMERGENCY ROOM IF YOU HAVE ANY OF THE FOLLOWING:      Difficulty breathing              Chills and/or fever over 101 F   Persistent vomiting and/or vomiting blood   Severe abdominal pain   Severe chest pain   Black, tarry stools   Bleeding- more than one tablespoon   Any other symptom or condition that you feel may need urgent attention  Your doctor recommends these additional instructions:  If any biopsies were taken, your doctors clinic will contact you in 1 to 2   weeks with any results.  - Discharge patient to home.   - Resume previous diet.   - Continue present medications.   - Await pathology results.   - If anemia confirms as Iron deficiency not responding to Iron replacement   with suspected GI source then plan capsule endoscopy.  Recheck Stool FOBT  For questions, problems or results please call your physician Jt Vilchis MD at Work:  [phone]  If you have any questions about the above instructions, call the GI   department at [phone] or call the endoscopy unit at [phone]   from 7am until 3 pm.  [facility] [place], EMERGENCY ROOM PHONE NUMBER:   [phone]  IF A COMPLICATION OR EMERGENCY SITUATION ARISES AND YOU ARE UNABLE TO REACH   YOUR PHYSICIAN - GO DIRECTLY TO THE EMERGENCY ROOM.  I have read or have had read to me these discharge instructions for my   procedure and have received a written copy.  I understand these   instructions and will follow-up with my physician if I have any questions.     __________________________________       _____________________________________  Nurse Signature                                          Patient/Designated   Responsible Party Signature  MD Jt Lizama MD  7/24/2023 12:33:39 PM  This report has been verified and signed electronically.  Dear patient,  As a result of recent federal  legislation (The Federal Cures Act), you may   receive lab or pathology results from your procedure in your [facility]   account before your physician is able to contact you. Your physician or   their representative will relay the results to you with their   recommendations at their soonest availability.  Thank you,  PROVATION

## 2023-07-25 LAB
FINAL PATHOLOGIC DIAGNOSIS: NORMAL
GROSS: NORMAL
Lab: NORMAL

## 2023-07-27 DIAGNOSIS — I10 ESSENTIAL HYPERTENSION: ICD-10-CM

## 2023-07-27 RX ORDER — DULOXETIN HYDROCHLORIDE 60 MG/1
60 CAPSULE, DELAYED RELEASE ORAL DAILY
Qty: 90 CAPSULE | Refills: 4 | Status: SHIPPED | OUTPATIENT
Start: 2023-07-27

## 2023-07-27 RX ORDER — AMLODIPINE BESYLATE 10 MG/1
10 TABLET ORAL DAILY
Qty: 90 TABLET | Refills: 4 | Status: SHIPPED | OUTPATIENT
Start: 2023-07-27

## 2023-07-27 NOTE — TELEPHONE ENCOUNTER
No care due was identified.  [facility] Embedded Care Due Messages. Reference number: [id number].   7/27/2023 5:08:38 AM CDT

## 2023-07-27 NOTE — TELEPHONE ENCOUNTER
Refill Routing Note   Medication(s) are not appropriate for processing by [facility] for the following reason(s):      Patient not seen by provider within 15 months    [facility] action(s):  Defer Care Due:  None identified     Medication Therapy Plan: Patient does  have appointment scheduled 8/29/23 with PCP      Appointments  past 12m or future 3m with PCP    Date Provider   Last Visit   3/28/2022 Kip Siddiqui MD   Next Visit   8/29/2023 Kip Siddiqui MD   ED visits in past 90 days: 0        Note composed:10:58 AM 07/27/2023

## 2023-08-15 DIAGNOSIS — I10 HYPERTENSION, UNSPECIFIED TYPE: Chronic | ICD-10-CM

## 2023-08-15 DIAGNOSIS — I10 ESSENTIAL HYPERTENSION: ICD-10-CM

## 2023-08-15 RX ORDER — CARVEDILOL 25 MG/1
25 TABLET ORAL 2 TIMES DAILY WITH MEALS
Qty: 60 TABLET | Refills: 0 | Status: CANCELLED | OUTPATIENT
Start: 2023-08-15 | End: 2023-09-14

## 2023-08-15 RX ORDER — VALSARTAN 320 MG/1
320 TABLET ORAL DAILY
Qty: 90 TABLET | Refills: 0 | Status: CANCELLED | OUTPATIENT
Start: 2023-08-15

## 2023-08-15 NOTE — TELEPHONE ENCOUNTER
No care due was identified.  [facility] Embedded Care Due Messages. Reference number: [id number].   8/15/2023 7:25:16 AM CDT

## 2023-08-18 DIAGNOSIS — I10 HYPERTENSION, UNSPECIFIED TYPE: Chronic | ICD-10-CM

## 2023-08-18 NOTE — TELEPHONE ENCOUNTER
No care due was identified.  [facility] Embedded Care Due Messages. Reference number: [id number].   8/18/2023 5:08:34 AM CDT

## 2023-08-18 NOTE — TELEPHONE ENCOUNTER
Refill Routing Note   Medication(s) are not appropriate for processing by [facility] for the following reason(s):      Patient not seen by provider within 15 months    [facility] action(s):  Defer Care Due:  None identified              Appointments  past 12m or future 3m with PCP    Date Provider   Last Visit   3/28/2022 Kip Siddiqui MD   Next Visit   8/29/2023 Kip Siddiqui MD   ED visits in past 90 days: 0        Note composed:12:10 PM 08/18/2023

## 2023-08-21 RX ORDER — CARVEDILOL 25 MG/1
25 TABLET ORAL 2 TIMES DAILY WITH MEALS
Qty: 60 TABLET | Refills: 11 | Status: SHIPPED | OUTPATIENT
Start: 2023-08-21

## 2023-08-23 ENCOUNTER — LAB VISIT (OUTPATIENT)
Dept: LAB | Facility: HOSPITAL | Age: 53
End: 2023-08-23
Payer: COMMERCIAL

## 2023-08-23 DIAGNOSIS — E11.8 TYPE 2 DIABETES MELLITUS WITH COMPLICATION, WITHOUT LONG-TERM CURRENT USE OF INSULIN: ICD-10-CM

## 2023-08-23 LAB
ESTIMATED AVG GLUCOSE: 120 MG/DL (ref 68–131)
HBA1C MFR BLD: 5.8 % (ref 4–5.6)

## 2023-08-23 PROCEDURE — 83036 HEMOGLOBIN GLYCOSYLATED A1C: CPT

## 2023-08-23 PROCEDURE — 36415 COLL VENOUS BLD VENIPUNCTURE: CPT | Mod: PN

## 2023-08-29 ENCOUNTER — OFFICE VISIT (OUTPATIENT)
Dept: INTERNAL MEDICINE | Facility: CLINIC | Age: 53
End: 2023-08-29
Payer: COMMERCIAL

## 2023-08-29 VITALS
HEIGHT: 72 IN | WEIGHT: 246.94 LBS | SYSTOLIC BLOOD PRESSURE: 120 MMHG | TEMPERATURE: 98 F | BODY MASS INDEX: 33.45 KG/M2 | DIASTOLIC BLOOD PRESSURE: 80 MMHG | HEART RATE: 65 BPM | OXYGEN SATURATION: 97 %

## 2023-08-29 DIAGNOSIS — Z12.5 SCREENING FOR PROSTATE CANCER: ICD-10-CM

## 2023-08-29 DIAGNOSIS — E11.9 TYPE 2 DIABETES MELLITUS WITHOUT COMPLICATION, WITHOUT LONG-TERM CURRENT USE OF INSULIN: Primary | ICD-10-CM

## 2023-08-29 DIAGNOSIS — D50.9 IRON DEFICIENCY ANEMIA, UNSPECIFIED IRON DEFICIENCY ANEMIA TYPE: ICD-10-CM

## 2023-08-29 DIAGNOSIS — I10 ESSENTIAL HYPERTENSION: ICD-10-CM

## 2023-08-29 DIAGNOSIS — F39 MOOD DISORDER: ICD-10-CM

## 2023-08-29 DIAGNOSIS — E78.1 HYPERTRIGLYCERIDEMIA: ICD-10-CM

## 2023-08-29 DIAGNOSIS — M1A.9XX0 CHRONIC GOUT WITHOUT TOPHUS, UNSPECIFIED CAUSE, UNSPECIFIED SITE: ICD-10-CM

## 2023-08-29 PROBLEM — D69.6 THROMBOCYTOPENIA: Status: RESOLVED | Noted: 2022-02-28 | Resolved: 2023-08-29

## 2023-08-29 PROCEDURE — 99214 PR OFFICE/OUTPT VISIT, EST, LEVL IV, 30-39 MIN: ICD-10-PCS | Mod: 25,S$GLB,, | Performed by: FAMILY MEDICINE

## 2023-08-29 PROCEDURE — 3066F PR DOCUMENTATION OF TREATMENT FOR NEPHROPATHY: ICD-10-PCS | Mod: CPTII,S$GLB,, | Performed by: FAMILY MEDICINE

## 2023-08-29 PROCEDURE — 4010F PR ACE/ARB THEARPY RXD/TAKEN: ICD-10-PCS | Mod: CPTII,S$GLB,, | Performed by: FAMILY MEDICINE

## 2023-08-29 PROCEDURE — 1159F PR MEDICATION LIST DOCUMENTED IN MEDICAL RECORD: ICD-10-PCS | Mod: CPTII,S$GLB,, | Performed by: FAMILY MEDICINE

## 2023-08-29 PROCEDURE — 3044F PR MOST RECENT HEMOGLOBIN A1C LEVEL <7.0%: ICD-10-PCS | Mod: CPTII,S$GLB,, | Performed by: FAMILY MEDICINE

## 2023-08-29 PROCEDURE — 90471 TDAP VACCINE GREATER THAN OR EQUAL TO 7YO IM: ICD-10-PCS | Mod: S$GLB,,, | Performed by: FAMILY MEDICINE

## 2023-08-29 PROCEDURE — 99214 OFFICE O/P EST MOD 30 MIN: CPT | Mod: 25,S$GLB,, | Performed by: FAMILY MEDICINE

## 2023-08-29 PROCEDURE — 90472 PNEUMOCOCCAL CONJUGATE VACCINE 20-VALENT: ICD-10-PCS | Mod: S$GLB,,, | Performed by: FAMILY MEDICINE

## 2023-08-29 PROCEDURE — 90677 PCV20 VACCINE IM: CPT | Mod: S$GLB,,, | Performed by: FAMILY MEDICINE

## 2023-08-29 PROCEDURE — 3079F DIAST BP 80-89 MM HG: CPT | Mod: CPTII,S$GLB,, | Performed by: FAMILY MEDICINE

## 2023-08-29 PROCEDURE — 3074F PR MOST RECENT SYSTOLIC BLOOD PRESSURE < 130 MM HG: ICD-10-PCS | Mod: CPTII,S$GLB,, | Performed by: FAMILY MEDICINE

## 2023-08-29 PROCEDURE — 90472 IMMUNIZATION ADMIN EACH ADD: CPT | Mod: S$GLB,,, | Performed by: FAMILY MEDICINE

## 2023-08-29 PROCEDURE — 3061F NEG MICROALBUMINURIA REV: CPT | Mod: CPTII,S$GLB,, | Performed by: FAMILY MEDICINE

## 2023-08-29 PROCEDURE — 4010F ACE/ARB THERAPY RXD/TAKEN: CPT | Mod: CPTII,S$GLB,, | Performed by: FAMILY MEDICINE

## 2023-08-29 PROCEDURE — 3044F HG A1C LEVEL LT 7.0%: CPT | Mod: CPTII,S$GLB,, | Performed by: FAMILY MEDICINE

## 2023-08-29 PROCEDURE — 3074F SYST BP LT 130 MM HG: CPT | Mod: CPTII,S$GLB,, | Performed by: FAMILY MEDICINE

## 2023-08-29 PROCEDURE — 3061F PR NEG MICROALBUMINURIA RESULT DOCUMENTED/REVIEW: ICD-10-PCS | Mod: CPTII,S$GLB,, | Performed by: FAMILY MEDICINE

## 2023-08-29 PROCEDURE — 3066F NEPHROPATHY DOC TX: CPT | Mod: CPTII,S$GLB,, | Performed by: FAMILY MEDICINE

## 2023-08-29 PROCEDURE — 99999 PR PBB SHADOW E&M-EST. PATIENT-LVL IV: ICD-10-PCS | Mod: PBBFAC,,, | Performed by: FAMILY MEDICINE

## 2023-08-29 PROCEDURE — 90715 TDAP VACCINE 7 YRS/> IM: CPT | Mod: S$GLB,,, | Performed by: FAMILY MEDICINE

## 2023-08-29 PROCEDURE — 90715 TDAP VACCINE GREATER THAN OR EQUAL TO 7YO IM: ICD-10-PCS | Mod: S$GLB,,, | Performed by: FAMILY MEDICINE

## 2023-08-29 PROCEDURE — 90471 IMMUNIZATION ADMIN: CPT | Mod: S$GLB,,, | Performed by: FAMILY MEDICINE

## 2023-08-29 PROCEDURE — 3079F PR MOST RECENT DIASTOLIC BLOOD PRESSURE 80-89 MM HG: ICD-10-PCS | Mod: CPTII,S$GLB,, | Performed by: FAMILY MEDICINE

## 2023-08-29 PROCEDURE — 1159F MED LIST DOCD IN RCRD: CPT | Mod: CPTII,S$GLB,, | Performed by: FAMILY MEDICINE

## 2023-08-29 PROCEDURE — 99999 PR PBB SHADOW E&M-EST. PATIENT-LVL IV: CPT | Mod: PBBFAC,,, | Performed by: FAMILY MEDICINE

## 2023-08-29 PROCEDURE — 3008F PR BODY MASS INDEX (BMI) DOCUMENTED: ICD-10-PCS | Mod: CPTII,S$GLB,, | Performed by: FAMILY MEDICINE

## 2023-08-29 PROCEDURE — 3008F BODY MASS INDEX DOCD: CPT | Mod: CPTII,S$GLB,, | Performed by: FAMILY MEDICINE

## 2023-08-29 PROCEDURE — 90677 PNEUMOCOCCAL CONJUGATE VACCINE 20-VALENT: ICD-10-PCS | Mod: S$GLB,,, | Performed by: FAMILY MEDICINE

## 2023-08-29 RX ORDER — SEMAGLUTIDE 2.68 MG/ML
2 INJECTION, SOLUTION SUBCUTANEOUS
Qty: 3 ML | Refills: 11 | Status: SHIPPED | OUTPATIENT
Start: 2023-08-29 | End: 2024-08-28

## 2023-08-29 RX ORDER — METFORMIN HYDROCHLORIDE 500 MG/1
1000 TABLET, EXTENDED RELEASE ORAL DAILY
Qty: 180 TABLET | Refills: 1 | Status: SHIPPED | OUTPATIENT
Start: 2023-08-29 | End: 2024-02-25 | Stop reason: SDUPTHER

## 2023-08-29 NOTE — PATIENT INSTRUCTIONS
Shingrix new shingles vaccine  via a pharmacy  Recommend covid booster via pharmacy    Decrease metformin to two metformin daily (down from 4 daily)  Inc ozempic to 2 mg

## 2023-08-29 NOTE — PROGRESS NOTES
Subjective:       Patient ID: Guillermo Castillo is a male.    Chief Complaint: Multiple issues see below    HPI Type 2 diabetes: a1cok/ digital med. Tolerating medicine. ; metf, ozmpic  Hypercholesterolemia:brenda med   Gout:  No c/o recent flares.   Hypertension: blood pressures at home nl.  Tolerating medicine.   Fatty liver nl . No etoh long time;   gerd controlled. No sympt    Mood / cymbalta    Anemia /hematol        Past Medical History:   Diagnosis Date    Anemia 2020    dr oglesby    COVID-19 01/16/2021    DM (diabetes mellitus)     Fatty liver     GERD (gastroesophageal reflux disease)     Gout     Hyperlipidemia     Hypertension     Hypertriglyceridemia     Mood disorder     Panic disorder     Sleep apnea     wears CPAP    Type 2 diabetes mellitus     05/2013 BS     Past Surgical History:   Procedure Laterality Date    APPENDECTOMY      COLONOSCOPY N/A 11/27/2019    Procedure: COLONOSCOPY;  Surgeon: Radha Anne MD;  Location: [facility];  Service: Endoscopy;  Laterality: N/A;    COLONOSCOPY N/A 2/22/2023    Procedure: COLONOSCOPY;  Surgeon: Princess Camara MD;  Location: [facility];  Service: Endoscopy;  Laterality: N/A;    ESOPHAGOGASTRODUODENOSCOPY N/A 11/27/2019    Procedure: EGD (ESOPHAGOGASTRODUODENOSCOPY);  Surgeon: Radha Anne MD;  Location: [facility];  Service: Endoscopy;  Laterality: N/A;    ESOPHAGOGASTRODUODENOSCOPY N/A 7/24/2023    Procedure: EGD (ESOPHAGOGASTRODUODENOSCOPY);  Surgeon: Jt Vilchis MD;  Location: [facility];  Service: Endoscopy;  Laterality: N/A;     Family History   Problem Relation Age of Onset    Coronary artery disease Father     Diabetes Father     Lung cancer Father     Diabetes Maternal Aunt     Diabetes Paternal Aunt     Hypertension Maternal Grandmother     Cataracts Maternal Grandmother     Hypertension Mother     Cataracts Mother      Social History     Socioeconomic History    Marital status: Single   Tobacco Use    Smoking status: Never    Smokeless  tobacco: Never   Substance and Sexual Activity    Alcohol use: Not Currently     Comment: rarely    Drug use: No    Sexual activity: Yes     Partners: Female               Review of Systems   Respiratory: Negative for shortness of breath.    Cardiovascular: Negative for chest pain a      Objective:      Physical Exam   Constitutional: He is oriented to person, place, and time. He appears well-developed and well-nourished.   HENT:   Head: Normocephalic and atraumatic.   Eyes: Conjunctivae and EOM are normal. Pupils are equal, round, and reactive to light.   Neck: Normal range of motion. Neck supple. Carotid bruit is not present. no mass  Cardiovascular: Normal rate and regular rhythm.    Pulmonary/Chest: Effort normal and breath sounds normal.         Neurological: He is alert and oriented to person, place, and time.   Skin: Skin is warm and dry.   Psychiatric: He has a normal mood and affect. His behavior is normal. Judgment and thought content normal.               .  Bilateral feet with normal monofilament testing and no lesions.  Bilat dors pedis pulses 2+         Assessment:       1. Type 2 diabetes mellitus    2. Hypertension    3. Gout    4. Hypercholesteremia    5. Fatty liver      Anemia hx  Mood d/o  Plan:     Lab and follow up 6 months  Nadege  F/u hematol when due        Follow up with Dr Gomez for diabetic eye exam    Decrease metformin to two metformin daily (down from 4 daily)  Inc ozempic to 2 mg     Lab and follow up after in 6 monthsAlison  Also a1c 3 months(poss d/c metf after that)  Orthostas precaut w wt loss./inc ozmpic  Type 2 diabetes mellitus without complication, without long-term current use of insulin  -     Hemoglobin A1C; Future; Expected date: 02/25/2024  -     Comprehensive Metabolic Panel; Future; Expected date: 02/25/2024  -     Lipid Panel; Future; Expected date: 02/25/2024  -     Vitamin B12; Future; Expected date: 02/29/2024  -     metFORMIN (GLUCOPHAGE-XR) 500 MG ER 24hr  tablet; Take 2 tablets (1,000 mg total) by mouth once daily.  Dispense: 180 tablet; Refill: 1  -     Hemoglobin A1C; Future; Expected date: 11/27/2023    Hypertriglyceridemia    Essential hypertension    Chronic gout without tophus, unspecified cause, unspecified site  -     Uric Acid; Future; Expected date: 02/25/2024    Screening for prostate cancer  -     PSA, Screening; Future; Expected date: 02/25/2024    Iron deficiency anemia, unspecified iron deficiency anemia type    Mood disorder    Other orders  -     semaglutide (OZEMPIC) 2 mg/dose (8 mg/3 mL) PnIj; Inject 2 mg into the skin every 7 days.  Dispense: 3 mL; Refill: 11  -     (In Office Administered) Tdap Vaccine  -     (In Office Administered) Pneumococcal Conjugate Vaccine (20 Valent) (IM)

## 2023-09-07 ENCOUNTER — HOSPITAL ENCOUNTER (EMERGENCY)
Facility: HOSPITAL | Age: 53
Discharge: HOME | End: 2023-09-07
Attending: EMERGENCY MEDICINE
Payer: COMMERCIAL

## 2023-09-07 VITALS
BODY MASS INDEX: 33.49 KG/M2 | OXYGEN SATURATION: 96 % | SYSTOLIC BLOOD PRESSURE: 154 MMHG | HEIGHT: 72 IN | DIASTOLIC BLOOD PRESSURE: 82 MMHG | HEART RATE: 68 BPM | TEMPERATURE: 98 F | RESPIRATION RATE: 19 BRPM

## 2023-09-07 DIAGNOSIS — E86.0 DEHYDRATION: Primary | ICD-10-CM

## 2023-09-07 DIAGNOSIS — R00.0 TACHYCARDIA: ICD-10-CM

## 2023-09-07 DIAGNOSIS — R19.7 DIARRHEA, UNSPECIFIED TYPE: ICD-10-CM

## 2023-09-07 LAB
ALBUMIN SERPL BCP-MCNC: 3.6 G/DL (ref 3.5–5.2)
ALP SERPL-CCNC: 144 U/L (ref 55–135)
ALT SERPL W/O P-5'-P-CCNC: 17 U/L (ref 10–44)
ANION GAP SERPL CALC-SCNC: 10 MMOL/L (ref 8–16)
AST SERPL-CCNC: 9 U/L (ref 10–40)
BACTERIA #/AREA URNS HPF: ABNORMAL /HPF
BASOPHILS # BLD AUTO: ABNORMAL K/UL (ref 0–0.2)
BASOPHILS NFR BLD: 0 % (ref 0–1.9)
BILIRUB SERPL-MCNC: 0.4 MG/DL (ref 0.1–1)
BILIRUB UR QL STRIP: NEGATIVE
BUN SERPL-MCNC: 19 MG/DL (ref 6–20)
C DIFF GDH STL QL: NEGATIVE
C DIFF TOX A+B STL QL IA: NEGATIVE
CALCIUM SERPL-MCNC: 8.5 MG/DL (ref 8.7–10.5)
CHLORIDE SERPL-SCNC: 112 MMOL/L (ref 95–110)
CK SERPL-CCNC: 37 U/L (ref 20–200)
CLARITY UR: ABNORMAL
CO2 SERPL-SCNC: 16 MMOL/L (ref 23–29)
COLOR UR: YELLOW
CREAT SERPL-MCNC: 0.9 MG/DL (ref 0.5–1.4)
DIFFERENTIAL METHOD: ABNORMAL
EOSINOPHIL # BLD AUTO: ABNORMAL K/UL (ref 0–0.5)
EOSINOPHIL NFR BLD: 7 % (ref 0–8)
ERYTHROCYTE [DISTWIDTH] IN BLOOD BY AUTOMATED COUNT: 13.6 % (ref 11.5–14.5)
EST. GFR  (NO RACE VARIABLE): >60 ML/MIN/1.73 M^2
GLUCOSE SERPL-MCNC: 221 MG/DL (ref 70–110)
GLUCOSE UR QL STRIP: NEGATIVE
HCT VFR BLD AUTO: 49.6 % (ref 40–54)
HGB BLD-MCNC: 17.2 G/DL (ref 14–18)
HGB UR QL STRIP: NEGATIVE
HYALINE CASTS #/AREA URNS LPF: 3 /LPF
IMM GRANULOCYTES # BLD AUTO: ABNORMAL K/UL (ref 0–0.04)
IMM GRANULOCYTES NFR BLD AUTO: ABNORMAL % (ref 0–0.5)
KETONES UR QL STRIP: NEGATIVE
LEUKOCYTE ESTERASE UR QL STRIP: NEGATIVE
LIPASE SERPL-CCNC: 8 U/L (ref 4–60)
LYMPHOCYTES # BLD AUTO: ABNORMAL K/UL (ref 1–4.8)
LYMPHOCYTES NFR BLD: 11 % (ref 18–48)
MCH RBC QN AUTO: 28.9 PG (ref 27–31)
MCHC RBC AUTO-ENTMCNC: 34.7 G/DL (ref 32–36)
MCV RBC AUTO: 83 FL (ref 82–98)
METAMYELOCYTES NFR BLD MANUAL: 1 %
MICROSCOPIC COMMENT: ABNORMAL
MONOCYTES # BLD AUTO: ABNORMAL K/UL (ref 0.3–1)
MONOCYTES NFR BLD: 8 % (ref 4–15)
MYELOCYTES NFR BLD MANUAL: 1 %
NEUTROPHILS NFR BLD: 70 % (ref 38–73)
NEUTS BAND NFR BLD MANUAL: 2 %
NITRITE UR QL STRIP: NEGATIVE
NRBC BLD-RTO: 0 /100 WBC
OB PNL STL: POSITIVE
PH UR STRIP: 6 [PH] (ref 5–8)
PLATELET # BLD AUTO: 296 K/UL (ref 150–450)
PLATELET BLD QL SMEAR: ABNORMAL
PMV BLD AUTO: 9.3 FL (ref 9.2–12.9)
POLYCHROMASIA BLD QL SMEAR: ABNORMAL
POTASSIUM SERPL-SCNC: 3.6 MMOL/L (ref 3.5–5.1)
PROT SERPL-MCNC: 6.6 G/DL (ref 6–8.4)
PROT UR QL STRIP: ABNORMAL
RBC # BLD AUTO: 5.96 M/UL (ref 4.6–6.2)
RBC #/AREA URNS HPF: 0 /HPF (ref 0–4)
RV AG STL QL IA.RAPID: NEGATIVE
SODIUM SERPL-SCNC: 138 MMOL/L (ref 136–145)
SP GR UR STRIP: >1.03 (ref 1–1.03)
URN SPEC COLLECT METH UR: ABNORMAL
UROBILINOGEN UR STRIP-ACNC: NEGATIVE EU/DL
WBC # BLD AUTO: 25.06 K/UL (ref 3.9–12.7)
WBC #/AREA URNS HPF: 4 /HPF (ref 0–5)

## 2023-09-07 PROCEDURE — 83690 ASSAY OF LIPASE: CPT | Performed by: EMERGENCY MEDICINE

## 2023-09-07 PROCEDURE — 25000003 PHARM REV CODE 250: Performed by: EMERGENCY MEDICINE

## 2023-09-07 PROCEDURE — 85007 BL SMEAR W/DIFF WBC COUNT: CPT | Performed by: EMERGENCY MEDICINE

## 2023-09-07 PROCEDURE — 80053 COMPREHEN METABOLIC PANEL: CPT | Performed by: EMERGENCY MEDICINE

## 2023-09-07 PROCEDURE — 82272 OCCULT BLD FECES 1-3 TESTS: CPT | Performed by: EMERGENCY MEDICINE

## 2023-09-07 PROCEDURE — 87209 SMEAR COMPLEX STAIN: CPT | Performed by: EMERGENCY MEDICINE

## 2023-09-07 PROCEDURE — 87449 NOS EACH ORGANISM AG IA: CPT | Performed by: EMERGENCY MEDICINE

## 2023-09-07 PROCEDURE — 63600175 PHARM REV CODE 636 W HCPCS: Performed by: EMERGENCY MEDICINE

## 2023-09-07 PROCEDURE — 96375 TX/PRO/DX INJ NEW DRUG ADDON: CPT

## 2023-09-07 PROCEDURE — 96361 HYDRATE IV INFUSION ADD-ON: CPT

## 2023-09-07 PROCEDURE — 82550 ASSAY OF CK (CPK): CPT | Performed by: EMERGENCY MEDICINE

## 2023-09-07 PROCEDURE — 85027 COMPLETE CBC AUTOMATED: CPT | Performed by: EMERGENCY MEDICINE

## 2023-09-07 PROCEDURE — 89055 LEUKOCYTE ASSESSMENT FECAL: CPT | Performed by: EMERGENCY MEDICINE

## 2023-09-07 PROCEDURE — 81000 URINALYSIS NONAUTO W/SCOPE: CPT | Performed by: EMERGENCY MEDICINE

## 2023-09-07 PROCEDURE — 87425 ROTAVIRUS AG IA: CPT | Performed by: EMERGENCY MEDICINE

## 2023-09-07 PROCEDURE — 96374 THER/PROPH/DIAG INJ IV PUSH: CPT

## 2023-09-07 PROCEDURE — 99285 EMERGENCY DEPT VISIT HI MDM: CPT | Mod: 25

## 2023-09-07 RX ORDER — ONDANSETRON 4 MG/1
4 TABLET, FILM COATED ORAL EVERY 6 HOURS PRN
Qty: 12 TABLET | Refills: 0 | Status: SHIPPED | OUTPATIENT
Start: 2023-09-07

## 2023-09-07 RX ORDER — DIAZEPAM 10 MG/2ML
2 INJECTION INTRAMUSCULAR
Status: COMPLETED | OUTPATIENT
Start: 2023-09-07 | End: 2023-09-07

## 2023-09-07 RX ORDER — DICYCLOMINE HYDROCHLORIDE 20 MG/1
20 TABLET ORAL 3 TIMES DAILY
Qty: 15 TABLET | Refills: 0 | Status: SHIPPED | OUTPATIENT
Start: 2023-09-07 | End: 2023-09-13

## 2023-09-07 RX ORDER — ONDANSETRON 2 MG/ML
4 INJECTION INTRAMUSCULAR; INTRAVENOUS
Status: COMPLETED | OUTPATIENT
Start: 2023-09-07 | End: 2023-09-07

## 2023-09-07 RX ADMIN — ONDANSETRON 4 MG: 2 INJECTION INTRAMUSCULAR; INTRAVENOUS at 08:09

## 2023-09-07 RX ADMIN — SODIUM CHLORIDE 2000 ML: 9 INJECTION, SOLUTION INTRAVENOUS at 08:09

## 2023-09-07 RX ADMIN — DIAZEPAM 2 MG: 5 INJECTION INTRAMUSCULAR; INTRAVENOUS at 08:09

## 2023-09-08 LAB — WBC #/AREA STL HPF: NORMAL /[HPF]

## 2023-09-08 NOTE — ED NOTES
AAOx4, respirations even and unlabored, skin warm and dry, no apparent distress noted. Ambulatory with steady gait, speech clear. Patient denies any questions upon discharge

## 2023-09-08 NOTE — DISCHARGE INSTRUCTIONS
Bentyl for cramps and Zofran for nausea.  Drink plenty of fluids.  Avoid heat for next 72 hours.  Stool studies are pending on you.  Have any issues please give me a call at [phone]

## 2023-09-08 NOTE — ED PROVIDER NOTES
SCRIBE #1 NOTE: I, Neri Jorge, am scribing for, and in the presence of, Twin Herring Jr., MD. I have scribed the entire note.       History     Chief Complaint   Patient presents with    Abdominal Pain     Pt c/o severe abd pain with nausea and diarrhea. Originally hypotensive on scene. Also reports has been unable to urinate since Tuesday. Received 1.5L NS and 2.5 Droperidol enroute      Review of patient's allergies indicates:   Allergen Reactions    Simvastatin      Other reaction(s): aches/inc lfts         History of Present Illness     HPI    9/7/2023, 8:03 PM  History obtained from the patient      History of Present Illness: Guillermo Castillo is a 53 y.o. male patient with a PMHx of DM2, HLD, HTN, who presents to the Emergency Department for evaluation of heat injury, muscle cramps and nausea which onset gradually 5 days ago. Pt states he is having muscle cramps in his feet, legs and stomach. Pt also states he has been unable to urinate for 2 days. Symptoms are constant and moderate in severity. No mitigating or exacerbating factors reported. Associated sxs include diarrhea. Patient denies any vomiting, CP, SOB, cough, headache, and all other sxs at this time. No prior Tx reported. No further complaints or concerns at this time.       Arrival mode:  EMS     PCP: Kip Siddiqui MD        Past Medical History:  Past Medical History:   Diagnosis Date    Anemia 2020    dr mendel DWYER-19 01/16/2021    DM (diabetes mellitus)     Fatty liver     GERD (gastroesophageal reflux disease)     Gout     Hyperlipidemia     Hypertension     Hypertriglyceridemia     Mood disorder     Panic disorder     Sleep apnea     wears CPAP    Type 2 diabetes mellitus     05/2013 BS       Past Surgical History:  Past Surgical History:   Procedure Laterality Date    APPENDECTOMY      COLONOSCOPY N/A 11/27/2019    Procedure: COLONOSCOPY;  Surgeon: Radha Anne MD;  Location: [facility];  Service: Endoscopy;  Laterality: N/A;     COLONOSCOPY N/A 2/22/2023    Procedure: COLONOSCOPY;  Surgeon: Princess Camara MD;  Location: [facility];  Service: Endoscopy;  Laterality: N/A;    ESOPHAGOGASTRODUODENOSCOPY N/A 11/27/2019    Procedure: EGD (ESOPHAGOGASTRODUODENOSCOPY);  Surgeon: Radha Anne MD;  Location: [facility];  Service: Endoscopy;  Laterality: N/A;    ESOPHAGOGASTRODUODENOSCOPY N/A 7/24/2023    Procedure: EGD (ESOPHAGOGASTRODUODENOSCOPY);  Surgeon: Jt Vilchis MD;  Location: [facility];  Service: Endoscopy;  Laterality: N/A;         Family History:  Family History   Problem Relation Age of Onset    Coronary artery disease Father     Diabetes Father     Lung cancer Father     Diabetes Maternal Aunt     Diabetes Paternal Aunt     Hypertension Maternal Grandmother     Cataracts Maternal Grandmother     Hypertension Mother     Cataracts Mother        Social History:  Social History     Tobacco Use    Smoking status: Never    Smokeless tobacco: Never   Substance and Sexual Activity    Alcohol use: Not Currently     Comment: rarely    Drug use: No    Sexual activity: Yes     Partners: Female        Review of Systems     Review of Systems   Constitutional:  Negative for fever.   HENT:  Negative for sore throat.    Respiratory:  Negative for cough and shortness of breath.    Cardiovascular:  Negative for chest pain.   Gastrointestinal:  Positive for diarrhea and nausea. Negative for vomiting.   Genitourinary:  Negative for dysuria.   Musculoskeletal:  Negative for back pain.        (+)Muscle cramps   Skin:  Negative for rash.   Neurological:  Negative for headaches.   Hematological:  Does not bruise/bleed easily.   All other systems reviewed and are negative.     Physical Exam     Initial Vitals   BP Pulse Resp Temp SpO2   09/07/23 2009 09/07/23 2009 09/07/23 2009 09/07/23 2057 09/07/23 2009   129/70 (!) 130 (!) 22 97.8 °F (36.6 °C) 95 %      MAP       --                 Physical Exam  Nursing Notes and Vital Signs  Reviewed.  Constitutional: Patient is in no acute distress. Well-developed and well-nourished.  Head: Atraumatic. Normocephalic.  Eyes:  EOM intact.  No scleral icterus.  ENT: Mucous membranes are moist.  Nares clear   Neck:  Full ROM. No JVD.  Cardiovascular: Regular rate. Regular rhythm No murmurs, rubs, or gallops. Distal pulses are 2+ and symmetric  Pulmonary/Chest: No respiratory distress. Clear to auscultation bilaterally. No wheezing or rales.  Equal chest wall rise bilaterally  Abdominal: Soft and non-distended.  There is no tenderness.  No rebound, guarding, or rigidity. Good bowel sounds.  Genitourinary: No CVA tenderness.  No suprapubic tenderness  Musculoskeletal: Moves all extremities. No obvious deformities.  5 x 5 strength in all extremities   Skin: Warm and dry.  Neurological:  Alert, awake, and appropriate.  Normal speech.  No acute focal neurological deficits are appreciated.  Two through 12 intact bilaterally.  Psychiatric: Normal affect. Good eye contact. Appropriate in content.   ED Course   Procedures  ED Vital Signs:  Vitals:    09/07/23 2009 09/07/23 2040 09/07/23 2050 09/07/23 2057   BP: 129/70 124/65     Pulse: (!) 130  67    Resp: (!) 22  (!) 22    Temp:    97.8 °F (36.6 °C)   TempSrc:    Oral   SpO2: 95%  (!) 93%    Height: 6' (1.829 m)       09/07/23 2200 09/07/23 2255   BP: (!) 141/80    Pulse: 69    Resp:     Temp:  98 °F (36.7 °C)   TempSrc:  Oral   SpO2: 98%    Height:         Abnormal Lab Results:  Labs Reviewed   COMPREHENSIVE METABOLIC PANEL - Abnormal; Notable for the following components:       Result Value    Chloride 112 (*)     CO2 16 (*)     Glucose 221 (*)     Calcium 8.5 (*)     Alkaline Phosphatase 144 (*)     AST 9 (*)     All other components within normal limits   CBC W/ AUTO DIFFERENTIAL - Abnormal; Notable for the following components:    WBC 25.06 (*)     Lymph % 11.0 (*)     All other components within normal limits   URINALYSIS, REFLEX TO URINE CULTURE - Abnormal;  Notable for the following components:    Appearance, UA Hazy (*)     Specific Gravity, UA >1.030 (*)     Protein, UA 1+ (*)     All other components within normal limits    Narrative:     Specimen Source->Urine   OCCULT BLOOD X 1, STOOL - Abnormal; Notable for the following components:    Occult Blood Positive (*)     All other components within normal limits   URINALYSIS MICROSCOPIC - Abnormal; Notable for the following components:    Hyaline Casts, UA 3 (*)     All other components within normal limits    Narrative:     Specimen Source->Urine   CLOSTRIDIUM DIFFICILE   CULTURE, STOOL   ENTEROHEMORRHAGIC E.COLI   LIPASE   CK   ROTAVIRUS ANTIGEN, STOOL   WBC, STOOL   STOOL EXAM-OVA,CYSTS,PARASITES        All Lab Results:  Results for orders placed or performed during the hospital encounter of 09/07/23   Clostridium difficile EIA    Specimen: Stool   Result Value Ref Range    C. diff Antigen Negative Negative    C difficile Toxins A+B, EIA Negative Negative   Comprehensive metabolic panel   Result Value Ref Range    Sodium 138 136 - 145 mmol/L    Potassium 3.6 3.5 - 5.1 mmol/L    Chloride 112 (H) 95 - 110 mmol/L    CO2 16 (L) 23 - 29 mmol/L    Glucose 221 (H) 70 - 110 mg/dL    BUN 19 6 - 20 mg/dL    Creatinine 0.9 0.5 - 1.4 mg/dL    Calcium 8.5 (L) 8.7 - 10.5 mg/dL    Total Protein 6.6 6.0 - 8.4 g/dL    Albumin 3.6 3.5 - 5.2 g/dL    Total Bilirubin 0.4 0.1 - 1.0 mg/dL    Alkaline Phosphatase 144 (H) 55 - 135 U/L    AST 9 (L) 10 - 40 U/L    ALT 17 10 - 44 U/L    eGFR >60 >60 mL/min/1.73 m^2    Anion Gap 10 8 - 16 mmol/L   CBC auto differential   Result Value Ref Range    WBC 25.06 (H) 3.90 - 12.70 K/uL    RBC 5.96 4.60 - 6.20 M/uL    Hemoglobin 17.2 14.0 - 18.0 g/dL    Hematocrit 49.6 40.0 - 54.0 %    MCV 83 82 - 98 fL    MCH 28.9 27.0 - 31.0 pg    MCHC 34.7 32.0 - 36.0 g/dL    RDW 13.6 11.5 - 14.5 %    Platelets 296 150 - 450 K/uL    MPV 9.3 9.2 - 12.9 fL    Immature Granulocytes CANCELED 0.0 - 0.5 %    Immature Grans  (Abs) CANCELED 0.00 - 0.04 K/uL    Lymph # CANCELED 1.0 - 4.8 K/uL    Mono # CANCELED 0.3 - 1.0 K/uL    Eos # CANCELED 0.0 - 0.5 K/uL    Baso # CANCELED 0.00 - 0.20 K/uL    nRBC 0 0 /100 WBC    Gran % 70.0 38.0 - 73.0 %    Lymph % 11.0 (L) 18.0 - 48.0 %    Mono % 8.0 4.0 - 15.0 %    Eosinophil % 7.0 0.0 - 8.0 %    Basophil % 0.0 0.0 - 1.9 %    Bands 2.0 %    Metamyelocytes 1.0 %    Myelocytes 1.0 %    Platelet Estimate Appears normal     Poly Occasional     Differential Method Manual    Lipase   Result Value Ref Range    Lipase 8 4 - 60 U/L   Urinalysis, Reflex to Urine Culture Urine, Clean Catch    Specimen: Urine   Result Value Ref Range    Specimen UA Urine, Clean Catch     Color, UA Yellow Yellow, Straw, Xiomy    Appearance, UA Hazy (A) Clear    pH, UA 6.0 5.0 - 8.0    Specific Gravity, UA >1.030 (A) 1.005 - 1.030    Protein, UA 1+ (A) Negative    Glucose, UA Negative Negative    Ketones, UA Negative Negative    Bilirubin (UA) Negative Negative    Occult Blood UA Negative Negative    Nitrite, UA Negative Negative    Urobilinogen, UA Negative <2.0 EU/dL    Leukocytes, UA Negative Negative   CPK   Result Value Ref Range    CPK 37 20 - 200 U/L   Occult blood x 1, stool    Specimen: Stool   Result Value Ref Range    Occult Blood Positive (A) Negative   Rotavirus antigen, stool   Result Value Ref Range    Rotavirus Negative Negative   Urinalysis Microscopic   Result Value Ref Range    RBC, UA 0 0 - 4 /hpf    WBC, UA 4 0 - 5 /hpf    Bacteria None None-Occ /hpf    Hyaline Casts, UA 3 (A) 0-1/lpf /lpf    Microscopic Comment SEE COMMENT        Imaging Results:  Imaging Results              CT Abdomen Pelvis  Without Contrast (Final result)  Result time 09/07/23 21:40:39      Final result by Crystal Hogue MD (09/07/23 21:40:39)                   Impression:      No overt acute finding; distal colon and rectal borderline wall thickening versus under distension      Electronically signed by: Crystal Burr  Lennie  Date:    09/07/2023  Time:    21:40               Narrative:    EXAMINATION:  CT ABDOMEN PELVIS WITHOUT CONTRAST    CLINICAL HISTORY:  Abdominal abscess/infection suspected;    TECHNIQUE:  Low dose axial images, sagittal and coronal reformations were obtained from the lung bases to the pubic symphysis.  Contrast was not administered.    COMPARISON:  None    FINDINGS:  Liver and spleen normal size.  Gallbladder present    Pancreas unremarkable    Kidneys without hydronephrosis    No aortic aneurysm    Urinary bladder decompressed    No obstructive bowel findings.  Stomach contains fluid.  Fluid in the proximal colon.  Distal colon and rectum borderline wall thickening versus under distension.  No overt inflammatory change or fluid collection seen                        Final result by Crystal Hogue MD (09/07/23 21:40:39)                   Impression:      No overt acute finding; distal colon and rectal borderline wall thickening versus under distension      Electronically signed by: Crystal Hogue  Date:    09/07/2023  Time:    21:40               Narrative:    EXAMINATION:  CT ABDOMEN PELVIS WITHOUT CONTRAST    CLINICAL HISTORY:  Abdominal abscess/infection suspected;    TECHNIQUE:  Low dose axial images, sagittal and coronal reformations were obtained from the lung bases to the pubic symphysis.  Contrast was not administered.    COMPARISON:  None    FINDINGS:  Liver and spleen normal size.  Gallbladder present    Pancreas unremarkable    Kidneys without hydronephrosis    No aortic aneurysm    Urinary bladder decompressed    No obstructive bowel findings.  Stomach contains fluid.  Fluid in the proximal colon.  Distal colon and rectum borderline wall thickening versus under distension.  No overt inflammatory change or fluid collection seen                                       The EKG was ordered, reviewed, and independently interpreted by the ED provider.  Interpretation time: 20:12  Rate: 79  BPM  Rhythm: normal sinus rhythm  Interpretation: No acute ST changes. No STEMI.         The Emergency Provider reviewed the vital signs and test results, which are outlined above.     ED Discussion     10:51 PM: Reassessed pt at this time. Discussed with pt all pertinent ED information and results. Discussed pt dx and plan of tx. Gave pt all f/u and return to the ED instructions. All questions and concerns were addressed at this time. Pt expresses understanding of information and instructions, and is comfortable with plan to discharge. Pt is stable for discharge.    I discussed with patient and/or family/caretaker that evaluation in the ED does not suggest any emergent or life threatening medical conditions requiring immediate intervention beyond what was provided in the ED, and I believe patient is safe for discharge.  Regardless, an unremarkable evaluation in the ED does not preclude the development or presence of a serious of life threatening condition. As such, patient was instructed to return immediately for any worsening or change in current symptoms.       Medical Decision Making  Differential diagnosis: Gastroenteritis, he injury, diarrhea, dehydration, intra-abdominal infection    Patient presents with generalized body aches muscle cramps weakness as well as diarrhea for several days after exposure to heat.  Physical exam is benign.  Workup shows leukocytosis prompting a CT which was negative.  Does have diarrhea stool studies are pending.  Had a leukocytosis mild elevation in his sugar.  Received several boluses of fluid as well as Valium.  Vital signs are stable.  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  Use cramps Zofran for nausea advised no avoid heat for 72 hours drink plenty of fluids.  He verbalized understanding agreement with all instructions seems reliable safe discharge my opinion.    Amount and/or Complexity of Data Reviewed  Labs: ordered.  Decision-making details documented in ED Course.     Details: Patient was occult blood positive rotavirus negative C diff negative in his stools.  UA is negative.  He is a 25,000 white count normal H&H.  CPK is normal lipase is normal glucose is elevated at 221 hour is normal gap.  Renal function is normal.  CO2 is 16.  Radiology: ordered. Decision-making details documented in ED Course.     Details: No acute findings  ECG/medicine tests: ordered and independent interpretation performed. Decision-making details documented in ED Course.     Details: Normal sinus rhythm at a rate of 79.  No STEMI.    Risk  Prescription drug management.  Parenteral controlled substances.  Decision regarding hospitalization.  Risk Details: Patient is stable nontoxic.  We considered admission however the patient is stable safe for discharge in my opinion.  Does have a leukocytosis but a negative workup otherwise in his likely related to gastroenteritis as well as dehydration secondary to heat injury.  Will start Bentyl and Zofran have him follow-up with his primary care provider as return as needed for any worsening symptoms, problems, questions or concerns.  He did receive IV Valium here                ED Medication(s):  Medications   sodium chloride 0.9% bolus 2,000 mL 2,000 mL (2,000 mLs Intravenous New Bag 9/7/23 2033)   ondansetron injection 4 mg (4 mg Intravenous Given 9/7/23 2033)   diazePAM injection 2 mg (2 mg Intravenous Given 9/7/23 2033)       New Prescriptions    DICYCLOMINE (BENTYL) 20 MG TABLET    Take 1 tablet (20 mg total) by mouth 3 (three) times daily. for 15 doses    ONDANSETRON (ZOFRAN) 4 MG TABLET    Take 1 tablet (4 mg total) by mouth every 6 (six) hours as needed for Nausea.        Follow-up Information       Kip Siddiqui MD.    Specialty: Family Medicine  Contact information:  [id number]  [place] [address]  [phone]                                 Scribe Attestation:   Scribe #1: I performed  the above scribed service and the documentation accurately describes the services I performed. I attest to the accuracy of the note.     Attending:   Physician Attestation Statement for Scribe #1: I, Twin Herring Jr., MD, personally performed the services described in this documentation, as scribed by Neri Jorge, in my presence, and it is both accurate and complete.           Clinical Impression       ICD-10-CM ICD-9-CM   1. Dehydration  E86.0 276.51   2. Tachycardia  R00.0 785.0   3. Diarrhea, unspecified type  R19.7 787.91       Disposition:   Disposition: Discharged  Condition: Stable         Twin Herring Jr., MD  09/07/23 4779

## 2023-09-09 ENCOUNTER — PATIENT MESSAGE (OUTPATIENT)
Dept: INTERNAL MEDICINE | Facility: CLINIC | Age: 53
End: 2023-09-09
Payer: COMMERCIAL

## 2023-09-11 LAB — O+P STL MICRO: NORMAL

## 2023-09-12 ENCOUNTER — OFFICE VISIT (OUTPATIENT)
Dept: INTERNAL MEDICINE | Facility: CLINIC | Age: 53
End: 2023-09-12
Payer: COMMERCIAL

## 2023-09-12 ENCOUNTER — LAB VISIT (OUTPATIENT)
Dept: LAB | Facility: HOSPITAL | Age: 53
End: 2023-09-12
Attending: NURSE PRACTITIONER
Payer: COMMERCIAL

## 2023-09-12 VITALS
OXYGEN SATURATION: 98 % | DIASTOLIC BLOOD PRESSURE: 70 MMHG | BODY MASS INDEX: 32.53 KG/M2 | HEART RATE: 62 BPM | WEIGHT: 239.88 LBS | SYSTOLIC BLOOD PRESSURE: 122 MMHG | TEMPERATURE: 98 F | RESPIRATION RATE: 18 BRPM

## 2023-09-12 DIAGNOSIS — R19.5 POSITIVE FECAL OCCULT BLOOD TEST: ICD-10-CM

## 2023-09-12 DIAGNOSIS — M25.551 RIGHT HIP PAIN: ICD-10-CM

## 2023-09-12 DIAGNOSIS — R19.7 FREQUENT DIARRHEA: Primary | ICD-10-CM

## 2023-09-12 DIAGNOSIS — R19.7 FREQUENT DIARRHEA: ICD-10-CM

## 2023-09-12 DIAGNOSIS — R10.31 RIGHT GROIN PAIN: ICD-10-CM

## 2023-09-12 LAB
ALBUMIN SERPL BCP-MCNC: 3.9 G/DL (ref 3.5–5.2)
ALP SERPL-CCNC: 128 U/L (ref 55–135)
ALT SERPL W/O P-5'-P-CCNC: 36 U/L (ref 10–44)
ANION GAP SERPL CALC-SCNC: 11 MMOL/L (ref 8–16)
AST SERPL-CCNC: 18 U/L (ref 10–40)
BASOPHILS NFR BLD: 0 % (ref 0–1.9)
BILIRUB SERPL-MCNC: 0.4 MG/DL (ref 0.1–1)
BUN SERPL-MCNC: 14 MG/DL (ref 6–20)
CALCIUM SERPL-MCNC: 9.9 MG/DL (ref 8.7–10.5)
CHLORIDE SERPL-SCNC: 100 MMOL/L (ref 95–110)
CO2 SERPL-SCNC: 26 MMOL/L (ref 23–29)
CREAT SERPL-MCNC: 0.7 MG/DL (ref 0.5–1.4)
DIFFERENTIAL METHOD: ABNORMAL
EOSINOPHIL NFR BLD: 22 % (ref 0–8)
ERYTHROCYTE [DISTWIDTH] IN BLOOD BY AUTOMATED COUNT: 13.4 % (ref 11.5–14.5)
EST. GFR  (NO RACE VARIABLE): >60 ML/MIN/1.73 M^2
GLUCOSE SERPL-MCNC: 174 MG/DL (ref 70–110)
HCT VFR BLD AUTO: 41.4 % (ref 40–54)
HGB BLD-MCNC: 13.8 G/DL (ref 14–18)
IMM GRANULOCYTES # BLD AUTO: ABNORMAL K/UL (ref 0–0.04)
IMM GRANULOCYTES NFR BLD AUTO: ABNORMAL % (ref 0–0.5)
LYMPHOCYTES NFR BLD: 25 % (ref 18–48)
MCH RBC QN AUTO: 28.4 PG (ref 27–31)
MCHC RBC AUTO-ENTMCNC: 33.3 G/DL (ref 32–36)
MCV RBC AUTO: 85 FL (ref 82–98)
MONOCYTES NFR BLD: 7 % (ref 4–15)
NEUTROPHILS NFR BLD: 45 % (ref 38–73)
NEUTS BAND NFR BLD MANUAL: 1 %
NRBC BLD-RTO: 0 /100 WBC
PLATELET # BLD AUTO: 121 K/UL (ref 150–450)
PLATELET BLD QL SMEAR: ABNORMAL
PMV BLD AUTO: 11.1 FL (ref 9.2–12.9)
POTASSIUM SERPL-SCNC: 4.7 MMOL/L (ref 3.5–5.1)
PROT SERPL-MCNC: 7.1 G/DL (ref 6–8.4)
RBC # BLD AUTO: 4.86 M/UL (ref 4.6–6.2)
SMUDGE CELLS BLD QL SMEAR: PRESENT
SODIUM SERPL-SCNC: 137 MMOL/L (ref 136–145)
WBC # BLD AUTO: 15.99 K/UL (ref 3.9–12.7)
WBC TOXIC VACUOLES BLD QL SMEAR: PRESENT

## 2023-09-12 PROCEDURE — 36415 COLL VENOUS BLD VENIPUNCTURE: CPT | Mod: PO | Performed by: NURSE PRACTITIONER

## 2023-09-12 PROCEDURE — 99999 PR PBB SHADOW E&M-EST. PATIENT-LVL V: CPT | Mod: PBBFAC,,, | Performed by: NURSE PRACTITIONER

## 2023-09-12 PROCEDURE — 3061F NEG MICROALBUMINURIA REV: CPT | Mod: CPTII,S$GLB,, | Performed by: NURSE PRACTITIONER

## 2023-09-12 PROCEDURE — 96372 THER/PROPH/DIAG INJ SC/IM: CPT | Mod: S$GLB,,, | Performed by: NURSE PRACTITIONER

## 2023-09-12 PROCEDURE — 1159F PR MEDICATION LIST DOCUMENTED IN MEDICAL RECORD: ICD-10-PCS | Mod: CPTII,S$GLB,, | Performed by: NURSE PRACTITIONER

## 2023-09-12 PROCEDURE — 3008F PR BODY MASS INDEX (BMI) DOCUMENTED: ICD-10-PCS | Mod: CPTII,S$GLB,, | Performed by: NURSE PRACTITIONER

## 2023-09-12 PROCEDURE — 3044F HG A1C LEVEL LT 7.0%: CPT | Mod: CPTII,S$GLB,, | Performed by: NURSE PRACTITIONER

## 2023-09-12 PROCEDURE — 1160F PR REVIEW ALL MEDS BY PRESCRIBER/CLIN PHARMACIST DOCUMENTED: ICD-10-PCS | Mod: CPTII,S$GLB,, | Performed by: NURSE PRACTITIONER

## 2023-09-12 PROCEDURE — 3066F NEPHROPATHY DOC TX: CPT | Mod: CPTII,S$GLB,, | Performed by: NURSE PRACTITIONER

## 2023-09-12 PROCEDURE — 3074F PR MOST RECENT SYSTOLIC BLOOD PRESSURE < 130 MM HG: ICD-10-PCS | Mod: CPTII,S$GLB,, | Performed by: NURSE PRACTITIONER

## 2023-09-12 PROCEDURE — 85027 COMPLETE CBC AUTOMATED: CPT | Performed by: NURSE PRACTITIONER

## 2023-09-12 PROCEDURE — 85007 BL SMEAR W/DIFF WBC COUNT: CPT | Performed by: NURSE PRACTITIONER

## 2023-09-12 PROCEDURE — 96372 PR INJECTION,THERAP/PROPH/DIAG2ST, IM OR SUBCUT: ICD-10-PCS | Mod: S$GLB,,, | Performed by: NURSE PRACTITIONER

## 2023-09-12 PROCEDURE — 3008F BODY MASS INDEX DOCD: CPT | Mod: CPTII,S$GLB,, | Performed by: NURSE PRACTITIONER

## 2023-09-12 PROCEDURE — 1160F RVW MEDS BY RX/DR IN RCRD: CPT | Mod: CPTII,S$GLB,, | Performed by: NURSE PRACTITIONER

## 2023-09-12 PROCEDURE — 3044F PR MOST RECENT HEMOGLOBIN A1C LEVEL <7.0%: ICD-10-PCS | Mod: CPTII,S$GLB,, | Performed by: NURSE PRACTITIONER

## 2023-09-12 PROCEDURE — 4010F PR ACE/ARB THEARPY RXD/TAKEN: ICD-10-PCS | Mod: CPTII,S$GLB,, | Performed by: NURSE PRACTITIONER

## 2023-09-12 PROCEDURE — 3061F PR NEG MICROALBUMINURIA RESULT DOCUMENTED/REVIEW: ICD-10-PCS | Mod: CPTII,S$GLB,, | Performed by: NURSE PRACTITIONER

## 2023-09-12 PROCEDURE — 99214 OFFICE O/P EST MOD 30 MIN: CPT | Mod: 25,S$GLB,, | Performed by: NURSE PRACTITIONER

## 2023-09-12 PROCEDURE — 1159F MED LIST DOCD IN RCRD: CPT | Mod: CPTII,S$GLB,, | Performed by: NURSE PRACTITIONER

## 2023-09-12 PROCEDURE — 3074F SYST BP LT 130 MM HG: CPT | Mod: CPTII,S$GLB,, | Performed by: NURSE PRACTITIONER

## 2023-09-12 PROCEDURE — 3066F PR DOCUMENTATION OF TREATMENT FOR NEPHROPATHY: ICD-10-PCS | Mod: CPTII,S$GLB,, | Performed by: NURSE PRACTITIONER

## 2023-09-12 PROCEDURE — 99999 PR PBB SHADOW E&M-EST. PATIENT-LVL V: ICD-10-PCS | Mod: PBBFAC,,, | Performed by: NURSE PRACTITIONER

## 2023-09-12 PROCEDURE — 99214 PR OFFICE/OUTPT VISIT, EST, LEVL IV, 30-39 MIN: ICD-10-PCS | Mod: 25,S$GLB,, | Performed by: NURSE PRACTITIONER

## 2023-09-12 PROCEDURE — 3078F PR MOST RECENT DIASTOLIC BLOOD PRESSURE < 80 MM HG: ICD-10-PCS | Mod: CPTII,S$GLB,, | Performed by: NURSE PRACTITIONER

## 2023-09-12 PROCEDURE — 80053 COMPREHEN METABOLIC PANEL: CPT | Performed by: NURSE PRACTITIONER

## 2023-09-12 PROCEDURE — 4010F ACE/ARB THERAPY RXD/TAKEN: CPT | Mod: CPTII,S$GLB,, | Performed by: NURSE PRACTITIONER

## 2023-09-12 PROCEDURE — 3078F DIAST BP <80 MM HG: CPT | Mod: CPTII,S$GLB,, | Performed by: NURSE PRACTITIONER

## 2023-09-12 RX ORDER — TIZANIDINE 4 MG/1
4 TABLET ORAL 3 TIMES DAILY PRN
Qty: 30 TABLET | Refills: 0 | Status: SHIPPED | OUTPATIENT
Start: 2023-09-12 | End: 2023-09-22

## 2023-09-12 RX ORDER — KETOROLAC TROMETHAMINE 30 MG/ML
60 INJECTION, SOLUTION INTRAMUSCULAR; INTRAVENOUS
Status: COMPLETED | OUTPATIENT
Start: 2023-09-12 | End: 2023-09-12

## 2023-09-12 RX ADMIN — KETOROLAC TROMETHAMINE 60 MG: 30 INJECTION, SOLUTION INTRAMUSCULAR; INTRAVENOUS at 10:09

## 2023-09-12 NOTE — PROGRESS NOTES
Subjective     Patient ID: Guillermo Castillo is a 53 y.o. male.    Chief Complaint: Fatigue (Pain in hips, sick x 1 week, diarrhea )    Patient presents for hospital follow up.   Started to have diarrhea about 2 weeks ago.  Went to ER on 09/07/2023.   Reports visiting mother for Labor Day and spent a lot of time outdoors cutting a tree/log.      Had bowel movement this morning-diarrhea.  Took Imodium on yesterday     Right groin pain/scrotum.  Radiates to right hip and down right leg.  Started last night.   Pain is worse to the right hip.    Received IV fluids and Valium.    Differential diagnosis: Gastroenteritis, he injury, diarrhea, dehydration, intra-abdominal infection     Patient presents with generalized body aches muscle cramps weakness as well as diarrhea for several days after exposure to heat.  Physical exam is benign.  Workup shows leukocytosis prompting a CT which was negative.  Does have diarrhea stool studies are pending.  Had a leukocytosis mild elevation in his sugar.  Received several boluses of fluid as well as Valium.  Vital signs are stable.  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  Use cramps Zofran for nausea advised no avoid heat for 72 hours drink plenty of fluids.  He verbalized understanding agreement with all instructions seems reliable safe discharge my opinion    Fatigue  Associated symptoms include diaphoresis (intermittent) and fatigue. Pertinent negatives include no abdominal pain, arthralgias, chest pain, chills, coughing or fever.     Review of Systems   Constitutional:  Positive for diaphoresis (intermittent) and fatigue. Negative for chills and fever.   Respiratory:  Negative for cough and shortness of breath.    Cardiovascular:  Negative for chest pain and leg swelling.   Gastrointestinal:  Positive for diarrhea (none today). Negative for abdominal pain.   Genitourinary:         Right groin pain    Musculoskeletal:  Negative for  arthralgias and gait problem.   Psychiatric/Behavioral:  Negative for agitation and confusion.           Objective     Physical Exam  Vitals reviewed.   Cardiovascular:      Rate and Rhythm: Normal rate and regular rhythm.   Pulmonary:      Effort: Pulmonary effort is normal.      Breath sounds: Normal breath sounds.   Abdominal:      General: Bowel sounds are normal. There is no distension.   Musculoskeletal:         General: Normal range of motion.   Neurological:      General: No focal deficit present.      Mental Status: He is alert and oriented to person, place, and time.   Psychiatric:         Mood and Affect: Mood normal.         Behavior: Behavior normal. Behavior is cooperative.            Assessment and Plan     1. Frequent diarrhea  -     CBC Auto Differential; Future; Expected date: 09/12/2023  -     Comprehensive Metabolic Panel; Future; Expected date: 09/12/2023    2. Positive fecal occult blood test  -     Occult blood x 1, stool; Future; Expected date: 09/12/2023    3. Right groin pain  -     ketorolac injection 60 mg    4. Right hip pain  -     ketorolac injection 60 mg  -     tiZANidine (ZANAFLEX) 4 MG tablet; Take 1 tablet (4 mg total) by mouth 3 (three) times daily as needed (muscle spasm).  Dispense: 30 tablet; Refill: 0      Schedule GI-Dr. Camara -has seen before  Give collection kit to collect stool-occult blood     Toradol injection today.  Tizanidine as needed.  If no improvement, will order imaging.        No follow-ups on file.

## 2023-09-14 DIAGNOSIS — D69.6 THROMBOCYTOPENIA: Primary | ICD-10-CM

## 2023-09-18 ENCOUNTER — HOSPITAL ENCOUNTER (OUTPATIENT)
Facility: HOSPITAL | Age: 53
Discharge: HOME | End: 2023-09-20
Attending: EMERGENCY MEDICINE | Admitting: INTERNAL MEDICINE
Payer: COMMERCIAL

## 2023-09-18 DIAGNOSIS — R19.7 DIARRHEA WITH DEHYDRATION: Primary | ICD-10-CM

## 2023-09-18 DIAGNOSIS — R53.1 WEAKNESS: ICD-10-CM

## 2023-09-18 DIAGNOSIS — R07.9 CHEST PAIN: ICD-10-CM

## 2023-09-18 DIAGNOSIS — R19.7 FREQUENT DIARRHEA: ICD-10-CM

## 2023-09-18 PROBLEM — E66.9 CLASS 1 OBESITY IN ADULT: Chronic | Status: ACTIVE | Noted: 2019-03-18

## 2023-09-18 PROBLEM — D72.829 LEUKOCYTOSIS: Status: ACTIVE | Noted: 2023-09-18

## 2023-09-18 PROBLEM — E66.811 CLASS 1 OBESITY IN ADULT: Chronic | Status: ACTIVE | Noted: 2019-03-18

## 2023-09-18 PROBLEM — N17.9 ACUTE KIDNEY INJURY: Status: ACTIVE | Noted: 2023-09-18

## 2023-09-18 LAB
ALBUMIN SERPL BCP-MCNC: 3.9 G/DL (ref 3.5–5.2)
ALP SERPL-CCNC: 131 U/L (ref 55–135)
ALT SERPL W/O P-5'-P-CCNC: 29 U/L (ref 10–44)
ANION GAP SERPL CALC-SCNC: 14 MMOL/L (ref 8–16)
AST SERPL-CCNC: 18 U/L (ref 10–40)
BACTERIA #/AREA URNS HPF: ABNORMAL /HPF
BASOPHILS # BLD AUTO: 0.1 K/UL (ref 0–0.2)
BASOPHILS NFR BLD: 0.5 % (ref 0–1.9)
BILIRUB SERPL-MCNC: 0.5 MG/DL (ref 0.1–1)
BILIRUB UR QL STRIP: NEGATIVE
BUN SERPL-MCNC: 29 MG/DL (ref 6–20)
C DIFF GDH STL QL: NEGATIVE
C DIFF TOX A+B STL QL IA: NEGATIVE
CALCIUM SERPL-MCNC: 9.9 MG/DL (ref 8.7–10.5)
CAOX CRY URNS QL MICRO: ABNORMAL
CHLORIDE SERPL-SCNC: 108 MMOL/L (ref 95–110)
CLARITY UR: ABNORMAL
CO2 SERPL-SCNC: 16 MMOL/L (ref 23–29)
COLOR UR: YELLOW
CREAT SERPL-MCNC: 1.2 MG/DL (ref 0.5–1.4)
DIFFERENTIAL METHOD: ABNORMAL
EOSINOPHIL # BLD AUTO: 7.4 K/UL (ref 0–0.5)
EOSINOPHIL NFR BLD: 33.8 % (ref 0–8)
ERYTHROCYTE [DISTWIDTH] IN BLOOD BY AUTOMATED COUNT: 13.7 % (ref 11.5–14.5)
EST. GFR  (NO RACE VARIABLE): >60 ML/MIN/1.73 M^2
GLUCOSE SERPL-MCNC: 129 MG/DL (ref 70–110)
GLUCOSE UR QL STRIP: NEGATIVE
HCT VFR BLD AUTO: 46.1 % (ref 40–54)
HCV AB SERPL QL IA: NEGATIVE
HEP C VIRUS HOLD SPECIMEN: NORMAL
HGB BLD-MCNC: 15.5 G/DL (ref 14–18)
HGB UR QL STRIP: NEGATIVE
HIV 1+2 AB+HIV1 P24 AG SERPL QL IA: NEGATIVE
HYALINE CASTS #/AREA URNS LPF: 20 /LPF
IMM GRANULOCYTES # BLD AUTO: 0.48 K/UL (ref 0–0.04)
IMM GRANULOCYTES NFR BLD AUTO: 2.2 % (ref 0–0.5)
KETONES UR QL STRIP: ABNORMAL
LEUKOCYTE ESTERASE UR QL STRIP: ABNORMAL
LYMPHOCYTES # BLD AUTO: 3.8 K/UL (ref 1–4.8)
LYMPHOCYTES NFR BLD: 17.3 % (ref 18–48)
MCH RBC QN AUTO: 28.3 PG (ref 27–31)
MCHC RBC AUTO-ENTMCNC: 33.6 G/DL (ref 32–36)
MCV RBC AUTO: 84 FL (ref 82–98)
MICROSCOPIC COMMENT: ABNORMAL
MONOCYTES # BLD AUTO: 1.6 K/UL (ref 0.3–1)
MONOCYTES NFR BLD: 7.1 % (ref 4–15)
NEUTROPHILS # BLD AUTO: 8.5 K/UL (ref 1.8–7.7)
NEUTROPHILS NFR BLD: 39.1 % (ref 38–73)
NITRITE UR QL STRIP: NEGATIVE
NRBC BLD-RTO: 0 /100 WBC
OB PNL STL: POSITIVE
PH UR STRIP: 6 [PH] (ref 5–8)
PLATELET # BLD AUTO: 289 K/UL (ref 150–450)
PLATELET BLD QL SMEAR: ABNORMAL
PMV BLD AUTO: 10.1 FL (ref 9.2–12.9)
POCT GLUCOSE: 102 MG/DL (ref 70–110)
POCT GLUCOSE: 128 MG/DL (ref 70–110)
POTASSIUM SERPL-SCNC: 4.3 MMOL/L (ref 3.5–5.1)
PROT SERPL-MCNC: 7.5 G/DL (ref 6–8.4)
PROT UR QL STRIP: ABNORMAL
RBC # BLD AUTO: 5.48 M/UL (ref 4.6–6.2)
RBC #/AREA URNS HPF: 0 /HPF (ref 0–4)
RV AG STL QL IA.RAPID: NEGATIVE
SMUDGE CELLS BLD QL SMEAR: PRESENT
SODIUM SERPL-SCNC: 138 MMOL/L (ref 136–145)
SP GR UR STRIP: >1.03 (ref 1–1.03)
SQUAMOUS #/AREA URNS HPF: 1 /HPF
URN SPEC COLLECT METH UR: ABNORMAL
UROBILINOGEN UR STRIP-ACNC: NEGATIVE EU/DL
WBC # BLD AUTO: 21.75 K/UL (ref 3.9–12.7)
WBC #/AREA STL HPF: NORMAL /[HPF]
WBC #/AREA URNS HPF: 9 /HPF (ref 0–5)

## 2023-09-18 PROCEDURE — 87209 SMEAR COMPLEX STAIN: CPT | Performed by: EMERGENCY MEDICINE

## 2023-09-18 PROCEDURE — 87425 ROTAVIRUS AG IA: CPT | Performed by: EMERGENCY MEDICINE

## 2023-09-18 PROCEDURE — 96361 HYDRATE IV INFUSION ADD-ON: CPT

## 2023-09-18 PROCEDURE — 87338 HPYLORI STOOL AG IA: CPT | Performed by: EMERGENCY MEDICINE

## 2023-09-18 PROCEDURE — 86803 HEPATITIS C AB TEST: CPT | Performed by: EMERGENCY MEDICINE

## 2023-09-18 PROCEDURE — 89055 LEUKOCYTE ASSESSMENT FECAL: CPT | Performed by: EMERGENCY MEDICINE

## 2023-09-18 PROCEDURE — 82653 EL-1 FECAL QUANTITATIVE: CPT | Performed by: EMERGENCY MEDICINE

## 2023-09-18 PROCEDURE — 25000003 PHARM REV CODE 250: Performed by: NURSE PRACTITIONER

## 2023-09-18 PROCEDURE — 85025 COMPLETE CBC W/AUTO DIFF WBC: CPT | Performed by: EMERGENCY MEDICINE

## 2023-09-18 PROCEDURE — 87449 NOS EACH ORGANISM AG IA: CPT | Mod: 91 | Performed by: EMERGENCY MEDICINE

## 2023-09-18 PROCEDURE — 87449 NOS EACH ORGANISM AG IA: CPT | Performed by: EMERGENCY MEDICINE

## 2023-09-18 PROCEDURE — A9698 NON-RAD CONTRAST MATERIALNOC: HCPCS | Performed by: INTERNAL MEDICINE

## 2023-09-18 PROCEDURE — G0378 HOSPITAL OBSERVATION PER HR: HCPCS

## 2023-09-18 PROCEDURE — 25000003 PHARM REV CODE 250: Performed by: INTERNAL MEDICINE

## 2023-09-18 PROCEDURE — 87045 FECES CULTURE AEROBIC BACT: CPT | Performed by: EMERGENCY MEDICINE

## 2023-09-18 PROCEDURE — 87427 SHIGA-LIKE TOXIN AG IA: CPT | Mod: 59 | Performed by: EMERGENCY MEDICINE

## 2023-09-18 PROCEDURE — 82272 OCCULT BLD FECES 1-3 TESTS: CPT | Performed by: EMERGENCY MEDICINE

## 2023-09-18 PROCEDURE — 99285 EMERGENCY DEPT VISIT HI MDM: CPT | Mod: 25

## 2023-09-18 PROCEDURE — 81000 URINALYSIS NONAUTO W/SCOPE: CPT | Performed by: EMERGENCY MEDICINE

## 2023-09-18 PROCEDURE — 87329 GIARDIA AG IA: CPT | Performed by: EMERGENCY MEDICINE

## 2023-09-18 PROCEDURE — 87389 HIV-1 AG W/HIV-1&-2 AB AG IA: CPT | Performed by: EMERGENCY MEDICINE

## 2023-09-18 PROCEDURE — 87046 STOOL CULTR AEROBIC BACT EA: CPT | Performed by: EMERGENCY MEDICINE

## 2023-09-18 PROCEDURE — 80053 COMPREHEN METABOLIC PANEL: CPT | Performed by: EMERGENCY MEDICINE

## 2023-09-18 PROCEDURE — 63600175 PHARM REV CODE 636 W HCPCS: Performed by: INTERNAL MEDICINE

## 2023-09-18 PROCEDURE — 25000003 PHARM REV CODE 250: Performed by: EMERGENCY MEDICINE

## 2023-09-18 PROCEDURE — 96365 THER/PROPH/DIAG IV INF INIT: CPT | Mod: 59

## 2023-09-18 PROCEDURE — 83993 ASSAY FOR CALPROTECTIN FECAL: CPT | Performed by: EMERGENCY MEDICINE

## 2023-09-18 PROCEDURE — 87086 URINE CULTURE/COLONY COUNT: CPT | Performed by: EMERGENCY MEDICINE

## 2023-09-18 PROCEDURE — 82705 FATS/LIPIDS FECES QUAL: CPT | Performed by: EMERGENCY MEDICINE

## 2023-09-18 PROCEDURE — 25500020 PHARM REV CODE 255: Performed by: INTERNAL MEDICINE

## 2023-09-18 RX ORDER — CHOLESTYRAMINE 4 G/9G
1 POWDER, FOR SUSPENSION ORAL 2 TIMES DAILY
Status: DISCONTINUED | OUTPATIENT
Start: 2023-09-18 | End: 2023-09-20 | Stop reason: HOSPADM

## 2023-09-18 RX ORDER — HYDROCODONE BITARTRATE AND ACETAMINOPHEN 5; 325 MG/1; MG/1
1 TABLET ORAL EVERY 6 HOURS PRN
Status: DISCONTINUED | OUTPATIENT
Start: 2023-09-18 | End: 2023-09-20 | Stop reason: HOSPADM

## 2023-09-18 RX ORDER — POLYETHYLENE GLYCOL 3350 17 G/17G
17 POWDER, FOR SOLUTION ORAL DAILY
Status: DISCONTINUED | OUTPATIENT
Start: 2023-09-18 | End: 2023-09-19

## 2023-09-18 RX ORDER — SIMETHICONE 80 MG
1 TABLET,CHEWABLE ORAL 4 TIMES DAILY PRN
Status: DISCONTINUED | OUTPATIENT
Start: 2023-09-18 | End: 2023-09-20 | Stop reason: HOSPADM

## 2023-09-18 RX ORDER — INSULIN ASPART 100 [IU]/ML
0-5 INJECTION, SOLUTION INTRAVENOUS; SUBCUTANEOUS
Status: DISCONTINUED | OUTPATIENT
Start: 2023-09-18 | End: 2023-09-20 | Stop reason: HOSPADM

## 2023-09-18 RX ORDER — MAG HYDROX/ALUMINUM HYD/SIMETH 200-200-20
30 SUSPENSION, ORAL (FINAL DOSE FORM) ORAL 4 TIMES DAILY PRN
Status: DISCONTINUED | OUTPATIENT
Start: 2023-09-18 | End: 2023-09-20 | Stop reason: HOSPADM

## 2023-09-18 RX ORDER — SODIUM,POTASSIUM PHOSPHATES 280-250MG
2 POWDER IN PACKET (EA) ORAL
Status: DISCONTINUED | OUTPATIENT
Start: 2023-09-18 | End: 2023-09-20 | Stop reason: HOSPADM

## 2023-09-18 RX ORDER — DULOXETIN HYDROCHLORIDE 30 MG/1
60 CAPSULE, DELAYED RELEASE ORAL DAILY
Status: DISCONTINUED | OUTPATIENT
Start: 2023-09-19 | End: 2023-09-20 | Stop reason: HOSPADM

## 2023-09-18 RX ORDER — IBUPROFEN 200 MG
24 TABLET ORAL
Status: DISCONTINUED | OUTPATIENT
Start: 2023-09-18 | End: 2023-09-20 | Stop reason: HOSPADM

## 2023-09-18 RX ORDER — AMOXICILLIN 500 MG
1 CAPSULE ORAL DAILY
COMMUNITY

## 2023-09-18 RX ORDER — ONDANSETRON 8 MG/1
8 TABLET, ORALLY DISINTEGRATING ORAL EVERY 8 HOURS PRN
Status: DISCONTINUED | OUTPATIENT
Start: 2023-09-18 | End: 2023-09-20 | Stop reason: HOSPADM

## 2023-09-18 RX ORDER — ENOXAPARIN SODIUM 100 MG/ML
40 INJECTION SUBCUTANEOUS EVERY 24 HOURS
Status: DISCONTINUED | OUTPATIENT
Start: 2023-09-18 | End: 2023-09-20 | Stop reason: HOSPADM

## 2023-09-18 RX ORDER — LANOLIN ALCOHOL/MO/W.PET/CERES
800 CREAM (GRAM) TOPICAL
Status: DISCONTINUED | OUTPATIENT
Start: 2023-09-18 | End: 2023-09-20 | Stop reason: HOSPADM

## 2023-09-18 RX ORDER — BISACODYL 10 MG
10 SUPPOSITORY, RECTAL RECTAL DAILY PRN
Status: DISCONTINUED | OUTPATIENT
Start: 2023-09-18 | End: 2023-09-20 | Stop reason: HOSPADM

## 2023-09-18 RX ORDER — METRONIDAZOLE 500 MG/1
500 TABLET ORAL EVERY 8 HOURS
Status: DISCONTINUED | OUTPATIENT
Start: 2023-09-18 | End: 2023-09-19

## 2023-09-18 RX ORDER — CALCIUM CARB, CITRATE, MALATE 250 MG
CAPSULE ORAL
COMMUNITY
End: 2023-09-18

## 2023-09-18 RX ORDER — CARVEDILOL 12.5 MG/1
25 TABLET ORAL 2 TIMES DAILY WITH MEALS
Status: DISCONTINUED | OUTPATIENT
Start: 2023-09-18 | End: 2023-09-20 | Stop reason: HOSPADM

## 2023-09-18 RX ORDER — IBUPROFEN 200 MG
16 TABLET ORAL
Status: DISCONTINUED | OUTPATIENT
Start: 2023-09-18 | End: 2023-09-20 | Stop reason: HOSPADM

## 2023-09-18 RX ORDER — ONDANSETRON 2 MG/ML
4 INJECTION INTRAMUSCULAR; INTRAVENOUS EVERY 8 HOURS PRN
Status: DISCONTINUED | OUTPATIENT
Start: 2023-09-18 | End: 2023-09-20 | Stop reason: HOSPADM

## 2023-09-18 RX ORDER — SODIUM CHLORIDE 0.9 % (FLUSH) 0.9 %
10 SYRINGE (ML) INJECTION
Status: DISCONTINUED | OUTPATIENT
Start: 2023-09-18 | End: 2023-09-20 | Stop reason: HOSPADM

## 2023-09-18 RX ORDER — CIPROFLOXACIN 2 MG/ML
400 INJECTION, SOLUTION INTRAVENOUS
Status: DISCONTINUED | OUTPATIENT
Start: 2023-09-18 | End: 2023-09-19

## 2023-09-18 RX ORDER — NALOXONE HCL 0.4 MG/ML
0.02 VIAL (ML) INJECTION
Status: DISCONTINUED | OUTPATIENT
Start: 2023-09-18 | End: 2023-09-20 | Stop reason: HOSPADM

## 2023-09-18 RX ORDER — MAGNESIUM HYDROXIDE 400 MG/5ML
1 SUSPENSION, ORAL (FINAL DOSE FORM) ORAL 2 TIMES DAILY
COMMUNITY

## 2023-09-18 RX ORDER — SODIUM CHLORIDE, SODIUM LACTATE, POTASSIUM CHLORIDE, CALCIUM CHLORIDE 600; 310; 30; 20 MG/100ML; MG/100ML; MG/100ML; MG/100ML
INJECTION, SOLUTION INTRAVENOUS CONTINUOUS
Status: DISCONTINUED | OUTPATIENT
Start: 2023-09-18 | End: 2023-09-20

## 2023-09-18 RX ORDER — GLUCAGON 1 MG
1 KIT INJECTION
Status: DISCONTINUED | OUTPATIENT
Start: 2023-09-18 | End: 2023-09-20 | Stop reason: HOSPADM

## 2023-09-18 RX ORDER — ACETAMINOPHEN 325 MG/1
650 TABLET ORAL EVERY 4 HOURS PRN
Status: DISCONTINUED | OUTPATIENT
Start: 2023-09-18 | End: 2023-09-20 | Stop reason: HOSPADM

## 2023-09-18 RX ORDER — TALC
6 POWDER (GRAM) TOPICAL NIGHTLY PRN
Status: DISCONTINUED | OUTPATIENT
Start: 2023-09-18 | End: 2023-09-20 | Stop reason: HOSPADM

## 2023-09-18 RX ADMIN — SODIUM CHLORIDE 1000 ML: 9 INJECTION, SOLUTION INTRAVENOUS at 11:09

## 2023-09-18 RX ADMIN — CHOLESTYRAMINE 4 G: 4 POWDER, FOR SUSPENSION ORAL at 09:09

## 2023-09-18 RX ADMIN — CARVEDILOL 25 MG: 12.5 TABLET, FILM COATED ORAL at 09:09

## 2023-09-18 RX ADMIN — METRONIDAZOLE 500 MG: 500 TABLET ORAL at 09:09

## 2023-09-18 RX ADMIN — IOHEXOL 1000 ML: 9 SOLUTION ORAL at 01:09

## 2023-09-18 RX ADMIN — CIPROFLOXACIN 400 MG: 2 INJECTION, SOLUTION INTRAVENOUS at 08:09

## 2023-09-18 RX ADMIN — SODIUM CHLORIDE 1000 ML: 9 INJECTION, SOLUTION INTRAVENOUS at 08:09

## 2023-09-18 RX ADMIN — IOHEXOL 100 ML: 350 INJECTION, SOLUTION INTRAVENOUS at 01:09

## 2023-09-18 RX ADMIN — SODIUM CHLORIDE, POTASSIUM CHLORIDE, SODIUM LACTATE AND CALCIUM CHLORIDE: 600; 310; 30; 20 INJECTION, SOLUTION INTRAVENOUS at 02:09

## 2023-09-18 NOTE — ASSESSMENT & PLAN NOTE
Body mass index is 31.06 kg/m². Morbid obesity complicates all aspects of disease management from diagnostic modalities to treatment. Weight loss encouraged and health benefits explained to patient.

## 2023-09-18 NOTE — ASSESSMENT & PLAN NOTE
Normotensive  Continue home meds as BP tolerates  IV hydralazine p.r.n.  Titrate antihypertensive regimen as needed  Monitor BP

## 2023-09-18 NOTE — PHARMACY MED REC
"    Admission Medication History     The home medication history was taken by Dionne Jeffries.    You may go to "Admission" then "Reconcile Home Medications" tabs to review and/or act upon these items.     The home medication list has been updated by the Pharmacy department.   Please read ALL comments highlighted in yellow.   Please address this information as you see fit.    Feel free to contact us if you have any questions or require assistance.      The medications listed below were removed from the home medication list. Please reorder if appropriate:  Patient reports no longer taking the following medication(s):  ASPRIN 81 MCG        Medications listed below were obtained from: Patient/family and Analytic software- [employer]  (Not in a hospital admission)        Dionnerachel Gillson  [id number]      Current Outpatient Medications on File Prior to Encounter   Medication Sig Dispense Refill Last Dose    amLODIPine (NORVASC) 10 MG tablet Take 1 tablet by mouth once daily 90 tablet 4 9/18/2023    carvediloL (COREG) 25 MG tablet Take 1 tablet (25 mg total) by mouth 2 (two) times daily with meals. 60 tablet 11 9/18/2023    DULoxetine (CYMBALTA) 60 MG capsule Take 1 capsule by mouth once daily 90 capsule 4 9/18/2023    insulin lispro (HUMALOG U-100 INSULIN) 100 unit/mL injection Use via sliding scale 10 mL 1 Past Week    iron-vitamin C 100-250 mg, ICAR-C, (ICAR-C) 100-250 mg Tab Take 1 tablet by mouth once daily. 90 tablet 3 9/18/2023    omega-3 fatty acids/fish oil (FISH OIL-OMEGA-3 FATTY ACIDS) 300-1,000 mg capsule Take 1 capsule by mouth once daily.   9/17/2023    omeprazole (PRILOSEC) 40 MG capsule Take 1 capsule (40 mg total) by mouth daily as needed (Gerd). 90 capsule 3 9/18/2023    ondansetron (ZOFRAN) 4 MG tablet Take 1 tablet (4 mg total) by mouth every 6 (six) hours as needed for Nausea. 12 tablet 0 Past Week    potassium gluconate 595 mg (99 mg) Tab Take 1 tablet by mouth 2 (two) times a day.   9/17/2023    " "rosuvastatin (CRESTOR) 10 MG tablet Take 1 tablet (10 mg total) by mouth once daily. (Patient taking differently: Take 10 mg by mouth once daily. Thursdays) 90 tablet 3 Past Week    tiZANidine (ZANAFLEX) 4 MG tablet Take 1 tablet (4 mg total) by mouth 3 (three) times daily as needed (muscle spasm). 30 tablet 0 9/17/2023    valsartan (DIOVAN) 320 MG tablet Take 1 tablet (320 mg total) by mouth once daily. 90 tablet 1 9/18/2023    [DISCONTINUED] potassium citrate 99 mg Cap Take by mouth.   9/17/2023    insulin syringe-needle U-100 0.3 mL 31 gauge x 5/16" Syrg Use as directed (Patient taking differently: Use as directed) 100 each 0     metFORMIN (GLUCOPHAGE-XR) 500 MG ER 24hr tablet Take 2 tablets (1,000 mg total) by mouth once daily. 180 tablet 1     semaglutide (OZEMPIC) 2 mg/dose (8 mg/3 mL) PnIj Inject 2 mg into the skin every 7 days. 3 mL 11                        .          "

## 2023-09-18 NOTE — ED PROVIDER NOTES
SCRIBE #1 NOTE: I, Gerhard Díaz, am scribing for, and in the presence of, Tucker Denney MD. I have scribed the entire note.      History      Chief Complaint   Patient presents with    Weakness     Weakness, diarrhea x 2 weeks, intermittent diarrhea, was seen in ER x 1 week ago, also went to PCP last week. + nausea, no vomiting, states has diarrhea whenever he eats, taking antibiotics.        Review of patient's allergies indicates:   Allergen Reactions    Simvastatin      Other reaction(s): aches/inc lfts        HPI   HPI    9/18/2023, 7:59 AM   History obtained from the patient      History of Present Illness: Guillermo Castillo is a 53 y.o. male patient with a PMHx of DM, HTN who presents to the Emergency Department for diarrhea, onset 2 weeks PTA. Symptoms are episodic and moderate in severity. No mitigating or exacerbating factors reported. Associated sxs include nausea, fatigue, and generalized weakness. Patient denies any fever, chills, vomiting, SOB, CP, numbness, dizziness, headache, and all other sxs at this time. Pt presented to the ED 11 days ago for similar sxs, and had followed with his PCP 6 days ago. No further complaints or concerns at this time.     Arrival mode: Personal vehicle    PCP: Kip Siddiqui MD       Past Medical History:  Past Medical History:   Diagnosis Date    Anemia 2020    dr mendel DWYER-19 01/16/2021    DM (diabetes mellitus)     Fatty liver     GERD (gastroesophageal reflux disease)     Gout     Hyperlipidemia     Hypertension     Hypertriglyceridemia     Mood disorder     Panic disorder     Sleep apnea     wears CPAP    Type 2 diabetes mellitus     05/2013 BS       Past Surgical History:  Past Surgical History:   Procedure Laterality Date    APPENDECTOMY      COLONOSCOPY N/A 11/27/2019    Procedure: COLONOSCOPY;  Surgeon: Radha Anne MD;  Location: [facility];  Service: Endoscopy;  Laterality: N/A;    COLONOSCOPY N/A 2/22/2023    Procedure: COLONOSCOPY;  Surgeon:  Princess Camara MD;  Location[facility];  Service: Endoscopy;  Laterality: N/A;    ESOPHAGOGASTRODUODENOSCOPY N/A 11/27/2019    Procedure: EGD (ESOPHAGOGASTRODUODENOSCOPY);  Surgeon: Radha Anne MD;  Location: [facility] ENDO;  Service: Endoscopy;  Laterality: N/A;    ESOPHAGOGASTRODUODENOSCOPY N/A 7/24/2023    Procedure: EGD (ESOPHAGOGASTRODUODENOSCOPY);  Surgeon: Jt Vilchis MD;  Location: [facility] ENDO;  Service: Endoscopy;  Laterality: N/A;         Family History:  Family History   Problem Relation Age of Onset    Coronary artery disease Father     Diabetes Father     Lung cancer Father     Diabetes Maternal Aunt     Diabetes Paternal Aunt     Hypertension Maternal Grandmother     Cataracts Maternal Grandmother     Hypertension Mother     Cataracts Mother        Social History:  Social History     Tobacco Use    Smoking status: Never    Smokeless tobacco: Never   Substance and Sexual Activity    Alcohol use: Not Currently     Comment: rarely    Drug use: No    Sexual activity: Yes     Partners: Female       ROS   Review of Systems   Constitutional:  Positive for fatigue. Negative for chills and fever.   HENT:  Negative for sore throat.    Respiratory:  Negative for shortness of breath.    Cardiovascular:  Negative for chest pain.   Gastrointestinal:  Positive for diarrhea and nausea. Negative for vomiting.   Genitourinary:  Negative for dysuria.   Musculoskeletal:  Negative for back pain.   Skin:  Negative for rash.   Neurological:  Positive for weakness (generalized). Negative for dizziness, numbness and headaches.   Hematological:  Does not bruise/bleed easily.   All other systems reviewed and are negative.    Physical Exam      Initial Vitals [09/18/23 0754]   BP Pulse Resp Temp SpO2   111/71 72 16 97.8 °F (36.6 °C) 99 %      MAP       --          Physical Exam  Nursing Notes and Vital Signs Reviewed.  Constitutional: Patient is in no acute distress. Well-developed and well-nourished.  Head:  Atraumatic. Normocephalic.  Eyes: PERRL. EOM intact. Conjunctivae are not pale. No scleral icterus.  ENT: Mucous membranes are moist. Oropharynx is clear and symmetric.    Neck: Supple. Full ROM.   Cardiovascular: Regular rate. Regular rhythm. No murmurs, rubs, or gallops. Distal pulses are 2+ and symmetric.  Pulmonary/Chest: No respiratory distress. Clear to auscultation bilaterally. No wheezing or rales.  Abdominal: Soft and non-distended.  There is no tenderness.  No rebound, guarding, or rigidity.   Musculoskeletal: Moves all extremities. No obvious deformities. No edema.  Skin: Warm and dry.  Neurological:  Alert, awake, and appropriate.  Normal speech.  No acute focal neurological deficits are appreciated.  Psychiatric: Normal affect. Good eye contact. Appropriate in content.    ED Course    Procedures  ED Vital Signs:  Vitals:    09/18/23 0754 09/18/23 0835 09/18/23 1036 09/18/23 1039   BP: 111/71  117/65 117/65   Pulse: 72  66 67   Resp: 16  16    Temp: 97.8 °F (36.6 °C)      TempSrc: Oral      SpO2: 99%  98% 98%   Weight: 104.2 kg (229 lb 9.8 oz) 103.9 kg (229 lb)     Height:  6' (1.829 m)      09/18/23 1247   BP: 119/65   Pulse: (!) 59   Resp:    Temp:    TempSrc:    SpO2: 99%   Weight:    Height:        Abnormal Lab Results:  Labs Reviewed   CBC W/ AUTO DIFFERENTIAL - Abnormal; Notable for the following components:       Result Value    WBC 21.75 (*)     Immature Granulocytes 2.2 (*)     Gran # (ANC) 8.5 (*)     Immature Grans (Abs) 0.48 (*)     Mono # 1.6 (*)     Eos # 7.4 (*)     Lymph % 17.3 (*)     Eosinophil % 33.8 (*)     All other components within normal limits    Narrative:     Release to patient->Immediate   COMPREHENSIVE METABOLIC PANEL - Abnormal; Notable for the following components:    CO2 16 (*)     Glucose 129 (*)     BUN 29 (*)     All other components within normal limits    Narrative:     Release to patient->Immediate   URINALYSIS, REFLEX TO URINE CULTURE - Abnormal; Notable for the  following components:    Appearance, UA Hazy (*)     Specific Gravity, UA >1.030 (*)     Protein, UA 2+ (*)     Ketones, UA 1+ (*)     Leukocytes, UA 1+ (*)     All other components within normal limits    Narrative:     Specimen Source->Urine   OCCULT BLOOD X 1, STOOL - Abnormal; Notable for the following components:    Occult Blood Positive (*)     All other components within normal limits   URINALYSIS MICROSCOPIC - Abnormal; Notable for the following components:    WBC, UA 9 (*)     Hyaline Casts, UA 20 (*)     All other components within normal limits    Narrative:     Specimen Source->Urine   CLOSTRIDIUM DIFFICILE   CULTURE, URINE   CULTURE, STOOL   ENTEROHEMORRHAGIC E.COLI   CULTURE, URINE   HIV 1 / 2 ANTIBODY    Narrative:     Release to patient->Immediate   HEPATITIS C ANTIBODY    Narrative:     Release to patient->Immediate   HEP C VIRUS HOLD SPECIMEN    Narrative:     Release to patient->Immediate   ROTAVIRUS ANTIGEN, STOOL   STOOL EXAM-OVA,CYSTS,PARASITES   H. PYLORI ANTIGEN, STOOL   PANCREATIC ELASTASE, FECAL   FECAL FAT, QUALITATIVE   WBC, STOOL   CALPROTECTIN, STOOL   GIARDIA/CRYPTOSPORIDIUM (EIA)        All Lab Results:  Results for orders placed or performed during the hospital encounter of 09/18/23   Clostridium difficile EIA    Specimen: Stool   Result Value Ref Range    C. diff Antigen Negative Negative    C difficile Toxins A+B, EIA Negative Negative   HIV 1/2 Ag/Ab (4th Gen)   Result Value Ref Range    HIV 1/2 Ag/Ab Negative Negative   Hepatitis C Antibody   Result Value Ref Range    Hepatitis C Ab Negative Negative   HCV Virus Hold Specimen   Result Value Ref Range    HEP C Virus Hold Specimen Hold for HCV sendout    CBC Auto Differential   Result Value Ref Range    WBC 21.75 (H) 3.90 - 12.70 K/uL    RBC 5.48 4.60 - 6.20 M/uL    Hemoglobin 15.5 14.0 - 18.0 g/dL    Hematocrit 46.1 40.0 - 54.0 %    MCV 84 82 - 98 fL    MCH 28.3 27.0 - 31.0 pg    MCHC 33.6 32.0 - 36.0 g/dL    RDW 13.7 11.5 - 14.5  %    Platelets 289 150 - 450 K/uL    MPV 10.1 9.2 - 12.9 fL    Immature Granulocytes 2.2 (H) 0.0 - 0.5 %    Gran # (ANC) 8.5 (H) 1.8 - 7.7 K/uL    Immature Grans (Abs) 0.48 (H) 0.00 - 0.04 K/uL    Lymph # 3.8 1.0 - 4.8 K/uL    Mono # 1.6 (H) 0.3 - 1.0 K/uL    Eos # 7.4 (H) 0.0 - 0.5 K/uL    Baso # 0.10 0.00 - 0.20 K/uL    nRBC 0 0 /100 WBC    Gran % 39.1 38.0 - 73.0 %    Lymph % 17.3 (L) 18.0 - 48.0 %    Mono % 7.1 4.0 - 15.0 %    Eosinophil % 33.8 (H) 0.0 - 8.0 %    Basophil % 0.5 0.0 - 1.9 %    Platelet Estimate Appears normal     Smudge Cells Present     Differential Method Automated    Comprehensive Metabolic Panel   Result Value Ref Range    Sodium 138 136 - 145 mmol/L    Potassium 4.3 3.5 - 5.1 mmol/L    Chloride 108 95 - 110 mmol/L    CO2 16 (L) 23 - 29 mmol/L    Glucose 129 (H) 70 - 110 mg/dL    BUN 29 (H) 6 - 20 mg/dL    Creatinine 1.2 0.5 - 1.4 mg/dL    Calcium 9.9 8.7 - 10.5 mg/dL    Total Protein 7.5 6.0 - 8.4 g/dL    Albumin 3.9 3.5 - 5.2 g/dL    Total Bilirubin 0.5 0.1 - 1.0 mg/dL    Alkaline Phosphatase 131 55 - 135 U/L    AST 18 10 - 40 U/L    ALT 29 10 - 44 U/L    eGFR >60 >60 mL/min/1.73 m^2    Anion Gap 14 8 - 16 mmol/L   Urinalysis, Reflex to Urine Culture Urine, Clean Catch    Specimen: Urine   Result Value Ref Range    Specimen UA Urine, Clean Catch     Color, UA Yellow Yellow, Straw, Xiomy    Appearance, UA Hazy (A) Clear    pH, UA 6.0 5.0 - 8.0    Specific Gravity, UA >1.030 (A) 1.005 - 1.030    Protein, UA 2+ (A) Negative    Glucose, UA Negative Negative    Ketones, UA 1+ (A) Negative    Bilirubin (UA) Negative Negative    Occult Blood UA Negative Negative    Nitrite, UA Negative Negative    Urobilinogen, UA Negative <2.0 EU/dL    Leukocytes, UA 1+ (A) Negative   Occult blood x 1, stool    Specimen: Stool   Result Value Ref Range    Occult Blood Positive (A) Negative   Rotavirus antigen, stool   Result Value Ref Range    Rotavirus Negative Negative   Urinalysis Microscopic   Result Value Ref  Range    RBC, UA 0 0 - 4 /hpf    WBC, UA 9 (H) 0 - 5 /hpf    Bacteria None None-Occ /hpf    Squam Epithel, UA 1 /hpf    Hyaline Casts, UA 20 (A) 0-1/lpf /lpf    Ca Oxalate Lluvia, UA Few None-Moderate    Microscopic Comment SEE COMMENT      Imaging Results:  Imaging Results    None                 The Emergency Provider reviewed the vital signs and test results, which are outlined above.    ED Discussion     10:49 AM: Discussed case with Dr. Shah ([facility]). Dr. Cates agrees with current care and management of pt and accepts admission.   Admitting Service: [facility]  Admitting Physician: Dr. Cates  Admit to: Obs    10:50 AM: Re-evaluated pt. I have discussed test results, shared treatment plan, and the need for admission with patient and family at bedside. Pt and family express understanding at this time and agree with all information. All questions answered. Pt and family have no further questions or concerns at this time. Pt is ready for admit.         ED Medication(s):  Medications   sodium chloride 0.9% flush 10 mL (has no administration in time range)   enoxaparin injection 40 mg (has no administration in time range)   melatonin tablet 6 mg (has no administration in time range)   ondansetron disintegrating tablet 8 mg (has no administration in time range)   ondansetron injection 4 mg (has no administration in time range)   polyethylene glycol packet 17 g (has no administration in time range)   bisacodyL suppository 10 mg (has no administration in time range)   simethicone chewable tablet 80 mg (has no administration in time range)   aluminum-magnesium hydroxide-simethicone 200-200-20 mg/5 mL suspension 30 mL (has no administration in time range)   acetaminophen tablet 650 mg (has no administration in time range)   naloxone 0.4 mg/mL injection 0.02 mg (has no administration in time range)   potassium bicarbonate disintegrating tablet 50 mEq (has no administration in time range)   potassium  bicarbonate disintegrating tablet 35 mEq (has no administration in time range)   potassium bicarbonate disintegrating tablet 60 mEq (has no administration in time range)   magnesium oxide tablet 800 mg (has no administration in time range)   magnesium oxide tablet 800 mg (has no administration in time range)   potassium, sodium phosphates 280-160-250 mg packet 2 packet (has no administration in time range)   potassium, sodium phosphates 280-160-250 mg packet 2 packet (has no administration in time range)   potassium, sodium phosphates 280-160-250 mg packet 2 packet (has no administration in time range)   glucose chewable tablet 16 g (has no administration in time range)   glucose chewable tablet 24 g (has no administration in time range)   glucagon (human recombinant) injection 1 mg (has no administration in time range)   HYDROcodone-acetaminophen 5-325 mg per tablet 1 tablet (has no administration in time range)   insulin aspart U-100 pen 0-5 Units (has no administration in time range)   dextrose 10% bolus 125 mL 125 mL (has no administration in time range)   dextrose 10% bolus 250 mL 250 mL (has no administration in time range)   sodium chloride 0.9% bolus 1,000 mL 1,000 mL (0 mLs Intravenous Stopped 9/18/23 0924)   sodium chloride 0.9% bolus 1,000 mL 1,000 mL (0 mLs Intravenous Stopped 9/18/23 1214)         New Prescriptions    No medications on file         Medical Decision Making    Medical Decision Making  Continuous diarrhea for the last few weeks.  Feeling weaker and weaker  DDx: Dehydration, diarrhea    Amount and/or Complexity of Data Reviewed  Labs: ordered. Decision-making details documented in ED Course.  Radiology: ordered. Decision-making details documented in ED Course.                Scribe Attestation:   Scribe #1: I performed the above scribed service and the documentation accurately describes the services I performed. I attest to the accuracy of the note.    Attending:   Physician Attestation  Statement for Scribe #1: I, Tucker Denney MD, personally performed the services described in this documentation, as scribed by Gerhard Díaz, in my presence, and it is both accurate and complete.          Clinical Impression       ICD-10-CM ICD-9-CM   1. Frequent diarrhea  R19.7 787.91   2. Chest pain  R07.9 786.50   3. Weakness  R53.1 780.79       Disposition:   Disposition: Placed in Observation  Condition: Tucker Coronado MD  09/18/23 1312

## 2023-09-18 NOTE — H&P
[facility] - Emergency Dept.  [facility] Medicine  History & Physical    Patient Name: Guillermo Castillo  MRN: [id number]  Patient Class: OP- Observation  Admission Date: 9/18/2023  Attending Physician: Alma Rosa Cates DO   Primary Care Provider: Kip Siddiqui MD         Patient information was obtained from patient, spouse/SO, past medical records and ER records.     Subjective:     Principal Problem:Diarrhea with dehydration    Chief Complaint:   Chief Complaint   Patient presents with    Weakness     Weakness, diarrhea x 2 weeks, intermittent diarrhea, was seen in ER x 1 week ago, also went to PCP last week. + nausea, no vomiting, states has diarrhea whenever he eats, taking antibiotics.         HPI: Guillermo Castillo is a 53 y.o. male with PETROS, DM, Fatty liver, GERD, Gout, Hyperlipidemia, Hypertension, Hypertriglyceridemia, Mood disorder, Panic disorder, Sleep apnea who presented to ED on 9/18/2023 with complaints of weakness and diarrhea over the past 2 weeks.  Patient was evaluated in ED on 09/07 for same complaint and had CT of abdomen and pelvis done which showed no overt acute finding, distal colon and rectal borderline wall thickening versus underdistention.  He followed up outpatient with PCP on 09/12 however continued to have intermittent diarrhea, weakness, diaphoresis prompting return to ED. Per chart review, he had frequent diarrhea earlier this year and was referred to GI.  Underwent colonoscopy on 02/22/2023 which showed diverticulosis and several polyps removed.  He then underwent EGD on 07/24/2023, pathology not consistent with celiac disease, negative H pylori. Reports that when he had frequent diarrhea earlier this year he did not have any discomfort or weakness like he has now. He started taking Cipro & Flagyl yesterday    Workup in ED significant for WBC 21.75, CO2 16, BUN/creatinine 29/1.2, C diff negative, positive FOBT, urinalysis with ketones, hyaline casts, proteinuria. CT abdomen is pending      PCP:  "Kip Siddiqui MD    SDM: Spouse, Michael    CODE STATUS: Full code        Past Medical History:   Diagnosis Date    Anemia 2020    dr oglesby    COVID-19 01/16/2021    DM (diabetes mellitus)     Fatty liver     GERD (gastroesophageal reflux disease)     Gout     Hyperlipidemia     Hypertension     Hypertriglyceridemia     Mood disorder     Panic disorder     Sleep apnea     wears CPAP    Type 2 diabetes mellitus     05/2013 BS       Past Surgical History:   Procedure Laterality Date    APPENDECTOMY      COLONOSCOPY N/A 11/27/2019    Procedure: COLONOSCOPY;  Surgeon: Radha Anne MD;  Location: [facility];  Service: Endoscopy;  Laterality: N/A;    COLONOSCOPY N/A 2/22/2023    Procedure: COLONOSCOPY;  Surgeon: Princess Camara MD;  Location: [facility];  Service: Endoscopy;  Laterality: N/A;    ESOPHAGOGASTRODUODENOSCOPY N/A 11/27/2019    Procedure: EGD (ESOPHAGOGASTRODUODENOSCOPY);  Surgeon: Radha Anne MD;  Location: [facility];  Service: Endoscopy;  Laterality: N/A;    ESOPHAGOGASTRODUODENOSCOPY N/A 7/24/2023    Procedure: EGD (ESOPHAGOGASTRODUODENOSCOPY);  Surgeon: Jt Vilchis MD;  Location: [facility];  Service: Endoscopy;  Laterality: N/A;       Review of patient's allergies indicates:   Allergen Reactions    Simvastatin      Other reaction(s): aches/inc lfts       No current facility-administered medications on file prior to encounter.     Current Outpatient Medications on File Prior to Encounter   Medication Sig    amLODIPine (NORVASC) 10 MG tablet Take 1 tablet by mouth once daily    aspirin (ECOTRIN) 81 MG EC tablet Take 81 mg by mouth once daily.    carvediloL (COREG) 25 MG tablet Take 1 tablet (25 mg total) by mouth 2 (two) times daily with meals.    DULoxetine (CYMBALTA) 60 MG capsule Take 1 capsule by mouth once daily    insulin lispro (HUMALOG U-100 INSULIN) 100 unit/mL injection Use via sliding scale    insulin syringe-needle U-100 0.3 mL 31 gauge x 5/16" " Syrg Use as directed (Patient taking differently: Use as directed)    iron-vitamin C 100-250 mg, ICAR-C, (ICAR-C) 100-250 mg Tab Take 1 tablet by mouth once daily.    metFORMIN (GLUCOPHAGE-XR) 500 MG ER 24hr tablet Take 2 tablets (1,000 mg total) by mouth once daily.    omeprazole (PRILOSEC) 40 MG capsule Take 1 capsule (40 mg total) by mouth daily as needed (Gerd).    ondansetron (ZOFRAN) 4 MG tablet Take 1 tablet (4 mg total) by mouth every 6 (six) hours as needed for Nausea.    rosuvastatin (CRESTOR) 10 MG tablet Take 1 tablet (10 mg total) by mouth once daily.    semaglutide (OZEMPIC) 2 mg/dose (8 mg/3 mL) PnIj Inject 2 mg into the skin every 7 days.    tiZANidine (ZANAFLEX) 4 MG tablet Take 1 tablet (4 mg total) by mouth 3 (three) times daily as needed (muscle spasm).    valsartan (DIOVAN) 320 MG tablet Take 1 tablet (320 mg total) by mouth once daily.     Family History       Problem Relation (Age of Onset)    Cataracts Maternal Grandmother, Mother    Coronary artery disease Father    Diabetes Father, Maternal Aunt, Paternal Aunt    Hypertension Maternal Grandmother, Mother    Lung cancer Father          Tobacco Use    Smoking status: Never    Smokeless tobacco: Never   Substance and Sexual Activity    Alcohol use: Not Currently     Comment: rarely    Drug use: No    Sexual activity: Yes     Partners: Female     Review of Systems   Constitutional:  Positive for fever (subjective fevers). Negative for chills.   Respiratory:  Negative for cough, shortness of breath and wheezing.    Cardiovascular:  Negative for chest pain and palpitations.   Gastrointestinal:  Positive for abdominal distention, abdominal pain, diarrhea and nausea (chronic nausea, unchanged from baseline). Negative for vomiting.   Genitourinary:  Negative for difficulty urinating and dysuria.   Musculoskeletal:  Positive for back pain.   Neurological:  Positive for weakness.   All other systems reviewed and are  negative.    Objective:     Vital Signs (Most Recent):  Temp: 97.8 °F (36.6 °C) (09/18/23 0754)  Pulse: 67 (09/18/23 1039)  Resp: 16 (09/18/23 1036)  BP: 117/65 (09/18/23 1039)  SpO2: 98 % (09/18/23 1039) Vital Signs (24h Range):  Temp:  [97.8 °F (36.6 °C)] 97.8 °F (36.6 °C)  Pulse:  [66-72] 67  Resp:  [16] 16  SpO2:  [98 %-99 %] 98 %  BP: (111-117)/(65-71) 117/65     Weight: 103.9 kg (229 lb)  Body mass index is 31.06 kg/m².     Physical Exam  Vitals and nursing note reviewed. Exam conducted with a chaperone present.   Constitutional:       General: He is not in acute distress.     Appearance: He is obese. He is ill-appearing. He is not diaphoretic.   HENT:      Head: Normocephalic and atraumatic.      Right Ear: External ear normal.      Left Ear: External ear normal.      Nose: Nose normal.   Eyes:      Pupils: Pupils are equal, round, and reactive to light.   Cardiovascular:      Rate and Rhythm: Normal rate and regular rhythm.      Heart sounds: No murmur heard.  Pulmonary:      Effort: Pulmonary effort is normal. No respiratory distress.      Breath sounds: No wheezing.   Abdominal:      General: Bowel sounds are normal.      Palpations: Abdomen is soft.      Tenderness: There is no abdominal tenderness. There is no guarding or rebound.   Musculoskeletal:      Cervical back: Neck supple.      Right lower leg: No edema.      Left lower leg: No edema.   Skin:     General: Skin is warm and dry.   Neurological:      Mental Status: He is alert and oriented to person, place, and time. Mental status is at baseline.   Psychiatric:         Mood and Affect: Mood normal.              CRANIAL NERVES     CN III, IV, VI   Pupils are equal, round, and reactive to light.       Significant Labs: All pertinent labs within the past 24 hours have been reviewed.  CBC:   Recent Labs   Lab 09/18/23  0824   WBC 21.75*   HGB 15.5   HCT 46.1        CMP:   Recent Labs   Lab 09/18/23  0824      K 4.3      CO2 16*   GLU  129*   BUN 29*   CREATININE 1.2   CALCIUM 9.9   PROT 7.5   ALBUMIN 3.9   BILITOT 0.5   ALKPHOS 131   AST 18   ALT 29   ANIONGAP 14       Significant Imaging: I have reviewed all pertinent imaging results/findings within the past 24 hours.    Assessment/Plan:     * Diarrhea with dehydration  Ongoing for 2 weeks with generalized abdominal discomfort. Pt reports all oral intake goes through him  Etiology unclear  Per chart review, he had frequent diarrhea earlier this year and was referred to GI.  Underwent colonoscopy on 02/22/2023 which showed diverticulosis and several polyps removed.  He then underwent EGD on 07/24/2023, pathology not consistent with celiac disease, negative H pylori.  Started Cipro & Flagyl the day prior to admission  CT abdomen pending  Hold off on antibiotics and antidiarrheals pending CT result  IV fluid  Has GI follow up outpatient on 9/20    Acute kidney injury  Patient with acute kidney injury/acute renal failure likely due to pre-renal azotemia due to IVVD TIFFANY is currently noted. Baseline creatinine 0.7 on 9/7/2023 - Labs reviewed- Renal function/electrolytes with Estimated Creatinine Clearance: 89.7 mL/min (based on SCr of 1.2 mg/dL). according to latest data. Monitor urine output and serial BMP and adjust therapy as needed. Avoid nephrotoxins and renally dose meds for GFR listed above.    Leukocytosis  Etiology unclear  Patient afebrile, VS within normal limits  Stool & urine culture pending  CT abdomen/pelvis pending  Monitor CBC  Hold off on antibiotics reinitiation pending CT result.    Class 1 obesity in adult  Body mass index is 31.06 kg/m². Morbid obesity complicates all aspects of disease management from diagnostic modalities to treatment. Weight loss encouraged and health benefits explained to patient.     ARPAN (obstructive sleep apnea)  Continue CPAP nightly      Hypertension  Normotensive  Continue home meds as BP tolerates  IV hydralazine p.r.n.  Titrate antihypertensive regimen  "as needed  Monitor BP      Type 2 diabetes mellitus without complication, without long-term current use of insulin  Patient's FSGs are controlled on current medication regimen. (On metformin & Ozempic outpatient)  Last A1c reviewed-   Lab Results   Component Value Date    HGBA1C 5.8 (H) 08/23/2023     Most recent fingerstick glucose reviewed- No results for input(s): "POCTGLUCOSE" in the last 24 hours.  Current correctional scale  Low  Maintain anti-hyperglycemic dose as follows-   Antihyperglycemics (From admission, onward)    Start     Stop Route Frequency Ordered    09/18/23 1342  insulin aspart U-100 pen 0-5 Units         -- SubQ Before meals & nightly PRN 09/18/23 1244        Hold Oral hypoglycemics and Ozempic while patient is in the hospital.    VTE Risk Mitigation (From admission, onward)         Ordered     enoxaparin injection 40 mg  Daily         09/18/23 1244     IP VTE HIGH RISK PATIENT  Once         09/18/23 1244     Place sequential compression device  Until discontinued         09/18/23 1244                   On 09/18/2023, patient should be placed in hospital observation services under my care.        Alma Rosa Cates DO  Department of Hospital Medicine  [facility] - Emergency Dept.  "

## 2023-09-18 NOTE — ASSESSMENT & PLAN NOTE
"Patient's FSGs are controlled on current medication regimen. (On metformin & Ozempic outpatient)  Last A1c reviewed-   Lab Results   Component Value Date    HGBA1C 5.8 (H) 08/23/2023     Most recent fingerstick glucose reviewed- No results for input(s): "POCTGLUCOSE" in the last 24 hours.  Current correctional scale  Low  Maintain anti-hyperglycemic dose as follows-   Antihyperglycemics (From admission, onward)    Start     Stop Route Frequency Ordered    09/18/23 1342  insulin aspart U-100 pen 0-5 Units         -- SubQ Before meals & nightly PRN 09/18/23 1244        Hold Oral hypoglycemics and Ozempic while patient is in the hospital.  "

## 2023-09-18 NOTE — PLAN OF CARE
Bed locked, in lowest position. Call bell in reach. Purposeful rounding done every two hours. Blood glucose monitoring. Chart check complete. Will continue with plan of care.

## 2023-09-18 NOTE — ASSESSMENT & PLAN NOTE
Etiology unclear  Patient afebrile, VS within normal limits  Stool & urine culture pending  CT abdomen/pelvis pending  Monitor CBC  Hold off on antibiotics reinitiation pending CT result.

## 2023-09-18 NOTE — ASSESSMENT & PLAN NOTE
Patient with acute kidney injury/acute renal failure likely due to pre-renal azotemia due to IVVD TIFFANY is currently noted. Baseline creatinine 0.7 on 9/7/2023 - Labs reviewed- Renal function/electrolytes with Estimated Creatinine Clearance: 89.7 mL/min (based on SCr of 1.2 mg/dL). according to latest data. Monitor urine output and serial BMP and adjust therapy as needed. Avoid nephrotoxins and renally dose meds for GFR listed above.

## 2023-09-18 NOTE — SUBJECTIVE & OBJECTIVE
"Past Medical History:   Diagnosis Date    Anemia 2020    dr mendel CARRID-19 01/16/2021    DM (diabetes mellitus)     Fatty liver     GERD (gastroesophageal reflux disease)     Gout     Hyperlipidemia     Hypertension     Hypertriglyceridemia     Mood disorder     Panic disorder     Sleep apnea     wears CPAP    Type 2 diabetes mellitus     05/2013 BS       Past Surgical History:   Procedure Laterality Date    APPENDECTOMY      COLONOSCOPY N/A 11/27/2019    Procedure: COLONOSCOPY;  Surgeon: Radha Anne MD;  Location: [facility] ENDO;  Service: Endoscopy;  Laterality: N/A;    COLONOSCOPY N/A 2/22/2023    Procedure: COLONOSCOPY;  Surgeon: Princess Camara MD;  Location: [facility] ENDO;  Service: Endoscopy;  Laterality: N/A;    ESOPHAGOGASTRODUODENOSCOPY N/A 11/27/2019    Procedure: EGD (ESOPHAGOGASTRODUODENOSCOPY);  Surgeon: Radha Anne MD;  Location: [facility];  Service: Endoscopy;  Laterality: N/A;    ESOPHAGOGASTRODUODENOSCOPY N/A 7/24/2023    Procedure: EGD (ESOPHAGOGASTRODUODENOSCOPY);  Surgeon: Jt Vilchis MD;  Location: [facility];  Service: Endoscopy;  Laterality: N/A;       Review of patient's allergies indicates:   Allergen Reactions    Simvastatin      Other reaction(s): aches/inc lfts       No current facility-administered medications on file prior to encounter.     Current Outpatient Medications on File Prior to Encounter   Medication Sig    amLODIPine (NORVASC) 10 MG tablet Take 1 tablet by mouth once daily    aspirin (ECOTRIN) 81 MG EC tablet Take 81 mg by mouth once daily.    carvediloL (COREG) 25 MG tablet Take 1 tablet (25 mg total) by mouth 2 (two) times daily with meals.    DULoxetine (CYMBALTA) 60 MG capsule Take 1 capsule by mouth once daily    insulin lispro (HUMALOG U-100 INSULIN) 100 unit/mL injection Use via sliding scale    insulin syringe-needle U-100 0.3 mL 31 gauge x 5/16" Syrg Use as directed (Patient taking differently: Use as directed)    iron-vitamin C 100-250 mg, ICAR-C, " (ICAR-C) 100-250 mg Tab Take 1 tablet by mouth once daily.    metFORMIN (GLUCOPHAGE-XR) 500 MG ER 24hr tablet Take 2 tablets (1,000 mg total) by mouth once daily.    omeprazole (PRILOSEC) 40 MG capsule Take 1 capsule (40 mg total) by mouth daily as needed (Gerd).    ondansetron (ZOFRAN) 4 MG tablet Take 1 tablet (4 mg total) by mouth every 6 (six) hours as needed for Nausea.    rosuvastatin (CRESTOR) 10 MG tablet Take 1 tablet (10 mg total) by mouth once daily.    semaglutide (OZEMPIC) 2 mg/dose (8 mg/3 mL) PnIj Inject 2 mg into the skin every 7 days.    tiZANidine (ZANAFLEX) 4 MG tablet Take 1 tablet (4 mg total) by mouth 3 (three) times daily as needed (muscle spasm).    valsartan (DIOVAN) 320 MG tablet Take 1 tablet (320 mg total) by mouth once daily.     Family History       Problem Relation (Age of Onset)    Cataracts Maternal Grandmother, Mother    Coronary artery disease Father    Diabetes Father, Maternal Aunt, Paternal Aunt    Hypertension Maternal Grandmother, Mother    Lung cancer Father          Tobacco Use    Smoking status: Never    Smokeless tobacco: Never   Substance and Sexual Activity    Alcohol use: Not Currently     Comment: rarely    Drug use: No    Sexual activity: Yes     Partners: Female     Review of Systems   Constitutional:  Positive for fever (subjective fevers). Negative for chills.   Respiratory:  Negative for cough, shortness of breath and wheezing.    Cardiovascular:  Negative for chest pain and palpitations.   Gastrointestinal:  Positive for abdominal distention, abdominal pain, diarrhea and nausea (chronic nausea, unchanged from baseline). Negative for vomiting.   Genitourinary:  Negative for difficulty urinating and dysuria.   Musculoskeletal:  Positive for back pain.   Neurological:  Positive for weakness.   All other systems reviewed and are negative.    Objective:     Vital Signs (Most Recent):  Temp: 97.8 °F (36.6 °C) (09/18/23 0754)  Pulse: 67 (09/18/23 1039)  Resp: 16  (09/18/23 1036)  BP: 117/65 (09/18/23 1039)  SpO2: 98 % (09/18/23 1039) Vital Signs (24h Range):  Temp:  [97.8 °F (36.6 °C)] 97.8 °F (36.6 °C)  Pulse:  [66-72] 67  Resp:  [16] 16  SpO2:  [98 %-99 %] 98 %  BP: (111-117)/(65-71) 117/65     Weight: 103.9 kg (229 lb)  Body mass index is 31.06 kg/m².     Physical Exam  Vitals and nursing note reviewed. Exam conducted with a chaperone present.   Constitutional:       General: He is not in acute distress.     Appearance: He is obese. He is ill-appearing. He is not diaphoretic.   HENT:      Head: Normocephalic and atraumatic.      Right Ear: External ear normal.      Left Ear: External ear normal.      Nose: Nose normal.   Eyes:      Pupils: Pupils are equal, round, and reactive to light.   Cardiovascular:      Rate and Rhythm: Normal rate and regular rhythm.      Heart sounds: No murmur heard.  Pulmonary:      Effort: Pulmonary effort is normal. No respiratory distress.      Breath sounds: No wheezing.   Abdominal:      General: Bowel sounds are normal.      Palpations: Abdomen is soft.      Tenderness: There is no abdominal tenderness. There is no guarding or rebound.   Musculoskeletal:      Cervical back: Neck supple.      Right lower leg: No edema.      Left lower leg: No edema.   Skin:     General: Skin is warm and dry.   Neurological:      Mental Status: He is alert and oriented to person, place, and time. Mental status is at baseline.   Psychiatric:         Mood and Affect: Mood normal.              CRANIAL NERVES     CN III, IV, VI   Pupils are equal, round, and reactive to light.       Significant Labs: All pertinent labs within the past 24 hours have been reviewed.  CBC:   Recent Labs   Lab 09/18/23  0824   WBC 21.75*   HGB 15.5   HCT 46.1        CMP:   Recent Labs   Lab 09/18/23  0824      K 4.3      CO2 16*   *   BUN 29*   CREATININE 1.2   CALCIUM 9.9   PROT 7.5   ALBUMIN 3.9   BILITOT 0.5   ALKPHOS 131   AST 18   ALT 29   ANIONGAP 14        Significant Imaging: I have reviewed all pertinent imaging results/findings within the past 24 hours.

## 2023-09-18 NOTE — HPI
Guillermo Castillo is a 53 y.o. male with PETROS, DM, Fatty liver, GERD, Gout, Hyperlipidemia, Hypertension, Hypertriglyceridemia, Mood disorder, Panic disorder, Sleep apnea who presented to ED on 9/18/2023 with complaints of weakness and diarrhea over the past 2 weeks.  Patient was evaluated in ED on 09/07 for same complaint and had CT of abdomen and pelvis done which showed no overt acute finding, distal colon and rectal borderline wall thickening versus underdistention.  He followed up outpatient with PCP on 09/12 however continued to have intermittent diarrhea, weakness, diaphoresis prompting return to ED. Per chart review, he had frequent diarrhea earlier this year and was referred to GI.  Underwent colonoscopy on 02/22/2023 which showed diverticulosis and several polyps removed.  He then underwent EGD on 07/24/2023, pathology not consistent with celiac disease, negative H pylori. Reports that when he had frequent diarrhea earlier this year he did not have any discomfort or weakness like he has now. He started taking Cipro & Flagyl yesterday    Workup in ED significant for WBC 21.75, CO2 16, BUN/creatinine 29/1.2, C diff negative, positive FOBT, urinalysis with ketones, hyaline casts, proteinuria. CT abdomen is pending      PCP: Kip Siddiqui MD    SDM: Spouse, Michael    CODE STATUS: Full code

## 2023-09-18 NOTE — ASSESSMENT & PLAN NOTE
Ongoing for 2 weeks with generalized abdominal discomfort. Pt reports all oral intake goes through him  Etiology unclear  Per chart review, he had frequent diarrhea earlier this year and was referred to GI.  Underwent colonoscopy on 02/22/2023 which showed diverticulosis and several polyps removed.  He then underwent EGD on 07/24/2023, pathology not consistent with celiac disease, negative H pylori.  Started Cipro & Flagyl the day prior to admission  CT abdomen pending  Hold off on antibiotics and antidiarrheals pending CT result  IV fluid  Has GI follow up outpatient on 9/20

## 2023-09-19 LAB
ALBUMIN SERPL BCP-MCNC: 3.4 G/DL (ref 3.5–5.2)
ALP SERPL-CCNC: 105 U/L (ref 55–135)
ALT SERPL W/O P-5'-P-CCNC: 20 U/L (ref 10–44)
ANION GAP SERPL CALC-SCNC: 10 MMOL/L (ref 8–16)
AST SERPL-CCNC: 15 U/L (ref 10–40)
BASOPHILS # BLD AUTO: 0.09 K/UL (ref 0–0.2)
BASOPHILS NFR BLD: 0.6 % (ref 0–1.9)
BILIRUB SERPL-MCNC: 0.4 MG/DL (ref 0.1–1)
BUN SERPL-MCNC: 16 MG/DL (ref 6–20)
CALCIUM SERPL-MCNC: 8.9 MG/DL (ref 8.7–10.5)
CHLORIDE SERPL-SCNC: 108 MMOL/L (ref 95–110)
CO2 SERPL-SCNC: 20 MMOL/L (ref 23–29)
CREAT SERPL-MCNC: 0.9 MG/DL (ref 0.5–1.4)
CRYPTOSP AG STL QL IA: NEGATIVE
DIFFERENTIAL METHOD: ABNORMAL
EOSINOPHIL # BLD AUTO: 7.1 K/UL (ref 0–0.5)
EOSINOPHIL NFR BLD: 43.8 % (ref 0–8)
ERYTHROCYTE [DISTWIDTH] IN BLOOD BY AUTOMATED COUNT: 13.9 % (ref 11.5–14.5)
EST. GFR  (NO RACE VARIABLE): >60 ML/MIN/1.73 M^2
G LAMBLIA AG STL QL IA: NEGATIVE
GLUCOSE SERPL-MCNC: 118 MG/DL (ref 70–110)
HCT VFR BLD AUTO: 40.4 % (ref 40–54)
HGB BLD-MCNC: 13.5 G/DL (ref 14–18)
IMM GRANULOCYTES # BLD AUTO: 0.29 K/UL (ref 0–0.04)
IMM GRANULOCYTES NFR BLD AUTO: 1.8 % (ref 0–0.5)
LYMPHOCYTES # BLD AUTO: 2.8 K/UL (ref 1–4.8)
LYMPHOCYTES NFR BLD: 16.9 % (ref 18–48)
MAGNESIUM SERPL-MCNC: 1.8 MG/DL (ref 1.6–2.6)
MCH RBC QN AUTO: 28.8 PG (ref 27–31)
MCHC RBC AUTO-ENTMCNC: 33.4 G/DL (ref 32–36)
MCV RBC AUTO: 86 FL (ref 82–98)
MONOCYTES # BLD AUTO: 0.9 K/UL (ref 0.3–1)
MONOCYTES NFR BLD: 5.6 % (ref 4–15)
NEUTROPHILS # BLD AUTO: 5.1 K/UL (ref 1.8–7.7)
NEUTROPHILS NFR BLD: 31.3 % (ref 38–73)
NRBC BLD-RTO: 0 /100 WBC
PLATELET # BLD AUTO: 204 K/UL (ref 150–450)
PMV BLD AUTO: 10 FL (ref 9.2–12.9)
POCT GLUCOSE: 116 MG/DL (ref 70–110)
POCT GLUCOSE: 129 MG/DL (ref 70–110)
POCT GLUCOSE: 130 MG/DL (ref 70–110)
POCT GLUCOSE: 87 MG/DL (ref 70–110)
POTASSIUM SERPL-SCNC: 4.3 MMOL/L (ref 3.5–5.1)
PROT SERPL-MCNC: 6.1 G/DL (ref 6–8.4)
RBC # BLD AUTO: 4.69 M/UL (ref 4.6–6.2)
SODIUM SERPL-SCNC: 138 MMOL/L (ref 136–145)
WBC # BLD AUTO: 16.27 K/UL (ref 3.9–12.7)

## 2023-09-19 PROCEDURE — 83735 ASSAY OF MAGNESIUM: CPT | Performed by: INTERNAL MEDICINE

## 2023-09-19 PROCEDURE — G0378 HOSPITAL OBSERVATION PER HR: HCPCS

## 2023-09-19 PROCEDURE — 80053 COMPREHEN METABOLIC PANEL: CPT | Performed by: INTERNAL MEDICINE

## 2023-09-19 PROCEDURE — 25000003 PHARM REV CODE 250: Performed by: INTERNAL MEDICINE

## 2023-09-19 PROCEDURE — 96361 HYDRATE IV INFUSION ADD-ON: CPT

## 2023-09-19 PROCEDURE — 63600175 PHARM REV CODE 636 W HCPCS: Performed by: INTERNAL MEDICINE

## 2023-09-19 PROCEDURE — 36415 COLL VENOUS BLD VENIPUNCTURE: CPT | Performed by: INTERNAL MEDICINE

## 2023-09-19 PROCEDURE — 25000003 PHARM REV CODE 250: Performed by: NURSE PRACTITIONER

## 2023-09-19 PROCEDURE — 96366 THER/PROPH/DIAG IV INF ADDON: CPT

## 2023-09-19 PROCEDURE — 85025 COMPLETE CBC W/AUTO DIFF WBC: CPT | Performed by: INTERNAL MEDICINE

## 2023-09-19 PROCEDURE — 96372 THER/PROPH/DIAG INJ SC/IM: CPT | Performed by: INTERNAL MEDICINE

## 2023-09-19 RX ADMIN — CIPROFLOXACIN 400 MG: 2 INJECTION, SOLUTION INTRAVENOUS at 09:09

## 2023-09-19 RX ADMIN — DULOXETINE HYDROCHLORIDE 60 MG: 30 CAPSULE, DELAYED RELEASE ORAL at 09:09

## 2023-09-19 RX ADMIN — ENOXAPARIN SODIUM 40 MG: 40 INJECTION SUBCUTANEOUS at 05:09

## 2023-09-19 RX ADMIN — METRONIDAZOLE 500 MG: 500 TABLET ORAL at 05:09

## 2023-09-19 RX ADMIN — CHOLESTYRAMINE 4 G: 4 POWDER, FOR SUSPENSION ORAL at 09:09

## 2023-09-19 RX ADMIN — SODIUM CHLORIDE, POTASSIUM CHLORIDE, SODIUM LACTATE AND CALCIUM CHLORIDE: 600; 310; 30; 20 INJECTION, SOLUTION INTRAVENOUS at 11:09

## 2023-09-19 RX ADMIN — CARVEDILOL 25 MG: 12.5 TABLET, FILM COATED ORAL at 05:09

## 2023-09-19 RX ADMIN — SODIUM CHLORIDE, POTASSIUM CHLORIDE, SODIUM LACTATE AND CALCIUM CHLORIDE: 600; 310; 30; 20 INJECTION, SOLUTION INTRAVENOUS at 12:09

## 2023-09-19 RX ADMIN — CARVEDILOL 25 MG: 12.5 TABLET, FILM COATED ORAL at 09:09

## 2023-09-19 NOTE — ASSESSMENT & PLAN NOTE
Patient's FSGs are controlled on current medication regimen. (On metformin & Ozempic outpatient)  Last A1c reviewed-   Lab Results   Component Value Date    HGBA1C 5.8 (H) 08/23/2023     Most recent fingerstick glucose reviewed-   Recent Labs   Lab 09/18/23  1827 09/18/23 2043 09/19/23  0545   POCTGLUCOSE 128* 102 116*     Current correctional scale  Low  Maintain anti-hyperglycemic dose as follows-   Antihyperglycemics (From admission, onward)    Start     Stop Route Frequency Ordered    09/18/23 1342  insulin aspart U-100 pen 0-5 Units         -- SubQ Before meals & nightly PRN 09/18/23 1244        Hold Oral hypoglycemics and Ozempic while patient is in the hospital.

## 2023-09-19 NOTE — PROGRESS NOTES
[facility]  Progress Note    Patient Name: Guillermo Castillo  MRN: [id number]  Patient Class: OP- Observation   Admission Date: 9/18/2023  Length of Stay: 0 days  Attending Physician: Alma Rosa Cates DO  Primary Care Provider: Kip Siddiqui MD        Subjective:     Principal Problem:Diarrhea with dehydration        HPI:  Guillermo Castillo is a 53 y.o. male with PETROS, DM, Fatty liver, GERD, Gout, Hyperlipidemia, Hypertension, Hypertriglyceridemia, Mood disorder, Panic disorder, Sleep apnea who presented to ED on 9/18/2023 with complaints of weakness and diarrhea over the past 2 weeks.  Patient was evaluated in ED on 09/07 for same complaint and had CT of abdomen and pelvis done which showed no overt acute finding, distal colon and rectal borderline wall thickening versus underdistention.  He followed up outpatient with PCP on 09/12 however continued to have intermittent diarrhea, weakness, diaphoresis prompting return to ED. Per chart review, he had frequent diarrhea earlier this year and was referred to GI.  Underwent colonoscopy on 02/22/2023 which showed diverticulosis and several polyps removed.  He then underwent EGD on 07/24/2023, pathology not consistent with celiac disease, negative H pylori. Reports that when he had frequent diarrhea earlier this year he did not have any discomfort or weakness like he has now. He started taking Cipro & Flagyl yesterday    Workup in ED significant for WBC 21.75, CO2 16, BUN/creatinine 29/1.2, C diff negative, positive FOBT, urinalysis with ketones, hyaline casts, proteinuria. CT abdomen is pending      PCP: Kip Siddiqui MD    SDM: SpouseMichael    CODE STATUS: Full code        Overview/Hospital Course:  No notes on file    Interval History:  Seen and examined with  present in room.  Patient reports that he had at least 6-7 episodes of diarrhea yesterday after taking in clear liquids.  Continues to have chronic nausea.  No new complaints  complaints, reports that he feels unchanged compared to yesterday although has been able to ambulate in room without any issues. Discussed he may need further workup outpatient for evaluation gastroparesis.  Consult GI given continuous diarrhea    Review of Systems  Objective:     Vital Signs (Most Recent):  Temp: 98.1 °F (36.7 °C) (09/19/23 0741)  Pulse: 65 (09/19/23 0741)  Resp: 18 (09/19/23 0741)  BP: 128/71 (09/19/23 0741)  SpO2: 98 % (09/19/23 0707) Vital Signs (24h Range):  Temp:  [97.5 °F (36.4 °C)-98.2 °F (36.8 °C)] 98.1 °F (36.7 °C)  Pulse:  [59-72] 65  Resp:  [16-18] 18  SpO2:  [95 %-99 %] 98 %  BP: (117-138)/(63-80) 128/71     Weight: 103.9 kg (229 lb)  Body mass index is 31.06 kg/m².  No intake or output data in the 24 hours ending 09/19/23 0855      Physical Exam  Vitals and nursing note reviewed. Exam conducted with a chaperone present.   Constitutional:       General: He is not in acute distress.     Appearance: He is obese. He is not ill-appearing or diaphoretic.   HENT:      Head: Normocephalic and atraumatic.      Right Ear: External ear normal.      Left Ear: External ear normal.      Nose: Nose normal.      Mouth/Throat:      Mouth: Mucous membranes are moist.   Eyes:      Pupils: Pupils are equal, round, and reactive to light.   Cardiovascular:      Rate and Rhythm: Normal rate and regular rhythm.      Heart sounds: No murmur heard.  Pulmonary:      Effort: Pulmonary effort is normal. No respiratory distress.      Breath sounds: No wheezing.   Abdominal:      General: Bowel sounds are normal.      Palpations: Abdomen is soft.      Tenderness: There is no abdominal tenderness. There is no guarding or rebound.   Musculoskeletal:      Cervical back: Neck supple.      Right lower leg: No edema.      Left lower leg: No edema.   Skin:     General: Skin is warm and dry.   Neurological:      Mental Status: He is alert and oriented to person, place, and time. Mental status is at baseline.   Psychiatric:          Mood and Affect: Mood normal.             Significant Labs: All pertinent labs within the past 24 hours have been reviewed.  CBC:   Recent Labs   Lab 09/18/23  0824 09/19/23  0521   WBC 21.75* 16.27*   HGB 15.5 13.5*   HCT 46.1 40.4    204     CMP:   Recent Labs   Lab 09/18/23  0824 09/19/23  0521    138   K 4.3 4.3    108   CO2 16* 20*   * 118*   BUN 29* 16   CREATININE 1.2 0.9   CALCIUM 9.9 8.9   PROT 7.5 6.1   ALBUMIN 3.9 3.4*   BILITOT 0.5 0.4   ALKPHOS 131 105   AST 18 15   ALT 29 20   ANIONGAP 14 10     Magnesium:   Recent Labs   Lab 09/19/23  0521   MG 1.8       Significant Imaging: I have reviewed all pertinent imaging results/findings within the past 24 hours.      Assessment/Plan:      * Diarrhea with dehydration  Ongoing for 2 weeks with generalized abdominal discomfort. Pt reports all oral intake goes through him  Etiology unclear  Per chart review, he had frequent diarrhea earlier this year and was referred to GI.  Underwent colonoscopy on 02/22/2023 which showed diverticulosis and several polyps removed.  He then underwent EGD on 07/24/2023, pathology not consistent with celiac disease, negative H pylori.  Started Cipro & Flagyl the day prior to admission  CT abdomen showed moderate to severe gastric distention which could reflect gastroparesis, liquid stool within the large bowel could reflect diarrheal state, mild thickening of rectosigmoid and shotty nodes within the mesentery which may be reactive  Continue Cipro and Flagyl  IV fluid  Consult GI    Acute kidney injury  Patient with acute kidney injury/acute renal failure likely due to pre-renal azotemia due to IVVD TIFFANY is currently improving. Baseline creatinine 0.7 on 9/7/2023 - Labs reviewed- Renal function/electrolytes with Estimated Creatinine Clearance: 119.6 mL/min (based on SCr of 0.9 mg/dL). according to latest data. Monitor urine output and serial BMP and adjust therapy as needed. Avoid nephrotoxins and  renally dose meds for GFR listed above.    Leukocytosis  Etiology unclear  Patient afebrile, VS within normal limits on admission  Trending down  Stool & urine culture in process  CT of abdomen and pelvis showed moderate to severe gastric distention which could reflect gastroparesis, liquid stool within the large bowel could reflect diarrheal state, mild thickening of rectosigmoid and shotty nodes within the mesentery which may be reactive  On Cipro and Flagyl  Monitor CBC    Class 1 obesity in adult  Body mass index is 31.06 kg/m². Morbid obesity complicates all aspects of disease management from diagnostic modalities to treatment. Weight loss encouraged and health benefits explained to patient.     ARPAN (obstructive sleep apnea)  Continue CPAP nightly      Hypertension  Normotensive  Continue home meds as BP tolerates  IV hydralazine p.r.n.  Titrate antihypertensive regimen as needed  Monitor BP      Type 2 diabetes mellitus without complication, without long-term current use of insulin  Patient's FSGs are controlled on current medication regimen. (On metformin & Ozempic outpatient)  Last A1c reviewed-   Lab Results   Component Value Date    HGBA1C 5.8 (H) 08/23/2023     Most recent fingerstick glucose reviewed-   Recent Labs   Lab 09/18/23  1827 09/18/23  2043 09/19/23  0545   POCTGLUCOSE 128* 102 116*     Current correctional scale  Low  Maintain anti-hyperglycemic dose as follows-   Antihyperglycemics (From admission, onward)    Start     Stop Route Frequency Ordered    09/18/23 1342  insulin aspart U-100 pen 0-5 Units         -- SubQ Before meals & nightly PRN 09/18/23 1244        Hold Oral hypoglycemics and Ozempic while patient is in the hospital.      VTE Risk Mitigation (From admission, onward)         Ordered     enoxaparin injection 40 mg  Daily         09/18/23 1244     IP VTE HIGH RISK PATIENT  Once         09/18/23 1244     Place sequential compression device  Until discontinued         09/18/23 1244                 Discharge Planning   CECI:      Code Status: Full Code   Is the patient medically ready for discharge?:     Reason for patient still in hospital (select all that apply): Patient trending condition, Laboratory test, Treatment and Consult recommendations                     Alma Rosa Cates DO  Department of Hospital Medicine   [facility]

## 2023-09-19 NOTE — PLAN OF CARE
Discussed poc with pt, pt verbalized understanding    Purposeful rounding every 2hours  VS wnl  Blood glucose monitoring   Fall precautions in place, remains injury free  Pain and nausea under control with PRN meds  IVFs  Accurate I&Os  Abx given as prescribed  Bed locked at lowest position  Call light within reach  Chart check complete  Will cont with POC       Problem: Adult Inpatient Plan of Care  Goal: Plan of Care Review  Outcome: Ongoing, Progressing  Goal: Patient-Specific Goal (Individualized)  Outcome: Ongoing, Progressing  Goal: Absence of Hospital-Acquired Illness or Injury  Outcome: Ongoing, Progressing  Goal: Optimal Comfort and Wellbeing  Outcome: Ongoing, Progressing  Goal: Readiness for Transition of Care  Outcome: Ongoing, Progressing     Problem: Diabetes Comorbidity  Goal: Blood Glucose Level Within Targeted Range  Outcome: Ongoing, Progressing     Problem: Fluid and Electrolyte Imbalance (Acute Kidney Injury/Impairment)  Goal: Fluid and Electrolyte Balance  Outcome: Ongoing, Progressing     Problem: Oral Intake Inadequate (Acute Kidney Injury/Impairment)  Goal: Optimal Nutrition Intake  Outcome: Ongoing, Progressing     Problem: Renal Function Impairment (Acute Kidney Injury/Impairment)  Goal: Effective Renal Function  Outcome: Ongoing, Progressing     Problem: Diarrhea  Goal: Fluid and Electrolyte Balance  Outcome: Ongoing, Progressing

## 2023-09-19 NOTE — ASSESSMENT & PLAN NOTE
Patient with acute kidney injury/acute renal failure likely due to pre-renal azotemia due to IVVD TIFFANY is currently improving. Baseline creatinine 0.7 on 9/7/2023 - Labs reviewed- Renal function/electrolytes with Estimated Creatinine Clearance: 119.6 mL/min (based on SCr of 0.9 mg/dL). according to latest data. Monitor urine output and serial BMP and adjust therapy as needed. Avoid nephrotoxins and renally dose meds for GFR listed above.

## 2023-09-19 NOTE — SUBJECTIVE & OBJECTIVE
Past Medical History:   Diagnosis Date    Anemia 2020    dr oglesby    COVID-19 01/16/2021    DM (diabetes mellitus)     Fatty liver     GERD (gastroesophageal reflux disease)     Gout     Hyperlipidemia     Hypertension     Hypertriglyceridemia     Mood disorder     Panic disorder     Sleep apnea     wears CPAP    Type 2 diabetes mellitus     05/2013 BS       Past Surgical History:   Procedure Laterality Date    APPENDECTOMY      COLONOSCOPY N/A 11/27/2019    Procedure: COLONOSCOPY;  Surgeon: Radha Anne MD;  Location: [facility];  Service: Endoscopy;  Laterality: N/A;    COLONOSCOPY N/A 2/22/2023    Procedure: COLONOSCOPY;  Surgeon: Princess Camara MD;  Location: [facility];  Service: Endoscopy;  Laterality: N/A;    ESOPHAGOGASTRODUODENOSCOPY N/A 11/27/2019    Procedure: EGD (ESOPHAGOGASTRODUODENOSCOPY);  Surgeon: Radha Anne MD;  Location: [facility];  Service: Endoscopy;  Laterality: N/A;    ESOPHAGOGASTRODUODENOSCOPY N/A 7/24/2023    Procedure: EGD (ESOPHAGOGASTRODUODENOSCOPY);  Surgeon: Jt Vilchis MD;  Location: [facility];  Service: Endoscopy;  Laterality: N/A;       Review of patient's allergies indicates:   Allergen Reactions    Simvastatin      Other reaction(s): aches/inc lfts     Family History       Problem Relation (Age of Onset)    Cataracts Maternal Grandmother, Mother    Coronary artery disease Father    Diabetes Father, Maternal Aunt, Paternal Aunt    Hypertension Maternal Grandmother, Mother    Lung cancer Father          Tobacco Use    Smoking status: Never    Smokeless tobacco: Never   Substance and Sexual Activity    Alcohol use: Not Currently     Comment: rarely    Drug use: No    Sexual activity: Yes     Partners: Female     Review of Systems   Constitutional:  Negative for fever.   HENT:  Negative for hearing loss.    Eyes:  Negative for visual disturbance.   Respiratory:  Negative for cough and shortness of breath.    Cardiovascular:  Negative for chest pain and  palpitations.   Gastrointestinal:         As per HPI.   Genitourinary:  Negative for difficulty urinating, dysuria, frequency and hematuria.   Musculoskeletal:  Negative for arthralgias and back pain.   Skin:  Negative for color change and rash.   Neurological:  Negative for seizures, syncope, weakness, numbness and headaches.   Hematological:  Does not bruise/bleed easily.   Psychiatric/Behavioral:  The patient is not nervous/anxious.      Objective:     Vital Signs (Most Recent):  Temp: 98.1 °F (36.7 °C) (09/19/23 0741)  Pulse: 65 (09/19/23 0741)  Resp: 18 (09/19/23 0741)  BP: 128/71 (09/19/23 0741)  SpO2: 98 % (09/19/23 0707) Vital Signs (24h Range):  Temp:  [97.5 °F (36.4 °C)-98.2 °F (36.8 °C)] 98.1 °F (36.7 °C)  Pulse:  [59-72] 65  Resp:  [16-18] 18  SpO2:  [95 %-99 %] 98 %  BP: (119-138)/(63-80) 128/71     Weight: 103.9 kg (229 lb) (09/18/23 0835)  Body mass index is 31.06 kg/m².    No intake or output data in the 24 hours ending 09/19/23 1054    Lines/Drains/Airways       Peripheral Intravenous Line  Duration                  Peripheral IV - Single Lumen 09/18/23 0825 20 G Left Antecubital 1 day                     Physical Exam  Constitutional:       General: He is not in acute distress.     Appearance: Normal appearance. He is well-developed.   HENT:      Head: Normocephalic and atraumatic.   Eyes:      Extraocular Movements: Extraocular movements intact.   Cardiovascular:      Rate and Rhythm: Normal rate and regular rhythm.      Heart sounds: Normal heart sounds. No murmur heard.  Pulmonary:      Effort: Pulmonary effort is normal. No respiratory distress.      Breath sounds: Normal breath sounds. No wheezing.   Abdominal:      General: Bowel sounds are normal. There is no distension.      Palpations: Abdomen is soft. There is no mass.      Tenderness: There is no abdominal tenderness.   Musculoskeletal:      Cervical back: Normal range of motion and neck supple.      Right lower leg: No edema.      Left  "lower leg: No edema.   Skin:     General: Skin is warm and dry.      Findings: No rash.   Neurological:      Mental Status: He is alert and oriented to person, place, and time.      Cranial Nerves: No cranial nerve deficit.   Psychiatric:         Behavior: Behavior normal.          Significant Labs:  CBC:   Recent Labs   Lab 09/18/23 0824 09/19/23 0521   WBC 21.75* 16.27*   HGB 15.5 13.5*   HCT 46.1 40.4    204     CMP:   Recent Labs   Lab 09/19/23 0521   *   CALCIUM 8.9   ALBUMIN 3.4*   PROT 6.1      K 4.3   CO2 20*      BUN 16   CREATININE 0.9   ALKPHOS 105   ALT 20   AST 15   BILITOT 0.4     Coagulation: No results for input(s): "PT", "INR", "APTT" in the last 48 hours.  Stool C. diff:   Recent Labs   Lab 09/18/23 0924   CDIFFICILEAN Negative   CDIFFTOX Negative       Significant Imaging:  Imaging results within the past 24 hours have been reviewed.  "

## 2023-09-19 NOTE — CONSULTS
[facility]  Gastroenterology  Consult Note    Patient Name: Guillermo Castillo  MRN: [id number]  Admission Date: 9/18/2023  Hospital Length of Stay: 0 days  Code Status: Full Code   Attending Provider: Alma Rosa Cates DO   Consulting Provider: Elmer De Leon PA-C  Primary Care Physician: Kip Siddiqui MD  Principal Problem:Diarrhea with dehydration    Inpatient consult to Gastroenterology  Consult performed by: Elmer De Leon PA-C  Consult ordered by: Alma Rosa Cates DO  Reason for consult: Diarrhea         Subjective:     HPI:  The patient presented to the ER for acute diarrhea over the last few weeks. He reports having diarrhea every hour 1/2. He wakes to accidents in bed at night. He also has nausea but no vomiting, abdominal pain, hematochezia or melena. Liquid Imodium provides temporary relief. Pepto has not helped. He had similar issues in January for which he saw Dr. Camara. He has had stool studies done several times which were negative for infection. An EGD and colonoscopy with biopsies were unrevealing. CT scan this admit and at prior ER visit didn't show any reason for the diarrhea; pancreas noted to be normal and no bowel inflammation. His WBC count was 21.75 on admit so he was started on Cipro and Flagyl. He is afebrile. His K, NA and creatinine were within normal range. BUN was 29 but improved today.       Past Medical History:   Diagnosis Date    Anemia 2020    dr mendel CARRID-19 01/16/2021    DM (diabetes mellitus)     Fatty liver     GERD (gastroesophageal reflux disease)     Gout     Hyperlipidemia     Hypertension     Hypertriglyceridemia     Mood disorder     Panic disorder     Sleep apnea     wears CPAP    Type 2 diabetes mellitus     05/2013 BS       Past Surgical History:   Procedure Laterality Date    APPENDECTOMY      COLONOSCOPY N/A 11/27/2019    Procedure: COLONOSCOPY;  Surgeon: Radha Anne MD;  Location: [facility];  Service: Endoscopy;  Laterality: N/A;     COLONOSCOPY N/A 2/22/2023    Procedure: COLONOSCOPY;  Surgeon: Princess Camara MD;  Location[facility];  Service: Endoscopy;  Laterality: N/A;    ESOPHAGOGASTRODUODENOSCOPY N/A 11/27/2019    Procedure: EGD (ESOPHAGOGASTRODUODENOSCOPY);  Surgeon: Radha Anne MD;  Location: [facility];  Service: Endoscopy;  Laterality: N/A;    ESOPHAGOGASTRODUODENOSCOPY N/A 7/24/2023    Procedure: EGD (ESOPHAGOGASTRODUODENOSCOPY);  Surgeon: Jt Vilchis MD;  Location: [facility];  Service: Endoscopy;  Laterality: N/A;       Review of patient's allergies indicates:   Allergen Reactions    Simvastatin      Other reaction(s): aches/inc lfts     Family History       Problem Relation (Age of Onset)    Cataracts Maternal Grandmother, Mother    Coronary artery disease Father    Diabetes Father, Maternal Aunt, Paternal Aunt    Hypertension Maternal Grandmother, Mother    Lung cancer Father          Tobacco Use    Smoking status: Never    Smokeless tobacco: Never   Substance and Sexual Activity    Alcohol use: Not Currently     Comment: rarely    Drug use: No    Sexual activity: Yes     Partners: Female     Review of Systems   Constitutional:  Negative for fever.   HENT:  Negative for hearing loss.    Eyes:  Negative for visual disturbance.   Respiratory:  Negative for cough and shortness of breath.    Cardiovascular:  Negative for chest pain and palpitations.   Gastrointestinal:         As per HPI.   Genitourinary:  Negative for difficulty urinating, dysuria, frequency and hematuria.   Musculoskeletal:  Negative for arthralgias and back pain.   Skin:  Negative for color change and rash.   Neurological:  Negative for seizures, syncope, weakness, numbness and headaches.   Hematological:  Does not bruise/bleed easily.   Psychiatric/Behavioral:  The patient is not nervous/anxious.      Objective:     Vital Signs (Most Recent):  Temp: 98.1 °F (36.7 °C) (09/19/23 0741)  Pulse: 65 (09/19/23 0741)  Resp: 18 (09/19/23  0741)  BP: 128/71 (09/19/23 0741)  SpO2: 98 % (09/19/23 0707) Vital Signs (24h Range):  Temp:  [97.5 °F (36.4 °C)-98.2 °F (36.8 °C)] 98.1 °F (36.7 °C)  Pulse:  [59-72] 65  Resp:  [16-18] 18  SpO2:  [95 %-99 %] 98 %  BP: (119-138)/(63-80) 128/71     Weight: 103.9 kg (229 lb) (09/18/23 0835)  Body mass index is 31.06 kg/m².    No intake or output data in the 24 hours ending 09/19/23 1054    Lines/Drains/Airways       Peripheral Intravenous Line  Duration                  Peripheral IV - Single Lumen 09/18/23 0825 20 G Left Antecubital 1 day                     Physical Exam  Constitutional:       General: He is not in acute distress.     Appearance: Normal appearance. He is well-developed.   HENT:      Head: Normocephalic and atraumatic.   Eyes:      Extraocular Movements: Extraocular movements intact.   Cardiovascular:      Rate and Rhythm: Normal rate and regular rhythm.      Heart sounds: Normal heart sounds. No murmur heard.  Pulmonary:      Effort: Pulmonary effort is normal. No respiratory distress.      Breath sounds: Normal breath sounds. No wheezing.   Abdominal:      General: Bowel sounds are normal. There is no distension.      Palpations: Abdomen is soft. There is no mass.      Tenderness: There is no abdominal tenderness.   Musculoskeletal:      Cervical back: Normal range of motion and neck supple.      Right lower leg: No edema.      Left lower leg: No edema.   Skin:     General: Skin is warm and dry.      Findings: No rash.   Neurological:      Mental Status: He is alert and oriented to person, place, and time.      Cranial Nerves: No cranial nerve deficit.   Psychiatric:         Behavior: Behavior normal.          Significant Labs:  CBC:   Recent Labs   Lab 09/18/23  0824 09/19/23  0521   WBC 21.75* 16.27*   HGB 15.5 13.5*   HCT 46.1 40.4    204     CMP:   Recent Labs   Lab 09/19/23  0521   *   CALCIUM 8.9   ALBUMIN 3.4*   PROT 6.1      K 4.3   CO2 20*      BUN 16  "  CREATININE 0.9   ALKPHOS 105   ALT 20   AST 15   BILITOT 0.4     Coagulation: No results for input(s): "PT", "INR", "APTT" in the last 48 hours.  Stool C. diff:   Recent Labs   Lab 09/18/23  0924   CDIFFICILEAN Negative   CDIFFTOX Negative       Significant Imaging:  Imaging results within the past 24 hours have been reviewed.    Assessment/Plan:     Renal/  Acute kidney injury  Resolved. Monitor.    GI  * Diarrhea with dehydration  No evidence of infectious colitis so will stop antibiotics. Dr. Camara suspects a flare of IBS-D.  Await results of additional stool studies this admit.   Agree with Cholestyramine bid.   Monitor electrolytes.   Advance diet as tolerated.   Supportive care.         Thank you for your consult. I will follow-up with patient. Please contact us if you have any additional questions.    Elmer De Leon PA-C  Gastroenterology  [facility]  "

## 2023-09-19 NOTE — PLAN OF CARE
[facility]  Initial Discharge Assessment       Primary Care Provider: Kip Siddiqui MD    Admission Diagnosis: Weakness [R53.1]  Chest pain [R07.9]  Frequent diarrhea [R19.7]    Admission Date: 9/18/2023  Expected Discharge Date: per attending         Payor: BLUE CROSS BLUE SHIELD / Plan: [place] [place] HMO / Product Type: HMO /     Extended Emergency Contact Information  Primary Emergency Contact: Michael Brown  Address: [address] [place] [address] [place]  Mobile Phone: [phone]  Relation: Friend    Discharge Plan A: Home         [facility] [place]  [address] [facility] [address]  Phone: [phone] Fax: [phone]      Initial Assessment (most recent)       Adult Discharge Assessment - 09/19/23 1256          Discharge Assessment    Assessment Type Discharge Planning Assessment     Confirmed/corrected address, phone number and insurance Yes     Confirmed Demographics Correct on Facesheet     Source of Information patient     When was your last doctors appointment? --   last week in august    Communicated CECI with patient/caregiver Date not available/Unable to determine     Reason For Admission weakness     People in Home friend(s)     Do you expect to return to your current living situation? Yes     Do you have help at home or someone to help you manage your care at home? No     Prior to hospitilization cognitive status: Alert/Oriented     Current cognitive status: Alert/Oriented     Equipment Currently Used at Home CPAP     Readmission within 30 days? No     Patient currently being followed by outpatient case management? No     Do you currently have service(s) that help you manage your care at home? No     Do you take prescription medications? Yes     Do you have prescription coverage? Yes     Coverage bcbs     Do you have any problems affording any of your prescribed medications? No     Is the patient taking medications as prescribed? yes     Who is  going to help you get home at discharge? friend     How do you get to doctors appointments? family or friend will provide     Are you on dialysis? No     Do you take coumadin? No     Discharge Plan A Home

## 2023-09-19 NOTE — ASSESSMENT & PLAN NOTE
No evidence of infectious colitis so will stop antibiotics. Dr. Camara suspects a flare of IBS-D.  Await results of additional stool studies this admit.   Agree with Cholestyramine bid.   Monitor electrolytes.   Advance diet as tolerated.   Supportive care.

## 2023-09-19 NOTE — SUBJECTIVE & OBJECTIVE
Interval History:  Seen and examined with  present in room.  Patient reports that he had at least 6-7 episodes of diarrhea yesterday after taking in clear liquids.  Continues to have chronic nausea.  No new complaints complaints, reports that he feels unchanged compared to yesterday although has been able to ambulate in room without any issues. Discussed he may need further workup outpatient for evaluation gastroparesis.  Consult GI given continuous diarrhea    Review of Systems  Objective:     Vital Signs (Most Recent):  Temp: 98.1 °F (36.7 °C) (09/19/23 0741)  Pulse: 65 (09/19/23 0741)  Resp: 18 (09/19/23 0741)  BP: 128/71 (09/19/23 0741)  SpO2: 98 % (09/19/23 0707) Vital Signs (24h Range):  Temp:  [97.5 °F (36.4 °C)-98.2 °F (36.8 °C)] 98.1 °F (36.7 °C)  Pulse:  [59-72] 65  Resp:  [16-18] 18  SpO2:  [95 %-99 %] 98 %  BP: (117-138)/(63-80) 128/71     Weight: 103.9 kg (229 lb)  Body mass index is 31.06 kg/m².  No intake or output data in the 24 hours ending 09/19/23 0855      Physical Exam  Vitals and nursing note reviewed. Exam conducted with a chaperone present.   Constitutional:       General: He is not in acute distress.     Appearance: He is obese. He is not ill-appearing or diaphoretic.   HENT:      Head: Normocephalic and atraumatic.      Right Ear: External ear normal.      Left Ear: External ear normal.      Nose: Nose normal.      Mouth/Throat:      Mouth: Mucous membranes are moist.   Eyes:      Pupils: Pupils are equal, round, and reactive to light.   Cardiovascular:      Rate and Rhythm: Normal rate and regular rhythm.      Heart sounds: No murmur heard.  Pulmonary:      Effort: Pulmonary effort is normal. No respiratory distress.      Breath sounds: No wheezing.   Abdominal:      General: Bowel sounds are normal.      Palpations: Abdomen is soft.      Tenderness: There is no abdominal tenderness. There is no guarding or rebound.   Musculoskeletal:      Cervical back: Neck supple.      Right  lower leg: No edema.      Left lower leg: No edema.   Skin:     General: Skin is warm and dry.   Neurological:      Mental Status: He is alert and oriented to person, place, and time. Mental status is at baseline.   Psychiatric:         Mood and Affect: Mood normal.             Significant Labs: All pertinent labs within the past 24 hours have been reviewed.  CBC:   Recent Labs   Lab 09/18/23 0824 09/19/23  0521   WBC 21.75* 16.27*   HGB 15.5 13.5*   HCT 46.1 40.4    204     CMP:   Recent Labs   Lab 09/18/23 0824 09/19/23  0521    138   K 4.3 4.3    108   CO2 16* 20*   * 118*   BUN 29* 16   CREATININE 1.2 0.9   CALCIUM 9.9 8.9   PROT 7.5 6.1   ALBUMIN 3.9 3.4*   BILITOT 0.5 0.4   ALKPHOS 131 105   AST 18 15   ALT 29 20   ANIONGAP 14 10     Magnesium:   Recent Labs   Lab 09/19/23  0521   MG 1.8       Significant Imaging: I have reviewed all pertinent imaging results/findings within the past 24 hours.

## 2023-09-19 NOTE — ASSESSMENT & PLAN NOTE
Ongoing for 2 weeks with generalized abdominal discomfort. Pt reports all oral intake goes through him  Etiology unclear  Per chart review, he had frequent diarrhea earlier this year and was referred to GI.  Underwent colonoscopy on 02/22/2023 which showed diverticulosis and several polyps removed.  He then underwent EGD on 07/24/2023, pathology not consistent with celiac disease, negative H pylori.  Started Cipro & Flagyl the day prior to admission  CT abdomen showed moderate to severe gastric distention which could reflect gastroparesis, liquid stool within the large bowel could reflect diarrheal state, mild thickening of rectosigmoid and shotty nodes within the mesentery which may be reactive  Continue Cipro and Flagyl  IV fluid  Consult GI

## 2023-09-19 NOTE — UM SECONDARY REVIEW
Physician Advisor Internal    PA - Medical Necessity Issue    Approved Observation  GUILLERMO PICKETT    [id number]  DOB1970    53 YEARS OLD  GENDER  COMPLETING Yakelin Garrett  CHECKED OUT TO    Completed  READY FOR REVIEW DATE9/19/2023 10:49:00 AM    COMPLETED DATE09/19/2023 11:38:14 AM  [facility]  AUTHORIZATION OBTAINEDNo Authorization  CASE STATUSReady  ASSIGNED TO  Episode Information  SUBMITTED DATE09/19/2023 10:49:15 AM  ACCOUNT [id number]  PRESENTATION DATE9/18/2023  DISCHARGE DATE  HOSPITALIZATION STATUS*InHouse  LOCATIONFL - Floor  CURRENT ORDEROBS - Observation  ADDITIONAL DETAIL  Chart Information  Medical Diagnosis  SYSTEMDigestive System  DIAGNOSISConstipation/Diarrhea  EMR Systems  Click an EMR below to open in new window.  [facility])     Additional Information0 Result Notes Component Ref Range & Units 1 d ago 12 d ago 8 mo agoOccult Blood Negative Positive Abnormal  Positive Abnormal  Negative  Information Request  REQUEST TYPE  INFO TEXT      Recommendation   Disclaimer  Our recommendation for your physician is  Outpatient        Physician Advisor Notes    Guillermo iPckett is a 53-year-old male with PMH of hypertension and diabetes hospitalized in observation services at 1244 on 9/18/23 with working diagnosis of frequent diarrhea, chest pain and weakness after presenting on 9/18/23 at 0759 when care was initiated for evaluation of diarrhea, nausea, fatigue and generalized weakness. He was seen recently for similar. Pertinent findings include WBC 21.75, BUN 29, and occult blood positive. He is given bolus IV fluids and hospitalized orders to include hold often antibiotics, IV fluids, hydralazine as needed, continue Cipro and Flagyl which was started the day prior to admission and to avoid nephrotoxic agents. On 9/19/23, last noted vitals are stable. Repeat hemoglobin is 13.5. Progress notes document he had diarrhea yesterday and continues to have  chronic nausea. G.I. consult is ordered. Plan is to continue Cipro and Flagyl, IV fluids and await G.I. consult. He continues on IV Cipro, oral metronidazole and IV fluids at this time. While he has need for medical management on presentation given his diarrhea with leukocytosis and need for continued antibiotics, with no contraindication to oral treatment and given that he remains hemodynamically stable without fever and with improved leukocytosis, at this time inpatient level of care is not reasonably supported. Observation services may be appropriate - refer to payor policy guidelines.    Our recommendation for your physician is outpatient services for Guillermo Castillo. Oskar

## 2023-09-19 NOTE — ASSESSMENT & PLAN NOTE
Etiology unclear  Patient afebrile, VS within normal limits on admission  Trending down  Stool & urine culture in process  CT of abdomen and pelvis showed moderate to severe gastric distention which could reflect gastroparesis, liquid stool within the large bowel could reflect diarrheal state, mild thickening of rectosigmoid and shotty nodes within the mesentery which may be reactive  On Cipro and Flagyl  Monitor CBC

## 2023-09-19 NOTE — HPI
The patient presented to the ER for acute diarrhea over the last few weeks. He reports having diarrhea every hour 1/2. He wakes to accidents in bed at night. He also has nausea but no vomiting, abdominal pain, hematochezia or melena. Liquid Imodium provides temporary relief. Pepto has not helped. He had similar issues in January for which he saw Dr. Camara. He has had stool studies done several times which were negative for infection. An EGD and colonoscopy with biopsies were unrevealing. CT scan this admit and at prior ER visit didn't show any reason for the diarrhea; pancreas noted to be normal and no bowel inflammation. His WBC count was 21.75 on admit so he was started on Cipro and Flagyl. He is afebrile. His K, NA and creatinine were within normal range. BUN was 29 but improved today.

## 2023-09-19 NOTE — PLAN OF CARE
Discussed poc with pt, pt verbalized understanding    Purposeful rounding every 2hours    VS wnl  Blood glucose monitoring   Fall precautions in place, remains injury free  Pt denies c/o pain and nausea    IVFs  Accurate I&Os  Abx given as prescribed  Bed locked at lowest position  Call light within reach    Chart check complete  Will cont with POC

## 2023-09-20 VITALS
WEIGHT: 229 LBS | SYSTOLIC BLOOD PRESSURE: 154 MMHG | DIASTOLIC BLOOD PRESSURE: 86 MMHG | HEIGHT: 72 IN | OXYGEN SATURATION: 98 % | RESPIRATION RATE: 18 BRPM | HEART RATE: 60 BPM | BODY MASS INDEX: 31.02 KG/M2 | TEMPERATURE: 98 F

## 2023-09-20 LAB
ALBUMIN SERPL BCP-MCNC: 3.2 G/DL (ref 3.5–5.2)
ALP SERPL-CCNC: 94 U/L (ref 55–135)
ALT SERPL W/O P-5'-P-CCNC: 21 U/L (ref 10–44)
ANION GAP SERPL CALC-SCNC: 9 MMOL/L (ref 8–16)
ANISOCYTOSIS BLD QL SMEAR: SLIGHT
AST SERPL-CCNC: 21 U/L (ref 10–40)
BACTERIA UR CULT: NO GROWTH
BASOPHILS # BLD AUTO: 0.05 K/UL (ref 0–0.2)
BASOPHILS NFR BLD: 0.4 % (ref 0–1.9)
BILIRUB SERPL-MCNC: 0.3 MG/DL (ref 0.1–1)
BUN SERPL-MCNC: 11 MG/DL (ref 6–20)
CALCIUM SERPL-MCNC: 8.4 MG/DL (ref 8.7–10.5)
CHLORIDE SERPL-SCNC: 109 MMOL/L (ref 95–110)
CO2 SERPL-SCNC: 24 MMOL/L (ref 23–29)
CREAT SERPL-MCNC: 0.9 MG/DL (ref 0.5–1.4)
DACRYOCYTES BLD QL SMEAR: ABNORMAL
DIFFERENTIAL METHOD: ABNORMAL
E COLI SXT1 STL QL IA: NEGATIVE
E COLI SXT2 STL QL IA: NEGATIVE
EOSINOPHIL # BLD AUTO: 6.1 K/UL (ref 0–0.5)
EOSINOPHIL NFR BLD: 48.5 % (ref 0–8)
ERYTHROCYTE [DISTWIDTH] IN BLOOD BY AUTOMATED COUNT: 13.5 % (ref 11.5–14.5)
EST. GFR  (NO RACE VARIABLE): >60 ML/MIN/1.73 M^2
GLUCOSE SERPL-MCNC: 131 MG/DL (ref 70–110)
HCT VFR BLD AUTO: 37 % (ref 40–54)
HGB BLD-MCNC: 12.3 G/DL (ref 14–18)
IMM GRANULOCYTES # BLD AUTO: 0.24 K/UL (ref 0–0.04)
IMM GRANULOCYTES NFR BLD AUTO: 1.9 % (ref 0–0.5)
LYMPHOCYTES # BLD AUTO: 2.6 K/UL (ref 1–4.8)
LYMPHOCYTES NFR BLD: 20.7 % (ref 18–48)
MAGNESIUM SERPL-MCNC: 1.9 MG/DL (ref 1.6–2.6)
MCH RBC QN AUTO: 28.3 PG (ref 27–31)
MCHC RBC AUTO-ENTMCNC: 33.2 G/DL (ref 32–36)
MCV RBC AUTO: 85 FL (ref 82–98)
MONOCYTES # BLD AUTO: 0.9 K/UL (ref 0.3–1)
MONOCYTES NFR BLD: 6.8 % (ref 4–15)
NEUTROPHILS # BLD AUTO: 2.7 K/UL (ref 1.8–7.7)
NEUTROPHILS NFR BLD: 21.7 % (ref 38–73)
NRBC BLD-RTO: 0 /100 WBC
PLATELET # BLD AUTO: 231 K/UL (ref 150–450)
PLATELET BLD QL SMEAR: ABNORMAL
PMV BLD AUTO: 9.9 FL (ref 9.2–12.9)
POCT GLUCOSE: 119 MG/DL (ref 70–110)
POCT GLUCOSE: 210 MG/DL (ref 70–110)
POIKILOCYTOSIS BLD QL SMEAR: SLIGHT
POTASSIUM SERPL-SCNC: 4.2 MMOL/L (ref 3.5–5.1)
PROT SERPL-MCNC: 5.7 G/DL (ref 6–8.4)
RBC # BLD AUTO: 4.34 M/UL (ref 4.6–6.2)
SODIUM SERPL-SCNC: 142 MMOL/L (ref 136–145)
WBC # BLD AUTO: 12.6 K/UL (ref 3.9–12.7)

## 2023-09-20 PROCEDURE — G0378 HOSPITAL OBSERVATION PER HR: HCPCS

## 2023-09-20 PROCEDURE — 96372 THER/PROPH/DIAG INJ SC/IM: CPT | Performed by: INTERNAL MEDICINE

## 2023-09-20 PROCEDURE — 25000003 PHARM REV CODE 250: Performed by: INTERNAL MEDICINE

## 2023-09-20 PROCEDURE — 63600175 PHARM REV CODE 636 W HCPCS: Performed by: INTERNAL MEDICINE

## 2023-09-20 PROCEDURE — 83735 ASSAY OF MAGNESIUM: CPT | Performed by: INTERNAL MEDICINE

## 2023-09-20 PROCEDURE — 85025 COMPLETE CBC W/AUTO DIFF WBC: CPT | Performed by: INTERNAL MEDICINE

## 2023-09-20 PROCEDURE — 25000003 PHARM REV CODE 250: Performed by: NURSE PRACTITIONER

## 2023-09-20 PROCEDURE — 80053 COMPREHEN METABOLIC PANEL: CPT | Performed by: INTERNAL MEDICINE

## 2023-09-20 PROCEDURE — 96361 HYDRATE IV INFUSION ADD-ON: CPT

## 2023-09-20 PROCEDURE — 36415 COLL VENOUS BLD VENIPUNCTURE: CPT | Performed by: INTERNAL MEDICINE

## 2023-09-20 RX ORDER — CHOLESTYRAMINE 4 G/9G
1 POWDER, FOR SUSPENSION ORAL 2 TIMES DAILY
Qty: 180 PACKET | Refills: 3
Start: 2023-09-20 | End: 2023-10-24 | Stop reason: SDUPTHER

## 2023-09-20 RX ORDER — AMLODIPINE BESYLATE 10 MG/1
10 TABLET ORAL DAILY
Status: DISCONTINUED | OUTPATIENT
Start: 2023-09-20 | End: 2023-09-20 | Stop reason: HOSPADM

## 2023-09-20 RX ADMIN — AMLODIPINE BESYLATE 10 MG: 10 TABLET ORAL at 10:09

## 2023-09-20 RX ADMIN — CARVEDILOL 25 MG: 12.5 TABLET, FILM COATED ORAL at 08:09

## 2023-09-20 RX ADMIN — CHOLESTYRAMINE 4 G: 4 POWDER, FOR SUSPENSION ORAL at 08:09

## 2023-09-20 RX ADMIN — DULOXETINE HYDROCHLORIDE 60 MG: 30 CAPSULE, DELAYED RELEASE ORAL at 08:09

## 2023-09-20 RX ADMIN — INSULIN ASPART 2 UNITS: 100 INJECTION, SOLUTION INTRAVENOUS; SUBCUTANEOUS at 10:09

## 2023-09-20 NOTE — PLAN OF CARE
[facility]  Discharge Final Note    Primary Care Provider: Kip Siddiqui MD    Expected Discharge Date: 9/20/2023    Final Discharge Note (most recent)       Final Note - 09/20/23 0958          Final Note    Assessment Type Final Discharge Note     Anticipated Discharge Disposition Home or Self Care     Hospital Resources/Appts/Education Provided Appointments scheduled and added to AVS                                Contact Info       Ruby Ortega NP   Specialty: Internal Medicine    [address]  [place] [address]   Phone: [phone]       Next Steps: Follow up in 3 day(s)    Nazanin Causey NP   Specialty: Gastroenterology    [address] [address]   Phone: [phone]       Next Steps: Follow up in 1 week(s)

## 2023-09-20 NOTE — PLAN OF CARE
Problem: Adult Inpatient Plan of Care  Goal: Plan of Care Review  Outcome: Ongoing, Progressing  Flowsheets (Taken 9/20/2023 0552)  Plan of Care Reviewed With: patient     Problem: Diabetes Comorbidity  Goal: Blood Glucose Level Within Targeted Range  Outcome: Ongoing, Progressing  Intervention: Monitor and Manage Glycemia  Flowsheets (Taken 9/20/2023 0552)  Glycemic Management: blood glucose monitored

## 2023-09-20 NOTE — HOSPITAL COURSE
CT of abdomen and pelvis with contrast showed moderate to severe gastric distention which could reflect gastroparesis.  Mild thickening rectosigmoid.  Liquid stool within the large bowel which could reflect diarrheal state and shotty nodes within the mesentery which may be reactive.  Result of CT scan discussed with patient and spouse.    Patient started on IV fluid and antibiotics with Cipro and Flagyl on admission.  Diarrhea resolved with cholestyramine.    GI consulted.  Stool studies thus far negative for infectious process.  They recommended discontinuing antibiotics given there is no evidence of infectious colitis.  Prior colonoscopy biopsies negative for microscopic colitis.  Celiac panel negative.  They suspect a flare of IBS-D.    Dehydration resolved with IV fluid.  Patient tolerating diet without any further nausea.    Seen and examined personally on the day of discharge without any family present.  Reports that he feels well, requested to go home.  Discussed with patient followups with GI and PCP.  All questions answered to his satisfaction.  He confirms that he already has cholestyramine at home and refills were sent to his pharmacy by Dr. Camara earlier in year.  Stable for discharge home at this time.

## 2023-09-20 NOTE — DISCHARGE SUMMARY
[facility] Medicine  Discharge Summary      Patient Name: Guillermo Castillo  MRN: [id number]  ABAD: [id number]  Patient Class: OP- Observation  Admission Date: 9/18/2023  Hospital Length of Stay: 0 days  Discharge Date and Time:  09/20/2023 9:43 AM  Attending Physician: Alma Rosa Cates DO   Discharging Provider: Alma Rosa Cates DO  Primary Care Provider: Kip Siddiqui MD    Primary Care Team: Networked reference to record PCT     HPI:   Guillermo Castillo is a 53 y.o. male with PETROS, DM, Fatty liver, GERD, Gout, Hyperlipidemia, Hypertension, Hypertriglyceridemia, Mood disorder, Panic disorder, Sleep apnea who presented to ED on 9/18/2023 with complaints of weakness and diarrhea over the past 2 weeks.  Patient was evaluated in ED on 09/07 for same complaint and had CT of abdomen and pelvis done which showed no overt acute finding, distal colon and rectal borderline wall thickening versus underdistention.  He followed up outpatient with PCP on 09/12 however continued to have intermittent diarrhea, weakness, diaphoresis prompting return to ED. Per chart review, he had frequent diarrhea earlier this year and was referred to GI.  Underwent colonoscopy on 02/22/2023 which showed diverticulosis and several polyps removed.  He then underwent EGD on 07/24/2023, pathology not consistent with celiac disease, negative H pylori. Reports that when he had frequent diarrhea earlier this year he did not have any discomfort or weakness like he has now. He started taking Cipro & Flagyl yesterday    Workup in ED significant for WBC 21.75, CO2 16, BUN/creatinine 29/1.2, C diff negative, positive FOBT, urinalysis with ketones, hyaline casts, proteinuria. CT abdomen is pending      PCP: Kip Sididqui MD    SDM: Spouse, Michael    CODE STATUS: Full code        * No surgery found *      Hospital Course:   CT of abdomen and pelvis with contrast showed moderate to severe gastric distention which could reflect gastroparesis.  Mild  thickening rectosigmoid.  Liquid stool within the large bowel which could reflect diarrheal state and shotty nodes within the mesentery which may be reactive.  Result of CT scan discussed with patient and spouse.    Patient started on IV fluid and antibiotics with Cipro and Flagyl on admission.  Diarrhea resolved with cholestyramine.    GI consulted.  Stool studies thus far negative for infectious process.  They recommended discontinuing antibiotics given there is no evidence of infectious colitis.  Prior colonoscopy biopsies negative for microscopic colitis.  Celiac panel negative.  They suspect a flare of IBS-D.    Dehydration resolved with IV fluid.  Patient tolerating diet without any further nausea.    Seen and examined personally on the day of discharge without any family present.  Reports that he feels well, requested to go home.  Discussed with patient followups with GI and PCP.  All questions answered to his satisfaction.  He confirms that he already has cholestyramine at home and refills were sent to his pharmacy by Dr. Camara earlier in year.  Stable for discharge home at this time.     Goals of Care Treatment Preferences:  Code Status: Full Code      Consults:   Consults (From admission, onward)          Status Ordering Provider     Inpatient consult to Gastroenterology  Once        Provider:  Princess Camara MD    Completed WILSON VU            No new Assessment & Plan notes have been filed under this hospital service since the last note was generated.  Service: Hospital Medicine    Final Active Diagnoses:    Diagnosis Date Noted POA    PRINCIPAL PROBLEM:  Diarrhea with dehydration [R19.7] 02/22/2023 Yes    Leukocytosis [D72.829] 09/18/2023 Yes    Acute kidney injury [N17.9] 09/18/2023 Yes    Class 1 obesity in adult [E66.9] 03/18/2019 Yes     Chronic    ARPAN (obstructive sleep apnea) [G47.33] 01/19/2018 Yes     Chronic    Type 2 diabetes mellitus without complication, without long-term current  use of insulin [E11.9]  Yes    Hypertension [I10]  Yes     Chronic      Problems Resolved During this Admission:       Discharged Condition: stable    Disposition: Home or Self Care    Follow Up:   Follow-up Information       Ruby Ortega NP Follow up in 3 day(s).    Specialty: Internal Medicine  Contact information:  [address]  [place] [address]  [phone]               Nazanin Causey NP Follow up in 1 week(s).    Specialty: Gastroenterology  Contact information:  [address]  [place] [address]  [phone]                           Patient Instructions:      Notify your health care provider if you experience any of the following:  persistent nausea and vomiting or diarrhea     Activity as tolerated       Significant Diagnostic Studies: Labs: CMP   Recent Labs   Lab 09/19/23  0521 09/20/23  0602    142   K 4.3 4.2    109   CO2 20* 24   * 131*   BUN 16 11   CREATININE 0.9 0.9   CALCIUM 8.9 8.4*   PROT 6.1 5.7*   ALBUMIN 3.4* 3.2*   BILITOT 0.4 0.3   ALKPHOS 105 94   AST 15 21   ALT 20 21   ANIONGAP 10 9    and CBC   Recent Labs   Lab 09/19/23  0521 09/20/23  0602   WBC 16.27* 12.60   HGB 13.5* 12.3*   HCT 40.4 37.0*    231     Microbiology:   Urine Culture High Risk [[id number]]    Order Status: Completed Specimen: Urine, Clean Catch    Stool culture **CANNOT BE ORDERED STAT** [[id number]] Collected: 09/18/23 0924   Order Status: Completed Specimen: Stool Updated: 09/20/23 1057    Stool Culture Nothing significant to date   Clostridium difficile EIA [[id number]] Collected: 09/18/23 0924   Order Status: Completed Specimen: Stool Updated: 09/18/23 1036    C. diff Antigen Negative    C difficile Toxins A+B, EIA Negative    Comment: Testing not recommended for children <24 months old.      E. coli 0157 antigen [[id number]] Collected: 09/18/23 0924   Order Status: Completed Specimen: Stool Updated: 09/20/23 1339    Shiga Toxin 1 E.coli Negative     Shiga Toxin 2 E.coli Negative   Urine culture [[id number]] Collected: 09/18/23 0924   Order Status: Completed Specimen: Urine Updated: 09/20/23 0506    Urine Culture, Routine No growth   Narrative:     Specimen Source->Urine   Clostridium difficile EIA [[id number]] Collected: 09/07/23 2041   Order Status: Completed Specimen: Stool Updated: 09/07/23 2144    C. diff Antigen Negative    C difficile Toxins A+B, EIA Negative    Comment: Testing not recommended for children <24 months old.      Stool culture [[id number]] Collected: 09/07/23 2041   Order Status: Sent Specimen: Stool Updated: 09/07/23 2042   E. coli 0157 antigen [[id number]] Collected: 09/07/23 2041   Order Status: No result Specimen: Stool Updated: 09/07/23 2042       Radiology: CT scan: CT ABDOMEN PELVIS WITH CONTRAST:   Results for orders placed or performed during the hospital encounter of 09/18/23   CT Abdomen Pelvis With Contrast    Narrative    EXAMINATION:  CT ABDOMEN PELVIS WITH CONTRAST    CLINICAL HISTORY:  Abdominal pain, acute, nonlocalized;    TECHNIQUE:  Low dose axial images, sagittal and coronal reformations were obtained from the lung bases to the pubic symphysis following the IV administration of 100 mL of Omnipaque 350.  30 mL of oral Omnipaque 350 was administered.    COMPARISON:  09/07/2023    FINDINGS:  Heart: Normal size. No effusion.    Lung Bases: Clear.    Liver: Normal size and attenuation. No focal lesions.    Gallbladder: No calcified gallstones.    Bile Ducts: No dilatation.    Pancreas: No mass. No peripancreatic fat stranding.    Spleen: Normal.    Adrenals: Normal.    Kidneys/Ureters: Normal enhancement.  No mass or  hydroureteronephrosis.    Bladder: No wall thickening.    Reproductive organs: Normal.    GI Tract/Mesentery: Moderate to severe gastric distension could reflect gastroparesis.  No evidence of bowel obstruction or inflammation.  Appendix is not seen.  Mild thickening rectosigmoid.    Liquid stool within the  large bowel could reflect diarrheal state.  Shotty nodes within the mesentery may be reactive.    Peritoneal Space: No ascites or free air.    Retroperitoneum: No significant adenopathy.    Abdominal wall: Normal.    Vasculature: No aneurysm.    Bones: No acute fracture.  Mild spondylosis.  No suspicious lytic or sclerotic lesions.      Impression    Moderate to severe gastric distension could reflect gastroparesis.    Liquid stool within the large bowel could reflect diarrheal state.    All CT scans at this facility use dose modulation, iterative reconstruction, and/or weight based dosing when appropriate to reduce radiation dose to as low as reasonable achievable.      Electronically signed by: Kyle Lr MD  Date:    09/18/2023  Time:    13:36       Pending Diagnostic Studies:       Procedure Component Value Units Date/Time    Calprotectin, Stool [[id number]] Collected: 09/18/23 0923    Order Status: Sent Lab Status: In process Updated: 09/18/23 1616    Specimen: Stool     Fecal fat, qualitative [[id number]] Collected: 09/18/23 0923    Order Status: Sent Lab Status: In process Updated: 09/18/23 1616    Specimen: Stool     H. pylori antigen, stool [[id number]] Collected: 09/18/23 0923    Order Status: Sent Lab Status: In process Updated: 09/18/23 1616    Specimen: Stool     Pancreatic elastase, fecal [[id number]] Collected: 09/18/23 0923    Order Status: Sent Lab Status: In process Updated: 09/18/23 1616    Specimen: Stool     Stool Exam-Ova,Cysts,Parasites [[id number]] Collected: 09/18/23 0923    Order Status: Sent Lab Status: In process Updated: 09/18/23 1616    Specimen: Stool            Medications:  Reconciled Home Medications:      Medication List        START taking these medications      cholestyramine 4 gram packet  Commonly known as: QUESTRAN  Take 1 packet (4 g total) by mouth 2 (two) times daily.            CONTINUE taking these medications      amLODIPine 10 MG tablet  Commonly known as:  "NORVASC  Take 1 tablet by mouth once daily     carvediloL 25 MG tablet  Commonly known as: COREG  Take 1 tablet (25 mg total) by mouth 2 (two) times daily with meals.     DULoxetine 60 MG capsule  Commonly known as: CYMBALTA  Take 1 capsule by mouth once daily     fish oil-omega-3 fatty acids 300-1,000 mg capsule  Take 1 capsule by mouth once daily.     HumaLOG U-100 Insulin 100 unit/mL injection  Generic drug: insulin lispro  Use via sliding scale     insulin syringe-needle U-100 0.3 mL 31 gauge x 5/16" Syrg  Use as directed     iron-vitamin C 100-250 mg (ICAR-C) 100-250 mg Tab  Commonly known as: ICAR-C  Take 1 tablet by mouth once daily.     metFORMIN 500 MG ER 24hr tablet  Commonly known as: GLUCOPHAGE-XR  Take 2 tablets (1,000 mg total) by mouth once daily.     omeprazole 40 MG capsule  Commonly known as: PRILOSEC  Take 1 capsule (40 mg total) by mouth daily as needed (Gerd).     ondansetron 4 MG tablet  Commonly known as: ZOFRAN  Take 1 tablet (4 mg total) by mouth every 6 (six) hours as needed for Nausea.     OZEMPIC 2 mg/dose (8 mg/3 mL) Pnij  Generic drug: semaglutide  Inject 2 mg into the skin every 7 days.     potassium gluconate 595 mg (99 mg) Tab  Take 1 tablet by mouth 2 (two) times a day.     rosuvastatin 10 MG tablet  Commonly known as: CRESTOR  Take 1 tablet (10 mg total) by mouth once daily.     tiZANidine 4 MG tablet  Commonly known as: ZANAFLEX  Take 1 tablet (4 mg total) by mouth 3 (three) times daily as needed (muscle spasm).     valsartan 320 MG tablet  Commonly known as: DIOVAN  Take 1 tablet (320 mg total) by mouth once daily.              Indwelling Lines/Drains at time of discharge:   Lines/Drains/Airways       None                   Time spent on the discharge of patient: 40 minutes         Alma Rosa Cates DO  Department of Hospital Medicine  [facility] - [facility]  "

## 2023-09-21 LAB
BACTERIA STL CULT: NORMAL
ELASTASE 1, FECAL: 494 MCG/G
FAT STL QL: NORMAL
NEUTRAL FAT STL QL: NORMAL
O+P STL MICRO: NORMAL

## 2023-09-25 LAB — CALPROTECTIN STL-MCNT: 62.6 MCG/G

## 2023-09-27 LAB — H PYLORI AG STL QL IA: NOT DETECTED

## 2023-10-13 ENCOUNTER — LAB VISIT (OUTPATIENT)
Dept: LAB | Facility: HOSPITAL | Age: 53
End: 2023-10-13
Attending: NURSE PRACTITIONER
Payer: COMMERCIAL

## 2023-10-13 DIAGNOSIS — D69.6 THROMBOCYTOPENIA: ICD-10-CM

## 2023-10-13 PROCEDURE — 85025 COMPLETE CBC W/AUTO DIFF WBC: CPT | Performed by: NURSE PRACTITIONER

## 2023-10-13 PROCEDURE — 36415 COLL VENOUS BLD VENIPUNCTURE: CPT | Mod: PN | Performed by: NURSE PRACTITIONER

## 2023-10-14 LAB
BASOPHILS # BLD AUTO: 0.05 K/UL (ref 0–0.2)
BASOPHILS NFR BLD: 0.7 % (ref 0–1.9)
DIFFERENTIAL METHOD: ABNORMAL
EOSINOPHIL # BLD AUTO: 0.6 K/UL (ref 0–0.5)
EOSINOPHIL NFR BLD: 8.2 % (ref 0–8)
ERYTHROCYTE [DISTWIDTH] IN BLOOD BY AUTOMATED COUNT: 14.9 % (ref 11.5–14.5)
HCT VFR BLD AUTO: 37.8 % (ref 40–54)
HGB BLD-MCNC: 11.8 G/DL (ref 14–18)
IMM GRANULOCYTES # BLD AUTO: 0.07 K/UL (ref 0–0.04)
IMM GRANULOCYTES NFR BLD AUTO: 1 % (ref 0–0.5)
LYMPHOCYTES # BLD AUTO: 1.9 K/UL (ref 1–4.8)
LYMPHOCYTES NFR BLD: 27.8 % (ref 18–48)
MCH RBC QN AUTO: 29.3 PG (ref 27–31)
MCHC RBC AUTO-ENTMCNC: 31.2 G/DL (ref 32–36)
MCV RBC AUTO: 94 FL (ref 82–98)
MONOCYTES # BLD AUTO: 0.6 K/UL (ref 0.3–1)
MONOCYTES NFR BLD: 8.6 % (ref 4–15)
NEUTROPHILS # BLD AUTO: 3.7 K/UL (ref 1.8–7.7)
NEUTROPHILS NFR BLD: 53.7 % (ref 38–73)
NRBC BLD-RTO: 0 /100 WBC
PLATELET # BLD AUTO: 99 K/UL (ref 150–450)
PMV BLD AUTO: 11.1 FL (ref 9.2–12.9)
RBC # BLD AUTO: 4.03 M/UL (ref 4.6–6.2)
WBC # BLD AUTO: 6.87 K/UL (ref 3.9–12.7)

## 2023-10-16 ENCOUNTER — PATIENT MESSAGE (OUTPATIENT)
Dept: OPHTHALMOLOGY | Facility: CLINIC | Age: 53
End: 2023-10-16
Payer: COMMERCIAL

## 2023-10-16 ENCOUNTER — OFFICE VISIT (OUTPATIENT)
Dept: OPHTHALMOLOGY | Facility: CLINIC | Age: 53
End: 2023-10-16
Payer: COMMERCIAL

## 2023-10-16 DIAGNOSIS — E11.9 TYPE 2 DIABETES MELLITUS WITHOUT COMPLICATION, WITH LONG-TERM CURRENT USE OF INSULIN: Primary | ICD-10-CM

## 2023-10-16 DIAGNOSIS — Z79.4 TYPE 2 DIABETES MELLITUS WITHOUT COMPLICATION, WITH LONG-TERM CURRENT USE OF INSULIN: Primary | ICD-10-CM

## 2023-10-16 DIAGNOSIS — I10 PRIMARY HYPERTENSION: ICD-10-CM

## 2023-10-16 DIAGNOSIS — H52.4 BILATERAL PRESBYOPIA: ICD-10-CM

## 2023-10-16 PROCEDURE — 3044F HG A1C LEVEL LT 7.0%: CPT | Mod: CPTII,S$GLB,, | Performed by: OPTOMETRIST

## 2023-10-16 PROCEDURE — 3044F PR MOST RECENT HEMOGLOBIN A1C LEVEL <7.0%: ICD-10-PCS | Mod: CPTII,S$GLB,, | Performed by: OPTOMETRIST

## 2023-10-16 PROCEDURE — 2023F PR DILATED RETINAL EXAM W/O EVID OF RETINOPATHY: ICD-10-PCS | Mod: CPTII,S$GLB,, | Performed by: OPTOMETRIST

## 2023-10-16 PROCEDURE — 99999 PR PBB SHADOW E&M-EST. PATIENT-LVL III: CPT | Mod: PBBFAC,,, | Performed by: OPTOMETRIST

## 2023-10-16 PROCEDURE — 92014 PR EYE EXAM, EST PATIENT,COMPREHESV: ICD-10-PCS | Mod: S$GLB,,, | Performed by: OPTOMETRIST

## 2023-10-16 PROCEDURE — 3066F NEPHROPATHY DOC TX: CPT | Mod: CPTII,S$GLB,, | Performed by: OPTOMETRIST

## 2023-10-16 PROCEDURE — 2023F DILAT RTA XM W/O RTNOPTHY: CPT | Mod: CPTII,S$GLB,, | Performed by: OPTOMETRIST

## 2023-10-16 PROCEDURE — 92015 PR REFRACTION: ICD-10-PCS | Mod: S$GLB,,, | Performed by: OPTOMETRIST

## 2023-10-16 PROCEDURE — 92014 COMPRE OPH EXAM EST PT 1/>: CPT | Mod: S$GLB,,, | Performed by: OPTOMETRIST

## 2023-10-16 PROCEDURE — 1159F MED LIST DOCD IN RCRD: CPT | Mod: CPTII,S$GLB,, | Performed by: OPTOMETRIST

## 2023-10-16 PROCEDURE — 3061F NEG MICROALBUMINURIA REV: CPT | Mod: CPTII,S$GLB,, | Performed by: OPTOMETRIST

## 2023-10-16 PROCEDURE — 4010F PR ACE/ARB THEARPY RXD/TAKEN: ICD-10-PCS | Mod: CPTII,S$GLB,, | Performed by: OPTOMETRIST

## 2023-10-16 PROCEDURE — 3066F PR DOCUMENTATION OF TREATMENT FOR NEPHROPATHY: ICD-10-PCS | Mod: CPTII,S$GLB,, | Performed by: OPTOMETRIST

## 2023-10-16 PROCEDURE — 99999 PR PBB SHADOW E&M-EST. PATIENT-LVL III: ICD-10-PCS | Mod: PBBFAC,,, | Performed by: OPTOMETRIST

## 2023-10-16 PROCEDURE — 4010F ACE/ARB THERAPY RXD/TAKEN: CPT | Mod: CPTII,S$GLB,, | Performed by: OPTOMETRIST

## 2023-10-16 PROCEDURE — 3061F PR NEG MICROALBUMINURIA RESULT DOCUMENTED/REVIEW: ICD-10-PCS | Mod: CPTII,S$GLB,, | Performed by: OPTOMETRIST

## 2023-10-16 PROCEDURE — 92015 DETERMINE REFRACTIVE STATE: CPT | Mod: S$GLB,,, | Performed by: OPTOMETRIST

## 2023-10-16 PROCEDURE — 1159F PR MEDICATION LIST DOCUMENTED IN MEDICAL RECORD: ICD-10-PCS | Mod: CPTII,S$GLB,, | Performed by: OPTOMETRIST

## 2023-10-16 NOTE — PROGRESS NOTES
HPI    IDDM A1C 5.8 8/23/23  Presbyopia  Lab Results       Component                Value               Date                       HGBA1C                   5.8 (H)             08/23/2023              Last edited by Jordan Gomez, OD on 10/16/2023 10:00 AM.            Assessment /Plan     For exam results, see Encounter Report.    Type 2 diabetes mellitus without complication, with long-term current use of insulin    Primary hypertension    Bilateral presbyopia      No Background Diabetic Retinopathy  Strict BG control, f/u w/ PCP, and annual DFE  Stressed importance of DM control to preserve vision    No HTN Retinopathy    Dispense Final Rx for glasses.  RTC 1 year  Discussed above and answered questions.

## 2023-10-17 DIAGNOSIS — D69.6 THROMBOCYTOPENIA: Primary | ICD-10-CM

## 2023-10-24 ENCOUNTER — LAB VISIT (OUTPATIENT)
Dept: LAB | Facility: HOSPITAL | Age: 53
End: 2023-10-24
Attending: NURSE PRACTITIONER
Payer: COMMERCIAL

## 2023-10-24 ENCOUNTER — PATIENT MESSAGE (OUTPATIENT)
Dept: GASTROENTEROLOGY | Facility: CLINIC | Age: 53
End: 2023-10-24
Payer: COMMERCIAL

## 2023-10-24 DIAGNOSIS — D69.6 THROMBOCYTOPENIA: ICD-10-CM

## 2023-10-24 LAB
PLATELET # BLD AUTO: 186 K/UL (ref 150–450)
PMV BLD AUTO: 10.5 FL (ref 9.2–12.9)

## 2023-10-24 PROCEDURE — 36415 COLL VENOUS BLD VENIPUNCTURE: CPT | Mod: PN | Performed by: NURSE PRACTITIONER

## 2023-10-24 PROCEDURE — 85049 AUTOMATED PLATELET COUNT: CPT | Performed by: NURSE PRACTITIONER

## 2023-10-24 RX ORDER — CHOLESTYRAMINE 4 G/9G
1 POWDER, FOR SUSPENSION ORAL 2 TIMES DAILY
Qty: 180 PACKET | Refills: 5
Start: 2023-10-24 | End: 2025-04-16

## 2023-11-01 ENCOUNTER — OFFICE VISIT (OUTPATIENT)
Dept: INTERNAL MEDICINE | Facility: CLINIC | Age: 53
End: 2023-11-01
Payer: COMMERCIAL

## 2023-11-01 VITALS
BODY MASS INDEX: 32.68 KG/M2 | RESPIRATION RATE: 18 BRPM | DIASTOLIC BLOOD PRESSURE: 80 MMHG | OXYGEN SATURATION: 98 % | TEMPERATURE: 98 F | WEIGHT: 240.94 LBS | HEART RATE: 76 BPM | SYSTOLIC BLOOD PRESSURE: 128 MMHG

## 2023-11-01 DIAGNOSIS — K58.0 IRRITABLE BOWEL SYNDROME WITH DIARRHEA: ICD-10-CM

## 2023-11-01 DIAGNOSIS — E11.9 TYPE 2 DIABETES MELLITUS WITHOUT COMPLICATION, WITHOUT LONG-TERM CURRENT USE OF INSULIN: ICD-10-CM

## 2023-11-01 DIAGNOSIS — Z09 HOSPITAL DISCHARGE FOLLOW-UP: Primary | ICD-10-CM

## 2023-11-01 PROCEDURE — 3074F SYST BP LT 130 MM HG: CPT | Mod: CPTII,S$GLB,, | Performed by: NURSE PRACTITIONER

## 2023-11-01 PROCEDURE — 3066F NEPHROPATHY DOC TX: CPT | Mod: CPTII,S$GLB,, | Performed by: NURSE PRACTITIONER

## 2023-11-01 PROCEDURE — 3074F PR MOST RECENT SYSTOLIC BLOOD PRESSURE < 130 MM HG: ICD-10-PCS | Mod: CPTII,S$GLB,, | Performed by: NURSE PRACTITIONER

## 2023-11-01 PROCEDURE — 3066F PR DOCUMENTATION OF TREATMENT FOR NEPHROPATHY: ICD-10-PCS | Mod: CPTII,S$GLB,, | Performed by: NURSE PRACTITIONER

## 2023-11-01 PROCEDURE — 99212 OFFICE O/P EST SF 10 MIN: CPT | Mod: S$GLB,,, | Performed by: NURSE PRACTITIONER

## 2023-11-01 PROCEDURE — 99999 PR PBB SHADOW E&M-EST. PATIENT-LVL V: CPT | Mod: PBBFAC,,, | Performed by: NURSE PRACTITIONER

## 2023-11-01 PROCEDURE — 3008F BODY MASS INDEX DOCD: CPT | Mod: CPTII,S$GLB,, | Performed by: NURSE PRACTITIONER

## 2023-11-01 PROCEDURE — 3044F HG A1C LEVEL LT 7.0%: CPT | Mod: CPTII,S$GLB,, | Performed by: NURSE PRACTITIONER

## 2023-11-01 PROCEDURE — 3008F PR BODY MASS INDEX (BMI) DOCUMENTED: ICD-10-PCS | Mod: CPTII,S$GLB,, | Performed by: NURSE PRACTITIONER

## 2023-11-01 PROCEDURE — 1160F RVW MEDS BY RX/DR IN RCRD: CPT | Mod: CPTII,S$GLB,, | Performed by: NURSE PRACTITIONER

## 2023-11-01 PROCEDURE — 3079F PR MOST RECENT DIASTOLIC BLOOD PRESSURE 80-89 MM HG: ICD-10-PCS | Mod: CPTII,S$GLB,, | Performed by: NURSE PRACTITIONER

## 2023-11-01 PROCEDURE — 3079F DIAST BP 80-89 MM HG: CPT | Mod: CPTII,S$GLB,, | Performed by: NURSE PRACTITIONER

## 2023-11-01 PROCEDURE — 3044F PR MOST RECENT HEMOGLOBIN A1C LEVEL <7.0%: ICD-10-PCS | Mod: CPTII,S$GLB,, | Performed by: NURSE PRACTITIONER

## 2023-11-01 PROCEDURE — 1160F PR REVIEW ALL MEDS BY PRESCRIBER/CLIN PHARMACIST DOCUMENTED: ICD-10-PCS | Mod: CPTII,S$GLB,, | Performed by: NURSE PRACTITIONER

## 2023-11-01 PROCEDURE — 4010F PR ACE/ARB THEARPY RXD/TAKEN: ICD-10-PCS | Mod: CPTII,S$GLB,, | Performed by: NURSE PRACTITIONER

## 2023-11-01 PROCEDURE — 4010F ACE/ARB THERAPY RXD/TAKEN: CPT | Mod: CPTII,S$GLB,, | Performed by: NURSE PRACTITIONER

## 2023-11-01 PROCEDURE — 3061F NEG MICROALBUMINURIA REV: CPT | Mod: CPTII,S$GLB,, | Performed by: NURSE PRACTITIONER

## 2023-11-01 PROCEDURE — 1159F MED LIST DOCD IN RCRD: CPT | Mod: CPTII,S$GLB,, | Performed by: NURSE PRACTITIONER

## 2023-11-01 PROCEDURE — 99999 PR PBB SHADOW E&M-EST. PATIENT-LVL V: ICD-10-PCS | Mod: PBBFAC,,, | Performed by: NURSE PRACTITIONER

## 2023-11-01 PROCEDURE — 99212 PR OFFICE/OUTPT VISIT, EST, LEVL II, 10-19 MIN: ICD-10-PCS | Mod: S$GLB,,, | Performed by: NURSE PRACTITIONER

## 2023-11-01 PROCEDURE — 3061F PR NEG MICROALBUMINURIA RESULT DOCUMENTED/REVIEW: ICD-10-PCS | Mod: CPTII,S$GLB,, | Performed by: NURSE PRACTITIONER

## 2023-11-01 PROCEDURE — 1159F PR MEDICATION LIST DOCUMENTED IN MEDICAL RECORD: ICD-10-PCS | Mod: CPTII,S$GLB,, | Performed by: NURSE PRACTITIONER

## 2023-11-01 NOTE — PROGRESS NOTES
Subjective     Patient ID: Gulilermo Castillo is a 53 y.o. male.    Chief Complaint: Hospital Follow Up    Patient presents for hospital follow up.   Admitted to [facility] due to dehydration and diarrhea.   Work up completed-no infection.      Diarrhea symptoms are better.   Taking the cholestyramine.      Reports glucose has been elevated in the am.  Later in the mornings, it's better.         Hospital Course:   CT of abdomen and pelvis with contrast showed moderate to severe gastric distention which could reflect gastroparesis.  Mild thickening rectosigmoid.  Liquid stool within the large bowel which could reflect diarrheal state and shotty nodes within the mesentery which may be reactive.  Result of CT scan discussed with patient and spouse.     Patient started on IV fluid and antibiotics with Cipro and Flagyl on admission.  Diarrhea resolved with cholestyramine.     GI consulted.  Stool studies thus far negative for infectious process.  They recommended discontinuing antibiotics given there is no evidence of infectious colitis.  Prior colonoscopy biopsies negative for microscopic colitis.  Celiac panel negative.  They suspect a flare of IBS-D.     Dehydration resolved with IV fluid.  Patient tolerating diet without any further nausea.     Seen and examined personally on the day of discharge without any family present.  Reports that he feels well, requested to go home.  Discussed with patient followups with GI and PCP.  All questions answered to his satisfaction.  He confirms that he already has cholestyramine at home and refills were sent to his pharmacy by Dr. Camara earlier in year.  Stable for discharge home at this time.     Review of Systems   Constitutional:  Negative for chills and fatigue.   Respiratory:  Negative for cough and shortness of breath.    Cardiovascular:  Negative for chest pain and leg swelling.   Gastrointestinal:  Negative for abdominal pain, constipation and diarrhea.   Musculoskeletal:   Negative for arthralgias and gait problem.   Psychiatric/Behavioral:  Negative for agitation and confusion.           Objective     Physical Exam  Vitals reviewed.   HENT:      Head: Normocephalic.   Cardiovascular:      Rate and Rhythm: Normal rate and regular rhythm.   Pulmonary:      Effort: Pulmonary effort is normal.      Breath sounds: Normal breath sounds.   Abdominal:      General: Bowel sounds are normal. There is no distension.      Tenderness: There is no abdominal tenderness.   Musculoskeletal:         General: Normal range of motion.   Skin:     General: Skin is warm.   Neurological:      General: No focal deficit present.      Mental Status: He is alert and oriented to person, place, and time.   Psychiatric:         Mood and Affect: Mood normal.         Behavior: Behavior normal. Behavior is cooperative.            Assessment and Plan     1. Hospital discharge follow-up    2. Irritable bowel syndrome with diarrhea  Comments:  Symptoms are stable.   Continue current treatment plan.     3. Type 2 diabetes mellitus without complication, without long-term current use of insulin  Comments:  Complete labs and keep follow up as scheduled.                Follow up if symptoms worsen or fail to improve.

## 2023-11-13 ENCOUNTER — TELEPHONE (OUTPATIENT)
Dept: INTERNAL MEDICINE | Facility: CLINIC | Age: 53
End: 2023-11-13
Payer: COMMERCIAL

## 2023-11-13 NOTE — TELEPHONE ENCOUNTER
----- Message from Kip Siddiqui MD sent at 11/10/2023  4:41 PM CST -----  Regarding: RE: Nurse visit - HTN  Good idea. Cb or Teddy-please help schedule that nurse visit in [place].  ----- Message -----  From: Tatyana Morales, PharmD  Sent: 11/8/2023   9:11 AM CST  To: Kip Siddiqui MD; Ruby York PharmD; #  Subject: Nurse visit - HTN                                Good morning!     The digital medicine program manages Mr. Yoon's HTN. He has a 30 day avg. of 154/90, but had a manual, in-office reading of 128/80 on 11/1/23.      Would it be possible to schedule a nurse visit on the same day as his lab work (11/22) in [place] for the patient to get in-office readings comparing his personal automated cuff to a manual reading in hopes to rule out potential masked hypertension?     I spoke to the patient and he is receptive if this is possible.    Thanks!  Tatyana Morales, PharmD, PGY1 Resident  [phone]

## 2023-11-20 ENCOUNTER — TELEPHONE (OUTPATIENT)
Dept: INTERNAL MEDICINE | Facility: CLINIC | Age: 53
End: 2023-11-20

## 2023-11-20 ENCOUNTER — CLINICAL SUPPORT (OUTPATIENT)
Dept: INTERNAL MEDICINE | Facility: CLINIC | Age: 53
End: 2023-11-20
Payer: COMMERCIAL

## 2023-11-20 ENCOUNTER — LAB VISIT (OUTPATIENT)
Dept: LAB | Facility: HOSPITAL | Age: 53
End: 2023-11-20
Attending: FAMILY MEDICINE
Payer: COMMERCIAL

## 2023-11-20 VITALS — SYSTOLIC BLOOD PRESSURE: 122 MMHG | DIASTOLIC BLOOD PRESSURE: 78 MMHG

## 2023-11-20 DIAGNOSIS — I10 ESSENTIAL HYPERTENSION: Primary | ICD-10-CM

## 2023-11-20 DIAGNOSIS — E11.9 TYPE 2 DIABETES MELLITUS WITHOUT COMPLICATION, WITHOUT LONG-TERM CURRENT USE OF INSULIN: ICD-10-CM

## 2023-11-20 LAB
ESTIMATED AVG GLUCOSE: 120 MG/DL (ref 68–131)
HBA1C MFR BLD: 5.8 % (ref 4–5.6)

## 2023-11-20 PROCEDURE — 99999 PR PBB SHADOW E&M-EST. PATIENT-LVL I: ICD-10-PCS | Mod: PBBFAC,,,

## 2023-11-20 PROCEDURE — 99999 PR PBB SHADOW E&M-EST. PATIENT-LVL I: CPT | Mod: PBBFAC,,,

## 2023-11-20 PROCEDURE — 83036 HEMOGLOBIN GLYCOSYLATED A1C: CPT | Performed by: FAMILY MEDICINE

## 2023-11-20 PROCEDURE — 36415 COLL VENOUS BLD VENIPUNCTURE: CPT | Mod: PN | Performed by: FAMILY MEDICINE

## 2023-11-20 NOTE — PROGRESS NOTES
122/78 manual bp check, denies chest pain, sob at this time. Pt brought in 2 home bp cuffs, bp reading on 1st home bp cuff: 142/80   2nd home bp cuff: 142/93.

## 2023-11-20 NOTE — TELEPHONE ENCOUNTER
[place] [facility] [place] bp check. Manual /78.   Pt brought in 2 [facility] digital medicine bp cuffs  Reading 1: 142/80  Reading 2: 142/93    Patient denies any chest pain, shortness of breath, headaches.

## 2023-12-18 PROBLEM — N17.9 ACUTE KIDNEY INJURY: Status: RESOLVED | Noted: 2023-09-18 | Resolved: 2023-12-18

## 2024-02-22 ENCOUNTER — LAB VISIT (OUTPATIENT)
Dept: LAB | Facility: HOSPITAL | Age: 54
End: 2024-02-22
Payer: COMMERCIAL

## 2024-02-22 DIAGNOSIS — Z12.5 SCREENING FOR PROSTATE CANCER: ICD-10-CM

## 2024-02-22 DIAGNOSIS — E11.9 TYPE 2 DIABETES MELLITUS WITHOUT COMPLICATION, WITHOUT LONG-TERM CURRENT USE OF INSULIN: ICD-10-CM

## 2024-02-22 DIAGNOSIS — M1A.9XX0 CHRONIC GOUT WITHOUT TOPHUS, UNSPECIFIED CAUSE, UNSPECIFIED SITE: ICD-10-CM

## 2024-02-22 LAB
ALBUMIN SERPL BCP-MCNC: 4.3 G/DL (ref 3.5–5.2)
ALP SERPL-CCNC: 114 U/L (ref 55–135)
ALT SERPL W/O P-5'-P-CCNC: 28 U/L (ref 10–44)
ANION GAP SERPL CALC-SCNC: 11 MMOL/L (ref 8–16)
AST SERPL-CCNC: 17 U/L (ref 10–40)
BILIRUB SERPL-MCNC: 0.7 MG/DL (ref 0.1–1)
BUN SERPL-MCNC: 15 MG/DL (ref 6–20)
CALCIUM SERPL-MCNC: 9.6 MG/DL (ref 8.7–10.5)
CHLORIDE SERPL-SCNC: 105 MMOL/L (ref 95–110)
CHOLEST SERPL-MCNC: 154 MG/DL (ref 120–199)
CHOLEST/HDLC SERPL: 5 {RATIO} (ref 2–5)
CO2 SERPL-SCNC: 23 MMOL/L (ref 23–29)
COMPLEXED PSA SERPL-MCNC: 1.4 NG/ML (ref 0–4)
CREAT SERPL-MCNC: 0.8 MG/DL (ref 0.5–1.4)
EST. GFR  (NO RACE VARIABLE): >60 ML/MIN/1.73 M^2
ESTIMATED AVG GLUCOSE: 111 MG/DL (ref 68–131)
GLUCOSE SERPL-MCNC: 143 MG/DL (ref 70–110)
HBA1C MFR BLD: 5.5 % (ref 4–5.6)
HDLC SERPL-MCNC: 31 MG/DL (ref 40–75)
HDLC SERPL: 20.1 % (ref 20–50)
LDLC SERPL CALC-MCNC: 51.8 MG/DL (ref 63–159)
NONHDLC SERPL-MCNC: 123 MG/DL
POTASSIUM SERPL-SCNC: 3.9 MMOL/L (ref 3.5–5.1)
PROT SERPL-MCNC: 7.1 G/DL (ref 6–8.4)
SODIUM SERPL-SCNC: 139 MMOL/L (ref 136–145)
TRIGL SERPL-MCNC: 356 MG/DL (ref 30–150)
URATE SERPL-MCNC: 5.5 MG/DL (ref 3.4–7)
VIT B12 SERPL-MCNC: 429 PG/ML (ref 210–950)

## 2024-02-22 PROCEDURE — 80061 LIPID PANEL: CPT | Performed by: FAMILY MEDICINE

## 2024-02-22 PROCEDURE — 84550 ASSAY OF BLOOD/URIC ACID: CPT | Performed by: FAMILY MEDICINE

## 2024-02-22 PROCEDURE — 82607 VITAMIN B-12: CPT | Performed by: FAMILY MEDICINE

## 2024-02-22 PROCEDURE — 36415 COLL VENOUS BLD VENIPUNCTURE: CPT | Performed by: FAMILY MEDICINE

## 2024-02-22 PROCEDURE — 84153 ASSAY OF PSA TOTAL: CPT | Performed by: FAMILY MEDICINE

## 2024-02-22 PROCEDURE — 80053 COMPREHEN METABOLIC PANEL: CPT | Performed by: FAMILY MEDICINE

## 2024-02-22 PROCEDURE — 83036 HEMOGLOBIN GLYCOSYLATED A1C: CPT | Performed by: FAMILY MEDICINE

## 2024-02-25 DIAGNOSIS — K21.9 GASTROESOPHAGEAL REFLUX DISEASE, UNSPECIFIED WHETHER ESOPHAGITIS PRESENT: ICD-10-CM

## 2024-02-25 DIAGNOSIS — E11.9 TYPE 2 DIABETES MELLITUS WITHOUT COMPLICATION, WITHOUT LONG-TERM CURRENT USE OF INSULIN: ICD-10-CM

## 2024-02-25 NOTE — TELEPHONE ENCOUNTER
No care due was identified.  [facility] Embedded Care Due Messages. Reference number: [id number].   2/25/2024 9:22:13 AM CST

## 2024-02-26 RX ORDER — METFORMIN HYDROCHLORIDE 500 MG/1
1000 TABLET, EXTENDED RELEASE ORAL DAILY
Qty: 180 TABLET | Refills: 4 | Status: SHIPPED | OUTPATIENT
Start: 2024-02-26 | End: 2024-03-11 | Stop reason: ALTCHOICE

## 2024-02-26 RX ORDER — ROSUVASTATIN CALCIUM 10 MG/1
10 TABLET, COATED ORAL DAILY
Qty: 90 TABLET | Refills: 4 | Status: SHIPPED | OUTPATIENT
Start: 2024-02-26

## 2024-02-26 NOTE — TELEPHONE ENCOUNTER
Refill Routing Note   Medication(s) are not appropriate for processing by [facility] for the following reason(s):        Allergy or intolerance  ED/Hospital Visit since last OV with provider    [facility] action(s):  Defer        Medication Therapy Plan: ED Visit 9/18/23: Diarrhea with dehydration; Allergy to Simvastatin      Appointments  past 12m or future 3m with PCP    Date Provider   Last Visit   8/29/2023 Kip Siddiqui MD   Next Visit   Visit date not found Kip Siddiqui MD   ED visits in past 90 days: 0        Note composed:10:25 AM 02/26/2024

## 2024-02-28 RX ORDER — OMEPRAZOLE 40 MG/1
40 CAPSULE, DELAYED RELEASE ORAL EVERY MORNING
Qty: 90 CAPSULE | Refills: 3 | Status: SHIPPED | OUTPATIENT
Start: 2024-02-28 | End: 2025-02-27

## 2024-03-11 ENCOUNTER — OFFICE VISIT (OUTPATIENT)
Dept: INTERNAL MEDICINE | Facility: CLINIC | Age: 54
End: 2024-03-11
Payer: COMMERCIAL

## 2024-03-11 VITALS
TEMPERATURE: 97 F | BODY MASS INDEX: 32.37 KG/M2 | WEIGHT: 239 LBS | SYSTOLIC BLOOD PRESSURE: 120 MMHG | HEIGHT: 72 IN | DIASTOLIC BLOOD PRESSURE: 80 MMHG | HEART RATE: 64 BPM | OXYGEN SATURATION: 99 % | RESPIRATION RATE: 18 BRPM

## 2024-03-11 DIAGNOSIS — M1A.9XX0 CHRONIC GOUT WITHOUT TOPHUS, UNSPECIFIED CAUSE, UNSPECIFIED SITE: ICD-10-CM

## 2024-03-11 DIAGNOSIS — I10 PRIMARY HYPERTENSION: Chronic | ICD-10-CM

## 2024-03-11 DIAGNOSIS — F39 MOOD DISORDER: ICD-10-CM

## 2024-03-11 DIAGNOSIS — E78.1 HYPERTRIGLYCERIDEMIA: ICD-10-CM

## 2024-03-11 DIAGNOSIS — E78.2 MIXED HYPERLIPIDEMIA: ICD-10-CM

## 2024-03-11 DIAGNOSIS — D50.9 IRON DEFICIENCY ANEMIA, UNSPECIFIED IRON DEFICIENCY ANEMIA TYPE: ICD-10-CM

## 2024-03-11 DIAGNOSIS — G47.33 OSA (OBSTRUCTIVE SLEEP APNEA): Chronic | ICD-10-CM

## 2024-03-11 DIAGNOSIS — E11.9 TYPE 2 DIABETES MELLITUS WITHOUT COMPLICATION, WITHOUT LONG-TERM CURRENT USE OF INSULIN: Primary | ICD-10-CM

## 2024-03-11 PROCEDURE — 4010F ACE/ARB THERAPY RXD/TAKEN: CPT | Mod: CPTII,S$GLB,, | Performed by: PHYSICIAN ASSISTANT

## 2024-03-11 PROCEDURE — 99999 PR PBB SHADOW E&M-EST. PATIENT-LVL V: CPT | Mod: PBBFAC,,, | Performed by: PHYSICIAN ASSISTANT

## 2024-03-11 PROCEDURE — 3072F LOW RISK FOR RETINOPATHY: CPT | Mod: CPTII,S$GLB,, | Performed by: PHYSICIAN ASSISTANT

## 2024-03-11 PROCEDURE — 99214 OFFICE O/P EST MOD 30 MIN: CPT | Mod: S$GLB,,, | Performed by: PHYSICIAN ASSISTANT

## 2024-03-11 PROCEDURE — 3008F BODY MASS INDEX DOCD: CPT | Mod: CPTII,S$GLB,, | Performed by: PHYSICIAN ASSISTANT

## 2024-03-11 PROCEDURE — 3044F HG A1C LEVEL LT 7.0%: CPT | Mod: CPTII,S$GLB,, | Performed by: PHYSICIAN ASSISTANT

## 2024-03-11 PROCEDURE — 1160F RVW MEDS BY RX/DR IN RCRD: CPT | Mod: CPTII,S$GLB,, | Performed by: PHYSICIAN ASSISTANT

## 2024-03-11 PROCEDURE — 3074F SYST BP LT 130 MM HG: CPT | Mod: CPTII,S$GLB,, | Performed by: PHYSICIAN ASSISTANT

## 2024-03-11 PROCEDURE — 3079F DIAST BP 80-89 MM HG: CPT | Mod: CPTII,S$GLB,, | Performed by: PHYSICIAN ASSISTANT

## 2024-03-11 PROCEDURE — 1159F MED LIST DOCD IN RCRD: CPT | Mod: CPTII,S$GLB,, | Performed by: PHYSICIAN ASSISTANT

## 2024-03-11 RX ORDER — FENOFIBRATE 54 MG/1
54 TABLET ORAL DAILY
Qty: 90 TABLET | Refills: 3 | Status: SHIPPED | OUTPATIENT
Start: 2024-03-11 | End: 2024-06-12 | Stop reason: DRUGHIGH

## 2024-03-11 NOTE — PROGRESS NOTES
Subjective:      Patient ID: Guillermo Castillo is a 54 y.o. male.    Chief Complaint: Follow-up    HPI  Here today for his 6 month follow up. Will review recent labs as well.   DM: ozempic 2mg, metformin, humalog sliding scale.   BP: amlodipine, coreg, valsartan  REports compliance with crestor and omega 3 fatty acid.   Gout stable.   Mood stable on cymbalta.    Patient Active Problem List   Diagnosis    Fatty liver    Type 2 diabetes mellitus without complication, without long-term current use of insulin    Mood disorder    Hypertriglyceridemia    Hyperlipidemia    Hypertension    Chronic gout    ARPAN (obstructive sleep apnea)    Class 1 obesity in adult    Shifting sleep-work schedule    Anemia    PETROS (iron deficiency anemia)    Decreased range of motion with decreased strength    Nausea    Diarrhea with dehydration    Acute blood loss anemia    Leukocytosis    Irritable bowel syndrome with diarrhea         Current Outpatient Medications:     amLODIPine (NORVASC) 10 MG tablet, Take 1 tablet by mouth once daily, Disp: 90 tablet, Rfl: 4    carvediloL (COREG) 25 MG tablet, Take 1 tablet (25 mg total) by mouth 2 (two) times daily with meals., Disp: 60 tablet, Rfl: 11    cholestyramine (QUESTRAN) 4 gram packet, Take 1 packet (4 g total) by mouth 2 (two) times daily., Disp: 180 packet, Rfl: 5    DULoxetine (CYMBALTA) 60 MG capsule, Take 1 capsule by mouth once daily, Disp: 90 capsule, Rfl: 4    iron-vitamin C 100-250 mg, ICAR-C, (ICAR-C) 100-250 mg Tab, Take 1 tablet by mouth once daily., Disp: 90 tablet, Rfl: 3    omega-3 fatty acids/fish oil (FISH OIL-OMEGA-3 FATTY ACIDS) 300-1,000 mg capsule, Take 1 capsule by mouth once daily., Disp: , Rfl:     omeprazole (PRILOSEC) 40 MG capsule, Take 1 capsule (40 mg total) by mouth every morning., Disp: 90 capsule, Rfl: 3    potassium gluconate 595 mg (99 mg) Tab, Take 1 tablet by mouth 2 (two) times a day., Disp: , Rfl:     rosuvastatin (CRESTOR) 10 MG tablet, Take 1 tablet (10 mg  total) by mouth once daily., Disp: 90 tablet, Rfl: 4    semaglutide (OZEMPIC) 2 mg/dose (8 mg/3 mL) PnIj, Inject 2 mg into the skin every 7 days., Disp: 3 mL, Rfl: 11    valsartan (DIOVAN) 320 MG tablet, Take 1 tablet (320 mg total) by mouth once daily., Disp: 90 tablet, Rfl: 1    fenofibrate (TRICOR) 54 MG tablet, Take 1 tablet (54 mg total) by mouth once daily., Disp: 90 tablet, Rfl: 3    ondansetron (ZOFRAN) 4 MG tablet, Take 1 tablet (4 mg total) by mouth every 6 (six) hours as needed for Nausea. (Patient not taking: Reported on 3/11/2024), Disp: 12 tablet, Rfl: 0    Review of Systems   Constitutional:  Negative for activity change, appetite change, chills, diaphoresis, fatigue, fever and unexpected weight change.   HENT: Negative.  Negative for congestion, hearing loss, postnasal drip, rhinorrhea, sore throat, trouble swallowing and voice change.    Eyes: Negative.  Negative for visual disturbance.   Respiratory: Negative.  Negative for cough, choking, chest tightness and shortness of breath.    Cardiovascular:  Negative for chest pain, palpitations and leg swelling.   Gastrointestinal:  Negative for abdominal distention, abdominal pain, blood in stool, constipation, diarrhea, nausea and vomiting.   Endocrine: Negative for cold intolerance, heat intolerance, polydipsia and polyuria.   Genitourinary: Negative.  Negative for difficulty urinating and frequency.   Musculoskeletal:  Negative for arthralgias, back pain, gait problem, joint swelling and myalgias.   Skin:  Negative for color change, pallor, rash and wound.   Neurological:  Negative for dizziness, tremors, weakness, light-headedness, numbness and headaches.   Hematological:  Negative for adenopathy.   Psychiatric/Behavioral:  Negative for behavioral problems, confusion, self-injury, sleep disturbance and suicidal ideas. The patient is not nervous/anxious.      Objective:   /80 (BP Location: Left arm, Patient Position: Sitting, BP Method: Large  (Manual))   Pulse 64   Temp 96.9 °F (36.1 °C) (Tympanic)   Resp 18   Ht 6' (1.829 m)   Wt 108.4 kg (238 lb 15.7 oz)   SpO2 99%   BMI 32.41 kg/m²     Physical Exam  Vitals reviewed.   Constitutional:       General: He is not in acute distress.     Appearance: Normal appearance. He is well-developed. He is not ill-appearing, toxic-appearing or diaphoretic.   HENT:      Head: Normocephalic and atraumatic.      Right Ear: External ear normal.      Left Ear: External ear normal.      Nose: Nose normal.   Eyes:      Conjunctiva/sclera: Conjunctivae normal.      Pupils: Pupils are equal, round, and reactive to light.   Cardiovascular:      Rate and Rhythm: Normal rate and regular rhythm.      Heart sounds: Normal heart sounds. No murmur heard.     No friction rub. No gallop.   Pulmonary:      Effort: Pulmonary effort is normal. No respiratory distress.      Breath sounds: Normal breath sounds. No wheezing or rales.   Chest:      Chest wall: No tenderness.   Abdominal:      General: There is no distension.      Palpations: Abdomen is soft.      Tenderness: There is no abdominal tenderness.   Musculoskeletal:         General: Normal range of motion.      Cervical back: Normal range of motion and neck supple.   Lymphadenopathy:      Cervical: No cervical adenopathy.   Skin:     General: Skin is warm and dry.      Capillary Refill: Capillary refill takes less than 2 seconds.      Findings: No rash.   Neurological:      Mental Status: He is alert and oriented to person, place, and time.      Motor: No weakness.      Coordination: Coordination normal.      Gait: Gait normal.   Psychiatric:         Mood and Affect: Mood normal.         Behavior: Behavior normal.         Thought Content: Thought content normal.         Judgment: Judgment normal.       Lab Visit on 02/22/2024   Component Date Value Ref Range Status    Hemoglobin A1C 02/22/2024 5.5  4.0 - 5.6 % Final    Comment: ADA Screening Guidelines:  5.7-6.4%  Consistent  with prediabetes  >or=6.5%  Consistent with diabetes    High levels of fetal hemoglobin interfere with the HbA1C  assay. Heterozygous hemoglobin variants (HbS, HgC, etc)do  not significantly interfere with this assay.   However, presence of multiple variants may affect accuracy.      Estimated Avg Glucose 02/22/2024 111  68 - 131 mg/dL Final    Sodium 02/22/2024 139  136 - 145 mmol/L Final    Potassium 02/22/2024 3.9  3.5 - 5.1 mmol/L Final    Chloride 02/22/2024 105  95 - 110 mmol/L Final    CO2 02/22/2024 23  23 - 29 mmol/L Final    Glucose 02/22/2024 143 (H)  70 - 110 mg/dL Final    BUN 02/22/2024 15  6 - 20 mg/dL Final    Creatinine 02/22/2024 0.8  0.5 - 1.4 mg/dL Final    Calcium 02/22/2024 9.6  8.7 - 10.5 mg/dL Final    Total Protein 02/22/2024 7.1  6.0 - 8.4 g/dL Final    Albumin 02/22/2024 4.3  3.5 - 5.2 g/dL Final    Total Bilirubin 02/22/2024 0.7  0.1 - 1.0 mg/dL Final    Comment: For infants and newborns, interpretation of results should be based  on gestational age, weight and in agreement with clinical  observations.    Premature Infant recommended reference ranges:  Up to 24 hours.............<8.0 mg/dL  Up to 48 hours............<12.0 mg/dL  3-5 days..................<15.0 mg/dL  6-29 days.................<15.0 mg/dL      Alkaline Phosphatase 02/22/2024 114  55 - 135 U/L Final    AST 02/22/2024 17  10 - 40 U/L Final    ALT 02/22/2024 28  10 - 44 U/L Final    eGFR 02/22/2024 >60.0  >60 mL/min/1.73 m^2 Final    Anion Gap 02/22/2024 11  8 - 16 mmol/L Final    Cholesterol 02/22/2024 154  120 - 199 mg/dL Final    Comment: The National Cholesterol Education Program (NCEP) has set the  following guidelines (reference ranges) for Cholesterol:  Optimal.....................<200 mg/dL  Borderline High.............200-239 mg/dL  High........................> or = 240 mg/dL      Triglycerides 02/22/2024 356 (H)  30 - 150 mg/dL Final    Comment: The National Cholesterol Education Program (NCEP) has set  the  following guidelines (reference values) for triglycerides:  Normal......................<150 mg/dL  Borderline High.............150-199 mg/dL  High........................200-499 mg/dL      HDL 02/22/2024 31 (L)  40 - 75 mg/dL Final    Comment: The National Cholesterol Education Program (NCEP) has set the  following guidelines (reference values) for HDL Cholesterol:  Low...............<40 mg/dL  Optimal...........>60 mg/dL      LDL Cholesterol 02/22/2024 51.8 (L)  63.0 - 159.0 mg/dL Final    Comment: The National Cholesterol Education Program (NCEP) has set the  following guidelines (reference values) for LDL Cholesterol:  Optimal.......................<130 mg/dL  Borderline High...............130-159 mg/dL  High..........................160-189 mg/dL  Very High.....................>190 mg/dL      HDL/Cholesterol Ratio 02/22/2024 20.1  20.0 - 50.0 % Final    Total Cholesterol/HDL Ratio 02/22/2024 5.0  2.0 - 5.0 Final    Non-HDL Cholesterol 02/22/2024 123  mg/dL Final    Comment: Risk category and Non-HDL cholesterol goals:  Coronary heart disease (CHD)or equivalent (10-year risk of CHD >20%):  Non-HDL cholesterol goal     <130 mg/dL  Two or more CHD risk factors and 10-year risk of CHD <= 20%:  Non-HDL cholesterol goal     <160 mg/dL  0 to 1 CHD risk factor:  Non-HDL cholesterol goal     <190 mg/dL      PSA, Screen 02/22/2024 1.4  0.00 - 4.00 ng/mL Final    Comment: The testing method is a chemiluminescent microparticle immunoassay   manufactured by [employer] and performed on the    or   Fifth Generation Computer system. Values obtained with different assay manufacturers   for   methods may be different and cannot be used interchangeably.  PSA Expected levels:  Hormonal Therapy: <0.05 ng/ml  Prostatectomy: <0.01 ng/ml  Radiation Therapy: <1.00 ng/ml      Vitamin B-12 02/22/2024 429  210 - 950 pg/mL Final    Uric Acid 02/22/2024 5.5  3.4 - 7.0 mg/dL Final       Assessment:     1. Type 2 diabetes mellitus  without complication, without long-term current use of insulin    2. Hypertriglyceridemia    3. Mixed hyperlipidemia    4. Iron deficiency anemia, unspecified iron deficiency anemia type    5. Chronic gout without tophus, unspecified cause, unspecified site    6. Primary hypertension    7. ARPAN (obstructive sleep apnea)    8. Mood disorder      Plan:   Type 2 diabetes mellitus without complication, without long-term current use of insulin  -     Hemoglobin A1C; Future; Expected date: 06/11/2024    Hypertriglyceridemia  -     fenofibrate (TRICOR) 54 MG tablet; Take 1 tablet (54 mg total) by mouth once daily.  Dispense: 90 tablet; Refill: 3  -     TRIGLYCERIDES; Future; Expected date: 06/11/2024  -     Comprehensive Metabolic Panel; Future; Expected date: 06/11/2024    Mixed hyperlipidemia    Iron deficiency anemia, unspecified iron deficiency anemia type    Chronic gout without tophus, unspecified cause, unspecified site    Primary hypertension    ARPAN (obstructive sleep apnea)    Mood disorder    -triglycerides elevated despite taking fish oil and crestor. LDL in good range. Will add fenofibrate. Recheck triglycerides in 3 months. Check cmp (liver enzymes) in 6 months  -stop insulin and metformin. Continue ozempic 2mg.   Recheck A1c in 3 months.     Follow up in about 3 months (around 6/11/2024), or if symptoms worsen or fail to improve.

## 2024-05-18 NOTE — ASSESSMENT & PLAN NOTE
-Plt count 127k, mild thrombocytopenia  -Pt denies bleeding, bruising  -Discussed Sxs of bleeding and when to seek emergent tx  -R/o nutrient deficiencies; Pt had been excessive amounts of  mixed nuts but does not believe there were any walnuts.  -will continue to monitor   no

## 2024-06-11 ENCOUNTER — OFFICE VISIT (OUTPATIENT)
Dept: SLEEP MEDICINE | Facility: CLINIC | Age: 54
End: 2024-06-11
Payer: COMMERCIAL

## 2024-06-11 ENCOUNTER — LAB VISIT (OUTPATIENT)
Dept: LAB | Facility: HOSPITAL | Age: 54
End: 2024-06-11
Attending: FAMILY MEDICINE
Payer: COMMERCIAL

## 2024-06-11 VITALS
HEIGHT: 72 IN | HEART RATE: 73 BPM | DIASTOLIC BLOOD PRESSURE: 94 MMHG | RESPIRATION RATE: 21 BRPM | BODY MASS INDEX: 33.33 KG/M2 | WEIGHT: 246.06 LBS | SYSTOLIC BLOOD PRESSURE: 160 MMHG | OXYGEN SATURATION: 98 %

## 2024-06-11 DIAGNOSIS — E78.1 HYPERTRIGLYCERIDEMIA: ICD-10-CM

## 2024-06-11 DIAGNOSIS — E66.9 OBESITY, CLASS I, BMI 30-34.9: ICD-10-CM

## 2024-06-11 DIAGNOSIS — E11.9 TYPE 2 DIABETES MELLITUS WITHOUT COMPLICATION, WITHOUT LONG-TERM CURRENT USE OF INSULIN: ICD-10-CM

## 2024-06-11 DIAGNOSIS — G47.33 OSA (OBSTRUCTIVE SLEEP APNEA): Primary | Chronic | ICD-10-CM

## 2024-06-11 LAB
ALBUMIN SERPL BCP-MCNC: 4.3 G/DL (ref 3.5–5.2)
ALP SERPL-CCNC: 126 U/L (ref 55–135)
ALT SERPL W/O P-5'-P-CCNC: 39 U/L (ref 10–44)
ANION GAP SERPL CALC-SCNC: 11 MMOL/L (ref 8–16)
AST SERPL-CCNC: 24 U/L (ref 10–40)
BILIRUB SERPL-MCNC: 0.6 MG/DL (ref 0.1–1)
BUN SERPL-MCNC: 14 MG/DL (ref 6–20)
CALCIUM SERPL-MCNC: 9.5 MG/DL (ref 8.7–10.5)
CHLORIDE SERPL-SCNC: 104 MMOL/L (ref 95–110)
CO2 SERPL-SCNC: 22 MMOL/L (ref 23–29)
CREAT SERPL-MCNC: 1.1 MG/DL (ref 0.5–1.4)
EST. GFR  (NO RACE VARIABLE): >60 ML/MIN/1.73 M^2
ESTIMATED AVG GLUCOSE: 157 MG/DL (ref 68–131)
GLUCOSE SERPL-MCNC: 226 MG/DL (ref 70–110)
HBA1C MFR BLD: 7.1 % (ref 4–5.6)
POTASSIUM SERPL-SCNC: 4 MMOL/L (ref 3.5–5.1)
PROT SERPL-MCNC: 7.4 G/DL (ref 6–8.4)
SODIUM SERPL-SCNC: 137 MMOL/L (ref 136–145)
TRIGL SERPL-MCNC: 669 MG/DL (ref 30–150)

## 2024-06-11 PROCEDURE — 83036 HEMOGLOBIN GLYCOSYLATED A1C: CPT | Performed by: PHYSICIAN ASSISTANT

## 2024-06-11 PROCEDURE — 1159F MED LIST DOCD IN RCRD: CPT | Mod: CPTII,S$GLB,, | Performed by: NURSE PRACTITIONER

## 2024-06-11 PROCEDURE — 3077F SYST BP >= 140 MM HG: CPT | Mod: CPTII,S$GLB,, | Performed by: NURSE PRACTITIONER

## 2024-06-11 PROCEDURE — 84478 ASSAY OF TRIGLYCERIDES: CPT | Performed by: PHYSICIAN ASSISTANT

## 2024-06-11 PROCEDURE — 4010F ACE/ARB THERAPY RXD/TAKEN: CPT | Mod: CPTII,S$GLB,, | Performed by: NURSE PRACTITIONER

## 2024-06-11 PROCEDURE — 99999 PR PBB SHADOW E&M-EST. PATIENT-LVL IV: CPT | Mod: PBBFAC,,, | Performed by: NURSE PRACTITIONER

## 2024-06-11 PROCEDURE — 1160F RVW MEDS BY RX/DR IN RCRD: CPT | Mod: CPTII,S$GLB,, | Performed by: NURSE PRACTITIONER

## 2024-06-11 PROCEDURE — 3080F DIAST BP >= 90 MM HG: CPT | Mod: CPTII,S$GLB,, | Performed by: NURSE PRACTITIONER

## 2024-06-11 PROCEDURE — 80053 COMPREHEN METABOLIC PANEL: CPT | Performed by: PHYSICIAN ASSISTANT

## 2024-06-11 PROCEDURE — 3044F HG A1C LEVEL LT 7.0%: CPT | Mod: CPTII,S$GLB,, | Performed by: NURSE PRACTITIONER

## 2024-06-11 PROCEDURE — 3072F LOW RISK FOR RETINOPATHY: CPT | Mod: CPTII,S$GLB,, | Performed by: NURSE PRACTITIONER

## 2024-06-11 PROCEDURE — 3008F BODY MASS INDEX DOCD: CPT | Mod: CPTII,S$GLB,, | Performed by: NURSE PRACTITIONER

## 2024-06-11 PROCEDURE — 99213 OFFICE O/P EST LOW 20 MIN: CPT | Mod: S$GLB,,, | Performed by: NURSE PRACTITIONER

## 2024-06-11 PROCEDURE — 36415 COLL VENOUS BLD VENIPUNCTURE: CPT | Performed by: PHYSICIAN ASSISTANT

## 2024-06-11 NOTE — PROGRESS NOTES
Subjective:      Patient ID: Guillermo Castillo is a 54 y.o. male.    Chief Complaint: Sleep Apnea    HPI  Presents for sleep apnea on AutoPAP therapy. Patient states improved symptoms with use of AutoPAP. He received replacement device and states it feels like lower setting. Would like adjusted.  Sleeping more soundly. Waking up feeling more refreshed. Improved daytime sleepiness. Patient states he is benefiting from use of the AutoPAP.     Patient Active Problem List   Diagnosis    Fatty liver    Type 2 diabetes mellitus without complication, without long-term current use of insulin    Mood disorder    Hypertriglyceridemia    Hyperlipidemia    Hypertension    Chronic gout    ARPAN (obstructive sleep apnea)    Class 1 obesity in adult    Shifting sleep-work schedule    Anemia    PETROS (iron deficiency anemia)    Decreased range of motion with decreased strength    Nausea    Diarrhea with dehydration    Acute blood loss anemia    Leukocytosis    Irritable bowel syndrome with diarrhea     BP (!) 160/94   Pulse 73   Resp (!) 21   Ht 6' (1.829 m)   Wt 111.6 kg (246 lb 0.5 oz)   SpO2 98%   BMI 33.37 kg/m²   Body mass index is 33.37 kg/m².    Review of Systems   Constitutional: Negative.    HENT: Negative.     Respiratory: Negative.     Cardiovascular: Negative.    Musculoskeletal: Negative.    Gastrointestinal: Negative.    Neurological: Negative.    Psychiatric/Behavioral: Negative.       Objective:      Physical Exam  Constitutional:       Appearance: Normal appearance. He is well-developed. He is obese.   HENT:      Head: Normocephalic and atraumatic.   Musculoskeletal:      Cervical back: Normal range of motion and neck supple.   Skin:     General: Skin is warm and dry.   Neurological:      Mental Status: He is alert and oriented to person, place, and time.   Psychiatric:         Mood and Affect: Mood normal.         Behavior: Behavior normal.       Personal Diagnostic Review      6/11/2024     2:30 PM   EPWORTH  SLEEPINESS SCALE   Sitting and reading 0   Watching TV 0   Sitting, inactive in a public place (e.g. a theatre or a meeting) 0   As a passenger in a car for an hour without a break 1   Lying down to rest in the afternoon when circumstances permit 3   Sitting and talking to someone 0   Sitting quietly after a lunch without alcohol 0   In a car, while stopped for a few minutes in traffic 0   Total score 4      Dreamstation one- outdated modem.   Review. Compliant and normal AHI 1.2  Review on website shows same setting but manual review reveals start pressure. 4cm  Changed to 7-cm start pressure.         Assessment:       1. ARPAN (obstructive sleep apnea)    2. Obesity, Class I, BMI 30-34.9        Outpatient Encounter Medications as of 6/11/2024   Medication Sig Dispense Refill    amLODIPine (NORVASC) 10 MG tablet Take 1 tablet by mouth once daily 90 tablet 4    carvediloL (COREG) 25 MG tablet Take 1 tablet (25 mg total) by mouth 2 (two) times daily with meals. 60 tablet 11    cholestyramine (QUESTRAN) 4 gram packet Take 1 packet (4 g total) by mouth 2 (two) times daily. 180 packet 5    DULoxetine (CYMBALTA) 60 MG capsule Take 1 capsule by mouth once daily 90 capsule 4    fenofibrate (TRICOR) 54 MG tablet Take 1 tablet (54 mg total) by mouth once daily. 90 tablet 3    iron-vitamin C 100-250 mg, ICAR-C, (ICAR-C) 100-250 mg Tab Take 1 tablet by mouth once daily. 90 tablet 3    omega-3 fatty acids/fish oil (FISH OIL-OMEGA-3 FATTY ACIDS) 300-1,000 mg capsule Take 1 capsule by mouth once daily.      omeprazole (PRILOSEC) 40 MG capsule Take 1 capsule (40 mg total) by mouth every morning. 90 capsule 3    ondansetron (ZOFRAN) 4 MG tablet Take 1 tablet (4 mg total) by mouth every 6 (six) hours as needed for Nausea. 12 tablet 0    potassium gluconate 595 mg (99 mg) Tab Take 1 tablet by mouth 2 (two) times a day.      rosuvastatin (CRESTOR) 10 MG tablet Take 1 tablet (10 mg total) by mouth once daily. 90 tablet 4    semaglutide  (OZEMPIC) 2 mg/dose (8 mg/3 mL) PnIj Inject 2 mg into the skin every 7 days. 3 mL 11    valsartan (DIOVAN) 320 MG tablet Take 1 tablet (320 mg total) by mouth once daily. 90 tablet 1     No facility-administered encounter medications on file as of 6/11/2024.     Orders Placed This Encounter   Procedures    CPAP/BIPAP SUPPLIES     Order Specific Question:   Length of need (1-99 months):     Answer:   99     Order Specific Question:   Choose ONE mask type and its corresponding cushions and/or pillows:     Answer:    Nasal Mask, 1 per 90 days:  Nasal Cushions, (6 per 90 days):  Nasal Pillows, (6 per 90 days)     Order Specific Question:   Choose EITHER Heated or Non-Heated Tubjing     Answer:    Heated Tubing, 1 per 6 months     Order Specific Question:   All other supplies as needed as listed below:     Answer:    Headgear, 1 per 180 days     Order Specific Question:   All other supplies as needed as listed below:     Answer:    Disposable Filter, 6 per 90 days     Order Specific Question:   All other supplies as needed as listed below:     Answer:    Non-Disposable Filter, 1 per 180 days     Order Specific Question:   All other supplies as needed as listed below:     Answer:    Humidifier Chamber, 1 per 180 days     Order Specific Question:   All other supplies as needed as listed below:     Answer:    Chin Strap, 1 per 180 days     Plan:       1. ARPAN (obstructive sleep apnea)  Overview:  Autopap. Compliant with PAP and benefits from use. Follow up annually in the sleep clinic.      Orders:  -     CPAP/BIPAP SUPPLIES    2. Obesity, Class I, BMI 30-34.9    Weight loss and exercise to improve overall health.            I spent a total of 20 minutes on the day of the visit.  This includes face to face time and non-face to face time preparing to see the patient (eg, review of tests), obtaining and/or reviewing separately obtained history, documenting clinical information in  the electronic or other health record, independently interpreting results and communicating results to the patient/family/caregiver, or care coordinator.         Elizabeth LeJeune, ACNP, ANP

## 2024-06-12 ENCOUNTER — OFFICE VISIT (OUTPATIENT)
Dept: INTERNAL MEDICINE | Facility: CLINIC | Age: 54
End: 2024-06-12
Payer: COMMERCIAL

## 2024-06-12 VITALS
SYSTOLIC BLOOD PRESSURE: 130 MMHG | HEART RATE: 58 BPM | OXYGEN SATURATION: 98 % | WEIGHT: 247.13 LBS | HEIGHT: 72 IN | BODY MASS INDEX: 33.47 KG/M2 | DIASTOLIC BLOOD PRESSURE: 80 MMHG | TEMPERATURE: 98 F

## 2024-06-12 DIAGNOSIS — K76.0 FATTY LIVER: ICD-10-CM

## 2024-06-12 DIAGNOSIS — I10 PRIMARY HYPERTENSION: Primary | Chronic | ICD-10-CM

## 2024-06-12 DIAGNOSIS — E78.1 HYPERTRIGLYCERIDEMIA: Primary | ICD-10-CM

## 2024-06-12 DIAGNOSIS — E11.65 TYPE 2 DIABETES MELLITUS WITH HYPERGLYCEMIA, WITHOUT LONG-TERM CURRENT USE OF INSULIN: Primary | ICD-10-CM

## 2024-06-12 DIAGNOSIS — E78.1 HYPERTRIGLYCERIDEMIA: ICD-10-CM

## 2024-06-12 PROCEDURE — 3008F BODY MASS INDEX DOCD: CPT | Mod: CPTII,S$GLB,, | Performed by: PHYSICIAN ASSISTANT

## 2024-06-12 PROCEDURE — 99214 OFFICE O/P EST MOD 30 MIN: CPT | Mod: S$GLB,,, | Performed by: PHYSICIAN ASSISTANT

## 2024-06-12 PROCEDURE — 3051F HG A1C>EQUAL 7.0%<8.0%: CPT | Mod: CPTII,S$GLB,, | Performed by: PHYSICIAN ASSISTANT

## 2024-06-12 PROCEDURE — 3075F SYST BP GE 130 - 139MM HG: CPT | Mod: CPTII,S$GLB,, | Performed by: PHYSICIAN ASSISTANT

## 2024-06-12 PROCEDURE — 4010F ACE/ARB THERAPY RXD/TAKEN: CPT | Mod: CPTII,S$GLB,, | Performed by: PHYSICIAN ASSISTANT

## 2024-06-12 PROCEDURE — 3079F DIAST BP 80-89 MM HG: CPT | Mod: CPTII,S$GLB,, | Performed by: PHYSICIAN ASSISTANT

## 2024-06-12 PROCEDURE — 99999 PR PBB SHADOW E&M-EST. PATIENT-LVL V: CPT | Mod: PBBFAC,,, | Performed by: PHYSICIAN ASSISTANT

## 2024-06-12 PROCEDURE — 1160F RVW MEDS BY RX/DR IN RCRD: CPT | Mod: CPTII,S$GLB,, | Performed by: PHYSICIAN ASSISTANT

## 2024-06-12 PROCEDURE — 1159F MED LIST DOCD IN RCRD: CPT | Mod: CPTII,S$GLB,, | Performed by: PHYSICIAN ASSISTANT

## 2024-06-12 PROCEDURE — 3072F LOW RISK FOR RETINOPATHY: CPT | Mod: CPTII,S$GLB,, | Performed by: PHYSICIAN ASSISTANT

## 2024-06-12 RX ORDER — FENOFIBRATE 160 MG/1
160 TABLET ORAL DAILY
Qty: 90 TABLET | Refills: 3 | Status: SHIPPED | OUTPATIENT
Start: 2024-06-12 | End: 2025-06-12

## 2024-06-12 RX ORDER — METFORMIN HYDROCHLORIDE 500 MG/1
1000 TABLET, EXTENDED RELEASE ORAL
Qty: 180 TABLET | Refills: 3 | Status: SHIPPED | OUTPATIENT
Start: 2024-06-12 | End: 2025-06-12

## 2024-06-12 NOTE — PROGRESS NOTES
Subjective:      Patient ID: Guillermo Castillo is a 54 y.o. male.    Chief Complaint: Follow-up (3m)    HPI  Here today for a 3 month follow up to review labs after stopping insulin and metformin. He has been taking ozempic 2mg for his diabetes. Pt doing well on the ozempic without any SE. Admits to noncompliance with diet/exercise. A1C went up since last visit to 7.1 with the changes.   He is on crestor 10mg once daily and tricor for hyperlipidemia/hypertriglyceridemia. Recent labs show extremely high triglycerides. Pt denies any epigastric pain, nausea or vomiting.   No fam hx of heart attack/stroke.     Pt has diagnosis of fatty liver from year ago. Has not been rechecked but recent CT scan showed normal liver. Will recheck his ultrasound. Liver enzymes have been normal.   Patient Active Problem List   Diagnosis    Fatty liver    Type 2 diabetes mellitus without complication, without long-term current use of insulin    Mood disorder    Hypertriglyceridemia    Hyperlipidemia    Hypertension    Chronic gout    ARPAN (obstructive sleep apnea)    Class 1 obesity in adult    Shifting sleep-work schedule    Anemia    PETROS (iron deficiency anemia)    Decreased range of motion with decreased strength    Nausea    Diarrhea with dehydration    Acute blood loss anemia    Leukocytosis    Irritable bowel syndrome with diarrhea         Current Outpatient Medications:     amLODIPine (NORVASC) 10 MG tablet, Take 1 tablet by mouth once daily, Disp: 90 tablet, Rfl: 4    carvediloL (COREG) 25 MG tablet, Take 1 tablet (25 mg total) by mouth 2 (two) times daily with meals., Disp: 60 tablet, Rfl: 11    cholestyramine (QUESTRAN) 4 gram packet, Take 1 packet (4 g total) by mouth 2 (two) times daily., Disp: 180 packet, Rfl: 5    DULoxetine (CYMBALTA) 60 MG capsule, Take 1 capsule by mouth once daily, Disp: 90 capsule, Rfl: 4    iron-vitamin C 100-250 mg, ICAR-C, (ICAR-C) 100-250 mg Tab, Take 1 tablet by mouth once daily., Disp: 90 tablet,  Rfl: 3    omega-3 fatty acids/fish oil (FISH OIL-OMEGA-3 FATTY ACIDS) 300-1,000 mg capsule, Take 1 capsule by mouth once daily., Disp: , Rfl:     omeprazole (PRILOSEC) 40 MG capsule, Take 1 capsule (40 mg total) by mouth every morning., Disp: 90 capsule, Rfl: 3    ondansetron (ZOFRAN) 4 MG tablet, Take 1 tablet (4 mg total) by mouth every 6 (six) hours as needed for Nausea., Disp: 12 tablet, Rfl: 0    potassium gluconate 595 mg (99 mg) Tab, Take 1 tablet by mouth 2 (two) times a day., Disp: , Rfl:     rosuvastatin (CRESTOR) 10 MG tablet, Take 1 tablet (10 mg total) by mouth once daily., Disp: 90 tablet, Rfl: 4    semaglutide (OZEMPIC) 2 mg/dose (8 mg/3 mL) PnIj, Inject 2 mg into the skin every 7 days., Disp: 3 mL, Rfl: 11    valsartan (DIOVAN) 320 MG tablet, Take 1 tablet (320 mg total) by mouth once daily., Disp: 90 tablet, Rfl: 1    fenofibrate 160 MG Tab, Take 1 tablet (160 mg total) by mouth once daily., Disp: 90 tablet, Rfl: 3    metFORMIN (GLUCOPHAGE-XR) 500 MG ER 24hr tablet, Take 2 tablets (1,000 mg total) by mouth daily with breakfast., Disp: 180 tablet, Rfl: 3    Review of Systems   Constitutional:  Negative for activity change, appetite change, chills, diaphoresis, fatigue, fever and unexpected weight change.   HENT: Negative.  Negative for congestion, hearing loss, postnasal drip, rhinorrhea, sore throat, trouble swallowing and voice change.    Eyes: Negative.  Negative for visual disturbance.   Respiratory: Negative.  Negative for cough, choking, chest tightness and shortness of breath.    Cardiovascular:  Negative for chest pain, palpitations and leg swelling.   Gastrointestinal:  Negative for abdominal distention, abdominal pain, blood in stool, constipation, diarrhea, nausea and vomiting.   Endocrine: Negative for cold intolerance, heat intolerance, polydipsia and polyuria.   Genitourinary: Negative.  Negative for difficulty urinating and frequency.   Musculoskeletal:  Negative for arthralgias, back  pain, gait problem, joint swelling and myalgias.   Skin:  Negative for color change, pallor, rash and wound.   Neurological:  Negative for dizziness, tremors, weakness, light-headedness, numbness and headaches.   Hematological:  Negative for adenopathy.   Psychiatric/Behavioral:  Negative for behavioral problems, confusion, self-injury, sleep disturbance and suicidal ideas. The patient is not nervous/anxious.      Objective:   /80 (BP Location: Right arm, Patient Position: Sitting, BP Method: Large (Manual))   Pulse (!) 58   Temp 97.6 °F (36.4 °C) (Tympanic)   Ht 6' (1.829 m)   Wt 112.1 kg (247 lb 2.2 oz)   SpO2 98%   BMI 33.52 kg/m²     Physical Exam  Vitals and nursing note reviewed.   Constitutional:       General: He is not in acute distress.     Appearance: Normal appearance. He is well-developed. He is not ill-appearing, toxic-appearing or diaphoretic.   HENT:      Head: Normocephalic and atraumatic.   Cardiovascular:      Rate and Rhythm: Normal rate and regular rhythm.      Heart sounds: Normal heart sounds. No murmur heard.     No friction rub. No gallop.   Pulmonary:      Effort: Pulmonary effort is normal. No respiratory distress.      Breath sounds: Normal breath sounds. No wheezing or rales.   Skin:     General: Skin is warm.      Findings: No rash.   Neurological:      Mental Status: He is alert and oriented to person, place, and time.   Psychiatric:         Mood and Affect: Mood normal.         Behavior: Behavior normal.         Thought Content: Thought content normal.         Judgment: Judgment normal.       Lab Visit on 06/11/2024   Component Date Value Ref Range Status    Triglycerides 06/11/2024 669 (H)  30 - 150 mg/dL Final    Comment: The National Cholesterol Education Program (NCEP) has set the  following guidelines (reference values) for triglycerides:  Normal......................<150 mg/dL  Borderline High.............150-199 mg/dL  High........................200-499 mg/dL       Sodium 06/11/2024 137  136 - 145 mmol/L Final    Potassium 06/11/2024 4.0  3.5 - 5.1 mmol/L Final    Chloride 06/11/2024 104  95 - 110 mmol/L Final    CO2 06/11/2024 22 (L)  23 - 29 mmol/L Final    Glucose 06/11/2024 226 (H)  70 - 110 mg/dL Final    BUN 06/11/2024 14  6 - 20 mg/dL Final    Creatinine 06/11/2024 1.1  0.5 - 1.4 mg/dL Final    Calcium 06/11/2024 9.5  8.7 - 10.5 mg/dL Final    Total Protein 06/11/2024 7.4  6.0 - 8.4 g/dL Final    Albumin 06/11/2024 4.3  3.5 - 5.2 g/dL Final    Total Bilirubin 06/11/2024 0.6  0.1 - 1.0 mg/dL Final    Comment: For infants and newborns, interpretation of results should be based  on gestational age, weight and in agreement with clinical  observations.    Premature Infant recommended reference ranges:  Up to 24 hours.............<8.0 mg/dL  Up to 48 hours............<12.0 mg/dL  3-5 days..................<15.0 mg/dL  6-29 days.................<15.0 mg/dL      Alkaline Phosphatase 06/11/2024 126  55 - 135 U/L Final    AST 06/11/2024 24  10 - 40 U/L Final    ALT 06/11/2024 39  10 - 44 U/L Final    eGFR 06/11/2024 >60.0  >60 mL/min/1.73 m^2 Final    Anion Gap 06/11/2024 11  8 - 16 mmol/L Final    Hemoglobin A1C 06/11/2024 7.1 (H)  4.0 - 5.6 % Final    Comment: ADA Screening Guidelines:  5.7-6.4%  Consistent with prediabetes  >or=6.5%  Consistent with diabetes    High levels of fetal hemoglobin interfere with the HbA1C  assay. Heterozygous hemoglobin variants (HbS, HgC, etc)do  not significantly interfere with this assay.   However, presence of multiple variants may affect accuracy.      Estimated Avg Glucose 06/11/2024 157 (H)  68 - 131 mg/dL Final         Assessment:     1. Type 2 diabetes mellitus with hyperglycemia, without long-term current use of insulin    2. Hypertriglyceridemia    3. Fatty liver      Plan:   Type 2 diabetes mellitus with hyperglycemia, without long-term current use of insulin  -     metFORMIN (GLUCOPHAGE-XR) 500 MG ER 24hr tablet; Take 2 tablets (1,000  mg total) by mouth daily with breakfast.  Dispense: 180 tablet; Refill: 3  -     Hemoglobin A1C; Future; Expected date: 12/12/2024  -     Microalbumin/Creatinine Ratio, Urine; Future; Expected date: 12/12/2024    Hypertriglyceridemia  -     fenofibrate 160 MG Tab; Take 1 tablet (160 mg total) by mouth once daily.  Dispense: 90 tablet; Refill: 3  -     Lipid Panel; Future; Expected date: 06/12/2024  -     Comprehensive Metabolic Panel; Future; Expected date: 12/12/2024    Fatty liver  -     US Abdomen Limited; Future; Expected date: 06/12/2024  -     Comprehensive Metabolic Panel; Future; Expected date: 12/12/2024      -restart metformin  -increase tricor  -pt has history of fatty liver but recent CT was normal. Will recheck his ultrasound. Possibly refer to hepatology based on those results.   -recheck liver enymes after increasing dose of fenofibrate  Advise to maintain lifestyle changes with low carbohydrate, low sugar diet and exercise 30 minutes daily    Follow up in about 6 months (around 12/12/2024), or if symptoms worsen or fail to improve.

## 2024-06-13 ENCOUNTER — HOSPITAL ENCOUNTER (OUTPATIENT)
Dept: CARDIOLOGY | Facility: HOSPITAL | Age: 54
Discharge: HOME | End: 2024-06-13
Attending: STUDENT IN AN ORGANIZED HEALTH CARE EDUCATION/TRAINING PROGRAM
Payer: COMMERCIAL

## 2024-06-13 ENCOUNTER — OFFICE VISIT (OUTPATIENT)
Dept: CARDIOLOGY | Facility: CLINIC | Age: 54
End: 2024-06-13
Payer: COMMERCIAL

## 2024-06-13 VITALS
SYSTOLIC BLOOD PRESSURE: 124 MMHG | WEIGHT: 246.94 LBS | HEIGHT: 72 IN | BODY MASS INDEX: 33.45 KG/M2 | DIASTOLIC BLOOD PRESSURE: 80 MMHG | HEART RATE: 60 BPM

## 2024-06-13 DIAGNOSIS — E78.1 HYPERTRIGLYCERIDEMIA: ICD-10-CM

## 2024-06-13 DIAGNOSIS — Z82.49 FAMILY HISTORY OF HEART DISEASE: ICD-10-CM

## 2024-06-13 DIAGNOSIS — E11.9 TYPE 2 DIABETES MELLITUS WITHOUT COMPLICATION, WITHOUT LONG-TERM CURRENT USE OF INSULIN: ICD-10-CM

## 2024-06-13 DIAGNOSIS — E66.9 OBESITY (BMI 30.0-34.9): ICD-10-CM

## 2024-06-13 DIAGNOSIS — E11.59 HYPERTENSION ASSOCIATED WITH DIABETES: Primary | ICD-10-CM

## 2024-06-13 DIAGNOSIS — I10 PRIMARY HYPERTENSION: Chronic | ICD-10-CM

## 2024-06-13 DIAGNOSIS — G47.33 OSA (OBSTRUCTIVE SLEEP APNEA): Chronic | ICD-10-CM

## 2024-06-13 DIAGNOSIS — I15.2 HYPERTENSION ASSOCIATED WITH DIABETES: Primary | ICD-10-CM

## 2024-06-13 DIAGNOSIS — D50.9 IRON DEFICIENCY ANEMIA, UNSPECIFIED IRON DEFICIENCY ANEMIA TYPE: ICD-10-CM

## 2024-06-13 LAB
OHS QRS DURATION: 94 MS
OHS QTC CALCULATION: 394 MS

## 2024-06-13 PROCEDURE — 93005 ELECTROCARDIOGRAM TRACING: CPT

## 2024-06-13 PROCEDURE — 1159F MED LIST DOCD IN RCRD: CPT | Mod: CPTII,S$GLB,, | Performed by: STUDENT IN AN ORGANIZED HEALTH CARE EDUCATION/TRAINING PROGRAM

## 2024-06-13 PROCEDURE — 3008F BODY MASS INDEX DOCD: CPT | Mod: CPTII,S$GLB,, | Performed by: STUDENT IN AN ORGANIZED HEALTH CARE EDUCATION/TRAINING PROGRAM

## 2024-06-13 PROCEDURE — 93010 ELECTROCARDIOGRAM REPORT: CPT | Mod: ,,, | Performed by: INTERNAL MEDICINE

## 2024-06-13 PROCEDURE — 99204 OFFICE O/P NEW MOD 45 MIN: CPT | Mod: S$GLB,,, | Performed by: STUDENT IN AN ORGANIZED HEALTH CARE EDUCATION/TRAINING PROGRAM

## 2024-06-13 PROCEDURE — 3051F HG A1C>EQUAL 7.0%<8.0%: CPT | Mod: CPTII,S$GLB,, | Performed by: STUDENT IN AN ORGANIZED HEALTH CARE EDUCATION/TRAINING PROGRAM

## 2024-06-13 PROCEDURE — 99999 PR PBB SHADOW E&M-EST. PATIENT-LVL IV: CPT | Mod: PBBFAC,,, | Performed by: STUDENT IN AN ORGANIZED HEALTH CARE EDUCATION/TRAINING PROGRAM

## 2024-06-13 PROCEDURE — 4010F ACE/ARB THERAPY RXD/TAKEN: CPT | Mod: CPTII,S$GLB,, | Performed by: STUDENT IN AN ORGANIZED HEALTH CARE EDUCATION/TRAINING PROGRAM

## 2024-06-13 PROCEDURE — 3072F LOW RISK FOR RETINOPATHY: CPT | Mod: CPTII,S$GLB,, | Performed by: STUDENT IN AN ORGANIZED HEALTH CARE EDUCATION/TRAINING PROGRAM

## 2024-06-13 PROCEDURE — 3079F DIAST BP 80-89 MM HG: CPT | Mod: CPTII,S$GLB,, | Performed by: STUDENT IN AN ORGANIZED HEALTH CARE EDUCATION/TRAINING PROGRAM

## 2024-06-13 PROCEDURE — 3074F SYST BP LT 130 MM HG: CPT | Mod: CPTII,S$GLB,, | Performed by: STUDENT IN AN ORGANIZED HEALTH CARE EDUCATION/TRAINING PROGRAM

## 2024-06-13 NOTE — PROGRESS NOTES
Section of Cardiology                  Cardiac Clinic Note    Chief Complaint/Reason for consultation: elevated triglycerides       HPI:   Guillermo Castillo is a 54 y.o. male with h/o PETRSO, HTN, DM, high triglycerides who was referred to cardiology clinic by DAYANA Mendez for evaluation.      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       STRESS TEST Results for orders placed during the hospital encounter of 09/04/21    Exercise Stress - EKG    Interpretation Summary    The EKG portion of this study is negative for ischemia. Sensitivity is reduced secondary to the target heart rate not being achieved.    Sensitivity is reduced secondary to the target heart rate not being achieved.    The blood pressure response to stress was normal.    There were no arrhythmias during stress.    The exercise capacity was above average.       [facility] No results found for this or any previous visit.            ROS: All 10 systems reviewed. Please refer to the HPI for pertinent positives. All other systems negative.     Past Medical History  Past Medical History:   Diagnosis Date    Anemia 2020    dr mendel DWYER-19 01/16/2021    DM (diabetes mellitus)     Fatty liver     GERD (gastroesophageal reflux disease)     Gout     Hyperlipidemia     Hypertension     Hypertriglyceridemia     Mood disorder     Panic disorder     Sleep apnea     wears CPAP    Type 2 diabetes mellitus     05/2013 JAMES        Surgical History  Past Surgical History:   Procedure Laterality Date    APPENDECTOMY      COLONOSCOPY N/A 11/27/2019    Procedure: COLONOSCOPY;  Surgeon: Radha Anne MD;  Location: [facility];  Service: Endoscopy;  Laterality: N/A;    COLONOSCOPY N/A 2/22/2023    Procedure: COLONOSCOPY;  Surgeon: Princess Camara MD;  Location: [facility];  Service: Endoscopy;  Laterality: N/A;    ESOPHAGOGASTRODUODENOSCOPY N/A 11/27/2019    Procedure: EGD (ESOPHAGOGASTRODUODENOSCOPY);  Surgeon: Radha Anne MD;  Location: [facility];  Service: Endoscopy;  Laterality: N/A;    ESOPHAGOGASTRODUODENOSCOPY N/A 7/24/2023    Procedure: EGD (ESOPHAGOGASTRODUODENOSCOPY);  Surgeon: Jt Vilchis MD;  Location: [facility];  Service: Endoscopy;  Laterality: N/A;          Allergies:   Review of patient's allergies indicates:   Allergen Reactions    Simvastatin      Other reaction(s): aches/inc lfts       Social History:  Social History     Socioeconomic History    Marital status: Single   Tobacco Use    Smoking status: Never    Smokeless tobacco: Never   Substance and Sexual Activity    Alcohol use: Not Currently     Comment: rarely    Drug use: No    Sexual activity: Yes     Partners: Female       Family History:  family history includes Cataracts in his maternal grandmother and mother; Coronary artery disease in his father; Diabetes in his father, maternal aunt, and paternal aunt; Hypertension in his maternal grandmother and mother; Lung cancer in his father.    Home Medications:  Current Outpatient Medications on File Prior to Visit   Medication Sig Dispense Refill    amLODIPine (NORVASC) 10 MG tablet Take 1 tablet by mouth once daily 90 tablet 4    carvediloL (COREG) 25 MG tablet Take 1 tablet (25 mg total) by mouth 2 (two) times daily with meals. 60 tablet 11    cholestyramine (QUESTRAN) 4 gram packet Take 1 packet (4 g total) by mouth 2 (two) times daily. 180 packet 5    DULoxetine (CYMBALTA) 60 MG capsule Take 1  capsule by mouth once daily 90 capsule 4    fenofibrate 160 MG Tab Take 1 tablet (160 mg total) by mouth once daily. 90 tablet 3    metFORMIN (GLUCOPHAGE-XR) 500 MG ER 24hr tablet Take 2 tablets (1,000 mg total) by mouth daily with breakfast. 180 tablet 3    omega-3 fatty acids/fish oil (FISH OIL-OMEGA-3 FATTY ACIDS) 300-1,000 mg capsule Take 1 capsule by mouth once daily.      omeprazole (PRILOSEC) 40 MG capsule Take 1 capsule (40 mg total) by mouth every morning. 90 capsule 3    potassium gluconate 595 mg (99 mg) Tab Take 1 tablet by mouth 2 (two) times a day.      rosuvastatin (CRESTOR) 10 MG tablet Take 1 tablet (10 mg total) by mouth once daily. 90 tablet 4    semaglutide (OZEMPIC) 2 mg/dose (8 mg/3 mL) PnIj Inject 2 mg into the skin every 7 days. 3 mL 11    valsartan (DIOVAN) 320 MG tablet Take 1 tablet (320 mg total) by mouth once daily. 90 tablet 1    iron-vitamin C 100-250 mg, ICAR-C, (ICAR-C) 100-250 mg Tab Take 1 tablet by mouth once daily. (Patient not taking: Reported on 6/13/2024) 90 tablet 3    ondansetron (ZOFRAN) 4 MG tablet Take 1 tablet (4 mg total) by mouth every 6 (six) hours as needed for Nausea. (Patient not taking: Reported on 6/13/2024) 12 tablet 0     No current facility-administered medications on file prior to visit.       Physical exam:  BP (!) 140/80 (BP Location: Right arm, Patient Position: Sitting, BP Method: Large (Manual))   Pulse 60   Ht 6' (1.829 m)   Wt 112 kg (246 lb 14.6 oz)   BMI 33.49 kg/m²         General: Pt is a 54 y.o. year old male who is AAOx3, in NAD, is pleasant, well nourished, looks stated age  HEENT: PERRL, EOMI, Oral mucosa pink & moist  CVS  No abnormal cardiac pulsations noted on inspection. JVP not raised. The apical impulse is normal on palpation, and is located in the left 5th intercostal space in the mid - clavicular line. No palpable thrills or abnormal pulsations noted. RR, S1 - S2 heard, no murmurs, rubs or gallops appreciated.   PUL : CTA B/L. No  wheezes/crackles heard   ABD : BS +, soft. No tenderness elicited   LE : No C/C/E. Distal Pulses palpable B/L         LABS:    Chemistry:   Lab Results   Component Value Date     06/11/2024    K 4.0 06/11/2024     06/11/2024    CO2 22 (L) 06/11/2024    BUN 14 06/11/2024    CREATININE 1.1 06/11/2024    CALCIUM 9.5 06/11/2024     Cardiac Markers:   Lab Results   Component Value Date    TROPONINI 0.009 09/05/2021     Cardiac Markers (Last 3):   Lab Results   Component Value Date    TROPONINI 0.009 09/05/2021    TROPONINI <0.006 09/05/2021    TROPONINI <0.006 09/04/2021     CBC:   Lab Results   Component Value Date    WBC 6.87 10/13/2023    HGB 11.8 (L) 10/13/2023    HCT 37.8 (L) 10/13/2023    MCV 94 10/13/2023     10/24/2023     Lipids:   Lab Results   Component Value Date    CHOL 154 02/22/2024    TRIG 669 (H) 06/11/2024    HDL 31 (L) 02/22/2024     Coagulation:   Lab Results   Component Value Date    INR 1.0 05/11/2012    APTT 29.6 02/17/2011           Assessment        1. Hypertension associated with diabetes    2. Hypertriglyceridemia    3. ARPAN (obstructive sleep apnea)    4. Type 2 diabetes mellitus without complication, without long-term current use of insulin    5. Iron deficiency anemia, unspecified iron deficiency anemia type    6. Obesity (BMI 30.0-34.9)         Plan:    High triglycerides   Recent weight gain   Recommend regular exercise with bike riding given joint pain  Fenofibrate recently increased to 160 mg  Rechecked cholesterol prior to next visit    Diabetes   A1c 7.1  Continue therapy    Iron deficiency anemia   Reports history of iron infusions     ARPAN   Continue CPAP    Hypertension   Stable   Continue carvedilol, amlodipine, valsartan    Obesity, Body mass index is 33.49 kg/m².   Low salt, low fat diet  Exercise as tolerated, at least 30 min daily     This note was prepared using voice recognition system and is likely to have sound alike errors that may have been overlooked  even after proofreading.     I have reviewed all pertinent chart information.  Plans and recommendations have been formulated under my direct supervision. All questions answered and patient voiced understanding.   If symptoms persist go to the ED.    RTC in [address] [place], [place] [address]Emmett Cee MD  Cardiology

## 2024-07-09 ENCOUNTER — HOSPITAL ENCOUNTER (OUTPATIENT)
Dept: RADIOLOGY | Facility: HOSPITAL | Age: 54
Discharge: HOME | End: 2024-07-09
Attending: PHYSICIAN ASSISTANT
Payer: COMMERCIAL

## 2024-07-09 DIAGNOSIS — K76.0 FATTY LIVER: ICD-10-CM

## 2024-07-09 DIAGNOSIS — R16.2 HEPATOSPLENOMEGALY: Primary | ICD-10-CM

## 2024-07-09 DIAGNOSIS — K76.0 HEPATIC STEATOSIS: ICD-10-CM

## 2024-07-09 PROCEDURE — 76705 ECHO EXAM OF ABDOMEN: CPT | Mod: TC

## 2024-07-09 PROCEDURE — 76705 ECHO EXAM OF ABDOMEN: CPT | Mod: 26,,, | Performed by: STUDENT IN AN ORGANIZED HEALTH CARE EDUCATION/TRAINING PROGRAM

## 2024-07-11 ENCOUNTER — PATIENT MESSAGE (OUTPATIENT)
Dept: INTERNAL MEDICINE | Facility: CLINIC | Age: 54
End: 2024-07-11
Payer: COMMERCIAL

## 2024-08-05 DIAGNOSIS — D64.9 ANEMIA, UNSPECIFIED TYPE: ICD-10-CM

## 2024-08-05 RX ORDER — IRON,CARBONYL/ASCORBIC ACID 100-250 MG
1 TABLET ORAL DAILY
Qty: 90 TABLET | Refills: 3 | OUTPATIENT
Start: 2024-08-05

## 2024-08-20 DIAGNOSIS — I10 HYPERTENSION, UNSPECIFIED TYPE: Chronic | ICD-10-CM

## 2024-08-20 DIAGNOSIS — I10 ESSENTIAL HYPERTENSION: ICD-10-CM

## 2024-08-20 RX ORDER — DULOXETIN HYDROCHLORIDE 60 MG/1
60 CAPSULE, DELAYED RELEASE ORAL DAILY
Qty: 90 CAPSULE | Refills: 4 | Status: SHIPPED | OUTPATIENT
Start: 2024-08-20

## 2024-08-20 RX ORDER — AMLODIPINE BESYLATE 10 MG/1
10 TABLET ORAL DAILY
Qty: 90 TABLET | Refills: 4 | Status: SHIPPED | OUTPATIENT
Start: 2024-08-20

## 2024-08-20 RX ORDER — CARVEDILOL 25 MG/1
25 TABLET ORAL 2 TIMES DAILY WITH MEALS
Qty: 180 TABLET | Refills: 4 | Status: SHIPPED | OUTPATIENT
Start: 2024-08-20

## 2024-08-20 NOTE — TELEPHONE ENCOUNTER
No care due was identified.  [facility] Embedded Care Due Messages. Reference number: [id number].   8/20/2024 2:19:11 PM CDT

## 2024-09-06 DIAGNOSIS — D64.9 ANEMIA, UNSPECIFIED TYPE: ICD-10-CM

## 2024-09-09 ENCOUNTER — PATIENT OUTREACH (OUTPATIENT)
Dept: ADMINISTRATIVE | Facility: HOSPITAL | Age: 54
End: 2024-09-09
Payer: COMMERCIAL

## 2024-09-09 RX ORDER — IRON,CARBONYL/ASCORBIC ACID 100-250 MG
1 TABLET ORAL DAILY
Qty: 90 TABLET | Refills: 3 | Status: SHIPPED | OUTPATIENT
Start: 2024-09-09

## 2024-09-23 ENCOUNTER — PATIENT MESSAGE (OUTPATIENT)
Dept: OTHER | Facility: OTHER | Age: 54
End: 2024-09-23
Payer: COMMERCIAL

## 2024-09-24 NOTE — TELEPHONE ENCOUNTER
Care Due:                  Date            Visit Type   Department     Provider  --------------------------------------------------------------------------------                                MYCHART                              ANNUAL                              CHECKUP/PHY  [facility] INTERNAL  Last Visit: 08-      [facility]       Kip Siddiqui                              EP -                              PRIMARY      [facility] INTERNAL  Next Visit: 12-      CARE ([facility])   [facility]       Kip Siddiqui                                                            Last  Test          Frequency    Reason                     Performed    Due Date  --------------------------------------------------------------------------------    HBA1C.......  6 months...  metFORMIN................  06- 12-    [facility] Embedded Care Due Messages. Reference number: [id number].   9/24/2024 10:36:30 AM CDT

## 2024-09-28 RX ORDER — SEMAGLUTIDE 2.68 MG/ML
2 INJECTION, SOLUTION SUBCUTANEOUS
Qty: 3 ML | Refills: 11 | Status: SHIPPED | OUTPATIENT
Start: 2024-09-28 | End: 2025-09-28

## 2024-10-02 ENCOUNTER — PATIENT OUTREACH (OUTPATIENT)
Dept: ADMINISTRATIVE | Facility: HOSPITAL | Age: 54
End: 2024-10-02
Payer: COMMERCIAL
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
## 2024-10-03 ENCOUNTER — PATIENT MESSAGE (OUTPATIENT)
Dept: CARDIOLOGY | Facility: CLINIC | Age: 54
End: 2024-10-03
Payer: COMMERCIAL

## 2024-10-21 ENCOUNTER — PATIENT OUTREACH (OUTPATIENT)
Dept: ADMINISTRATIVE | Facility: HOSPITAL | Age: 54
End: 2024-10-21
Payer: COMMERCIAL

## 2024-10-21 NOTE — PROGRESS NOTES
Lab visit report: Per chart review, patient has lab appointment scheduled 12/2/2024 for recheck of diabetic labs.

## 2024-10-31 ENCOUNTER — OFFICE VISIT (OUTPATIENT)
Dept: CARDIOLOGY | Facility: CLINIC | Age: 54
End: 2024-10-31
Payer: COMMERCIAL

## 2024-10-31 VITALS
HEIGHT: 72 IN | WEIGHT: 244.06 LBS | OXYGEN SATURATION: 97 % | SYSTOLIC BLOOD PRESSURE: 128 MMHG | BODY MASS INDEX: 33.06 KG/M2 | DIASTOLIC BLOOD PRESSURE: 76 MMHG | HEART RATE: 64 BPM

## 2024-10-31 DIAGNOSIS — Z82.49 FAMILY HISTORY OF HEART DISEASE: ICD-10-CM

## 2024-10-31 DIAGNOSIS — E11.9 TYPE 2 DIABETES MELLITUS WITHOUT COMPLICATION, WITHOUT LONG-TERM CURRENT USE OF INSULIN: ICD-10-CM

## 2024-10-31 DIAGNOSIS — I15.2 HYPERTENSION ASSOCIATED WITH DIABETES: ICD-10-CM

## 2024-10-31 DIAGNOSIS — G47.33 OSA (OBSTRUCTIVE SLEEP APNEA): ICD-10-CM

## 2024-10-31 DIAGNOSIS — E11.59 HYPERTENSION ASSOCIATED WITH DIABETES: ICD-10-CM

## 2024-10-31 DIAGNOSIS — E66.811 OBESITY (BMI 30.0-34.9): ICD-10-CM

## 2024-10-31 DIAGNOSIS — E78.1 HYPERTRIGLYCERIDEMIA: Primary | ICD-10-CM

## 2024-10-31 DIAGNOSIS — D50.9 IRON DEFICIENCY ANEMIA, UNSPECIFIED IRON DEFICIENCY ANEMIA TYPE: ICD-10-CM

## 2024-10-31 PROCEDURE — 3051F HG A1C>EQUAL 7.0%<8.0%: CPT | Mod: CPTII,S$GLB,, | Performed by: STUDENT IN AN ORGANIZED HEALTH CARE EDUCATION/TRAINING PROGRAM

## 2024-10-31 PROCEDURE — 3008F BODY MASS INDEX DOCD: CPT | Mod: CPTII,S$GLB,, | Performed by: STUDENT IN AN ORGANIZED HEALTH CARE EDUCATION/TRAINING PROGRAM

## 2024-10-31 PROCEDURE — 3072F LOW RISK FOR RETINOPATHY: CPT | Mod: CPTII,S$GLB,, | Performed by: STUDENT IN AN ORGANIZED HEALTH CARE EDUCATION/TRAINING PROGRAM

## 2024-10-31 PROCEDURE — 1159F MED LIST DOCD IN RCRD: CPT | Mod: CPTII,S$GLB,, | Performed by: STUDENT IN AN ORGANIZED HEALTH CARE EDUCATION/TRAINING PROGRAM

## 2024-10-31 PROCEDURE — 3074F SYST BP LT 130 MM HG: CPT | Mod: CPTII,S$GLB,, | Performed by: STUDENT IN AN ORGANIZED HEALTH CARE EDUCATION/TRAINING PROGRAM

## 2024-10-31 PROCEDURE — 4010F ACE/ARB THERAPY RXD/TAKEN: CPT | Mod: CPTII,S$GLB,, | Performed by: STUDENT IN AN ORGANIZED HEALTH CARE EDUCATION/TRAINING PROGRAM

## 2024-10-31 PROCEDURE — 3078F DIAST BP <80 MM HG: CPT | Mod: CPTII,S$GLB,, | Performed by: STUDENT IN AN ORGANIZED HEALTH CARE EDUCATION/TRAINING PROGRAM

## 2024-10-31 PROCEDURE — 99214 OFFICE O/P EST MOD 30 MIN: CPT | Mod: S$GLB,,, | Performed by: STUDENT IN AN ORGANIZED HEALTH CARE EDUCATION/TRAINING PROGRAM

## 2024-10-31 PROCEDURE — 99999 PR PBB SHADOW E&M-EST. PATIENT-LVL III: CPT | Mod: PBBFAC,,, | Performed by: STUDENT IN AN ORGANIZED HEALTH CARE EDUCATION/TRAINING PROGRAM

## 2024-11-05 RX ORDER — CHOLESTYRAMINE 4 G/9G
1 POWDER, FOR SUSPENSION ORAL 2 TIMES DAILY
Qty: 180 PACKET | Refills: 5 | Status: SHIPPED | OUTPATIENT
Start: 2024-11-05 | End: 2026-04-29

## 2024-11-26 ENCOUNTER — PATIENT OUTREACH (OUTPATIENT)
Dept: ADMINISTRATIVE | Facility: HOSPITAL | Age: 54
End: 2024-11-26
Payer: COMMERCIAL

## 2024-11-26 NOTE — PROGRESS NOTES
DM LAB REPORT: Microalbumin urine, HA1c, CMP, & Lipid Panel are already linked to an upcoming Lab appt.

## 2024-12-02 ENCOUNTER — LAB VISIT (OUTPATIENT)
Dept: LAB | Facility: HOSPITAL | Age: 54
End: 2024-12-02
Attending: PHYSICIAN ASSISTANT
Payer: COMMERCIAL

## 2024-12-02 ENCOUNTER — LAB VISIT (OUTPATIENT)
Dept: LAB | Facility: HOSPITAL | Age: 54
End: 2024-12-02
Payer: COMMERCIAL

## 2024-12-02 DIAGNOSIS — E11.65 TYPE 2 DIABETES MELLITUS WITH HYPERGLYCEMIA, WITHOUT LONG-TERM CURRENT USE OF INSULIN: ICD-10-CM

## 2024-12-02 DIAGNOSIS — E78.1 HYPERTRIGLYCERIDEMIA: ICD-10-CM

## 2024-12-02 DIAGNOSIS — K76.0 FATTY LIVER: ICD-10-CM

## 2024-12-02 LAB
ALBUMIN SERPL BCP-MCNC: 4.5 G/DL (ref 3.5–5.2)
ALBUMIN/CREAT UR: 7.4 UG/MG (ref 0–30)
ALP SERPL-CCNC: 85 U/L (ref 40–150)
ALT SERPL W/O P-5'-P-CCNC: 51 U/L (ref 10–44)
ANION GAP SERPL CALC-SCNC: 13 MMOL/L (ref 8–16)
AST SERPL-CCNC: 48 U/L (ref 10–40)
BILIRUB SERPL-MCNC: 0.5 MG/DL (ref 0.1–1)
BUN SERPL-MCNC: 18 MG/DL (ref 6–20)
CALCIUM SERPL-MCNC: 10.2 MG/DL (ref 8.7–10.5)
CHLORIDE SERPL-SCNC: 104 MMOL/L (ref 95–110)
CHOLEST SERPL-MCNC: 188 MG/DL (ref 120–199)
CHOLEST/HDLC SERPL: 5.5 {RATIO} (ref 2–5)
CO2 SERPL-SCNC: 21 MMOL/L (ref 23–29)
CREAT SERPL-MCNC: 0.9 MG/DL (ref 0.5–1.4)
CREAT UR-MCNC: 136 MG/DL (ref 23–375)
EST. GFR  (NO RACE VARIABLE): >60 ML/MIN/1.73 M^2
ESTIMATED AVG GLUCOSE: 120 MG/DL (ref 68–131)
GLUCOSE SERPL-MCNC: 154 MG/DL (ref 70–110)
HBA1C MFR BLD: 5.8 % (ref 4–5.6)
HDLC SERPL-MCNC: 34 MG/DL (ref 40–75)
HDLC SERPL: 18.1 % (ref 20–50)
LDLC SERPL CALC-MCNC: 90.8 MG/DL (ref 63–159)
MICROALBUMIN UR DL<=1MG/L-MCNC: 10 UG/ML
NONHDLC SERPL-MCNC: 154 MG/DL
POTASSIUM SERPL-SCNC: 3.9 MMOL/L (ref 3.5–5.1)
PROT SERPL-MCNC: 7.4 G/DL (ref 6–8.4)
SODIUM SERPL-SCNC: 138 MMOL/L (ref 136–145)
TRIGL SERPL-MCNC: 316 MG/DL (ref 30–150)

## 2024-12-02 PROCEDURE — 80053 COMPREHEN METABOLIC PANEL: CPT | Performed by: PHYSICIAN ASSISTANT

## 2024-12-02 PROCEDURE — 80061 LIPID PANEL: CPT | Performed by: PHYSICIAN ASSISTANT

## 2024-12-02 PROCEDURE — 83036 HEMOGLOBIN GLYCOSYLATED A1C: CPT | Performed by: PHYSICIAN ASSISTANT

## 2024-12-02 PROCEDURE — 82570 ASSAY OF URINE CREATININE: CPT | Performed by: PHYSICIAN ASSISTANT

## 2024-12-09 ENCOUNTER — PATIENT MESSAGE (OUTPATIENT)
Dept: OPHTHALMOLOGY | Facility: CLINIC | Age: 54
End: 2024-12-09

## 2024-12-09 ENCOUNTER — OFFICE VISIT (OUTPATIENT)
Dept: OPHTHALMOLOGY | Facility: CLINIC | Age: 54
End: 2024-12-09
Payer: COMMERCIAL

## 2024-12-09 DIAGNOSIS — H52.4 BILATERAL PRESBYOPIA: ICD-10-CM

## 2024-12-09 DIAGNOSIS — Z79.4 TYPE 2 DIABETES MELLITUS WITHOUT COMPLICATION, WITH LONG-TERM CURRENT USE OF INSULIN: Primary | ICD-10-CM

## 2024-12-09 DIAGNOSIS — E11.9 TYPE 2 DIABETES MELLITUS WITHOUT COMPLICATION, WITH LONG-TERM CURRENT USE OF INSULIN: Primary | ICD-10-CM

## 2024-12-09 DIAGNOSIS — I10 PRIMARY HYPERTENSION: ICD-10-CM

## 2024-12-09 PROCEDURE — 3061F NEG MICROALBUMINURIA REV: CPT | Mod: CPTII,S$GLB,, | Performed by: OPTOMETRIST

## 2024-12-09 PROCEDURE — 99999 PR PBB SHADOW E&M-EST. PATIENT-LVL III: CPT | Mod: PBBFAC,,, | Performed by: OPTOMETRIST

## 2024-12-09 PROCEDURE — 3044F HG A1C LEVEL LT 7.0%: CPT | Mod: CPTII,S$GLB,, | Performed by: OPTOMETRIST

## 2024-12-09 PROCEDURE — 92015 DETERMINE REFRACTIVE STATE: CPT | Mod: S$GLB,,, | Performed by: OPTOMETRIST

## 2024-12-09 PROCEDURE — 3066F NEPHROPATHY DOC TX: CPT | Mod: CPTII,S$GLB,, | Performed by: OPTOMETRIST

## 2024-12-09 PROCEDURE — 92014 COMPRE OPH EXAM EST PT 1/>: CPT | Mod: S$GLB,,, | Performed by: OPTOMETRIST

## 2024-12-09 PROCEDURE — 2023F DILAT RTA XM W/O RTNOPTHY: CPT | Mod: CPTII,S$GLB,, | Performed by: OPTOMETRIST

## 2024-12-09 PROCEDURE — 1159F MED LIST DOCD IN RCRD: CPT | Mod: CPTII,S$GLB,, | Performed by: OPTOMETRIST

## 2024-12-09 PROCEDURE — 4010F ACE/ARB THERAPY RXD/TAKEN: CPT | Mod: CPTII,S$GLB,, | Performed by: OPTOMETRIST

## 2024-12-09 NOTE — PROGRESS NOTES
SUBJECTIVE  Guillermo Castillo is 54 y.o. male  Corrected distance visual acuity was 20/25 -2 in the right eye and 20/20 -2 in the left eye.   Chief Complaint   Patient presents with    Diabetes     Yearly diabetic eye exam. Reports of slight change in vision from last year.  Lab Results       Component                Value               Date                       HGBA1C                   5.8 (H)             12/02/2024                      HPI     Diabetes     Additional comments: Yearly diabetic eye exam. Reports of slight change   in vision from last year.  Lab Results       Component                Value               Date                       HGBA1C                   5.8 (H)             12/02/2024                      Last edited by David Palomares on 12/9/2024  2:45 PM.         Assessment /Plan :  1. Type 2 diabetes mellitus without complication, with long-term current use of insulin  No Background Diabetic Retinopathy  Strict BG control, f/u w/ PCP, and annual DFE  Stressed importance of DM control to preserve vision     2. Primary hypertension  No HTN Retinopathy, monitor annually.     3. Bilateral presbyopia  Dispense Final Rx for glasses.  RTC 1 year  Discussed above and answered questions.

## 2024-12-16 ENCOUNTER — OFFICE VISIT (OUTPATIENT)
Dept: INTERNAL MEDICINE | Facility: CLINIC | Age: 54
End: 2024-12-16
Payer: COMMERCIAL

## 2024-12-16 VITALS
WEIGHT: 242.75 LBS | HEIGHT: 72 IN | TEMPERATURE: 97 F | DIASTOLIC BLOOD PRESSURE: 76 MMHG | HEART RATE: 68 BPM | SYSTOLIC BLOOD PRESSURE: 132 MMHG | BODY MASS INDEX: 32.88 KG/M2 | OXYGEN SATURATION: 97 %

## 2024-12-16 DIAGNOSIS — Z12.5 SCREENING FOR PROSTATE CANCER: ICD-10-CM

## 2024-12-16 DIAGNOSIS — E78.1 HYPERTRIGLYCERIDEMIA: ICD-10-CM

## 2024-12-16 DIAGNOSIS — M54.31 SCIATICA OF RIGHT SIDE: ICD-10-CM

## 2024-12-16 DIAGNOSIS — E78.2 MIXED HYPERLIPIDEMIA: ICD-10-CM

## 2024-12-16 DIAGNOSIS — M1A.9XX0 CHRONIC GOUT WITHOUT TOPHUS, UNSPECIFIED CAUSE, UNSPECIFIED SITE: ICD-10-CM

## 2024-12-16 DIAGNOSIS — E11.9 TYPE 2 DIABETES MELLITUS WITHOUT COMPLICATION, WITHOUT LONG-TERM CURRENT USE OF INSULIN: Primary | ICD-10-CM

## 2024-12-16 DIAGNOSIS — Z86.39 HISTORY OF IRON DEFICIENCY: ICD-10-CM

## 2024-12-16 DIAGNOSIS — D64.9 ANEMIA, UNSPECIFIED TYPE: ICD-10-CM

## 2024-12-16 DIAGNOSIS — K76.0 FATTY LIVER: ICD-10-CM

## 2024-12-16 PROBLEM — R19.7 DIARRHEA WITH DEHYDRATION: Status: RESOLVED | Noted: 2023-02-22 | Resolved: 2024-12-16

## 2024-12-16 PROBLEM — R11.0 NAUSEA: Status: RESOLVED | Noted: 2022-09-01 | Resolved: 2024-12-16

## 2024-12-16 PROBLEM — D62 ACUTE BLOOD LOSS ANEMIA: Status: RESOLVED | Noted: 2023-07-24 | Resolved: 2024-12-16

## 2024-12-16 PROCEDURE — 1159F MED LIST DOCD IN RCRD: CPT | Mod: CPTII,S$GLB,, | Performed by: FAMILY MEDICINE

## 2024-12-16 PROCEDURE — 3075F SYST BP GE 130 - 139MM HG: CPT | Mod: CPTII,S$GLB,, | Performed by: FAMILY MEDICINE

## 2024-12-16 PROCEDURE — 3008F BODY MASS INDEX DOCD: CPT | Mod: CPTII,S$GLB,, | Performed by: FAMILY MEDICINE

## 2024-12-16 PROCEDURE — 99999 PR PBB SHADOW E&M-EST. PATIENT-LVL V: CPT | Mod: PBBFAC,,, | Performed by: FAMILY MEDICINE

## 2024-12-16 PROCEDURE — 3078F DIAST BP <80 MM HG: CPT | Mod: CPTII,S$GLB,, | Performed by: FAMILY MEDICINE

## 2024-12-16 PROCEDURE — 3044F HG A1C LEVEL LT 7.0%: CPT | Mod: CPTII,S$GLB,, | Performed by: FAMILY MEDICINE

## 2024-12-16 PROCEDURE — G2211 COMPLEX E/M VISIT ADD ON: HCPCS | Mod: S$GLB,,, | Performed by: FAMILY MEDICINE

## 2024-12-16 PROCEDURE — 3066F NEPHROPATHY DOC TX: CPT | Mod: CPTII,S$GLB,, | Performed by: FAMILY MEDICINE

## 2024-12-16 PROCEDURE — 4010F ACE/ARB THERAPY RXD/TAKEN: CPT | Mod: CPTII,S$GLB,, | Performed by: FAMILY MEDICINE

## 2024-12-16 PROCEDURE — 3061F NEG MICROALBUMINURIA REV: CPT | Mod: CPTII,S$GLB,, | Performed by: FAMILY MEDICINE

## 2024-12-16 PROCEDURE — 99214 OFFICE O/P EST MOD 30 MIN: CPT | Mod: S$GLB,,, | Performed by: FAMILY MEDICINE

## 2024-12-16 RX ORDER — MELOXICAM 15 MG/1
15 TABLET ORAL DAILY PRN
Qty: 30 TABLET | Refills: 5 | Status: SHIPPED | OUTPATIENT
Start: 2024-12-16

## 2024-12-16 NOTE — PROGRESS NOTES
Subjective:       Patient ID: Guillermo Castillo is a male.    Chief Complaint: Multiple issues see below    HPI Type 2 diabetes: a1co<6.5 digital med. Tolerating medicine. ; metf, ozmpic;back on metf 2 bid  Hypercholesterolemia:brenda med   Gout:  No c/o recent flares.   Hypertension: blood pressures at home nl.  Tolerating medicine.   Fatty liver nl . No etoh long time;   gerd controlled. No sympt    Mood / cymbalta    Anemia /hematol hx      R upper lower leg pain w/o weakness ornumbness worsened last couple months occurs with sitting driving/long plane flights    Past Medical History:   Diagnosis Date    Anemia 2020    dr mendel CARRID-19 01/16/2021    DM (diabetes mellitus)     Fatty liver     GERD (gastroesophageal reflux disease)     Gout     Hyperlipidemia     Hypertension     Hypertriglyceridemia     Mood disorder     Panic disorder     Sleep apnea     wears CPAP    Type 2 diabetes mellitus     05/2013 BS     Past Surgical History:   Procedure Laterality Date    APPENDECTOMY      COLONOSCOPY N/A 11/27/2019    Procedure: COLONOSCOPY;  Surgeon: Radha Anne MD;  Location: [facility];  Service: Endoscopy;  Laterality: N/A;    COLONOSCOPY N/A 2/22/2023    Procedure: COLONOSCOPY;  Surgeon: Princess Camara MD;  Location: [facility];  Service: Endoscopy;  Laterality: N/A;    ESOPHAGOGASTRODUODENOSCOPY N/A 11/27/2019    Procedure: EGD (ESOPHAGOGASTRODUODENOSCOPY);  Surgeon: Radha Anne MD;  Location: [facility];  Service: Endoscopy;  Laterality: N/A;    ESOPHAGOGASTRODUODENOSCOPY N/A 7/24/2023    Procedure: EGD (ESOPHAGOGASTRODUODENOSCOPY);  Surgeon: Jt Vilchis MD;  Location: [facility];  Service: Endoscopy;  Laterality: N/A;     Family History   Problem Relation Name Age of Onset    Hypertension Mother      Cataracts Mother      Coronary artery disease Father      Diabetes Father      Lung cancer Father      Interstitial lung disease Sister      Hypertension Maternal Grandmother      Cataracts  Maternal Grandmother      Diabetes Maternal Aunt      Diabetes Paternal Aunt       Social History     Socioeconomic History    Marital status: Single   Tobacco Use    Smoking status: Never    Smokeless tobacco: Never   Substance and Sexual Activity    Alcohol use: Not Currently     Comment: rarely    Drug use: No    Sexual activity: Yes     Partners: Female   Social History Narrative    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    Cardiovascular: Negative for chest pain a      Objective:      Physical Exam   Constitutional: He is oriented to person, place, and time. He appears well-developed and well-nourished.   HENT:   Head: Normocephalic and atraumatic.   Eyes: Conjunctivae and EOM are normal. Pupils are equal, round, and reactive to light.   Neck: Normal range of motion. Neck supple. Carotid bruit is not present. no mass  Cardiovascular: Normal rate and regular rhythm.    Pulmonary/Chest: Effort normal and breath sounds normal.         Neurological: He is alert and oriented to person, place, and time.   Skin: Skin is warm and dry.   Psychiatric: He has a normal mood and affect. His behavior is normal. Judgment and thought content normal.             Bilat legs 5/5 motor. Slr helps post leg pain  .  Bilateral feet with normal monofilament testing and no lesions.  Bilat dors pedis pulses 2+         Assessment:       1. Type 2 diabetes mellitus    2. Hypertension    3. Gout    4. Hypercholesteremia    5. Fatty liver      Anemia hx  Mood d/o  R leg pain- sciatica vs piriformis  Plan:   Lab and follow up after in 3 months Nadege;if A1c improving more consider dec met top two instead of 4 daily    Type 2 diabetes mellitus without complication, without long-term current use of insulin  -     Hemoglobin A1C; Future; Expected date: 03/16/2025  -     CBC Auto Differential; Future; Expected date: 03/16/2025  -     Vitamin B12; Future;  Expected date: 03/16/2025    Fatty liver    Mixed hyperlipidemia    Chronic gout without tophus, unspecified cause, unspecified site  -     Uric Acid; Future; Expected date: 03/16/2025    Hypertriglyceridemia    Anemia, unspecified type    Sciatica of right side  -     Ambulatory referral/consult to Physical/Occupational Therapy; Future; Expected date: 12/23/2024    Screening for prostate cancer  -     PSA, Screening; Future; Expected date: 03/16/2025    History of iron deficiency  -     Ferritin; Future; Expected date: 03/16/2025    Other orders  -     meloxicam (MOBIC) 15 MG tablet; Take 1 tablet (15 mg total) by mouth daily as needed for Pain.  Dispense: 30 tablet; Refill: 5       If no better 4-6 week r leg symp after PT followup sooner if any worsening or change in symptoms or lack of resolution      Defers switch to moun

## 2024-12-17 ENCOUNTER — OFFICE VISIT (OUTPATIENT)
Dept: GASTROENTEROLOGY | Facility: CLINIC | Age: 54
End: 2024-12-17
Payer: COMMERCIAL

## 2024-12-17 VITALS
BODY MASS INDEX: 33.23 KG/M2 | SYSTOLIC BLOOD PRESSURE: 138 MMHG | WEIGHT: 245.38 LBS | DIASTOLIC BLOOD PRESSURE: 74 MMHG | HEIGHT: 72 IN

## 2024-12-17 DIAGNOSIS — R14.0 ABDOMINAL BLOATING: Primary | ICD-10-CM

## 2024-12-17 DIAGNOSIS — R10.9 ABDOMINAL DISCOMFORT: ICD-10-CM

## 2024-12-17 PROCEDURE — 1159F MED LIST DOCD IN RCRD: CPT | Mod: CPTII,S$GLB,, | Performed by: INTERNAL MEDICINE

## 2024-12-17 PROCEDURE — 3008F BODY MASS INDEX DOCD: CPT | Mod: CPTII,S$GLB,, | Performed by: INTERNAL MEDICINE

## 2024-12-17 PROCEDURE — 99999 PR PBB SHADOW E&M-EST. PATIENT-LVL IV: CPT | Mod: PBBFAC,,, | Performed by: INTERNAL MEDICINE

## 2024-12-17 PROCEDURE — 3061F NEG MICROALBUMINURIA REV: CPT | Mod: CPTII,S$GLB,, | Performed by: INTERNAL MEDICINE

## 2024-12-17 PROCEDURE — 4010F ACE/ARB THERAPY RXD/TAKEN: CPT | Mod: CPTII,S$GLB,, | Performed by: INTERNAL MEDICINE

## 2024-12-17 PROCEDURE — 3044F HG A1C LEVEL LT 7.0%: CPT | Mod: CPTII,S$GLB,, | Performed by: INTERNAL MEDICINE

## 2024-12-17 PROCEDURE — 3066F NEPHROPATHY DOC TX: CPT | Mod: CPTII,S$GLB,, | Performed by: INTERNAL MEDICINE

## 2024-12-17 PROCEDURE — 99214 OFFICE O/P EST MOD 30 MIN: CPT | Mod: S$GLB,,, | Performed by: INTERNAL MEDICINE

## 2024-12-17 PROCEDURE — 3075F SYST BP GE 130 - 139MM HG: CPT | Mod: CPTII,S$GLB,, | Performed by: INTERNAL MEDICINE

## 2024-12-17 PROCEDURE — 3078F DIAST BP <80 MM HG: CPT | Mod: CPTII,S$GLB,, | Performed by: INTERNAL MEDICINE

## 2024-12-17 NOTE — PROGRESS NOTES
[facility]  Gastroenterology    PCP: Kip Siddiqui MD    12/17/24    Reason for Visit: Abdominal bloating, Abdominal discomfort    Subjective:   Guillermo Castillo is a 54 y.o. male here for complaints of abdominal bloating and abdominal discomfort. Previously celiac panel negative. EGD 07/2023 with gastric and duodenal bx unremarkable. Of note, patient is a diabetic and on Ozempic. Ozempic dose was increased over the past year to a higher dose 2.5.  CT scan 09/2023 showed moderate to severe gastric distention which could reflect gastroparesis. US 09/2024 showed normal gallbladder but fatty liver so patient referred to hepatology. Chronically has diarrhea- most likely felt to be IBS-D as prior colonoscopies with bx unremarkable and stool studies have been negative for infection. Bloating and discomfort is more in upper abdomen.       Past Medical History:   Diagnosis Date    Anemia 2020    dr mendel DWYER-19 01/16/2021    DM (diabetes mellitus)     Fatty liver     GERD (gastroesophageal reflux disease)     Gout     Hyperlipidemia     Hypertension     Hypertriglyceridemia     Mood disorder     Panic disorder     Sleep apnea     wears CPAP    Type 2 diabetes mellitus     05/2013 BS       Past Surgical History:   Procedure Laterality Date    APPENDECTOMY      COLONOSCOPY N/A 11/27/2019    Procedure: COLONOSCOPY;  Surgeon: Radha Anne MD;  Location: [facility];  Service: Endoscopy;  Laterality: N/A;    COLONOSCOPY N/A 2/22/2023    Procedure: COLONOSCOPY;  Surgeon: Princess Camara MD;  Location: [facility];  Service: Endoscopy;  Laterality: N/A;    ESOPHAGOGASTRODUODENOSCOPY N/A 11/27/2019    Procedure: EGD (ESOPHAGOGASTRODUODENOSCOPY);  Surgeon: Radha Anne MD;  Location: [facility];  Service: Endoscopy;  Laterality: N/A;    ESOPHAGOGASTRODUODENOSCOPY N/A 7/24/2023    Procedure: EGD (ESOPHAGOGASTRODUODENOSCOPY);  Surgeon: Jt Vilchis MD;  Location: [facility];  Service: Endoscopy;   Laterality: N/A;       Current Outpatient Medications on File Prior to Visit   Medication Sig Dispense Refill    amLODIPine (NORVASC) 10 MG tablet Take 1 tablet by mouth once daily 90 tablet 4    carvediloL (COREG) 25 MG tablet Take 1 tablet (25 mg total) by mouth 2 (two) times daily with meals. 180 tablet 4    cholestyramine (QUESTRAN) 4 gram packet Take 1 packet (4 g total) by mouth 2 (two) times daily. 180 packet 5    DULoxetine (CYMBALTA) 60 MG capsule Take 1 capsule by mouth once daily 90 capsule 4    fenofibrate 160 MG Tab Take 1 tablet (160 mg total) by mouth once daily. 90 tablet 3    iron-vitamin C 100-250 mg, ICAR-C, (ICAR-C) 100-250 mg Tab Take 1 tablet by mouth once daily. 90 tablet 3    meloxicam (MOBIC) 15 MG tablet Take 1 tablet (15 mg total) by mouth daily as needed for Pain. 30 tablet 5    metFORMIN (GLUCOPHAGE-XR) 500 MG ER 24hr tablet Take 2 tablets (1,000 mg total) by mouth 2 (two) times daily with meals. 360 tablet 1    omega-3 fatty acids/fish oil (FISH OIL-OMEGA-3 FATTY ACIDS) 300-1,000 mg capsule Take 1 capsule by mouth once daily.      omeprazole (PRILOSEC) 40 MG capsule Take 1 capsule (40 mg total) by mouth every morning. 90 capsule 3    ondansetron (ZOFRAN) 4 MG tablet Take 1 tablet (4 mg total) by mouth every 6 (six) hours as needed for Nausea. 12 tablet 0    potassium gluconate 595 mg (99 mg) Tab Take 1 tablet by mouth 2 (two) times a day.      rosuvastatin (CRESTOR) 10 MG tablet Take 1 tablet (10 mg total) by mouth once daily. 90 tablet 4    semaglutide (OZEMPIC) 2 mg/dose (8 mg/3 mL) PnIj Inject 2 mg into the skin every 7 days. 3 mL 11    valsartan (DIOVAN) 320 MG tablet Take 1 tablet (320 mg total) by mouth once daily. 90 tablet 1     No current facility-administered medications on file prior to visit.       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   HENT:  Negative for sore throat and trouble swallowing.    Eyes:  Negative for visual disturbance.   Respiratory:  Negative for cough, shortness of breath and wheezing.    Cardiovascular:  Negative for chest pain, palpitations and leg swelling.   Gastrointestinal:  Positive for abdominal distention and abdominal pain (discomfort). Negative for blood in stool, constipation, diarrhea, nausea and vomiting.   Genitourinary:  Negative for dysuria.   Musculoskeletal:  Negative for arthralgias and myalgias.   Skin:  Negative for color change, pallor and rash.   Neurological:  Negative for dizziness and weakness.   Hematological:  Negative for adenopathy.   Psychiatric/Behavioral:  Negative for agitation.            Objective:   Vitals: There were no vitals filed for this visit.    Physical Exam  Vitals reviewed.   Constitutional:       General: He is not in acute distress.     Appearance: He is not diaphoretic.   HENT:      Head: Normocephalic and atraumatic.      Mouth/Throat:      Pharynx: No oropharyngeal exudate.   Eyes:      General: No scleral icterus.        Right eye: No discharge.         Left eye: No discharge.      Conjunctiva/sclera: Conjunctivae normal.       Pupils: Pupils are equal, round, and reactive to light.   Cardiovascular:      Rate and Rhythm: Normal rate and regular rhythm.   Abdominal:      General: There is distension.      Palpations: Abdomen is soft. There is no mass.      Tenderness: There is no abdominal tenderness. There is no guarding.   Musculoskeletal:         General: Normal range of motion.      Cervical back: Normal range of motion.   Skin:     General: Skin is warm and dry.      Coloration: Skin is not pale.      Findings: No erythema or rash.   Neurological:      Mental Status: He is alert and oriented to person, place, and time.             IMPRESSION     Problem List Items Addressed This Visit    None  Visit Diagnoses       Abdominal bloating    -  Primary    Abdominal discomfort                PLANS:    - NM GE study given prior CT findings and risk factor of Ozempic use/diabetes  - If GE study negative, recommend to repeat EGD and consider breath testing to r/o SIBO  - Celiac panel negative  - EGD 07/2023 with gastric and duodenal bx negative  - Further recs pending the above    Abdominal bloating    Abdominal discomfort        Princess Camara MD  Gastroenterology

## 2024-12-19 NOTE — TELEPHONE ENCOUNTER
----- Message from Елена Park MD sent at 10/11/2019 12:33 AM CDT -----  Please call pt to let him know possibly iron deficiency and trial ferrous sulfate 325mg twice daily recommended. I am concerned about degree of anemia new since 1/2019 from normal values and recommend GI scope evaluation (2 yrs early since he is 48yo) however should rule out slow GI bleed causing this. I have ordered GI scope.    
----- Message from Елена Park MD sent at 10/11/2019 12:36 AM CDT -----  Please shedule also fup in 1 month with me and labs week prior  
Called patient with lab results and recommendations for taking ferrous sulfate 325mg  otc two daily. Patient  Verbalized understanding and he also mentioned he was scheduled for upper endo and colonscopy for Nov. 27, 2019  
Sent a message to endo to schedule for upper endo and colonscopy waiting for answer.  
Discharged

## 2025-01-06 ENCOUNTER — CLINICAL SUPPORT (OUTPATIENT)
Dept: REHABILITATION | Facility: HOSPITAL | Age: 55
End: 2025-01-06
Payer: COMMERCIAL

## 2025-01-06 DIAGNOSIS — R52 PAIN: ICD-10-CM

## 2025-01-06 DIAGNOSIS — M54.31 SCIATICA OF RIGHT SIDE: ICD-10-CM

## 2025-01-06 DIAGNOSIS — R68.89 DECREASED FUNCTIONAL ACTIVITY TOLERANCE: Primary | ICD-10-CM

## 2025-01-06 DIAGNOSIS — R53.1 DECREASED RANGE OF MOTION WITH DECREASED STRENGTH: ICD-10-CM

## 2025-01-06 DIAGNOSIS — Z74.09 DECREASED FUNCTIONAL MOBILITY AND ENDURANCE: ICD-10-CM

## 2025-01-06 DIAGNOSIS — M25.60 DECREASED RANGE OF MOTION WITH DECREASED STRENGTH: ICD-10-CM

## 2025-01-06 DIAGNOSIS — R26.9 GAIT ABNORMALITY: ICD-10-CM

## 2025-01-06 PROCEDURE — 97162 PT EVAL MOD COMPLEX 30 MIN: CPT | Performed by: PHYSICAL THERAPIST

## 2025-01-06 PROCEDURE — 97110 THERAPEUTIC EXERCISES: CPT | Performed by: PHYSICAL THERAPIST

## 2025-01-06 NOTE — PLAN OF CARE
[facility]   Physical Therapy Initial Evaluation      Date: 1/6/2025   Name: Guillermo Castillo  Clinic Number: [id number]    Therapy Diagnosis:    Encounter Diagnoses   Name Primary?    Sciatica of right side     Decreased functional activity tolerance Yes    Decreased functional mobility and endurance     Decreased range of motion with decreased strength     Gait abnormality     Pain       Physician: Kip Siddiqui MD     Physician Orders: PT Eval and Treat  Medical Diagnosis from Referral: Sciatica of right side  Evaluation Date: 1/6/2025  Authorization Period Expiration: 12/16/25  Plan of Care Expiration: 3/6/2025  Progress Note Due: 2/6/2025  Visit # / Visits authorized: 1/1  FOTO: 1/3 (last performed on 1/6/2025)        Precautions: Standard, Diabetes, and HTN    Time In: 5:30 pm   Time Out: 6:30 pm   Total Billable Time (timed & untimed codes): 55 minutes    SUBJECTIVE   Date of onset: A few months    History of current condition - 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 Noticed a few months ago that he was having some pain in backside and then has started to spread down leg. F/U with Dr Siddiqui who has referred for Physical Therapy.  He noted that sitting in [employer] traffic makes his pain much worse and notes that he is having more pain with the position of putting pressure on and off leg. (Gas and brake)  He reports that he can usually see chiropractor and and feel better but this time it did not help.  Hx of right ankle pain after multiple sprains, when walks at times. Balance on right side has not been good for some time.  He reports sitting in a vehicle with cloth seats seems to be better then sitting with leather seats.    Current Activity Level: Low, having other issues and will be having gastric emptying test tomorrow.    Falls: [] No  [x] Yes: Last fall he thinks was in 2022 -  when fell 4 feet after pool steps broke. Fell out of a  tree a few years ago (2016).    Imaging: [] Xray [] MRI [] CT:   None    Prior Therapy:  [] No  [x] Yes: not for this   Social History: Patient lives alone.  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  Prior Level of Function: no pain with any activities.  Current Level of Function:  standing ~ Ok, walking ~ 150 yards, sitting ~ time varies, about 30 min.    Pain:  Current 2/10, worst 8/10, best 0-1/10   Location: starts in upper buttock on right side and spreads down to bottom of Achilles - no foot pain  Description: Aching and Dull  Aggravating Factors: driving in traffic, cloth seat seems to be better then leather seats.  Easing Factors: when sitting sometimes putting hand under thigh will help. Shifting to left side helps.      Patients goals: Get out of pain.    Medical History:   Past Medical History:   Diagnosis Date    Anemia 2020    dr mendel DWYER-19 01/16/2021    DM (diabetes mellitus)     Fatty liver     GERD (gastroesophageal reflux disease)     Gout     Hyperlipidemia     Hypertension     Hypertriglyceridemia     Mood disorder     Panic disorder     Sleep apnea     wears CPAP    Type 2 diabetes mellitus     05/2013 BS       Surgical History:   Guillermo Castillo  has a past surgical history that includes Appendectomy; Colonoscopy (N/A, 11/27/2019); Esophagogastroduodenoscopy (N/A, 11/27/2019); Colonoscopy (N/A, 2/22/2023); and Esophagogastroduodenoscopy (N/A, 7/24/2023).    Medications:   Guillermo has a current medication list which includes the following prescription(s): amlodipine, carvedilol, cholestyramine, duloxetine, fenofibrate, iron-vitamin c 100-250 mg (icar-c), meloxicam, metformin, fish oil-omega-3 fatty acids, omeprazole, ondansetron, potassium gluconate, rosuvastatin, ozempic, and valsartan.    Allergies:   Review of patient's allergies indicates:   Allergen Reactions    Simvastatin      Other reaction(s): aches/inc lfts         OBJECTIVE     Posture:  Patient presents with postural abnormalities which include:    [x] Forward Head   [x] Decreased Lumbar Lordosis   [x] Rounded Shoulder   [] Flat Back Posture   [] Increased Thoracic Kyphosis [] Pes Planus   [] Increased Trunk Sway  [] Valgus knee position   [] Increased Trunk Rotation  [] Varus knee position   [] Increased cervical lordosis [] Other:    Sensation:    Sensation to light touch over UE's is  [] Intact [] Impaired [x] Defer  Sensation to light touch over LE's is  [x] Intact [] Impaired [] Defer    Reflexes:  Patellar    [x] Defer [] Normal [] Hyper []  Hypo   Achilles   [x] Defer [] Normal [] Hyper []  Hypo  Tricep   [x] Defer [] Normal [] Hyper []  Hypo  Brachioradialis [x] Defer [] Normal [] Hyper []  Hypo      Gait Analysis: The patient ambulated with the following assistive device: none; the patient presents with the following gait abnormalities: occasional unsteady gait, decreased step length, increased base of support, and trunk shift side-side.     Balance  Right   (seconds) Left  (seconds) Norms   Single Leg Stance 10 30 Less than 4.9 sec high risk  5-6.4 sec increased risk  6.5 or greater low risk       Functional Tests  Outcome Norms   Five Time Sit to Stand    Greater than 14 sec high risk  12.1-14 sec increased risk  12 sec or less low risk 23.37 sec 20-29 yrs ? 6.0±1.4 sec.  30-39 yrs ? 6.1±1.4 sec.  40-49 yrs ? 7.6±1.8 sec.  50-59 yrs ? 7.7±2.6 sec.  60-69 yrs ? 8.4±0.0 sec (male), 12.7±1.8 sec (female)  70-79 yrs ? 11.6±3.4 sec (male), 13.0±4.8 sec (female)  80-89 yrs ? 16.7±4.5 sec (male), 17.2±5.5 sec (female)        AROM:  Motion: Movement Results:   Multi-Segmental Flexion mid thigh   Multi-Segmental Extension 15%   Multi-Segmental Rotation 25% to left increased upper back, hip pain to right    Arms Down Deep Squat defer       Strength:    L/E MMT Right  (spine) Left Pain/Dysfunction with Movement   Hip Flexion  3+/5 3+/5 Pain on left resistance over right  side LB   Hip Abduction  3/5 4-/5    Knee Extension 4+/5 4+/5    Knee Flexion 4+/5 4+/5    Hip IR 4-/5 4/5    Hip ER 4-/5 4/5    Ankle DF 5/5 5/5    Ankle PF 5/5 5/5        Muscle Length:       Muscle Tested  Right Left  Limitation   Hip Flexors [] Normal      [x] Limited [x] Normal      [] Limited    Quadriceps [] Normal      [x] Limited [x] Normal      [] Limited    Hamstrings  [] Normal      [x] Limited [] Normal      [x] Limited    Piriformis  [] Normal      [x] Limited [] Normal      [x] Limited        Joint Mobility:   JOINT MOBILITY CHARTS:   Joint Motion  Right Mobility  (spine) Left Mobility Goal   Thoracic PA  [x] Hypo     [] Normal     [] Hyper --- Normal   Costotransverse  [x] Hypo     [] Normal     [] Hyper [x] Hypo     [] Normal     [] Hyper Normal    Lumbar PA [x] Hypo     [] Normal     [] Hyper --- Normal   Hip:  [x] Hypo     [] Normal     [] Hyper [] Hypo     [x] Normal     [] Hyper Normal       Special Tests:     Right  (spine) Left    SLUMP [x] Positive    [] Negative [] Positive    [x] Negative   ZENY  [x] Positive    [] Negative [] Positive    [x] Negative   FADIR  [] Positive    [x] Negative [] Positive    [x] Negative   Scour  [] Positive    [x] Negative [] Positive    [x] Negative       Palpation:  No tenderness to palpation.      FOTO:    Intake Outcome Measure for FOTO LB Survey    Therapist reviewed FOTO scores for Guillermo Castillo on 1/6/2025.   FOTO documents entered into [employer] - see Media section or FOTO account episode details..    Intake Score: see below         TREATMENT     Total Treatment time (time-based codes) separate from Evaluation: (10) minutes       CPT Intervention Performed  1/6/2025  Duration / Intensity     MT        TE  home exercise program  x                       NMR                          TA                                                                       PLAN  Nustep/Treadmill;                  PATIENT EDUCATION AND HOME EXERCISES     Education provided:  (during treatment session) minutes  Home exercise program, and importance of strength and mobility.    Written Home Exercises Provided: yes.  Exercises were reviewed and Car was able to demonstrate them prior to the end of the session.  Car demonstrated good  understanding of the education provided. See EMR under Patient Instructions for exercises provided during therapy sessions.    ASSESSMENT     Guillermo is a 54 y.o. male referred to outpatient Physical Therapy with a medical diagnosis of Sciatica of right side . The patient presents with signs and symptoms consistent with diagnosis along with  impairments which include weakness, impaired endurance, impaired functional mobility, gait instability, impaired balance, decreased lower extremity function, pain, decreased ROM, and impaired muscle length.      Patient prognosis is Good, if patient is consistent and compliant with Physical Therapy and home exercise program.  Patient will benefit from skilled outpatient Physical Therapy to address the deficits stated above and in the chart below, provide patient/family education, and to maximize patient's level of independence.     Plan of care discussed with patient: Yes  Patient's spiritual, cultural and educational needs considered and patient is agreeable to the plan of care and goals as stated below:     Anticipated Barriers for therapy: co-morbidities, sedentary lifestyle, chronicity of condition, adherence to treatment plan, and occupation      Medical Necessity is demonstrated by the following   History  Co-morbidities and personal factors that may impact the plan of care [] LOW: no personal factors / co-morbidities  [x] MODERATE: 1-2 personal factors / co-morbidities  [] HIGH: 3+ personal factors / co-morbidities    Moderate / High Support Documentation:   Past Medical History:   Diagnosis Date    Anemia 2020    dr oglesby    COVID-19 01/16/2021    DM (diabetes mellitus)     Fatty liver     GERD (gastroesophageal  reflux disease)     Gout     Hyperlipidemia     Hypertension     Hypertriglyceridemia     Mood disorder     Panic disorder     Sleep apnea     wears CPAP    Type 2 diabetes mellitus     05/2013 BS         Examination  Body Structures and Functions, activity limitations and participation restrictions that may impact the plan of care [] LOW: addressing 1-2 elements  [x] MODERATE: 3+ elements  [] HIGH: 4+ elements (please support below)    Moderate / High Support Documentation: See evaluation / objective measurements above and FOTO.     Clinical Presentation [] LOW: stable  [x] MODERATE: Evolving  [] HIGH: Unstable     Decision Making/ Complexity Score: moderate         Goals:  Reviewed: 1/6/2025      Short Term Goals: In 4 weeks  Progress Date   1.Patient to be educated on HEP. [x] Progressing  [] MET   [] Not MET   [] Not tested    2.Patient to increase Trunk range of motion, in order to improve available range of motion for ADL's.  [x] Progressing  [] MET   [] Not MET  [] Not tested    3.Patient to increase bilateral LE strength by 1/2 grade, in order to improve endurance and increase ability to perform all functional activities for increased time.  [x] Progressing  [] MET   [] Not MET  [] Not tested    4.Patient to have pain less than 5/10 at worst, to improve QOL. [x] Progressing  [] MET   [] Not MET  [] Not tested    5.Patient to improve score on the FOTO, to improve QOL. [x] Progressing  [] MET   [] Not MET  [] Not tested    6. Patient to improve score on 5 times sit to  order to decrease fall risk. [x] Progressing  [] MET   [] Not MET  [] Not tested      Long Term Goals: In 8 weeks Progress Date   1.Patient to improve score on the FOTO predicted score or better, to improve QOL. [x] Progressing  [] MET   [] Not MET  [] Not tested    2. Patient to increase bilateral LE strength to 4+/5 or greater, in order to improve endurance and increase ability to perform all functional activities for increased time.  [x] Progressing  [] MET   [] Not MET  [] Not tested    3. Patient to have decreased pain to 2/10 at worst, to improve QOL. [x] Progressing  [] MET   [] Not MET  [] Not tested    4. Patient to normalize score on 5 times sit to stand , in order to improve endurance and decrease fall risk. [x] Progressing  [] MET   [] Not MET  [] Not tested    5. Patient to perform daily activities including driving and sitting with only mild increased symptoms. [x] Progressing  [] MET   [] Not MET  [] Not tested      PLAN     Plan of care Certification: 1/6/2025  to 3/6/2025.    Outpatient Physical Therapy 1 times weekly for 8 weeks to include any combination of the following interventions: Aquatic Therapy, virtual visits, electrical stimulation (PRN), Manual Therapy, Neuromuscular Re-ed, Patient Education/Self Care, Therapeutic Activites, Therapeutic Exercise, Dry Needling, and Moist Hot Pack/Cold Pack    Екатерина Donovan, PT

## 2025-01-07 ENCOUNTER — HOSPITAL ENCOUNTER (OUTPATIENT)
Dept: RADIOLOGY | Facility: HOSPITAL | Age: 55
Discharge: HOME | End: 2025-01-07
Attending: INTERNAL MEDICINE
Payer: COMMERCIAL

## 2025-01-07 DIAGNOSIS — R14.0 ABDOMINAL BLOATING: ICD-10-CM

## 2025-01-07 DIAGNOSIS — R10.9 ABDOMINAL DISCOMFORT: ICD-10-CM

## 2025-01-07 PROCEDURE — A9541 TC99M SULFUR COLLOID: HCPCS | Performed by: INTERNAL MEDICINE

## 2025-01-07 PROCEDURE — 78264 GASTRIC EMPTYING IMG STUDY: CPT | Mod: 26,,, | Performed by: RADIOLOGY

## 2025-01-07 PROCEDURE — 78264 GASTRIC EMPTYING IMG STUDY: CPT | Mod: TC

## 2025-01-07 RX ORDER — TECHNETIUM TC 99M SULFUR COLLOID 2 MG
1.04 KIT MISCELLANEOUS
Status: COMPLETED | OUTPATIENT
Start: 2025-01-07 | End: 2025-01-07

## 2025-01-07 RX ADMIN — TECHNETIUM TC 99M SULFUR COLLOID KIT 1.04 MILLICURIE: KIT at 08:01

## 2025-01-08 PROBLEM — R26.9 GAIT ABNORMALITY: Status: ACTIVE | Noted: 2025-01-08

## 2025-01-08 PROBLEM — R68.89 DECREASED FUNCTIONAL ACTIVITY TOLERANCE: Status: ACTIVE | Noted: 2025-01-08

## 2025-01-08 PROBLEM — M25.60 DECREASED RANGE OF MOTION WITH DECREASED STRENGTH: Status: RESOLVED | Noted: 2021-10-11 | Resolved: 2025-01-08

## 2025-01-08 PROBLEM — R52 PAIN: Status: ACTIVE | Noted: 2025-01-08

## 2025-01-08 PROBLEM — R53.1 DECREASED RANGE OF MOTION WITH DECREASED STRENGTH: Status: ACTIVE | Noted: 2025-01-08

## 2025-01-08 PROBLEM — Z74.09 DECREASED FUNCTIONAL MOBILITY AND ENDURANCE: Status: ACTIVE | Noted: 2025-01-08

## 2025-01-08 PROBLEM — M25.60 DECREASED RANGE OF MOTION WITH DECREASED STRENGTH: Status: ACTIVE | Noted: 2025-01-08

## 2025-01-08 PROBLEM — R53.1 DECREASED RANGE OF MOTION WITH DECREASED STRENGTH: Status: RESOLVED | Noted: 2021-10-11 | Resolved: 2025-01-08

## 2025-01-14 ENCOUNTER — OFFICE VISIT (OUTPATIENT)
Dept: GASTROENTEROLOGY | Facility: CLINIC | Age: 55
End: 2025-01-14
Payer: COMMERCIAL

## 2025-01-14 VITALS
WEIGHT: 239.44 LBS | DIASTOLIC BLOOD PRESSURE: 89 MMHG | HEIGHT: 72 IN | HEART RATE: 61 BPM | SYSTOLIC BLOOD PRESSURE: 147 MMHG | BODY MASS INDEX: 32.43 KG/M2

## 2025-01-14 DIAGNOSIS — R10.9 ABDOMINAL PAIN, UNSPECIFIED ABDOMINAL LOCATION: ICD-10-CM

## 2025-01-14 DIAGNOSIS — K31.84 GASTROPARESIS: Primary | ICD-10-CM

## 2025-01-14 DIAGNOSIS — R14.0 ABDOMINAL BLOATING: ICD-10-CM

## 2025-01-14 PROCEDURE — 99999 PR PBB SHADOW E&M-EST. PATIENT-LVL IV: CPT | Mod: PBBFAC,,, | Performed by: INTERNAL MEDICINE

## 2025-01-14 PROCEDURE — 3008F BODY MASS INDEX DOCD: CPT | Mod: CPTII,S$GLB,, | Performed by: INTERNAL MEDICINE

## 2025-01-14 PROCEDURE — 1159F MED LIST DOCD IN RCRD: CPT | Mod: CPTII,S$GLB,, | Performed by: INTERNAL MEDICINE

## 2025-01-14 PROCEDURE — 4010F ACE/ARB THERAPY RXD/TAKEN: CPT | Mod: CPTII,S$GLB,, | Performed by: INTERNAL MEDICINE

## 2025-01-14 PROCEDURE — 3079F DIAST BP 80-89 MM HG: CPT | Mod: CPTII,S$GLB,, | Performed by: INTERNAL MEDICINE

## 2025-01-14 PROCEDURE — 3072F LOW RISK FOR RETINOPATHY: CPT | Mod: CPTII,S$GLB,, | Performed by: INTERNAL MEDICINE

## 2025-01-14 PROCEDURE — 3077F SYST BP >= 140 MM HG: CPT | Mod: CPTII,S$GLB,, | Performed by: INTERNAL MEDICINE

## 2025-01-14 PROCEDURE — 99213 OFFICE O/P EST LOW 20 MIN: CPT | Mod: S$GLB,,, | Performed by: INTERNAL MEDICINE

## 2025-01-14 NOTE — PROGRESS NOTES
[facility]  Gastroenterology    PCP: Kip Siddiqui MD    1/14/25    Reason for Visit: Follow-up NM GE test    Subjective:   Guillermo Castillo is a 54 y.o. male here for follow up abnormal gastric emptying test. Patient previously seen for complaints of abdominal bloating and discomfort, EGD was unremarkable. US RUQ negative aside from fatty liver. NM GE test ordered and returned positive for gastroparesis. Patient was on Ozempic and dose had been increased- has been on this dose for a long time. +nausea, - abdominal pain or vomiting.       Past Medical History:   Diagnosis Date    Anemia 2020    dr mendel DWYER-19 01/16/2021    DM (diabetes mellitus)     Fatty liver     GERD (gastroesophageal reflux disease)     Gout     Hyperlipidemia     Hypertension     Hypertriglyceridemia     Mood disorder     Panic disorder     Sleep apnea     wears CPAP    Type 2 diabetes mellitus     05/2013 BS       Past Surgical History:   Procedure Laterality Date    APPENDECTOMY      COLONOSCOPY N/A 11/27/2019    Procedure: COLONOSCOPY;  Surgeon: Radha Anne MD;  Location: [facility];  Service: Endoscopy;  Laterality: N/A;    COLONOSCOPY N/A 2/22/2023    Procedure: COLONOSCOPY;  Surgeon: Princess Camara MD;  Location: [facility];  Service: Endoscopy;  Laterality: N/A;    ESOPHAGOGASTRODUODENOSCOPY N/A 11/27/2019    Procedure: EGD (ESOPHAGOGASTRODUODENOSCOPY);  Surgeon: Radha Anne MD;  Location: [facility];  Service: Endoscopy;  Laterality: N/A;    ESOPHAGOGASTRODUODENOSCOPY N/A 7/24/2023    Procedure: EGD (ESOPHAGOGASTRODUODENOSCOPY);  Surgeon: Jt Vilchis MD;  Location: [facility];  Service: Endoscopy;  Laterality: N/A;       Current Outpatient Medications on File Prior to Visit   Medication Sig Dispense Refill    amLODIPine (NORVASC) 10 MG tablet Take 1 tablet by mouth once daily 90 tablet 4    carvediloL (COREG) 25 MG tablet Take 1 tablet (25 mg total) by mouth 2 (two) times daily with meals.  180 tablet 4    cholestyramine (QUESTRAN) 4 gram packet Take 1 packet (4 g total) by mouth 2 (two) times daily. 180 packet 5    DULoxetine (CYMBALTA) 60 MG capsule Take 1 capsule by mouth once daily 90 capsule 4    fenofibrate 160 MG Tab Take 1 tablet (160 mg total) by mouth once daily. 90 tablet 3    iron-vitamin C 100-250 mg, ICAR-C, (ICAR-C) 100-250 mg Tab Take 1 tablet by mouth once daily. 90 tablet 3    meloxicam (MOBIC) 15 MG tablet Take 1 tablet (15 mg total) by mouth daily as needed for Pain. 30 tablet 5    metFORMIN (GLUCOPHAGE-XR) 500 MG ER 24hr tablet Take 2 tablets (1,000 mg total) by mouth 2 (two) times daily with meals. 360 tablet 1    omega-3 fatty acids/fish oil (FISH OIL-OMEGA-3 FATTY ACIDS) 300-1,000 mg capsule Take 1 capsule by mouth once daily.      omeprazole (PRILOSEC) 40 MG capsule Take 1 capsule (40 mg total) by mouth every morning. 90 capsule 3    ondansetron (ZOFRAN) 4 MG tablet Take 1 tablet (4 mg total) by mouth every 6 (six) hours as needed for Nausea. 12 tablet 0    potassium gluconate 595 mg (99 mg) Tab Take 1 tablet by mouth 2 (two) times a day.      rosuvastatin (CRESTOR) 10 MG tablet Take 1 tablet (10 mg total) by mouth once daily. 90 tablet 4    semaglutide (OZEMPIC) 2 mg/dose (8 mg/3 mL) PnIj Inject 2 mg into the skin every 7 days. 3 mL 11    valsartan (DIOVAN) 320 MG tablet Take 1 tablet (320 mg total) by mouth once daily. 90 tablet 1     No current facility-administered medications on file prior to visit.       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   HENT:  Negative for sore throat and trouble swallowing.    Eyes:  Negative for visual disturbance.   Respiratory:  Negative for cough, shortness of breath and wheezing.    Cardiovascular:  Negative for chest pain, palpitations and leg swelling.   Gastrointestinal:  Positive for abdominal distention and nausea. Negative for abdominal pain, blood in stool, constipation, diarrhea and vomiting.   Genitourinary:  Negative for dysuria.   Musculoskeletal:  Negative for arthralgias and myalgias.   Skin:  Negative for color change, pallor and rash.   Neurological:  Negative for dizziness and weakness.   Hematological:  Negative for adenopathy.   Psychiatric/Behavioral:  Negative for agitation.            Objective:   Vitals:   Vitals:    01/14/25 0857   BP: (!) 147/89   Pulse: 61       Physical Exam  Vitals reviewed.   Constitutional:       General: He is not in acute distress.     Appearance: He is not diaphoretic.   HENT:      Head: Normocephalic and atraumatic.      Mouth/Throat:      Pharynx: No oropharyngeal exudate.   Eyes:      General: No scleral icterus.        Right eye: No discharge.         Left eye: No discharge.      Conjunctiva/sclera: Conjunctivae normal.      Pupils: Pupils are equal, round, and reactive to light.   Cardiovascular:      Rate and Rhythm: Normal rate and regular rhythm.   Abdominal:      General: There is no distension.      Palpations: There is no mass.   Musculoskeletal:         General: Normal range of motion.      Cervical back:  Normal range of motion.   Skin:     General: Skin is warm and dry.      Coloration: Skin is not pale.      Findings: No erythema or rash.   Neurological:      Mental Status: He is alert and oriented to person, place, and time.         NM Gastric Emptying    Result Date: 1/7/2025  EXAMINATION: NM GASTRIC EMPTYING CLINICAL HISTORY: R/o gastroparesis;  Abdominal distension (gaseous) TECHNIQUE: The patient received 1.04 mCi orally of sulfur colloid labeled meal in less than 10 minutes. Anterior and posterior projection images were obtained immediately and at 30-60 minute intervals for 4 hours. Geometric mean of the anterior and the posterior images was generated. The decay-corrected time activity curve and the percentage of the retention and emptying were obtained. COMPARISON: None. FINDINGS: At 4 hours the percentage of retention is 73 % (normal retention at 4 hours is 10% and lower).  The raw data T1/2 time is 460.3 minutes.     Delayed gastric emptying. Electronically signed by: Yazan Cameron DO Date:    01/07/2025 Time:    13:17      IMPRESSION     Problem List Items Addressed This Visit    None  Visit Diagnoses       Gastroparesis    -  Primary    Abdominal bloating        Abdominal pain, unspecified abdominal location                PLANS:    - Gastroparesis diet discussed- 5-6 smaller meals throughout the day instead of 3 larger meals. Low fat, low residue  - Discussed Ozempic can cause gastroparesis- needs to discuss with PCP about alternative medications that can be used  - Discussed Reglan and potential SE. Patient would like to avoid at this time. Patient would like to focus on dietary efforts and continued glycemic control   - Follow-up as needed if symptoms fail to improve    Gastroparesis    Abdominal bloating    Abdominal pain, unspecified abdominal location        Princess Camara MD  Gastroenterology

## 2025-01-15 ENCOUNTER — CLINICAL SUPPORT (OUTPATIENT)
Dept: REHABILITATION | Facility: HOSPITAL | Age: 55
End: 2025-01-15
Payer: COMMERCIAL

## 2025-01-15 DIAGNOSIS — R26.9 GAIT ABNORMALITY: ICD-10-CM

## 2025-01-15 DIAGNOSIS — R53.1 DECREASED RANGE OF MOTION WITH DECREASED STRENGTH: ICD-10-CM

## 2025-01-15 DIAGNOSIS — R52 PAIN: ICD-10-CM

## 2025-01-15 DIAGNOSIS — R68.89 DECREASED FUNCTIONAL ACTIVITY TOLERANCE: Primary | ICD-10-CM

## 2025-01-15 DIAGNOSIS — Z74.09 DECREASED FUNCTIONAL MOBILITY AND ENDURANCE: ICD-10-CM

## 2025-01-15 DIAGNOSIS — M25.60 DECREASED RANGE OF MOTION WITH DECREASED STRENGTH: ICD-10-CM

## 2025-01-15 PROCEDURE — 97110 THERAPEUTIC EXERCISES: CPT | Performed by: PHYSICAL THERAPIST

## 2025-01-15 PROCEDURE — 97112 NEUROMUSCULAR REEDUCATION: CPT | Performed by: PHYSICAL THERAPIST

## 2025-01-15 PROCEDURE — 97140 MANUAL THERAPY 1/> REGIONS: CPT | Performed by: PHYSICAL THERAPIST

## 2025-01-15 NOTE — PROGRESS NOTES
"  [facility]   Physical Therapy Treatment Note        Name: Guillermo Castillo  Clinic Number: [id number]    Therapy Diagnosis: No diagnosis found.  Physician: Kip Siddiqui MD    Visit Date: 1/15/2025    Physician Orders: ***  Medical Diagnosis: ***  Evaluation Date: ***  Authorization Period Expiration: ***  Plan of Care Certification Period: ***  Progress Note Due: ***    Visit # / Visits authorized: ***/ ***   FOTO: ***/ ***   PTA Visit #: ***/5     Precautions: {IP WOUND PRECAUTIONS OHS:37098}    Time In: ***  Time Out: ***  Total Billable Time: *** minutes      SUBJECTIVE     Pt reports: ***.  He {Actions; was/was not:94708} compliant with home exercise program.  Response to previous treatment: ***  Functional change: ***    Pain: {0-10:10487::"0"}/10  Location: {RIGHT/LEFT/BILATERAL:07841} {LOCATION ON BODY:31204}     OBJECTIVE     Objective Measures updated at progress report unless specified.       TREATMENT       CPT Intervention Performed  1/15/2025  Duration / Intensity     MT  extensive LB clearing and paraspinals  x           TE  TM  x  7 min      LTR x  3 min      ball roll outs - silver ball  x 10x FW  8x sides               NMR  PPT x  3 min      ball squeeze  x  1 min 30 sec    Supine clamshells x 3 min      bridges  x 5x/ 2 sets    Sidelying hip  x 10x/ 2 sets    KB carry #15 x 10 steps each side         TA                                                                       PLAN              CPT Codes available for Billing:   (00) minutes of Manual therapy (MT) to improve pain and ROM.  (00) minutes of Therapeutic Exercise (TE) to develop strength, endurance, range of motion, and flexibility.  (00) minutes of Neuromuscular Re-Education (NMR)  to improve: Balance, Coordination, Kinesthetic Sense, Proprioception, and Posture.  (00) minutes of Therapeutic Activities (TA) to improve functional performance.  Unattended Electrical Stimulation (ES) for muscle performance or " "pain modulation.  BFR: Blood flow restriction applied during exercise  NP or (-): Not Performed              PATIENT EDUCATION AND HOME EXERCISES     Home Exercises Provided and Patient Education Provided     Education provided:   - ***    Written Home Exercises Provided: {Blank single:90355::"yes","Patient instructed to cont prior HEP"}. Exercises were reviewed and Car was able to demonstrate them prior to the end of the session.  Car demonstrated {Desc; good/fair/poor:93352} understanding of the education provided. See EMR under Patient Instructions for exercises provided during therapy sessions      ASSESSMENT     ***    Car {IS/IS NOT:26744} progressing well towards his goals.   Pt prognosis is {REHAB PROGNOSIS OHS:02491}.     Pt will continue to benefit from skilled outpatient physical therapy to address the deficits listed in the problem list box on initial evaluation, provide pt/family education and to maximize pt's level of independence in the home and community environment.     Pt's spiritual, cultural and educational needs considered and pt agreeable to plan of care and goals.     Anticipated barriers to physical therapy: ***    Goals:   Reviewed: 1/15/2025    ***    PLAN     Monitor response to today's treatment session and progress with Physical Therapy plan of care as indicated.    ***    Екатерина Donovan, PT     "

## 2025-01-15 NOTE — TELEPHONE ENCOUNTER
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   Acute infected chronic right ankle ulcer with hardware in place  - S/p I&D and hardware removal on  with Ortho; 1 screw retained  Sepsis, present on admission  Acute kidney injury  L1 vertebral compression fracture  Acute encephalopathy, resolving  Mildly decompensated CHF  New onset atrial fibrillation  Comorbidities: HTN, DM2, anxiety, depression, hypothyroidism        RECOMMENDATIONS  Continue cefazolin x 6 weeks from 25 to 2025  Weekly labs as outpatient  Follow-up with me as outpatient  Will need lifelong prophylaxis due to retained hardware  PICC line ordered  Please page ID for next dressing changes      Labs and imaging reviewed.     Suresh Ruiz DO  [phone]        -------------------------------------------------------------------------------------------------------------------    SUBJECTIVE:  No overnight events  Reports pain is ongoing  Afebrile      ANTIMICROBIALS  Cefazolin      OBJECTIVE:  Tmax Temp (24hrs), Av.9 °F (36.6 °C), Min:97.5 °F (36.4 °C), Max:98.4 °F (36.9 °C)     Last Vitals   Vitals:    01/15/25 1127   BP: 130/62   Pulse: (!) 55   Resp:    Temp: 97.5 °F (36.4 °C)       Gen: Pt is NAD. Alert.  HEENT: NC/AT.  Anicteric  CVS: S1, S2.   Lung:CTA bl  Abd: Soft.  NT/ND.  Extr: Right ankle wrapped in surgical dressings with wound VAC on  Skin: WWP  Neuro: Awake, alert      LAB:  Recent Labs     25  0356 25  0416 01/15/25  0402   WBC 9.0 9.6 9.4   HGB 8.2* 8.7* 8.3*   HCT 28.0* 29.0* 27.3*    216 205   MCV 98.9 96.7 97.2       Recent Labs   Lab 01/15/25  0402 25  0416 25  0356   SODIUM 141 141 138   POTASSIUM 4.0 4.2 4.7   CHLORIDE 106 106 108   CO2 27 27 24   BUN 8 10 11   CREATININE 0.56 0.59 0.59   GLUCOSE 119* 121* 113*   CALCIUM 9.5 9.6 9.4       Microbiology Results  (Last 10 results in the past 7  Chart reviewed for [facility]     days)      Specimen   Gram Smear   Culture Result   Status       01/09/25  1444         Rare Polymorphonuclear cells.            No epithelial cells seen.            No organisms seen.            Few Polymorphonuclear cells.            No epithelial cells seen.            No organisms seen.                    RADIOLOGY: images reviewed       Note:  Assessment and Plan have been moved to the top of the screen for ease of the viewer such that the plan can be seen without scrolling to the bottom of the note.

## 2025-01-17 NOTE — PROGRESS NOTES
[facility]   Physical Therapy Treatment Note        Name: Guillermo Castillo  [facility] Number: [id number]    Therapy Diagnosis:   Encounter Diagnoses   Name Primary?    Decreased functional activity tolerance Yes    Decreased functional mobility and endurance     Decreased range of motion with decreased strength     Gait abnormality     Pain      Physician: Kip Siddiqui MD    Visit Date: 1/15/2025    Physician Orders: PT Eval and Treat  Medical Diagnosis from Referral: Sciatica of right side  Evaluation Date: 1/6/2025  Authorization Period Expiration: 12/16/25  Plan of Care Expiration: 3/6/2025  Progress Note Due: 2/6/2025  Visit # / Visits authorized: 1/10 (+1)  FOTO: 1/3 (last performed on 1/6/2025)          Precautions: Standard, Diabetes, and HTN    Time In: 5:15 pm  Time Out: 6:15 pm  Total Billable Time: 53 minutes      SUBJECTIVE     Pt reports: His gastric testing did reveal gastroparesis. He is continuing to have leg pain and now LBP as well. Minor improvement after manual today,  some nausea at end of treatment session from stomach. Post treatment more pain in lower back less/no pain in leg.  Encouraged patient to let therapist know if any exercises are increasing back pain or stomach pain/discomfort so we can modify as needed.    He was compliant with home exercise program.  Response to previous treatment: increased LBP  Functional change: no change    Pain: NT/10  Location:  starts in upper buttock on right side and spreads down to bottom of Achilles - no foot pain     OBJECTIVE     Objective Measures updated at progress report unless specified.       TREATMENT       CPT Intervention Performed  1/15/2025  Duration / Intensity     MT  extensive soft tissue mobilization and clearing at iliac crest and LB paraspinals  x  15 min         TE  TM  x  7 min      LTR x  3 min      ball roll outs - silver ball  x  x 10x FW  8x sides               NMR  PPT x  3 min (extensive cues)       ball squeeze  x  1 min 30 sec    Supine clamshells x 3 min      bridges  x 5x/ 2 sets    Sidelying hip  x 10x/ 2 sets    KB carry #15 x 10 steps each side         TA                                                                       PLAN              CPT Codes available for Billing:   (15) minutes of Manual therapy (MT) to improve pain and ROM.  (15) minutes of Therapeutic Exercise (TE) to develop strength, endurance, range of motion, and flexibility.  (23) minutes of Neuromuscular Re-Education (NMR)  to improve: Balance, Coordination, Kinesthetic Sense, Proprioception, and Posture.  (00) minutes of Therapeutic Activities (TA) to improve functional performance.  Unattended Electrical Stimulation (ES) for muscle performance or pain modulation.  BFR: Blood flow restriction applied during exercise  NP or (-): Not Performed              PATIENT EDUCATION AND HOME EXERCISES     Home Exercises Provided and Patient Education Provided     Education provided:   - home exercise program      Written Home Exercises Provided: Patient instructed to cont prior HEP. Exercises were reviewed and Car was able to demonstrate them prior to the end of the session.  Car demonstrated good  understanding of the education provided. See EMR under Patient Instructions for exercises provided during therapy sessions      ASSESSMENT     Patient tolerated treatment session well and was able to complete interventions as noted with increased time and cues for technique and to avoid substitution. He did report decreased leg pain at end of treatment session and was encouraged to continue with home exercise program.      Car Is progressing well towards his goals.   Pt prognosis is Good.     Pt will continue to benefit from skilled outpatient physical therapy to address the deficits listed in the problem list box on initial evaluation, provide pt/family education and to maximize pt's level of independence in the home and community environment.      Pt's spiritual, cultural and educational needs considered and pt agreeable to plan of care and goals.     Anticipated barriers to physical therapy:  co-morbidities, sedentary lifestyle, chronicity of condition, adherence to treatment plan, and occupation     Goals:   Reviewed: 1/15/2025       Short Term Goals: In 4 weeks  Progress Date   1.Patient to be educated on HEP. [x] Progressing  [] MET   [] Not MET   [] Not tested     2.Patient to increase Trunk range of motion, in order to improve available range of motion for ADL's.  [x] Progressing  [] MET   [] Not MET  [] Not tested     3.Patient to increase bilateral LE strength by 1/2 grade, in order to improve endurance and increase ability to perform all functional activities for increased time.  [x] Progressing  [] MET   [] Not MET  [] Not tested     4.Patient to have pain less than 5/10 at worst, to improve QOL. [x] Progressing  [] MET   [] Not MET  [] Not tested     5.Patient to improve score on the FOTO, to improve QOL. [x] Progressing  [] MET   [] Not MET  [] Not tested     6. Patient to improve score on 5 times sit to  order to decrease fall risk. [x] Progressing  [] MET   [] Not MET  [] Not tested        Long Term Goals: In 8 weeks Progress Date   1.Patient to improve score on the FOTO predicted score or better, to improve QOL. [x] Progressing  [] MET   [] Not MET  [] Not tested     2. Patient to increase bilateral LE strength to 4+/5 or greater, in order to improve endurance and increase ability to perform all functional activities for increased time. [x] Progressing  [] MET   [] Not MET  [] Not tested     3. Patient to have decreased pain to 2/10 at worst, to improve QOL. [x] Progressing  [] MET   [] Not MET  [] Not tested     4. Patient to normalize score on 5 times sit to stand , in order to improve endurance and decrease fall risk. [x] Progressing  [] MET   [] Not MET  [] Not tested     5. Patient to perform daily activities including driving  and sitting with only mild increased symptoms. [x] Progressing  [] MET   [] Not MET  [] Not tested          PLAN     Monitor response to today's treatment session and progress with Physical Therapy plan of care as indicated.    Plan of care Certification: 1/6/2025  to 3/6/2025.     Outpatient Physical Therapy 1 times weekly for 8 weeks to include any combination of the following interventions: Aquatic Therapy, virtual visits, electrical stimulation (PRN), Manual Therapy, Neuromuscular Re-ed, Patient Education/Self Care, Therapeutic Activites, Therapeutic Exercise, Dry Needling, and Moist Hot Pack/Cold Pack    Екатерина Donovan, PT

## 2025-01-31 NOTE — TELEPHONE ENCOUNTER
----- Message from Kip Siddiqui MD sent at 11/15/2020 10:46 PM CST -----  Please let him know his anemia has returned but not as bad. Please f/u dr oglesby   Called pt again to see if he was going to come in to sign his paperwork for his physical. No answer, left message.

## 2025-02-10 PROBLEM — R16.0 HEPATOMEGALY: Status: ACTIVE | Noted: 2024-07-09

## 2025-02-10 NOTE — PROGRESS NOTES
Hepatology Note    PATIENT: Guillermo Castillo  MRN: [id number]  DATE: 2/12/2025    Provider: Hepatologist - Dr Vilchis  Urgency of review: non-urgent  Referring provider: Nadege Mendez    Diagnosis:   1. Metabolic dysfunction-associated steatotic liver disease (MASLD)    2. Hepatomegaly    3. Fatty liver        Chief complaint:   Chief Complaint   Patient presents with    Fatty Liver       Subjective:    Initial History: Guillermo Castillo is a 55 y.o. male who was referred to Hepatology Clinic for consultation of fatty Liver      02/12/2025   Patient was found to have fatty liver during evaluation of abdominal pain  EGD was unremarkable. US RUQ negative aside from fatty liver.     As regards to liver disease,  - The patient reports no symptoms of hepatitis including malaise or flu-like symptoms to suggest a flare.  - The patient reports no new manifestations of portal hypertension including ascites, edema, GI bleeding, or hepatic encephalopathy to suggest liver decompensation.  - The patient reports no fevers/chills or pruritis to suggest biliary disease.        Prior Relevant History:    He  denies hepatotoxic medication    Review of systems:  A review of 12+ systems was conducted with pertinent positive and negative findings documented in HPI with all other systems reviewed and negative.      PFSH:  Past medical, family, and social history reviewed as documented in chart with pertinent positive medical, family, and social history detailed in HPI.    Past Medical History:   Past Medical History:   Diagnosis Date    Anemia 2020    dr mendel DWYER-19 01/16/2021    DM (diabetes mellitus)     Fatty liver     GERD (gastroesophageal reflux disease)     Gout     Hyperlipidemia     Hypertension     Hypertriglyceridemia     Mood disorder     Panic disorder     Sleep apnea     wears CPAP    Type 2 diabetes mellitus     05/2013 BS       Past Surgical HIstory:   Past Surgical History:   Procedure Laterality Date     APPENDECTOMY      COLONOSCOPY N/A 11/27/2019    Procedure: COLONOSCOPY;  Surgeon: Radha Anne MD;  Location: [facility] ENDO;  Service: Endoscopy;  Laterality: N/A;    COLONOSCOPY N/A 2/22/2023    Procedure: COLONOSCOPY;  Surgeon: Princess Camara MD;  Location: [facility] ENDO;  Service: Endoscopy;  Laterality: N/A;    ESOPHAGOGASTRODUODENOSCOPY N/A 11/27/2019    Procedure: EGD (ESOPHAGOGASTRODUODENOSCOPY);  Surgeon: Radha Anne MD;  Location: [facility] ENDO;  Service: Endoscopy;  Laterality: N/A;    ESOPHAGOGASTRODUODENOSCOPY N/A 7/24/2023    Procedure: EGD (ESOPHAGOGASTRODUODENOSCOPY);  Surgeon: Jt Vilchis MD;  Location: [facility] ENDO;  Service: Endoscopy;  Laterality: N/A;       Family History:   Family History   Problem Relation Name Age of Onset    Hypertension Mother      Cataracts Mother      Coronary artery disease Father      Diabetes Father      Lung cancer Father      Interstitial lung disease Sister      Hypertension Maternal Grandmother      Cataracts Maternal Grandmother      Diabetes Maternal Aunt      Diabetes Paternal Aunt       He has no known family history of liver disease.     Social History:  reports that he has never smoked. He has been exposed to tobacco smoke. He has never used smokeless tobacco. He reports that he does not currently use alcohol. He reports that he does not use drugs.    He has no history of Alcohol     He denies history of IV drug use/Tatto  He  denies high-risk sexual contacts, no raw seafood, no sick contacts      Allergies:  Review of patient's allergies indicates:   Allergen Reactions    Simvastatin      Other reaction(s): aches/inc lfts       Medications:  Current Outpatient Medications   Medication Sig Dispense Refill    amLODIPine (NORVASC) 10 MG tablet Take 1 tablet by mouth once daily 90 tablet 4    carvediloL (COREG) 25 MG tablet Take 1 tablet (25 mg total) by mouth 2 (two) times daily with meals. 180 tablet 4    cholestyramine (QUESTRAN) 4 gram packet Take 1  packet (4 g total) by mouth 2 (two) times daily. 180 packet 5    DULoxetine (CYMBALTA) 60 MG capsule Take 1 capsule by mouth once daily 90 capsule 4    fenofibrate 160 MG Tab Take 1 tablet (160 mg total) by mouth once daily. 90 tablet 3    iron-vitamin C 100-250 mg, ICAR-C, (ICAR-C) 100-250 mg Tab Take 1 tablet by mouth once daily. 90 tablet 3    meloxicam (MOBIC) 15 MG tablet Take 1 tablet (15 mg total) by mouth daily as needed for Pain. 30 tablet 5    metFORMIN (GLUCOPHAGE-XR) 500 MG ER 24hr tablet Take 2 tablets (1,000 mg total) by mouth 2 (two) times daily with meals. 360 tablet 1    omega-3 fatty acids/fish oil (FISH OIL-OMEGA-3 FATTY ACIDS) 300-1,000 mg capsule Take 1 capsule by mouth once daily.      omeprazole (PRILOSEC) 40 MG capsule Take 1 capsule (40 mg total) by mouth every morning. 90 capsule 3    potassium gluconate 595 mg (99 mg) Tab Take 1 tablet by mouth 2 (two) times a day.      rosuvastatin (CRESTOR) 10 MG tablet Take 1 tablet (10 mg total) by mouth once daily. 90 tablet 4    semaglutide (OZEMPIC) 2 mg/dose (8 mg/3 mL) PnIj Inject 2 mg into the skin every 7 days. 3 mL 11    valsartan (DIOVAN) 320 MG tablet Take 1 tablet (320 mg total) by mouth once daily. 90 tablet 1    ondansetron (ZOFRAN) 4 MG tablet Take 1 tablet (4 mg total) by mouth every 6 (six) hours as needed for Nausea. (Patient not taking: Reported on 2/12/2025) 12 tablet 0     No current facility-administered medications for this visit.       Review of Systems   Constitutional:  Negative for fatigue, fever and unexpected weight change.   HENT:  Negative for ear pain, nosebleeds and trouble swallowing.    Eyes:  Negative for discharge and redness.   Respiratory:  Negative for cough and shortness of breath.    Cardiovascular:  Negative for palpitations and leg swelling.   Gastrointestinal:  Negative for abdominal distention, abdominal pain, diarrhea and vomiting.   Endocrine: Negative for cold intolerance and polyuria.   Genitourinary:   Negative for flank pain and hematuria.   Musculoskeletal:  Negative for back pain.   Skin:  Negative for pallor.   Neurological:  Negative for seizures and headaches.   Hematological:  Does not bruise/bleed easily.   Psychiatric/Behavioral:  Negative for confusion and hallucinations.          Objective:      Vitals:   Vitals:    02/12/25 0902   BP: 138/82   BP Location: Right arm   Patient Position: Sitting   Pulse: 63   SpO2: 98%   Weight: 111.1 kg (245 lb 0.7 oz)   Height: 6' (1.829 m)       Physical Exam  Constitutional:       Appearance: Normal appearance.   HENT:      Head: Normocephalic and atraumatic.      Right Ear: Tympanic membrane and external ear normal.      Left Ear: Tympanic membrane and external ear normal.      Mouth/Throat:      Mouth: Mucous membranes are moist.   Eyes:      Extraocular Movements: Extraocular movements intact.      Pupils: Pupils are equal, round, and reactive to light.   Cardiovascular:      Rate and Rhythm: Normal rate and regular rhythm.      Pulses: Normal pulses.      Heart sounds: Normal heart sounds.   Pulmonary:      Effort: Pulmonary effort is normal.      Breath sounds: Normal breath sounds.   Abdominal:      General: Bowel sounds are normal. There is no distension.      Palpations: Abdomen is soft. There is no mass.      Tenderness: There is no abdominal tenderness.   Musculoskeletal:         General: No swelling or deformity. Normal range of motion.      Cervical back: Normal range of motion and neck supple.   Skin:     Coloration: Skin is not jaundiced.   Neurological:      General: No focal deficit present.      Mental Status: He is alert and oriented to person, place, and time.      Cranial Nerves: No cranial nerve deficit.   Psychiatric:         Mood and Affect: Mood normal.         Behavior: Behavior normal.         Laboratory Data:  No visits with results within 1 Week(s) from this visit.   Latest known visit with results is:   Lab Visit on 12/02/2024   Component  Date Value Ref Range Status    Cholesterol 12/02/2024 188  120 - 199 mg/dL Final    Comment: The National Cholesterol Education Program (NCEP) has set the  following guidelines (reference ranges) for Cholesterol:  Optimal.....................<200 mg/dL  Borderline High.............200-239 mg/dL  High........................> or = 240 mg/dL      Triglycerides 12/02/2024 316 (H)  30 - 150 mg/dL Final    Comment: The National Cholesterol Education Program (NCEP) has set the  following guidelines (reference values) for triglycerides:  Normal......................<150 mg/dL  Borderline High.............150-199 mg/dL  High........................200-499 mg/dL      HDL 12/02/2024 34 (L)  40 - 75 mg/dL Final    Comment: The National Cholesterol Education Program (NCEP) has set the  following guidelines (reference values) for HDL Cholesterol:  Low...............<40 mg/dL  Optimal...........>60 mg/dL      LDL Cholesterol 12/02/2024 90.8  63.0 - 159.0 mg/dL Final    Comment: The National Cholesterol Education Program (NCEP) has set the  following guidelines (reference values) for LDL Cholesterol:  Optimal.......................<130 mg/dL  Borderline High...............130-159 mg/dL  High..........................160-189 mg/dL  Very High.....................>190 mg/dL      HDL/Cholesterol Ratio 12/02/2024 18.1 (L)  20.0 - 50.0 % Final    Total Cholesterol/HDL Ratio 12/02/2024 5.5 (H)  2.0 - 5.0 Final    Non-HDL Cholesterol 12/02/2024 154  mg/dL Final    Comment: Risk category and Non-HDL cholesterol goals:  Coronary heart disease (CHD)or equivalent (10-year risk of CHD >20%):  Non-HDL cholesterol goal     <130 mg/dL  Two or more CHD risk factors and 10-year risk of CHD <= 20%:  Non-HDL cholesterol goal     <160 mg/dL  0 to 1 CHD risk factor:  Non-HDL cholesterol goal     <190 mg/dL      Hemoglobin A1C 12/02/2024 5.8 (H)  4.0 - 5.6 % Final    Comment: ADA Screening Guidelines:  5.7-6.4%  Consistent with prediabetes  >or=6.5%   "Consistent with diabetes    High levels of fetal hemoglobin interfere with the HbA1C  assay. Heterozygous hemoglobin variants (HbS, HgC, etc)do  not significantly interfere with this assay.   However, presence of multiple variants may affect accuracy.      Estimated Avg Glucose 12/02/2024 120  68 - 131 mg/dL Final    Sodium 12/02/2024 138  136 - 145 mmol/L Final    Potassium 12/02/2024 3.9  3.5 - 5.1 mmol/L Final    Chloride 12/02/2024 104  95 - 110 mmol/L Final    CO2 12/02/2024 21 (L)  23 - 29 mmol/L Final    Glucose 12/02/2024 154 (H)  70 - 110 mg/dL Final    BUN 12/02/2024 18  6 - 20 mg/dL Final    Creatinine 12/02/2024 0.9  0.5 - 1.4 mg/dL Final    Calcium 12/02/2024 10.2  8.7 - 10.5 mg/dL Final    Total Protein 12/02/2024 7.4  6.0 - 8.4 g/dL Final    Albumin 12/02/2024 4.5  3.5 - 5.2 g/dL Final    Total Bilirubin 12/02/2024 0.5  0.1 - 1.0 mg/dL Final    Comment: For infants and newborns, interpretation of results should be based  on gestational age, weight and in agreement with clinical  observations.    Premature Infant recommended reference ranges:  Up to 24 hours.............<8.0 mg/dL  Up to 48 hours............<12.0 mg/dL  3-5 days..................<15.0 mg/dL  6-29 days.................<15.0 mg/dL      Alkaline Phosphatase 12/02/2024 85  40 - 150 U/L Final    AST 12/02/2024 48 (H)  10 - 40 U/L Final    ALT 12/02/2024 51 (H)  10 - 44 U/L Final    eGFR 12/02/2024 >60.0  >60 mL/min/1.73 m^2 Final    Anion Gap 12/02/2024 13  8 - 16 mmol/L Final       Lab Results   Component Value Date    INR 1.0 05/11/2012    INR 1.0 08/22/2011    INR 1.0 02/17/2011       No results found for: "SMOOTHMUSCAB", "MITOAB"  Lab Results   Component Value Date    IRON 65 06/07/2023    TIBC 392 06/07/2023    FERRITIN 348 (H) 06/07/2023     Lab Results   Component Value Date    HEPAIGM Negative 05/14/2013    HEPBIGM Negative 05/14/2013    HEPBCAB Negative 11/12/2010    HEPCAB Negative 09/18/2023     Lab Results   Component Value Date " "   TSH 1.638 09/29/2019     No results found for: "MARY BETH"    No results found for: "ABORH"        Lab Results   Component Value Date    HGBA1C 5.8 (H) 12/02/2024     Lab Results   Component Value Date    CHOL 188 12/02/2024    CHOL 158 10/24/2024    CHOL 154 02/22/2024     Lab Results   Component Value Date    HDL 34 (L) 12/02/2024    HDL 33 (L) 10/24/2024    HDL 31 (L) 02/22/2024     Lab Results   Component Value Date    LDLCALC 90.8 12/02/2024    LDLCALC 77.4 10/24/2024    LDLCALC 51.8 (L) 02/22/2024     Lab Results   Component Value Date    TRIG 316 (H) 12/02/2024    TRIG 238 (H) 10/24/2024    TRIG 669 (H) 06/11/2024     Lab Results   Component Value Date    CHOLHDL 18.1 (L) 12/02/2024    CHOLHDL 20.9 10/24/2024    CHOLHDL 20.1 02/22/2024         I personally reviewed imaging studies and outside records..      Assessment:       1. Metabolic dysfunction-associated steatotic liver disease (MASLD)    2. Hepatomegaly    3. Fatty liver               The patient's risk factors for MAFLD include:    Obesity/overweight                     yes There is no height or weight on file to calculate BMI.  Dyslipidemia                                yes  Insulin resistance/Diabetes         yes  Lab Results   Component Value Date    HGBA1C 5.8 (H) 12/02/2024         Plan:     Problem List Items Addressed This Visit          GI    Hepatomegaly    Metabolic dysfunction-associated steatotic liver disease (MASLD) - Primary       Guillermobeth Garcia" was seen today for fatty liver.    Diagnoses and all orders for this visit:    Metabolic dysfunction-associated steatotic liver disease (MASLD)    Hepatomegaly    Fatty liver  -     MARY BETH Screen w/Reflex; Future  -     Alpha-1-Antitrypsin; Future  -     Antimitochondrial Antibody; Future  -     Anti-Smooth Muscle Antibody; Future  -     Ceruloplasmin; Future  -     Protime-INR; Future  -     FibroScan (Vibration Controlled Transient Elastography); Future  -     Hepatitis B Surface Antigen; Future  - "     Hepatitis C Antibody; Future  -     IgG; Future  -     Iron and TIBC; Future  -     Tissue Transglutaminase, IgA; Future  -     Comprehensive Metabolic Panel; Future  -     CBC Auto Differential; Future            MAFLD  Fatty liver disease is a diagnosis of exclusion, will obtain additional labs to exclude autoimmune/infectitious etiology  Educated patient on spectrum of fatty liver disease and potential for cirrhosis if AVILA present  --Plan checking serologies to rule out other causes of chronic liver diseases   -Will obtain fibroscan for evaluation of fibrosis  -Discussed new Medication for MASLD--Redziffra ( resemetron)    I recommended her a more pragmatic approach to her medical problems which would include the following:  - life-style modifications: weight loss, daily exercise (30 mins of HIIT or cardio), low carb/low fat diet         Educated patient on spectrum of fatty liver disease and potential for cirrhosis if MASH present        Explored dietary habits and discussed dietary recommendations- Low calorie, low carbohydrate, high protein diet mediterranean with goal of loosing >3-5% to improve steatosis and >7-10% to improve histological changes if present  - control of diabetes or insulin resistance   - control of high cholesterol, if needed with statin drugs or other cholesterol-lowering agents  - coffee consumption (low caloric): 2-3 cups per day may reduce liver fat  -I will defer the management of the patient's dyslipidemia and diabetes to patient's primary care physician and/or endocrinologist   -Reduction of risk factors for CVD      Obesity  Continue GLP-1  Patient would benefit from weight loss and has tried to set realistic goals to achieve success. Lifestyle changes were discussed on eating healthy, exercising at least 150 minutes weekly, and reducing sedentary behavior.   Discussed the risk factors associated with obesity: Arthritis/ARPAN/Diabetes/Fatty Liver/Cardiovascular  disease/GERD/HTN/HLP.       DM  Continue GLP-1  Risk factor for AVILA  Chronic; stable on current treatment plan; Encourage to discuss with PCP  Education given    Return to clinic in 12 months.        Management of patients with MASLD includes optimization of blood glucose control in patients with diabetes and treatment of hyperlipidemia. If after a year of adhering to these measures and effectively reducing your Body Mass Index plus decreasing the insulin resistance, you continue to have symptoms, we may need to start medications to treat MASLD if there is liver fibrosis. These medications are used to treat diabetes and work well for insulin resistance. Unfortunately, these medications can have side effects.      In some people, fatty liver can progress to steatohepatitis (inflamatory fatty liver) and possibly to cirrhosis, putting one at increased risk for liver cancer, liver failure, and death. Therefore, the lifestyle changes are very important to decrease this risk.        Once again, we extend our sincere appreciation for giving us the opportunity to serve you. If there is anything else we can do to improve your experience or assist you further, please do not hesitate to reach out to us.       Thanks for trusting us with your healthcare needs and using [facility]. If you want to ask us a question, you can do so by replying to this message or by calling [phone]    I have sent communication to the referring physician and/or primary care provider.      Time Statement  A total time spent includes time preparing to see patient, reviewing  diagnostic studies and records, direct face-to-face visit, completing orders, medications , reconciliation, prescription management, and care coordination    We discussed in depth the nature of the patient's disease, the management plan in details. I have provided the patient with an opportunity to ask questions and have all questions answered to his satisfaction.      Discussed with patient that it is likely that she will see results before Myself or my nurse are able to view them and report results due to the Cures Act passed 4/1/21. Results will be sent immediately to the patient who are enrolled in the patient portal. If results come through after business hours or on weekend, we will not see them until the next business day that we are in the office. If resulted during the business day, we will likely not be able to review them until after completing all patient visits in office that day.       Jt Vilchis MD  Transplant Hepatologist and Gastroenterologist  [facility] [place]

## 2025-02-12 ENCOUNTER — LAB VISIT (OUTPATIENT)
Dept: LAB | Facility: HOSPITAL | Age: 55
End: 2025-02-12
Attending: INTERNAL MEDICINE
Payer: COMMERCIAL

## 2025-02-12 ENCOUNTER — OFFICE VISIT (OUTPATIENT)
Dept: HEPATOLOGY | Facility: CLINIC | Age: 55
End: 2025-02-12
Payer: COMMERCIAL

## 2025-02-12 VITALS
OXYGEN SATURATION: 98 % | WEIGHT: 245.06 LBS | BODY MASS INDEX: 33.19 KG/M2 | SYSTOLIC BLOOD PRESSURE: 138 MMHG | HEIGHT: 72 IN | HEART RATE: 63 BPM | DIASTOLIC BLOOD PRESSURE: 82 MMHG

## 2025-02-12 DIAGNOSIS — K76.0 FATTY LIVER: ICD-10-CM

## 2025-02-12 DIAGNOSIS — R16.0 HEPATOMEGALY: ICD-10-CM

## 2025-02-12 DIAGNOSIS — K76.0 METABOLIC DYSFUNCTION-ASSOCIATED STEATOTIC LIVER DISEASE (MASLD): Primary | ICD-10-CM

## 2025-02-12 LAB
A1AT SERPL-MCNC: 118 MG/DL (ref 100–190)
ALBUMIN SERPL BCP-MCNC: 4.2 G/DL (ref 3.5–5.2)
ALP SERPL-CCNC: 97 U/L (ref 40–150)
ALT SERPL W/O P-5'-P-CCNC: 50 U/L (ref 10–44)
ANION GAP SERPL CALC-SCNC: 11 MMOL/L (ref 8–16)
AST SERPL-CCNC: 44 U/L (ref 10–40)
BASOPHILS # BLD AUTO: 0.05 K/UL (ref 0–0.2)
BASOPHILS NFR BLD: 0.7 % (ref 0–1.9)
BILIRUB SERPL-MCNC: 0.4 MG/DL (ref 0.1–1)
BUN SERPL-MCNC: 18 MG/DL (ref 6–20)
CALCIUM SERPL-MCNC: 9.4 MG/DL (ref 8.7–10.5)
CERULOPLASMIN SERPL-MCNC: 24 MG/DL (ref 15–45)
CHLORIDE SERPL-SCNC: 103 MMOL/L (ref 95–110)
CO2 SERPL-SCNC: 23 MMOL/L (ref 23–29)
CREAT SERPL-MCNC: 0.9 MG/DL (ref 0.5–1.4)
DIFFERENTIAL METHOD BLD: ABNORMAL
EOSINOPHIL # BLD AUTO: 0.4 K/UL (ref 0–0.5)
EOSINOPHIL NFR BLD: 5.2 % (ref 0–8)
ERYTHROCYTE [DISTWIDTH] IN BLOOD BY AUTOMATED COUNT: 13.5 % (ref 11.5–14.5)
EST. GFR  (NO RACE VARIABLE): >60 ML/MIN/1.73 M^2
GLUCOSE SERPL-MCNC: 187 MG/DL (ref 70–110)
HBV SURFACE AG SERPL QL IA: NORMAL
HCT VFR BLD AUTO: 39.8 % (ref 40–54)
HCV AB SERPL QL IA: NORMAL
HGB BLD-MCNC: 13.1 G/DL (ref 14–18)
IGG SERPL-MCNC: 901 MG/DL (ref 650–1600)
IMM GRANULOCYTES # BLD AUTO: 0.09 K/UL (ref 0–0.04)
IMM GRANULOCYTES NFR BLD AUTO: 1.2 % (ref 0–0.5)
INR PPP: 1 (ref 0.8–1.2)
IRON SERPL-MCNC: 84 UG/DL (ref 45–160)
LYMPHOCYTES # BLD AUTO: 1.9 K/UL (ref 1–4.8)
LYMPHOCYTES NFR BLD: 24.7 % (ref 18–48)
MCH RBC QN AUTO: 28.5 PG (ref 27–31)
MCHC RBC AUTO-ENTMCNC: 32.9 G/DL (ref 32–36)
MCV RBC AUTO: 87 FL (ref 82–98)
MONOCYTES # BLD AUTO: 0.6 K/UL (ref 0.3–1)
MONOCYTES NFR BLD: 7.3 % (ref 4–15)
NEUTROPHILS # BLD AUTO: 4.7 K/UL (ref 1.8–7.7)
NEUTROPHILS NFR BLD: 60.9 % (ref 38–73)
NRBC BLD-RTO: 0 /100 WBC
PLATELET # BLD AUTO: 106 K/UL (ref 150–450)
PMV BLD AUTO: 11.4 FL (ref 9.2–12.9)
POTASSIUM SERPL-SCNC: 4.4 MMOL/L (ref 3.5–5.1)
PROT SERPL-MCNC: 7.1 G/DL (ref 6–8.4)
PROTHROMBIN TIME: 10.9 SEC (ref 9–12.5)
RBC # BLD AUTO: 4.59 M/UL (ref 4.6–6.2)
SATURATED IRON: 17 % (ref 20–50)
SODIUM SERPL-SCNC: 137 MMOL/L (ref 136–145)
TOTAL IRON BINDING CAPACITY: 497 UG/DL (ref 250–450)
TRANSFERRIN SERPL-MCNC: 336 MG/DL (ref 200–375)
WBC # BLD AUTO: 7.68 K/UL (ref 3.9–12.7)

## 2025-02-12 PROCEDURE — 3079F DIAST BP 80-89 MM HG: CPT | Mod: CPTII,S$GLB,, | Performed by: INTERNAL MEDICINE

## 2025-02-12 PROCEDURE — 99999 PR PBB SHADOW E&M-EST. PATIENT-LVL IV: CPT | Mod: PBBFAC,,, | Performed by: INTERNAL MEDICINE

## 2025-02-12 PROCEDURE — 3075F SYST BP GE 130 - 139MM HG: CPT | Mod: CPTII,S$GLB,, | Performed by: INTERNAL MEDICINE

## 2025-02-12 PROCEDURE — 3072F LOW RISK FOR RETINOPATHY: CPT | Mod: CPTII,S$GLB,, | Performed by: INTERNAL MEDICINE

## 2025-02-12 PROCEDURE — 82390 ASSAY OF CERULOPLASMIN: CPT | Performed by: INTERNAL MEDICINE

## 2025-02-12 PROCEDURE — 86038 ANTINUCLEAR ANTIBODIES: CPT | Performed by: INTERNAL MEDICINE

## 2025-02-12 PROCEDURE — 1159F MED LIST DOCD IN RCRD: CPT | Mod: CPTII,S$GLB,, | Performed by: INTERNAL MEDICINE

## 2025-02-12 PROCEDURE — 3008F BODY MASS INDEX DOCD: CPT | Mod: CPTII,S$GLB,, | Performed by: INTERNAL MEDICINE

## 2025-02-12 PROCEDURE — 85025 COMPLETE CBC W/AUTO DIFF WBC: CPT | Performed by: INTERNAL MEDICINE

## 2025-02-12 PROCEDURE — 86381 MITOCHONDRIAL ANTIBODY EACH: CPT | Performed by: INTERNAL MEDICINE

## 2025-02-12 PROCEDURE — 99204 OFFICE O/P NEW MOD 45 MIN: CPT | Mod: S$GLB,,, | Performed by: INTERNAL MEDICINE

## 2025-02-12 PROCEDURE — 82784 ASSAY IGA/IGD/IGG/IGM EACH: CPT | Performed by: INTERNAL MEDICINE

## 2025-02-12 PROCEDURE — 85610 PROTHROMBIN TIME: CPT | Performed by: INTERNAL MEDICINE

## 2025-02-12 PROCEDURE — 82103 ALPHA-1-ANTITRYPSIN TOTAL: CPT | Performed by: INTERNAL MEDICINE

## 2025-02-12 PROCEDURE — 80053 COMPREHEN METABOLIC PANEL: CPT | Performed by: INTERNAL MEDICINE

## 2025-02-12 PROCEDURE — 4010F ACE/ARB THERAPY RXD/TAKEN: CPT | Mod: CPTII,S$GLB,, | Performed by: INTERNAL MEDICINE

## 2025-02-12 PROCEDURE — 86015 ACTIN ANTIBODY EACH: CPT | Performed by: INTERNAL MEDICINE

## 2025-02-12 PROCEDURE — 86364 TISS TRNSGLTMNASE EA IG CLAS: CPT | Performed by: INTERNAL MEDICINE

## 2025-02-12 PROCEDURE — 83540 ASSAY OF IRON: CPT | Performed by: INTERNAL MEDICINE

## 2025-02-12 PROCEDURE — 87340 HEPATITIS B SURFACE AG IA: CPT | Performed by: INTERNAL MEDICINE

## 2025-02-12 PROCEDURE — 86803 HEPATITIS C AB TEST: CPT | Performed by: INTERNAL MEDICINE

## 2025-02-12 NOTE — PATIENT INSTRUCTIONS
MAFLD    Website with information about fatty liver and inflammation related to fatty liver (AVILA) = [email or web address]  AND [email or web address]     Limit alcohol consumption  2.  Weight loss goal & 7-10%, referral for [facility] if interested. Also, if interested in a dietician visit to create a weight loss plan, contact the dietician team at [facility] at [email or web address] to schedule a visit to you can call [facility] in [place]: [phone] and the  will transfer the call to one of the dieticians to schedule an appointment. Or you can also call [phone] to schedule. They do offer video visits   3. Low carb/sugar, high fiber and protein diet.Try to limit your carb intake to LESS than 30-45 grams of carbs with a meal or LESS than 5-10 grams with any snack (total of any snack foods eaten during that time). Use MyFitness Pal eunice to add up your carbs through the day. Do NOT drink any beverages with calories or carbs (this can lead to high blood sugar and weight gain). Also, some of our patients have been very successful with weight loss using the pre made/planned meal planning services that limit calories and portion size (one example is Sensible Meals but there are many out there)  4. Exercise, 5 days per week, 30 minutes per day, as tolerated  5. Recommend good cholesterol, blood pressure, blood sugar levels .      Management of patients with MASLD includes optimization of blood glucose control in patients with diabetes and treatment of hyperlipidemia. If after a year of adhering to these measures and effectively reducing your Body Mass Index plus decreasing the insulin resistance, you continue to have symptoms, we may need to start medications to treat MASLD if there is liver fibrosis. These medications are used to treat diabetes and work well for insulin resistance. Unfortunately, these medications can have side effects.      In some people, fatty liver can  progress to steatohepatitis (inflamatory fatty liver) and possibly to cirrhosis, putting one at increased risk for liver cancer, liver failure, and death. Therefore, the lifestyle changes are very important to decrease this risk.        Once again, we extend our sincere appreciation for giving us the opportunity to serve you. If there is anything else we can do to improve your experience or assist you further, please do not hesitate to reach out to us.       Thanks for trusting us with your healthcare needs and using [facility]. If you want to ask us a question, you can do so by replying to this message or by calling [phone]

## 2025-02-13 LAB
ANA SER QL IF: NORMAL
MITOCHONDRIA AB TITR SER IF: NORMAL {TITER}
SMOOTH MUSCLE AB TITR SER IF: NORMAL {TITER}

## 2025-02-17 LAB — TTG IGA SER-ACNC: 0.3 U/ML

## 2025-02-18 ENCOUNTER — DOCUMENTATION ONLY (OUTPATIENT)
Dept: REHABILITATION | Facility: HOSPITAL | Age: 55
End: 2025-02-18
Payer: COMMERCIAL

## 2025-02-18 NOTE — PROGRESS NOTES
[facility]  Physical Therapy Discharge Note    Name: Guillermo Castillo  [facility] Number: [id number]    Therapy Diagnosis:        Encounter Diagnoses   Name Primary?    Decreased functional activity tolerance Yes    Decreased functional mobility and endurance      Decreased range of motion with decreased strength      Gait abnormality      Pain        Physician: Kip Siddiqui MD       Physician Orders: PT Eval and Treat  Medical Diagnosis from Referral: Sciatica of right side  Evaluation Date: 1/6/2025      Date of Last visit: 1/15/2025  Total Visits Received: 2    ASSESSMENT      See daily note    Discharge reason: Patient has not attended therapy since 1/15/2025    Discharge FOTO Score: see daily note    Goals: see daily note    PLAN   This patient is discharged from Physical Therapy      Екатерина Donovan PT

## 2025-02-22 PROBLEM — R53.1 DECREASED RANGE OF MOTION WITH DECREASED STRENGTH: Status: RESOLVED | Noted: 2025-01-08 | Resolved: 2025-02-18

## 2025-02-22 PROBLEM — R52 PAIN: Status: RESOLVED | Noted: 2025-01-08 | Resolved: 2025-02-18

## 2025-02-22 PROBLEM — M25.60 DECREASED RANGE OF MOTION WITH DECREASED STRENGTH: Status: RESOLVED | Noted: 2025-01-08 | Resolved: 2025-02-18

## 2025-02-22 PROBLEM — R26.9 GAIT ABNORMALITY: Status: RESOLVED | Noted: 2025-01-08 | Resolved: 2025-02-18

## 2025-02-22 PROBLEM — Z74.09 DECREASED FUNCTIONAL MOBILITY AND ENDURANCE: Status: RESOLVED | Noted: 2025-01-08 | Resolved: 2025-02-18

## 2025-02-22 PROBLEM — R68.89 DECREASED FUNCTIONAL ACTIVITY TOLERANCE: Status: RESOLVED | Noted: 2025-01-08 | Resolved: 2025-02-18

## 2025-02-24 ENCOUNTER — RESULTS FOLLOW-UP (OUTPATIENT)
Dept: HEPATOLOGY | Facility: CLINIC | Age: 55
End: 2025-02-24

## 2025-03-10 ENCOUNTER — LAB VISIT (OUTPATIENT)
Dept: LAB | Facility: HOSPITAL | Age: 55
End: 2025-03-10
Payer: COMMERCIAL

## 2025-03-10 DIAGNOSIS — M1A.9XX0 CHRONIC GOUT WITHOUT TOPHUS, UNSPECIFIED CAUSE, UNSPECIFIED SITE: ICD-10-CM

## 2025-03-10 DIAGNOSIS — Z86.39 HISTORY OF IRON DEFICIENCY: ICD-10-CM

## 2025-03-10 DIAGNOSIS — E11.9 TYPE 2 DIABETES MELLITUS WITHOUT COMPLICATION, WITHOUT LONG-TERM CURRENT USE OF INSULIN: ICD-10-CM

## 2025-03-10 DIAGNOSIS — Z12.5 SCREENING FOR PROSTATE CANCER: ICD-10-CM

## 2025-03-10 PROCEDURE — 85025 COMPLETE CBC W/AUTO DIFF WBC: CPT | Performed by: FAMILY MEDICINE

## 2025-03-10 PROCEDURE — 84153 ASSAY OF PSA TOTAL: CPT | Performed by: FAMILY MEDICINE

## 2025-03-10 PROCEDURE — 82728 ASSAY OF FERRITIN: CPT | Performed by: FAMILY MEDICINE

## 2025-03-10 PROCEDURE — 84550 ASSAY OF BLOOD/URIC ACID: CPT | Performed by: FAMILY MEDICINE

## 2025-03-10 PROCEDURE — 36415 COLL VENOUS BLD VENIPUNCTURE: CPT | Performed by: FAMILY MEDICINE

## 2025-03-10 PROCEDURE — 82607 VITAMIN B-12: CPT | Performed by: FAMILY MEDICINE

## 2025-03-10 PROCEDURE — 83036 HEMOGLOBIN GLYCOSYLATED A1C: CPT | Performed by: FAMILY MEDICINE

## 2025-03-11 LAB
BASOPHILS # BLD AUTO: 0.06 K/UL (ref 0–0.2)
BASOPHILS NFR BLD: 0.8 % (ref 0–1.9)
COMPLEXED PSA SERPL-MCNC: 1.4 NG/ML (ref 0–4)
DIFFERENTIAL METHOD BLD: ABNORMAL
EOSINOPHIL # BLD AUTO: 0.4 K/UL (ref 0–0.5)
EOSINOPHIL NFR BLD: 5.9 % (ref 0–8)
ERYTHROCYTE [DISTWIDTH] IN BLOOD BY AUTOMATED COUNT: 13.3 % (ref 11.5–14.5)
ESTIMATED AVG GLUCOSE: 134 MG/DL (ref 68–131)
FERRITIN SERPL-MCNC: 455 NG/ML (ref 20–300)
HBA1C MFR BLD: 6.3 % (ref 4–5.6)
HCT VFR BLD AUTO: 37.4 % (ref 40–54)
HGB BLD-MCNC: 12.4 G/DL (ref 14–18)
IMM GRANULOCYTES # BLD AUTO: 0.09 K/UL (ref 0–0.04)
IMM GRANULOCYTES NFR BLD AUTO: 1.2 % (ref 0–0.5)
LYMPHOCYTES # BLD AUTO: 2.2 K/UL (ref 1–4.8)
LYMPHOCYTES NFR BLD: 29.6 % (ref 18–48)
MCH RBC QN AUTO: 28.6 PG (ref 27–31)
MCHC RBC AUTO-ENTMCNC: 33.2 G/DL (ref 32–36)
MCV RBC AUTO: 86 FL (ref 82–98)
MONOCYTES # BLD AUTO: 0.7 K/UL (ref 0.3–1)
MONOCYTES NFR BLD: 9.3 % (ref 4–15)
NEUTROPHILS # BLD AUTO: 4 K/UL (ref 1.8–7.7)
NEUTROPHILS NFR BLD: 53.2 % (ref 38–73)
NRBC BLD-RTO: 0 /100 WBC
PLATELET # BLD AUTO: 145 K/UL (ref 150–450)
PMV BLD AUTO: 10.5 FL (ref 9.2–12.9)
RBC # BLD AUTO: 4.34 M/UL (ref 4.6–6.2)
URATE SERPL-MCNC: 3.5 MG/DL (ref 3.4–7)
VIT B12 SERPL-MCNC: 413 PG/ML (ref 210–950)
WBC # BLD AUTO: 7.43 K/UL (ref 3.9–12.7)

## 2025-03-17 ENCOUNTER — OFFICE VISIT (OUTPATIENT)
Dept: INTERNAL MEDICINE | Facility: CLINIC | Age: 55
End: 2025-03-17
Payer: COMMERCIAL

## 2025-03-17 VITALS
HEART RATE: 69 BPM | WEIGHT: 243.63 LBS | OXYGEN SATURATION: 98 % | DIASTOLIC BLOOD PRESSURE: 78 MMHG | TEMPERATURE: 98 F | BODY MASS INDEX: 33 KG/M2 | SYSTOLIC BLOOD PRESSURE: 130 MMHG | HEIGHT: 72 IN

## 2025-03-17 DIAGNOSIS — I15.2 HYPERTENSION ASSOCIATED WITH DIABETES: ICD-10-CM

## 2025-03-17 DIAGNOSIS — R16.0 HEPATOMEGALY: ICD-10-CM

## 2025-03-17 DIAGNOSIS — E11.43 DIABETIC GASTROPARESIS: ICD-10-CM

## 2025-03-17 DIAGNOSIS — K31.84 DIABETIC GASTROPARESIS: ICD-10-CM

## 2025-03-17 DIAGNOSIS — K76.0 METABOLIC DYSFUNCTION-ASSOCIATED STEATOTIC LIVER DISEASE (MASLD): ICD-10-CM

## 2025-03-17 DIAGNOSIS — E11.65 TYPE 2 DIABETES MELLITUS WITH HYPERGLYCEMIA, WITHOUT LONG-TERM CURRENT USE OF INSULIN: Primary | ICD-10-CM

## 2025-03-17 DIAGNOSIS — E66.811 CLASS 1 OBESITY DUE TO EXCESS CALORIES WITH SERIOUS COMORBIDITY AND BODY MASS INDEX (BMI) OF 33.0 TO 33.9 IN ADULT: Chronic | ICD-10-CM

## 2025-03-17 DIAGNOSIS — E11.59 HYPERTENSION ASSOCIATED WITH DIABETES: ICD-10-CM

## 2025-03-17 DIAGNOSIS — E11.65 TYPE 2 DIABETES MELLITUS WITH HYPERGLYCEMIA, WITH LONG-TERM CURRENT USE OF INSULIN: ICD-10-CM

## 2025-03-17 DIAGNOSIS — Z79.4 TYPE 2 DIABETES MELLITUS WITH HYPERGLYCEMIA, WITH LONG-TERM CURRENT USE OF INSULIN: ICD-10-CM

## 2025-03-17 DIAGNOSIS — E66.09 CLASS 1 OBESITY DUE TO EXCESS CALORIES WITH SERIOUS COMORBIDITY AND BODY MASS INDEX (BMI) OF 33.0 TO 33.9 IN ADULT: Chronic | ICD-10-CM

## 2025-03-17 PROCEDURE — 3008F BODY MASS INDEX DOCD: CPT | Mod: CPTII,S$GLB,, | Performed by: PHYSICIAN ASSISTANT

## 2025-03-17 PROCEDURE — 3078F DIAST BP <80 MM HG: CPT | Mod: CPTII,S$GLB,, | Performed by: PHYSICIAN ASSISTANT

## 2025-03-17 PROCEDURE — 3075F SYST BP GE 130 - 139MM HG: CPT | Mod: CPTII,S$GLB,, | Performed by: PHYSICIAN ASSISTANT

## 2025-03-17 PROCEDURE — 99999 PR PBB SHADOW E&M-EST. PATIENT-LVL V: CPT | Mod: PBBFAC,,, | Performed by: PHYSICIAN ASSISTANT

## 2025-03-17 PROCEDURE — 1159F MED LIST DOCD IN RCRD: CPT | Mod: CPTII,S$GLB,, | Performed by: PHYSICIAN ASSISTANT

## 2025-03-17 PROCEDURE — 3072F LOW RISK FOR RETINOPATHY: CPT | Mod: CPTII,S$GLB,, | Performed by: PHYSICIAN ASSISTANT

## 2025-03-17 PROCEDURE — 99214 OFFICE O/P EST MOD 30 MIN: CPT | Mod: S$GLB,,, | Performed by: PHYSICIAN ASSISTANT

## 2025-03-17 PROCEDURE — 3044F HG A1C LEVEL LT 7.0%: CPT | Mod: CPTII,S$GLB,, | Performed by: PHYSICIAN ASSISTANT

## 2025-03-17 PROCEDURE — 4010F ACE/ARB THERAPY RXD/TAKEN: CPT | Mod: CPTII,S$GLB,, | Performed by: PHYSICIAN ASSISTANT

## 2025-03-17 RX ORDER — INSULIN LISPRO 100 [IU]/ML
INJECTION, SOLUTION INTRAVENOUS; SUBCUTANEOUS
Qty: 10 ML | Refills: 6 | Status: SHIPPED | OUTPATIENT
Start: 2025-03-17

## 2025-03-17 RX ORDER — BLOOD-GLUCOSE,RECEIVER,CONT
EACH MISCELLANEOUS
Qty: 1 EACH | Refills: 6 | Status: SHIPPED | OUTPATIENT
Start: 2025-03-17

## 2025-03-17 RX ORDER — BLOOD-GLUCOSE SENSOR
EACH MISCELLANEOUS
Qty: 5 EACH | Refills: 6 | Status: SHIPPED | OUTPATIENT
Start: 2025-03-17

## 2025-03-17 NOTE — PATIENT INSTRUCTIONS
Blood Glucose   mg/dL                  Pre-meal                2200   151-200                0 unit                      0 unit   201-250                2 units                    1 unit   251-300                3 units                    1 unit   301-350                4 units                    2 units   >350                     5 units                    3 units

## 2025-03-17 NOTE — PROGRESS NOTES
Subjective:      Patient ID: Guillermo Castillo is a 55 y.o. male.    Chief Complaint: Follow-up    HPI  History of Present Illness    CHIEF COMPLAINT:  Mr. Castillo presents today for follow up of diabetes management.    DIABETES:  He reports his A1c has increased slightly. He participates in digital medicine program for blood sugar monitoring, with recent fasting blood sugar of 166.   Gastric emptying study completed with his hepatologist showing gastroparesis. Likely from the ozempic. Pt would like to stop the ozempic and get back on insulin as he was previously on.   He previously used Humalog on sliding scale successfully in combination with current medications. His ophthalmologist recommended maintaining glucose below 140 for eye health.    MEDICATIONS:  He is currently taking Ozempic and Metformin for diabetes management. He takes cholestyramine for IBS-related loose bowels, which has improved symptoms.    GASTROINTESTINAL:  He was diagnosed with gastroparesis confirmed by abnormal gastric emptying study. He experiences chronic bloating and nausea, which he attributes to Ozempic.    MEDICAL HISTORY:  He has history of hypotension requiring hospitalization.    Medications were reviewed        Problem List[1]    Current Medications[2]    Review of Systems   Constitutional:  Negative for activity change, appetite change, chills, diaphoresis, fatigue, fever and unexpected weight change.   HENT: Negative.  Negative for congestion, hearing loss, postnasal drip, rhinorrhea, sore throat, trouble swallowing and voice change.    Eyes: Negative.  Negative for visual disturbance.   Respiratory: Negative.  Negative for cough, choking, chest tightness and shortness of breath.    Cardiovascular:  Negative for chest pain, palpitations and leg swelling.   Gastrointestinal:  Positive for abdominal distention, abdominal pain, constipation and nausea. Negative for blood in stool, diarrhea and vomiting.   Endocrine: Negative for cold  intolerance, heat intolerance, polydipsia and polyuria.   Genitourinary: Negative.  Negative for difficulty urinating and frequency.   Musculoskeletal:  Negative for arthralgias, back pain, gait problem, joint swelling and myalgias.   Skin:  Negative for color change, pallor, rash and wound.   Neurological:  Negative for dizziness, tremors, weakness, light-headedness, numbness and headaches.   Hematological:  Negative for adenopathy.   Psychiatric/Behavioral:  Negative for behavioral problems, confusion, self-injury, sleep disturbance and suicidal ideas. The patient is not nervous/anxious.      Objective:   /78 (BP Location: Right arm, Patient Position: Sitting)   Pulse 69   Temp 97.8 °F (36.6 °C) (Tympanic)   Ht 6' (1.829 m)   Wt 110.5 kg (243 lb 9.7 oz)   SpO2 98%   BMI 33.04 kg/m²     Physical Exam  Vitals and nursing note reviewed.   Constitutional:       General: He is not in acute distress.     Appearance: Normal appearance. He is well-developed. He is not ill-appearing, toxic-appearing or diaphoretic.   HENT:      Head: Normocephalic and atraumatic.   Cardiovascular:      Rate and Rhythm: Normal rate and regular rhythm.      Heart sounds: Normal heart sounds. No murmur heard.     No friction rub. No gallop.   Pulmonary:      Effort: Pulmonary effort is normal. No respiratory distress.      Breath sounds: Normal breath sounds. No wheezing or rales.   Abdominal:      General: Bowel sounds are normal.      Palpations: Abdomen is soft.   Skin:     General: Skin is warm.      Findings: No rash.   Neurological:      Mental Status: He is alert and oriented to person, place, and time.   Psychiatric:         Mood and Affect: Mood normal.         Behavior: Behavior normal.         Thought Content: Thought content normal.         Judgment: Judgment normal.       Lab Visit on 03/10/2025   Component Date Value Ref Range Status    Hemoglobin A1C 03/10/2025 6.3 (H)  4.0 - 5.6 % Final    Comment: ADA Screening  Guidelines:  5.7-6.4%  Consistent with prediabetes  >or=6.5%  Consistent with diabetes    High levels of fetal hemoglobin interfere with the HbA1C  assay. Heterozygous hemoglobin variants (HbS, HgC, etc)do  not significantly interfere with this assay.   However, presence of multiple variants may affect accuracy.      Estimated Avg Glucose 03/10/2025 134 (H)  68 - 131 mg/dL Final    WBC 03/10/2025 7.43  3.90 - 12.70 K/uL Final    RBC 03/10/2025 4.34 (L)  4.60 - 6.20 M/uL Final    Hemoglobin 03/10/2025 12.4 (L)  14.0 - 18.0 g/dL Final    Hematocrit 03/10/2025 37.4 (L)  40.0 - 54.0 % Final    MCV 03/10/2025 86  82 - 98 fL Final    MCH 03/10/2025 28.6  27.0 - 31.0 pg Final    MCHC 03/10/2025 33.2  32.0 - 36.0 g/dL Final    RDW 03/10/2025 13.3  11.5 - 14.5 % Final    Platelets 03/10/2025 145 (L)  150 - 450 K/uL Final    MPV 03/10/2025 10.5  9.2 - 12.9 fL Final    Immature Granulocytes 03/10/2025 1.2 (H)  0.0 - 0.5 % Final    Gran # (ANC) 03/10/2025 4.0  1.8 - 7.7 K/uL Final    Immature Grans (Abs) 03/10/2025 0.09 (H)  0.00 - 0.04 K/uL Final    Comment: Mild elevation in immature granulocytes is non specific and   can be seen in a variety of conditions including stress response,   acute inflammation, trauma and pregnancy. Correlation with other   laboratory and clinical findings is essential.      Lymph # 03/10/2025 2.2  1.0 - 4.8 K/uL Final    Mono # 03/10/2025 0.7  0.3 - 1.0 K/uL Final    Eos # 03/10/2025 0.4  0.0 - 0.5 K/uL Final    Baso # 03/10/2025 0.06  0.00 - 0.20 K/uL Final    nRBC 03/10/2025 0  0 /100 WBC Final    Gran % 03/10/2025 53.2  38.0 - 73.0 % Final    Lymph % 03/10/2025 29.6  18.0 - 48.0 % Final    Mono % 03/10/2025 9.3  4.0 - 15.0 % Final    Eosinophil % 03/10/2025 5.9  0.0 - 8.0 % Final    Basophil % 03/10/2025 0.8  0.0 - 1.9 % Final    Differential Method 03/10/2025 Automated   Final    Vitamin B-12 03/10/2025 413  210 - 950 pg/mL Final    Uric Acid 03/10/2025 3.5  3.4 - 7.0 mg/dL Final    PSA, Screen  03/10/2025 1.4  0.00 - 4.00 ng/mL Final    Comment: The testing method is a chemiluminescent microparticle immunoassay   manufactured by [employer] and performed on the    or   Svpply system. Values obtained with different assay manufacturers   for   methods may be different and cannot be used interchangeably.  PSA Expected levels:  Hormonal Therapy: <0.05 ng/ml  Prostatectomy: <0.01 ng/ml  Radiation Therapy: <1.00 ng/ml      Ferritin 03/10/2025 455 (H)  20.0 - 300.0 ng/mL Final       Assessment:     1. Type 2 diabetes mellitus with hyperglycemia, without long-term current use of insulin    2. Diabetic gastroparesis    3. Type 2 diabetes mellitus with hyperglycemia, with long-term current use of insulin    4. Hypertension associated with diabetes    5. Hepatomegaly    6. Metabolic dysfunction-associated steatotic liver disease (MASLD)    7. Class 1 obesity due to excess calories with serious comorbidity and body mass index (BMI) of 33.0 to 33.9 in adult      Plan:   Type 2 diabetes mellitus with hyperglycemia, without long-term current use of insulin  -     insulin lispro (HUMALOG U-100 INSULIN) 100 unit/mL injection; Use up to three times daily with meals per sliding scale  Dispense: 10 mL; Refill: 6  Discontinued Ozempic due to gastroparesis symptoms. Continue metformin at current dose.   Restarted Humalog insulin on sliding scale   Considered adding long-acting insulin (Lantus) if sliding scale alone is insufficient.  Informed patient that glucose levels may be elevated due to gastroparesis.    Diabetic gastroparesis  -stop ozempic  -start sliding scale humalog  - Confirmed diagnosis through gastric emptying test.  - Mr. Castillo reports symptoms of bloating and nausea.  - Discussed potential causes of bloating, including gastroparesis and medication side effects.    Type 2 diabetes mellitus with hyperglycemia, with long-term current use of insulin  -     blood-glucose meter,continuous (DEXCOM  G6 ) Misc; Use as directed to continually check blood glucose  Dispense: 1 each; Refill: 6  -     blood-glucose sensor (DEXCOM G6 SENSOR) Sharon; Use as directed to continuously check blood glucose  Dispense: 5 each; Refill: 6  -     Ambulatory referral/consult to Diabetes Education; Future; Expected date: 03/24/2025  -     Ambulatory referral/consult to Diabetic Advanced Practice Providers (Medical Management); Future; Expected date: 03/24/2025  - Evaluated patient's A1c, which has increased slightly but remains acceptable.  - Discontinued Ozempic due to gastroparesis symptoms.  - Continued Metformin therapy and restarted Humalog insulin on a sliding scale: 1 unit for glucose 150-199 mg/dL, 2 units for 200-250 mg/dL, 3 units for 250-300 mg/dL, 4 units for 300-350 mg/dL.  - Ordered Dexcom continuous glucose monitor and referred patient to diabetes education team for setup and insulin management.  SLIDING SCALE  Blood Glucose   mg/dL                  Pre-meal                2200   151-200                0 unit                      0 unit   201-250                2 units                    1 unit   251-300                3 units                    1 unit   301-350                4 units                    2 units   >350                     5 units                    3 units       Hypertension associated with diabetes  -stable and well controlled on current meds    Hepatomegaly  Metabolic dysfunction-associated steatotic liver disease (MASLD)  -up to date with hepatology  -stable    Class 1 obesity due to excess calories with serious comorbidity and body mass index (BMI) of 33.0 to 33.9 in adult  Advise to maintain lifestyle changes with low carbohydrate, low sugar diet and exercise 30 minutes daily              IRRITABLE BOWEL SYNDROME:  - Noted patient's history of loose bowels from metformin.  - Continued cholestyramine for IBS management.          This note was generated with the assistance of [employer]  listening technology. Verbal consent was obtained by the patient and accompanying visitor(s) for the recording of patient appointment to facilitate this note. I attest to having reviewed and edited the generated note for accuracy, though some syntax or spelling errors may persist. Please contact the author of this note for any clarification.      Follow up in about 3 months (around 6/17/2025), or if symptoms worsen or fail to improve.           [1]   Patient Active Problem List  Diagnosis    Metabolic dysfunction-associated steatotic liver disease (MASLD)    Mood disorder    Hypertriglyceridemia    Hyperlipidemia    Hypertension associated with diabetes    Chronic gout    ARPAN (obstructive sleep apnea)    Class 1 obesity in adult    Shifting sleep-work schedule    Anemia    PETROS (iron deficiency anemia)    Leukocytosis    Irritable bowel syndrome with diarrhea    Hepatomegaly    Gastroparesis   [2]   Current Outpatient Medications:     amLODIPine (NORVASC) 10 MG tablet, Take 1 tablet by mouth once daily, Disp: 90 tablet, Rfl: 4    carvediloL (COREG) 25 MG tablet, Take 1 tablet (25 mg total) by mouth 2 (two) times daily with meals., Disp: 180 tablet, Rfl: 4    cholestyramine (QUESTRAN) 4 gram packet, Take 1 packet (4 g total) by mouth 2 (two) times daily., Disp: 180 packet, Rfl: 5    DULoxetine (CYMBALTA) 60 MG capsule, Take 1 capsule by mouth once daily, Disp: 90 capsule, Rfl: 4    fenofibrate 160 MG Tab, Take 1 tablet (160 mg total) by mouth once daily., Disp: 90 tablet, Rfl: 3    iron-vitamin C 100-250 mg, ICAR-C, (ICAR-C) 100-250 mg Tab, Take 1 tablet by mouth once daily., Disp: 90 tablet, Rfl: 3    meloxicam (MOBIC) 15 MG tablet, Take 1 tablet (15 mg total) by mouth daily as needed for Pain., Disp: 30 tablet, Rfl: 5    metFORMIN (GLUCOPHAGE-XR) 500 MG ER 24hr tablet, Take 2 tablets (1,000 mg total) by mouth 2 (two) times daily with meals., Disp: 360 tablet, Rfl: 1    omega-3 fatty acids/fish oil (FISH OIL-OMEGA-3  FATTY ACIDS) 300-1,000 mg capsule, Take 1 capsule by mouth once daily., Disp: , Rfl:     potassium gluconate 595 mg (99 mg) Tab, Take 1 tablet by mouth 2 (two) times a day., Disp: , Rfl:     rosuvastatin (CRESTOR) 10 MG tablet, Take 1 tablet (10 mg total) by mouth once daily., Disp: 90 tablet, Rfl: 4    valsartan (DIOVAN) 320 MG tablet, Take 1 tablet (320 mg total) by mouth once daily., Disp: 90 tablet, Rfl: 1    blood-glucose meter,continuous (DEXCOM G6 ) Misc, Use as directed to continually check blood glucose, Disp: 1 each, Rfl: 6    blood-glucose sensor (DEXCOM G6 SENSOR) Sharon, Use as directed to continuously check blood glucose, Disp: 5 each, Rfl: 6    insulin lispro (HUMALOG U-100 INSULIN) 100 unit/mL injection, Use up to three times daily with meals per sliding scale, Disp: 10 mL, Rfl: 6    omeprazole (PRILOSEC) 40 MG capsule, Take 1 capsule (40 mg total) by mouth every morning., Disp: 90 capsule, Rfl: 3

## 2025-03-19 ENCOUNTER — PATIENT MESSAGE (OUTPATIENT)
Dept: INTERNAL MEDICINE | Facility: CLINIC | Age: 55
End: 2025-03-19
Payer: COMMERCIAL

## 2025-03-19 DIAGNOSIS — E11.65 TYPE 2 DIABETES MELLITUS WITH HYPERGLYCEMIA, WITH LONG-TERM CURRENT USE OF INSULIN: Primary | ICD-10-CM

## 2025-03-19 DIAGNOSIS — Z79.4 TYPE 2 DIABETES MELLITUS WITH HYPERGLYCEMIA, WITH LONG-TERM CURRENT USE OF INSULIN: Primary | ICD-10-CM

## 2025-03-19 RX ORDER — BLOOD-GLUCOSE TRANSMITTER
EACH MISCELLANEOUS
Qty: 1 EACH | Refills: 0 | Status: SHIPPED | OUTPATIENT
Start: 2025-03-19

## 2025-03-31 DIAGNOSIS — K21.9 GASTROESOPHAGEAL REFLUX DISEASE, UNSPECIFIED WHETHER ESOPHAGITIS PRESENT: ICD-10-CM

## 2025-03-31 NOTE — TELEPHONE ENCOUNTER
No care due was identified.  [facility] Embedded Care Due Messages. Reference number: [id number].   3/31/2025 3:10:03 PM CDT

## 2025-04-01 RX ORDER — OMEPRAZOLE 40 MG/1
40 CAPSULE, DELAYED RELEASE ORAL EVERY MORNING
Qty: 90 CAPSULE | Refills: 4 | Status: SHIPPED | OUTPATIENT
Start: 2025-04-01

## 2025-04-01 NOTE — TELEPHONE ENCOUNTER
Refill Routing Note   Medication(s) are not appropriate for processing by [facility] for the following reason(s):        No active prescription written by provider    [facility] action(s):  Defer      Medication Therapy Plan: Last ordered: 2/28/2024 by Ryan Kay MD. Recent OV but no current order by PCP      Appointments  past 12m or future 3m with PCP    Date Provider   Last Visit   12/16/2024 Kip Siddiqui MD   Next Visit   6/24/2025 Kip Siddiqui MD   ED visits in past 90 days: 0        Note composed:12:15 PM 04/01/2025

## 2025-04-02 ENCOUNTER — PATIENT OUTREACH (OUTPATIENT)
Dept: ADMINISTRATIVE | Facility: HOSPITAL | Age: 55
End: 2025-04-02
Payer: COMMERCIAL

## 2025-04-12 ENCOUNTER — PATIENT MESSAGE (OUTPATIENT)
Dept: HEPATOLOGY | Facility: CLINIC | Age: 55
End: 2025-04-12
Payer: COMMERCIAL

## 2025-04-15 ENCOUNTER — TELEPHONE (OUTPATIENT)
Dept: DIABETES | Facility: CLINIC | Age: 55
End: 2025-04-15
Payer: COMMERCIAL

## 2025-04-15 ENCOUNTER — OFFICE VISIT (OUTPATIENT)
Dept: URGENT CARE | Facility: CLINIC | Age: 55
End: 2025-04-15
Payer: COMMERCIAL

## 2025-04-15 VITALS
TEMPERATURE: 97 F | RESPIRATION RATE: 16 BRPM | WEIGHT: 249 LBS | HEIGHT: 72 IN | OXYGEN SATURATION: 97 % | HEART RATE: 53 BPM | SYSTOLIC BLOOD PRESSURE: 126 MMHG | BODY MASS INDEX: 33.72 KG/M2 | DIASTOLIC BLOOD PRESSURE: 74 MMHG

## 2025-04-15 DIAGNOSIS — J32.9 BACTERIAL SINUSITIS: ICD-10-CM

## 2025-04-15 DIAGNOSIS — J20.9 ACUTE BRONCHITIS, UNSPECIFIED ORGANISM: Primary | ICD-10-CM

## 2025-04-15 DIAGNOSIS — B96.89 BACTERIAL SINUSITIS: ICD-10-CM

## 2025-04-15 PROCEDURE — 99214 OFFICE O/P EST MOD 30 MIN: CPT | Mod: S$GLB,,, | Performed by: PHYSICIAN ASSISTANT

## 2025-04-15 PROCEDURE — 71046 X-RAY EXAM CHEST 2 VIEWS: CPT | Mod: S$GLB,,, | Performed by: RADIOLOGY

## 2025-04-15 RX ORDER — PROMETHAZINE HYDROCHLORIDE AND DEXTROMETHORPHAN HYDROBROMIDE 6.25; 15 MG/5ML; MG/5ML
5 SYRUP ORAL EVERY 6 HOURS PRN
Qty: 140 ML | Refills: 0 | Status: SHIPPED | OUTPATIENT
Start: 2025-04-15 | End: 2025-04-22

## 2025-04-15 RX ORDER — AMOXICILLIN AND CLAVULANATE POTASSIUM 875; 125 MG/1; MG/1
1 TABLET, FILM COATED ORAL 2 TIMES DAILY
Qty: 20 TABLET | Refills: 0 | Status: SHIPPED | OUTPATIENT
Start: 2025-04-15 | End: 2025-04-25

## 2025-04-15 RX ORDER — FLUTICASONE PROPIONATE 50 MCG
1 SPRAY, SUSPENSION (ML) NASAL 2 TIMES DAILY
Qty: 16 G | Refills: 0 | Status: SHIPPED | OUTPATIENT
Start: 2025-04-15 | End: 2025-05-15

## 2025-04-15 NOTE — LETTER
April 15, 2025      [facility] [address] [place] [address]  Phone: [phone]  Fax: [phone]       Patient: Guillermo Castillo   YOB: 1970  Date of Visit: 04/15/2025    To Whom It May Concern:    Dalton Castillo  was at [facility] on 04/15/2025. The patient may return to work/school on 4/16/25 with no restrictions. If you have any questions or concerns, or if I can be of further assistance, please do not hesitate to contact me.    Sincerely,    Kelsi Thomas PA-C  [facility]

## 2025-04-15 NOTE — PROGRESS NOTES
Subjective:      Patient ID: Guillermo Castillo is a 55 y.o. male.    Vitals:  height is 6' (1.829 m) and weight is 112.9 kg (249 lb 0.2 oz). His tympanic temperature is 97.2 °F (36.2 °C). His blood pressure is 126/74 and his pulse is 53 (abnormal). His respiration is 16 and oxygen saturation is 97%.     Chief Complaint: Cough (Entered by patient)    Patient presents with sinus congestion, productive cough and fever. Symptoms started x1 week ago. Patient seen at different urgent care x3 days ago and tested negative for flu, covid, and strep. Taking dayquil without relief. No CP,SOB. Still running low grade fever 99F as of yesterday    Cough  This is a new problem. The current episode started 1 to 4 weeks ago. The problem has been gradually worsening. The problem occurs every few minutes. The cough is Productive of sputum. Associated symptoms include nasal congestion and a sore throat. Pertinent negatives include no chest pain, chills, ear congestion, ear pain, fever, headaches, heartburn, hemoptysis, myalgias, postnasal drip, rash, rhinorrhea, shortness of breath, sweats, weight loss or wheezing. The symptoms are aggravated by lying down. He has tried OTC cough suppressant for the symptoms. The treatment provided no relief. There is no history of asthma, bronchiectasis, bronchitis, COPD, emphysema, environmental allergies or pneumonia.       Constitution: Positive for fatigue. Negative for chills and fever.   HENT:  Positive for congestion, sinus pain, sinus pressure and sore throat. Negative for ear pain and postnasal drip.    Cardiovascular:  Negative for chest pain, leg swelling, palpitations and sob on exertion.   Respiratory:  Positive for cough and sputum production. Negative for bloody sputum, shortness of breath and wheezing.    Gastrointestinal:  Negative for heartburn.   Musculoskeletal:  Negative for muscle ache.   Skin:  Negative for rash.   Allergic/Immunologic: Negative for environmental allergies.    Neurological:  Negative for headaches.      Objective:     Physical Exam   Constitutional: He is oriented to person, place, and time. He appears well-developed. He is cooperative.  Non-toxic appearance. He does not appear ill. No distress.   HENT:   Head: Normocephalic and atraumatic.   Ears:   Right Ear: Hearing, tympanic membrane, external ear and ear canal normal.   Left Ear: Hearing, tympanic membrane, external ear and ear canal normal.   Nose: Mucosal edema and congestion present. No rhinorrhea or nasal deformity. No epistaxis. Right sinus exhibits maxillary sinus tenderness. Right sinus exhibits no frontal sinus tenderness. Left sinus exhibits maxillary sinus tenderness. Left sinus exhibits no frontal sinus tenderness.   Mouth/Throat: Uvula is midline, oropharynx is clear and moist and mucous membranes are normal. No trismus in the jaw. Normal dentition. No uvula swelling. No posterior oropharyngeal erythema.   Eyes: Conjunctivae and lids are normal. Right eye exhibits no discharge. Left eye exhibits no discharge. No scleral icterus.   Neck: Trachea normal and phonation normal. Neck supple.   Cardiovascular: Regular rhythm, normal heart sounds and normal pulses. Bradycardia present.   Pulmonary/Chest: Effort normal and breath sounds normal. No respiratory distress.           Abdominal: Normal appearance and bowel sounds are normal. He exhibits no distension and no mass. Soft. There is no abdominal tenderness.   Musculoskeletal: Normal range of motion.         General: No deformity. Normal range of motion.   Neurological: He is alert and oriented to person, place, and time. He exhibits normal muscle tone. Coordination normal.   Skin: Skin is warm, dry, intact, not diaphoretic and not pale.   Psychiatric: His speech is normal and behavior is normal. Judgment and thought content normal.   Nursing note and vitals reviewed.    X-Ray Chest PA And Lateral  Result Date: 4/15/2025  EXAM: XR CHEST PA AND LATERAL  CLINICAL HISTORY: Bronchitis COMPARISON: None available. FINDINGS: 2 view chest. Heart size is normal and lungs are clear bilaterally.  Pulmonary vasculature is normal.  Incidental old, healed fractures visible in the right seventh rib.     No evidence of acute disease. Finalized on: 4/15/2025 10:06 AM By:  Jairo Ochoa [facility]# [id number]      2025-04-15 10:09:06.720     [facility]      Assessment:     1. Acute bronchitis, unspecified organism    2. Bacterial sinusitis        Plan:       Acute bronchitis, unspecified organism  -     X-Ray Chest PA And Lateral; Future; Expected date: 04/15/2025  -     promethazine-dextromethorphan (PROMETHAZINE-DM) 6.25-15 mg/5 mL Syrp; Take 5 mLs by mouth every 6 (six) hours as needed (cough).  Dispense: 140 mL; Refill: 0  -     fluticasone propionate (FLONASE) 50 mcg/actuation nasal spray; 1 spray (50 mcg total) by Each Nostril route 2 (two) times a day. Dispense 1 bottle for 7 days  Dispense: 16 g; Refill: 0    Bacterial sinusitis  -     fluticasone propionate (FLONASE) 50 mcg/actuation nasal spray; 1 spray (50 mcg total) by Each Nostril route 2 (two) times a day. Dispense 1 bottle for 7 days  Dispense: 16 g; Refill: 0  -     amoxicillin-clavulanate 875-125mg (AUGMENTIN) 875-125 mg per tablet; Take 1 tablet by mouth 2 (two) times daily. for 10 days  Dispense: 20 tablet; Refill: 0      Kelsi Thomas PA-C  [facility]       Patient Instructions   Complete full course of antibiotic. Take mucinex for nasal and chest congestion. Prescription promethazine DM cough syrup every 4-6 hours as needed (but do not combine with over the counter cough syrup). Rest and drink plenty of fluids.Tylenol or motrin for fever, sore throat or body aches. Flonase 1 spray/nostril up to 2x daily will help with nasal congestion.     You need re-evaluated for any new persistent fever > 4 days, progressive chest pain or shortness of breath, or cough > 4 weeks.        Medical Decision Making:    Clinical Tests:   Radiological Study: Ordered and Reviewed  Urgent Care Management:  Neg CXR. Tx for bacterial sinusitis based on duration of symptoms and still low grade temp, diabetic.

## 2025-04-15 NOTE — PATIENT INSTRUCTIONS
Complete full course of antibiotic. Take mucinex for nasal and chest congestion. Prescription promethazine DM cough syrup every 4-6 hours as needed (but do not combine with over the counter cough syrup). Rest and drink plenty of fluids.Tylenol or motrin for fever, sore throat or body aches. Flonase 1 spray/nostril up to 2x daily will help with nasal congestion.     You need re-evaluated for any new persistent fever > 4 days, progressive chest pain or shortness of breath, or cough > 4 weeks.

## 2025-04-28 ENCOUNTER — PATIENT MESSAGE (OUTPATIENT)
Dept: HEPATOLOGY | Facility: CLINIC | Age: 55
End: 2025-04-28
Payer: COMMERCIAL

## 2025-05-01 DIAGNOSIS — E11.59 HYPERTENSION ASSOCIATED WITH DIABETES: ICD-10-CM

## 2025-05-01 DIAGNOSIS — I15.2 HYPERTENSION ASSOCIATED WITH DIABETES: ICD-10-CM

## 2025-05-01 DIAGNOSIS — Z00.00 ROUTINE ADULT HEALTH MAINTENANCE: Primary | ICD-10-CM

## 2025-05-05 DIAGNOSIS — E78.1 HYPERTRIGLYCERIDEMIA: ICD-10-CM

## 2025-05-05 DIAGNOSIS — Z00.00 ROUTINE ADULT HEALTH MAINTENANCE: Primary | ICD-10-CM

## 2025-05-06 ENCOUNTER — LAB VISIT (OUTPATIENT)
Dept: LAB | Facility: HOSPITAL | Age: 55
End: 2025-05-06
Payer: COMMERCIAL

## 2025-05-06 DIAGNOSIS — Z00.00 ROUTINE ADULT HEALTH MAINTENANCE: ICD-10-CM

## 2025-05-06 DIAGNOSIS — E78.1 HYPERTRIGLYCERIDEMIA: ICD-10-CM

## 2025-05-06 LAB
CHOLEST SERPL-MCNC: 174 MG/DL (ref 120–199)
CHOLEST/HDLC SERPL: 5 {RATIO} (ref 2–5)
HDLC SERPL-MCNC: 35 MG/DL (ref 40–75)
HDLC SERPL: 20.1 % (ref 20–50)
LDLC SERPL CALC-MCNC: 98.8 MG/DL (ref 63–159)
NONHDLC SERPL-MCNC: 139 MG/DL
TRIGL SERPL-MCNC: 201 MG/DL (ref 30–150)

## 2025-05-06 PROCEDURE — 80061 LIPID PANEL: CPT

## 2025-05-06 PROCEDURE — 36415 COLL VENOUS BLD VENIPUNCTURE: CPT | Mod: PN

## 2025-05-07 ENCOUNTER — OFFICE VISIT (OUTPATIENT)
Dept: DIABETES | Facility: CLINIC | Age: 55
End: 2025-05-07
Payer: COMMERCIAL

## 2025-05-07 ENCOUNTER — TELEPHONE (OUTPATIENT)
Dept: DIABETES | Facility: CLINIC | Age: 55
End: 2025-05-07
Payer: COMMERCIAL

## 2025-05-07 VITALS
WEIGHT: 246.25 LBS | HEIGHT: 72 IN | DIASTOLIC BLOOD PRESSURE: 81 MMHG | SYSTOLIC BLOOD PRESSURE: 143 MMHG | HEART RATE: 54 BPM | BODY MASS INDEX: 33.35 KG/M2

## 2025-05-07 DIAGNOSIS — E11.65 TYPE 2 DIABETES MELLITUS WITH HYPERGLYCEMIA, WITH LONG-TERM CURRENT USE OF INSULIN: Primary | ICD-10-CM

## 2025-05-07 DIAGNOSIS — K76.0 METABOLIC DYSFUNCTION-ASSOCIATED STEATOTIC LIVER DISEASE (MASLD): ICD-10-CM

## 2025-05-07 DIAGNOSIS — I15.2 HYPERTENSION ASSOCIATED WITH DIABETES: ICD-10-CM

## 2025-05-07 DIAGNOSIS — E11.59 HYPERTENSION ASSOCIATED WITH DIABETES: ICD-10-CM

## 2025-05-07 DIAGNOSIS — E66.09 CLASS 1 OBESITY DUE TO EXCESS CALORIES WITH SERIOUS COMORBIDITY AND BODY MASS INDEX (BMI) OF 33.0 TO 33.9 IN ADULT: Chronic | ICD-10-CM

## 2025-05-07 DIAGNOSIS — Z79.4 TYPE 2 DIABETES MELLITUS WITH HYPERGLYCEMIA, WITH LONG-TERM CURRENT USE OF INSULIN: Primary | ICD-10-CM

## 2025-05-07 DIAGNOSIS — E78.2 MIXED HYPERLIPIDEMIA: ICD-10-CM

## 2025-05-07 DIAGNOSIS — D50.9 IRON DEFICIENCY ANEMIA, UNSPECIFIED IRON DEFICIENCY ANEMIA TYPE: ICD-10-CM

## 2025-05-07 DIAGNOSIS — E66.811 CLASS 1 OBESITY DUE TO EXCESS CALORIES WITH SERIOUS COMORBIDITY AND BODY MASS INDEX (BMI) OF 33.0 TO 33.9 IN ADULT: Chronic | ICD-10-CM

## 2025-05-07 LAB — GLUCOSE SERPL-MCNC: 184 MG/DL (ref 70–110)

## 2025-05-07 PROCEDURE — 99999 PR PBB SHADOW E&M-EST. PATIENT-LVL V: CPT | Mod: PBBFAC,,, | Performed by: NURSE PRACTITIONER

## 2025-05-07 PROCEDURE — 82962 GLUCOSE BLOOD TEST: CPT | Mod: S$GLB,,, | Performed by: NURSE PRACTITIONER

## 2025-05-07 RX ORDER — INSULIN GLARGINE 100 [IU]/ML
INJECTION, SOLUTION SUBCUTANEOUS
Qty: 10 ML | Refills: 2 | Status: SHIPPED | OUTPATIENT
Start: 2025-05-07

## 2025-05-07 RX ORDER — NAPROXEN SODIUM 220 MG
1 TABLET ORAL 4 TIMES DAILY
Qty: 100 EACH | Refills: 3 | Status: SHIPPED | OUTPATIENT
Start: 2025-05-07

## 2025-05-07 NOTE — TELEPHONE ENCOUNTER
Spoke with provider to ask if pt is okay to be seen at 9:30 because pt is running late due to weather. Provider said that is fine.  Called pt to let him know that it is okay to be here for 9:30.

## 2025-05-07 NOTE — PATIENT INSTRUCTIONS
Start Lantus 15 units every night     Humalog 12 units with each meal plus sliding scale  If blood sugar is          <60    =  subtract 2 units   60-80    =  subtract 1 unit     =  No change  141-180 =  add 1 unit  181-220 = add 2 units  221-260 = add 3 units  261-300 = add 4 units  301-340 = add 5 units  > 340     = add 6 units    Continue the metformin     Apps:  Calorie Counter by [employer]  [facility] low FODMAP

## 2025-05-07 NOTE — TELEPHONE ENCOUNTER
----- Message from Donna sent at 5/7/2025  8:58 AM CDT -----  Regarding: Late Arrival  Contact: melanie  Type: Patient Running Late to Appointment (tried to contact office, but no answer at extensions)Name of Caller: Guillermo Scheduled Appointment Date/Time: 9:00am Estimated Time of Arrival: 9:30am Patient's Provider: Dr Norris Call Back Number:  [phone] (home) Additional Information: The weather is holding him up plus the [place]

## 2025-05-07 NOTE — PROGRESS NOTES
Patient ID: Guillermo Castillo is a 55 y.o. male.  Patient's current PCP is Kip Siddiqui MD.   Collaborating Physician: VIKASH Morales MD    Chief Complaint: Diabetes Mellitus (NEW PT)    HPI  Guillremo Castillo is a 55 y.o. White male presenting for a new consult for diabetes.       Patient has been diagnosed with type 2 diabetes for several years.  Complications related to diabetes: MASLD  Recent diabetes related hospitalizations: none    Previous diabetes education: none  Occupation: EMT, works straight days  LMP: ---    Current diet: Using Cholestyramine. Variable. Trying to do Low carb, higher protein. Likes some vegetables.   Current weight: 246  Weight at FOV: 246 on 5/7/25  Activity level: No set activity    Changes made at the last visit: ---    Current issues: Concerned about Glucose control off Ozempic    Personal history of pancreatitis: denies  Personal history of abdominal surgery: denies  Personal history of thyroid surgery: denies  Family history of pancreatic cancer in first-degree relative: denies  Family history of MTC/MEN/endocrine tumors: denies       Past Medical History:   Diagnosis Date    Anemia 2020    dr mendel DWYER-19 01/16/2021    DM (diabetes mellitus)     Fatty liver     GERD (gastroesophageal reflux disease)     Gout     Hyperlipidemia     Hypertension     Hypertriglyceridemia     Mood disorder     Panic disorder     Sleep apnea     wears CPAP    Type 2 diabetes mellitus     05/2013 BS     Social History[1]    Review of patient's allergies indicates:   Allergen Reactions    Simvastatin      Other reaction(s): aches/inc lfts    Ozempic [semaglutide] Nausea And Vomiting     gastroparesis       CURRENT DM MEDICATIONS:   Diabetes Medications              insulin lispro (HUMALOG U-100 INSULIN) 100 unit/mL injection Use up to three times daily with meals per sliding scale  10-12 ac meals most of the time    metFORMIN (GLUCOPHAGE-XR) 500 MG ER 24hr tablet Take 2 tablets (1,000 mg total) by  mouth 2 (two) times daily with meals.     SLIDING SCALE  Blood Glucose   mg/dL                  Pre-meal                2200   151-200                0 unit                      0 unit   201-250                2 units                    1 unit   251-300                3 units                    1 unit   301-350                4 units                    2 units   >350                     5 units                    3 units       Past failed treatment(s) include:   Ozempic - gastroparesis. Delayed emptying study on Ozempic 2 mg    Meter/cgm: Dexcom G7  Blood glucose testing is performed regularly.   Patient is testing continuously.  Any episodes of hypoglycemia? Denies  Glucose trends:   Per CGM download, for the last 13 days:  Average glucose of 210 mg/dL. Patient is 31% in range. 52% of readings are mildly elevated with 17% of readings > 250. 0% hypoglycemia. SD 50 mg/dL. Estimated GMI 8.3%. Glycemic control is unstable - spiking after occasional meals. Staying above goal between meals with slight rise at 2 am      CGM data:      His blood sugar in the clinic today was:   Lab Results   Component Value Date    POCGLU 184 (A) 05/07/2025       Statin: Taking  ACE/ARB: Taking    Labs reviewed and are noted below.    Screening or Prevention Patient's value Goal Complete/Controlled?   HgA1C Testing and Control   Lab Results   Component Value Date    HGBA1C 6.3 (H) 03/10/2025      Annually/Less than 8% Yes   Lipid profile : 05/06/2025 Annually Yes   LDL control Lab Results   Component Value Date    LDLCALC 98.8 05/06/2025    Annually/Less than 100 mg/dl  Yes   Nephropathy screening Lab Results   Component Value Date    MICALBCREAT 7.4 12/02/2024     Lab Results   Component Value Date    PROTEINUA 2+ (A) 09/18/2023    Annually Yes   Blood pressure BP Readings from Last 1 Encounters:   05/07/25 (!) 143/81    Less than 140/90 no   Dilated retinal exam : 12/09/2024 Patricia Annually Yes    Foot exam   : 12/16/2024 Annually Yes  "    Glucose   Date Value Ref Range Status   02/12/2025 187 (H) 70 - 110 mg/dL Final     Anion Gap   Date Value Ref Range Status   02/12/2025 11 8 - 16 mmol/L Final     eGFR if    Date Value Ref Range Status   05/25/2022 >60 >60 mL/min/1.73 m^2 Final     eGFR if non    Date Value Ref Range Status   05/25/2022 >60 >60 mL/min/1.73 m^2 Final     Comment:     Calculation used to obtain the estimated glomerular filtration  rate (eGFR) is the CKD-EPI equation.        Lab Results   Component Value Date    FREET4 0.98 01/22/2008    TSH 1.638 09/29/2019     No results found for: "CPEPTIDE"  No results found for: "GLUTAMICACID"    Wt Readings from Last 3 Encounters:   05/07/25 0934 111.7 kg (246 lb 4.1 oz)   04/15/25 0908 112.9 kg (249 lb 0.2 oz)   03/17/25 0920 110.5 kg (243 lb 9.7 oz)       Review of Systems   Constitutional:  Negative for malaise/fatigue and weight loss.   Eyes:  Negative for blurred vision and double vision.   Respiratory:  Negative for shortness of breath.    Cardiovascular: Negative.    Gastrointestinal:  Positive for abdominal pain (bloating) and constipation.   Genitourinary:  Negative for frequency.   Musculoskeletal:  Negative for myalgias.   Neurological: Negative.    Psychiatric/Behavioral: Negative.         Physical Exam  Vitals reviewed.   Constitutional:       General: He is not in acute distress.     Appearance: Normal appearance. He is obese. He is not ill-appearing.   Eyes:      Conjunctiva/sclera: Conjunctivae normal.      Pupils: Pupils are equal, round, and reactive to light.   Cardiovascular:      Rate and Rhythm: Normal rate and regular rhythm.      Pulses: Normal pulses.   Pulmonary:      Effort: Pulmonary effort is normal.   Skin:     General: Skin is warm and dry.   Neurological:      General: No focal deficit present.      Mental Status: He is alert and oriented to person, place, and time.   Psychiatric:         Mood and Affect: Mood normal. " "          Assessment & Plan    Guillermo Garcia" was seen today for diabetes mellitus.    Diagnoses and all orders for this visit:    Type 2 diabetes mellitus with hyperglycemia, with long-term current use of insulin  -     Ambulatory referral/consult to Diabetic Advanced Practice Providers (Medical Management)  -     POCT Glucose, Hand-Held Device  -     LANTUS U-100 INSULIN 100 unit/mL injection; Inject up to 35 units daily  -     insulin syringe-needle U-100 (BD INSULIN SYRINGE ULTRA-FINE) 0.5 mL 31 gauge x 5/16" Syrg; 1 each by Misc.(Non-Drug; Combo Route) route 4 (four) times daily.   -Start Lantus 15 units every night     - Humalog 12 units with each meal plus sliding scale  If blood sugar is          <60    =  subtract 2 units   60-80    =  subtract 1 unit     =  No change  141-180 =  add 1 unit  181-220 = add 2 units  221-260 = add 3 units  261-300 = add 4 units  301-340 = add 5 units  > 340     = add 6 units    - Continue the metformin   - Start to learn which foods are carbs vs protein vs other  - Start to get same amount of carb at each meal/balance plate. Will work towards lowering carb and increasing lean protein and vegetables ove rtime  - Look at low FODMAP foods to help decrease bloating  - Decrease/limit fried foods  - Send readings in 1 week/prn  - Consider retrial of low dose Ozempic at later date    Hypertension associated with diabetes - on ARB. > goal today. States rushing and anxious  - Monitor    Mixed hyperlipidemia- on rosuvastatin, fenofibrate and fish oil    Iron deficiency anemia, unspecified iron deficiency anemia type - on iron  - Consider checking fructosamine if A1c not accurate    Metabolic dysfunction-associated steatotic liver disease (MASLD)- managed by Specialist  - as above for weight loss, glucose and lipid control    Class 1 obesity due to excess calories with serious comorbidity and body mass index (BMI) of 33.0 to 33.9 in adult  - as above for weight loss    - Reviewed " with patient:  The basic pathophysiology of Type 2 diabetes  Mechanism of action and action time of medications  Use of home glucose monitor/cgm  Basic diet/carbohydrate counting/avoiding simple sugars/plate method  Proper hydration   Risk of complications and preventive measures  When to call for assistance        - Follow up: 6 weeks    Visit today included increased complexity associated with the care of the episodic problem GLUCOSE CONTROL addressed and managing the longitudinal care of the patient due to the serious and/or complex managed problem(s) DIABETES.                   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

## 2025-05-15 NOTE — TELEPHONE ENCOUNTER
No care due was identified.  Health [facility] Embedded Care Due Messages. Reference number: [id number].   5/15/2025 5:08:48 AM CDT

## 2025-05-15 NOTE — TELEPHONE ENCOUNTER
Refill Routing Note   Medication(s) are not appropriate for processing by [facility] for the following reason(s):        Allergy or intolerance    [facility] action(s):  Defer        Medication Therapy Plan: Allergy/Contraindication: rosuvastatin      Appointments  past 12m or future 3m with PCP    Date Provider   Last Visit   12/16/2024 Kip Siddiqui MD   Next Visit   6/24/2025 Kip Siddiqui MD   ED visits in past 90 days: 0        Note composed:9:56 AM 05/15/2025

## 2025-05-16 RX ORDER — ROSUVASTATIN CALCIUM 10 MG/1
10 TABLET, COATED ORAL DAILY
Qty: 90 TABLET | Refills: 4 | Status: SHIPPED | OUTPATIENT
Start: 2025-05-16

## 2025-05-19 ENCOUNTER — HOSPITAL ENCOUNTER (OUTPATIENT)
Dept: CARDIOLOGY | Facility: HOSPITAL | Age: 55
Discharge: HOME | End: 2025-05-19
Payer: COMMERCIAL

## 2025-05-19 ENCOUNTER — OFFICE VISIT (OUTPATIENT)
Dept: CARDIOLOGY | Facility: CLINIC | Age: 55
End: 2025-05-19
Payer: COMMERCIAL

## 2025-05-19 VITALS
BODY MASS INDEX: 33.45 KG/M2 | DIASTOLIC BLOOD PRESSURE: 78 MMHG | SYSTOLIC BLOOD PRESSURE: 130 MMHG | HEART RATE: 56 BPM | HEIGHT: 72 IN | WEIGHT: 246.94 LBS

## 2025-05-19 DIAGNOSIS — I15.2 HYPERTENSION ASSOCIATED WITH DIABETES: ICD-10-CM

## 2025-05-19 DIAGNOSIS — E11.59 HYPERTENSION ASSOCIATED WITH DIABETES: ICD-10-CM

## 2025-05-19 DIAGNOSIS — I15.2 HYPERTENSION ASSOCIATED WITH DIABETES: Primary | ICD-10-CM

## 2025-05-19 DIAGNOSIS — E11.59 HYPERTENSION ASSOCIATED WITH DIABETES: Primary | ICD-10-CM

## 2025-05-19 DIAGNOSIS — G47.33 OSA (OBSTRUCTIVE SLEEP APNEA): Chronic | ICD-10-CM

## 2025-05-19 DIAGNOSIS — E78.2 MIXED HYPERLIPIDEMIA: ICD-10-CM

## 2025-05-19 DIAGNOSIS — E78.1 HYPERTRIGLYCERIDEMIA: ICD-10-CM

## 2025-05-19 DIAGNOSIS — Z00.00 ROUTINE ADULT HEALTH MAINTENANCE: ICD-10-CM

## 2025-05-19 DIAGNOSIS — R06.09 DOE (DYSPNEA ON EXERTION): ICD-10-CM

## 2025-05-19 LAB
OHS QRS DURATION: 92 MS
OHS QTC CALCULATION: 426 MS

## 2025-05-19 PROCEDURE — 3075F SYST BP GE 130 - 139MM HG: CPT | Mod: CPTII,S$GLB,,

## 2025-05-19 PROCEDURE — 1159F MED LIST DOCD IN RCRD: CPT | Mod: CPTII,S$GLB,,

## 2025-05-19 PROCEDURE — 93005 ELECTROCARDIOGRAM TRACING: CPT

## 2025-05-19 PROCEDURE — 3072F LOW RISK FOR RETINOPATHY: CPT | Mod: CPTII,S$GLB,,

## 2025-05-19 PROCEDURE — 3008F BODY MASS INDEX DOCD: CPT | Mod: CPTII,S$GLB,,

## 2025-05-19 PROCEDURE — 1160F RVW MEDS BY RX/DR IN RCRD: CPT | Mod: CPTII,S$GLB,,

## 2025-05-19 PROCEDURE — 3078F DIAST BP <80 MM HG: CPT | Mod: CPTII,S$GLB,,

## 2025-05-19 PROCEDURE — 4010F ACE/ARB THERAPY RXD/TAKEN: CPT | Mod: CPTII,S$GLB,,

## 2025-05-19 PROCEDURE — 99999 PR PBB SHADOW E&M-EST. PATIENT-LVL IV: CPT | Mod: PBBFAC,,,

## 2025-05-19 PROCEDURE — 3044F HG A1C LEVEL LT 7.0%: CPT | Mod: CPTII,S$GLB,,

## 2025-05-19 PROCEDURE — 93010 ELECTROCARDIOGRAM REPORT: CPT | Mod: ,,, | Performed by: INTERNAL MEDICINE

## 2025-05-19 PROCEDURE — 99214 OFFICE O/P EST MOD 30 MIN: CPT | Mod: S$GLB,,,

## 2025-05-19 NOTE — PROGRESS NOTES
Subjective:   Patient ID:  Guillermo Castillo is a 55 y.o. male who presents for evaluation of No chief complaint on file.      HPI 55-year-old male whose current medical conditions include PETROS, HTN, DM, high triglycerides previously followed in cardiology clinic by Dr. Cee here today for CV follow-up complaining of occasional shortness of breath and RAMOS denies any chest pain dizziness or palpitations    LDL 98 5/25'  Family hx: Dad with MI in 2005 at 66 yo, CAD s/p PCI   Lab Results   Component Value Date    HGBA1C 6.3 (H) 03/10/2025       Past Medical History:   Diagnosis Date    Anemia 2020    dr mendel CARRID-19 01/16/2021    DM (diabetes mellitus)     Fatty liver     GERD (gastroesophageal reflux disease)     Gout     Hyperlipidemia     Hypertension     Hypertriglyceridemia     Mood disorder     Panic disorder     Sleep apnea     wears CPAP    Type 2 diabetes mellitus     05/2013 BS       Past Surgical History:   Procedure Laterality Date    APPENDECTOMY      COLONOSCOPY N/A 11/27/2019    Procedure: COLONOSCOPY;  Surgeon: Radha Anne MD;  Location: [facility];  Service: Endoscopy;  Laterality: N/A;    COLONOSCOPY N/A 2/22/2023    Procedure: COLONOSCOPY;  Surgeon: Princess Camara MD;  Location: [facility];  Service: Endoscopy;  Laterality: N/A;    ESOPHAGOGASTRODUODENOSCOPY N/A 11/27/2019    Procedure: EGD (ESOPHAGOGASTRODUODENOSCOPY);  Surgeon: Radha Anne MD;  Location: [facility];  Service: Endoscopy;  Laterality: N/A;    ESOPHAGOGASTRODUODENOSCOPY N/A 7/24/2023    Procedure: EGD (ESOPHAGOGASTRODUODENOSCOPY);  Surgeon: Jt Vilchis MD;  Location: [facility];  Service: Endoscopy;  Laterality: N/A;       Social History[1]    Family History   Problem Relation Name Age of Onset    Hypertension Mother      Cataracts Mother      Coronary artery disease Father      Diabetes Father      Lung cancer Father      Interstitial lung disease Sister      Hypertension Maternal Grandmother      Cataracts  Maternal Grandmother      Diabetes Maternal Aunt      Diabetes Paternal Aunt         Medications Ordered Prior to Encounter[2]   Wt Readings from Last 3 Encounters:   05/19/25 112 kg (246 lb 14.6 oz)   05/07/25 111.7 kg (246 lb 4.1 oz)   04/15/25 112.9 kg (249 lb 0.2 oz)     Temp Readings from Last 3 Encounters:   04/15/25 97.2 °F (36.2 °C) (Tympanic)   03/17/25 97.8 °F (36.6 °C) (Tympanic)   12/16/24 96.6 °F (35.9 °C) (Tympanic)     BP Readings from Last 3 Encounters:   05/19/25 130/78   05/07/25 (!) 143/81   04/15/25 126/74     Pulse Readings from Last 3 Encounters:   05/19/25 (!) 56   05/07/25 (!) 54   04/15/25 (!) 53        Review of Systems   Constitutional: Negative.   HENT: Negative.     Eyes: Negative.    Cardiovascular: Negative.    Respiratory: Negative.     Skin: Negative.    Musculoskeletal: Negative.    Gastrointestinal: Negative.    Genitourinary: Negative.    Neurological: Negative.    Psychiatric/Behavioral: Negative.         Objective:   Physical Exam  Vitals and nursing note reviewed.   Constitutional:       Appearance: Normal appearance.   HENT:      Head: Normocephalic and atraumatic.   Eyes:      General:         Right eye: No discharge.         Left eye: No discharge.      Pupils: Pupils are equal, round, and reactive to light.   Cardiovascular:      Rate and Rhythm: Regular rhythm. Bradycardia present.      Heart sounds: S1 normal and S2 normal. No murmur heard.     No friction rub.   Pulmonary:      Effort: Pulmonary effort is normal. No respiratory distress.      Breath sounds: Normal breath sounds. No rales.   Abdominal:      Palpations: Abdomen is soft.      Tenderness: There is no abdominal tenderness.   Musculoskeletal:      Cervical back: Neck supple.      Right lower leg: No edema.      Left lower leg: No edema.   Skin:     General: Skin is warm and dry.   Neurological:      General: No focal deficit present.      Mental Status: He is alert and oriented to person, place, and time.    Psychiatric:         Mood and Affect: Mood normal.         Behavior: Behavior normal.         Thought Content: Thought content normal.         Lab Results   Component Value Date    CHOL 174 05/06/2025    CHOL 188 12/02/2024    CHOL 158 10/24/2024     Lab Results   Component Value Date    HDL 35 (L) 05/06/2025    HDL 34 (L) 12/02/2024    HDL 33 (L) 10/24/2024     Lab Results   Component Value Date    LDLCALC 98.8 05/06/2025    LDLCALC 90.8 12/02/2024    LDLCALC 77.4 10/24/2024     Lab Results   Component Value Date    TRIG 201 (H) 05/06/2025    TRIG 316 (H) 12/02/2024    TRIG 238 (H) 10/24/2024     Lab Results   Component Value Date    CHOLHDL 20.1 05/06/2025    CHOLHDL 18.1 (L) 12/02/2024    CHOLHDL 20.9 10/24/2024       Chemistry        Component Value Date/Time     02/12/2025 1005    K 4.4 02/12/2025 1005     02/12/2025 1005    CO2 23 02/12/2025 1005    BUN 18 02/12/2025 1005    CREATININE 0.9 02/12/2025 1005     (H) 02/12/2025 1005        Component Value Date/Time    CALCIUM 9.4 02/12/2025 1005    ALKPHOS 97 02/12/2025 1005    AST 44 (H) 02/12/2025 1005    ALT 50 (H) 02/12/2025 1005    BILITOT 0.4 02/12/2025 1005    ESTGFRAFRICA >60 05/25/2022 0742    EGFRNONAA >60 05/25/2022 0742          Lab Results   Component Value Date    TSH 1.638 09/29/2019     Lab Results   Component Value Date    INR 1.0 02/12/2025    INR 1.0 05/11/2012    INR 1.0 08/22/2011     @RESUFAST(WBC,HGB,HCT,MCV,PLT)  @LABRCNTIP(BNP,BNPTRIAGEBLO)@  CrCl cannot be calculated (Patient's most recent lab result is older than the maximum 7 days allowed.).     No results found for this or any previous visit.     Results for orders placed during the hospital encounter of 09/04/21    Exercise Stress - EKG    Interpretation Summary    The EKG portion of this study is negative for ischemia. Sensitivity is reduced secondary to the target heart rate not being achieved.    Sensitivity is reduced secondary to the target heart rate not  being achieved.    The blood pressure response to stress was normal.    There were no arrhythmias during stress.    The exercise capacity was above average.     Assessment:      1. Hypertriglyceridemia    2. Mixed hyperlipidemia    3. Hypertension associated with diabetes    4. ARPAN (obstructive sleep apnea)        Plan:   Hypertriglyceridemia    Mixed hyperlipidemia    Hypertension associated with diabetes    ARPAN (obstructive sleep apnea)      Check echo     Amlodipine, carvedilol, valsartan, low-sodium diet, profile blood pressure   Rosuvastatin, fenofibrate, low-fat diet HLP   CPAP ARPAN   Risk factor modifications   Daily exercise as tolerated    Return to clinic in 6 months or sooner if needed    Courtney Guillot, FNP-C [facility]         [1]   Social History  Tobacco Use    Smoking status: Never     Passive exposure: Past    Smokeless tobacco: Never   Substance Use Topics    Alcohol use: Not Currently     Comment: rarely    Drug use: No   [2]   Current Outpatient Medications on File Prior to Visit   Medication Sig Dispense Refill    amLODIPine (NORVASC) 10 MG tablet Take 1 tablet by mouth once daily 90 tablet 4    blood-glucose meter,continuous (DEXCOM G6 ) Misc Use as directed to continually check blood glucose 1 each 6    blood-glucose sensor (DEXCOM G6 SENSOR) Sharon Use as directed to continuously check blood glucose - change once every 10 days 5 each 6    blood-glucose transmitter (DEXCOM G6 TRANSMITTER) Sharon Use as directed to check blood glucose - replace every 90 days 1 each 0    carvediloL (COREG) 25 MG tablet Take 1 tablet (25 mg total) by mouth 2 (two) times daily with meals. 180 tablet 4    cholestyramine (QUESTRAN) 4 gram packet Take 1 packet (4 g total) by mouth 2 (two) times daily. 180 packet 5    DULoxetine (CYMBALTA) 60 MG capsule Take 1 capsule by mouth once daily 90 capsule 4    fenofibrate 160 MG Tab Take 1 tablet (160 mg total) by mouth once daily. 90 tablet 3    insulin  "lispro (HUMALOG U-100 INSULIN) 100 unit/mL injection Use up to three times daily with meals per sliding scale 10 mL 6    insulin syringe-needle U-100 (BD INSULIN SYRINGE ULTRA-FINE) 0.5 mL 31 gauge x 5/16" Syrg 1 each by Misc.(Non-Drug; Combo Route) route 4 (four) times daily. 100 each 3    iron-vitamin C 100-250 mg, ICAR-C, (ICAR-C) 100-250 mg Tab Take 1 tablet by mouth once daily. 90 tablet 3    LANTUS U-100 INSULIN 100 unit/mL injection Inject up to 35 units daily 10 mL 2    meloxicam (MOBIC) 15 MG tablet Take 1 tablet (15 mg total) by mouth daily as needed for Pain. 30 tablet 5    metFORMIN (GLUCOPHAGE-XR) 500 MG ER 24hr tablet Take 2 tablets (1,000 mg total) by mouth 2 (two) times daily with meals. 360 tablet 1    omega-3 fatty acids/fish oil (FISH OIL-OMEGA-3 FATTY ACIDS) 300-1,000 mg capsule Take 1 capsule by mouth once daily.      omeprazole (PRILOSEC) 40 MG capsule Take 1 capsule (40 mg total) by mouth every morning. 90 capsule 4    rosuvastatin (CRESTOR) 10 MG tablet Take 1 tablet (10 mg total) by mouth once daily. 90 tablet 4    valsartan (DIOVAN) 320 MG tablet Take 1 tablet (320 mg total) by mouth once daily. 90 tablet 1     No current facility-administered medications on file prior to visit.     "

## 2025-05-20 ENCOUNTER — HOSPITAL ENCOUNTER (OUTPATIENT)
Dept: CARDIOLOGY | Facility: HOSPITAL | Age: 55
Discharge: HOME | End: 2025-05-20
Payer: COMMERCIAL

## 2025-05-20 VITALS
WEIGHT: 246 LBS | DIASTOLIC BLOOD PRESSURE: 78 MMHG | HEIGHT: 72 IN | BODY MASS INDEX: 33.32 KG/M2 | SYSTOLIC BLOOD PRESSURE: 130 MMHG

## 2025-05-20 DIAGNOSIS — E11.59 HYPERTENSION ASSOCIATED WITH DIABETES: ICD-10-CM

## 2025-05-20 DIAGNOSIS — R06.09 DOE (DYSPNEA ON EXERTION): ICD-10-CM

## 2025-05-20 DIAGNOSIS — I15.2 HYPERTENSION ASSOCIATED WITH DIABETES: ICD-10-CM

## 2025-05-20 LAB
AORTIC ROOT ANNULUS: 3.4 CM
AORTIC SIZE INDEX (SOV): 1.5 CM/M2
AORTIC SIZE INDEX: 1.5 CM/M2
ASCENDING AORTA: 3.6 CM
AV INDEX (PROSTH): 0.84
AV MEAN GRADIENT: 4 MMHG
AV PEAK GRADIENT: 7 MMHG
AV VALVE AREA BY VELOCITY RATIO: 3.2 CM²
AV VALVE AREA: 2.9 CM²
AV VELOCITY RATIO: 0.92
BSA FOR ECHO PROCEDURE: 2.38 M2
CV ECHO LV RWT: 0.35 CM
DOP CALC AO PEAK VEL: 1.3 M/S
DOP CALC AO VTI: 30.4 CM
DOP CALC LVOT AREA: 3.5 CM2
DOP CALC LVOT DIAMETER: 2.1 CM
DOP CALC LVOT PEAK VEL: 1.2 M/S
DOP CALC LVOT STROKE VOLUME: 88.3 CM3
DOP CALC RVOT PEAK VEL: 0.77 M/S
DOP CALC RVOT VTI: 17.3 CM
DOP CALCLVOT PEAK VEL VTI: 25.5 CM
E WAVE DECELERATION TIME: 255 MSEC
E/A RATIO: 1.5
E/E' RATIO: 9 M/S
ECHO LV POSTERIOR WALL: 0.9 CM (ref 0.6–1.1)
FRACTIONAL SHORTENING: 35.3 % (ref 28–44)
INTERVENTRICULAR SEPTUM: 1.1 CM (ref 0.6–1.1)
IVC DIAMETER: 2.27 CM
IVRT: 65 MSEC
LA MAJOR: 5.5 CM
LA MINOR: 5.9 CM
LA WIDTH: 4.3 CM
LEFT ATRIUM AREA SYSTOLIC (APICAL 2 CHAMBER): 25.21 CM2
LEFT ATRIUM AREA SYSTOLIC (APICAL 4 CHAMBER): 24.08 CM2
LEFT ATRIUM SIZE: 4.5 CM
LEFT ATRIUM VOLUME INDEX MOD: 36 ML/M2
LEFT ATRIUM VOLUME INDEX: 40 ML/M2
LEFT ATRIUM VOLUME MOD: 85 ML
LEFT ATRIUM VOLUME: 94 CM3
LEFT INTERNAL DIMENSION IN SYSTOLE: 3.3 CM (ref 2.1–4)
LEFT VENTRICLE DIASTOLIC VOLUME INDEX: 53.22 ML/M2
LEFT VENTRICLE DIASTOLIC VOLUME: 124 ML
LEFT VENTRICLE END SYSTOLIC VOLUME APICAL 2 CHAMBER: 89.42 ML
LEFT VENTRICLE END SYSTOLIC VOLUME APICAL 4 CHAMBER: 80.83 ML
LEFT VENTRICLE MASS INDEX: 80.7 G/M2
LEFT VENTRICLE SYSTOLIC VOLUME INDEX: 18.5 ML/M2
LEFT VENTRICLE SYSTOLIC VOLUME: 43 ML
LEFT VENTRICULAR INTERNAL DIMENSION IN DIASTOLE: 5.1 CM (ref 3.5–6)
LEFT VENTRICULAR MASS: 188 G
LV LATERAL E/E' RATIO: 8.2 M/S
LV SEPTAL E/E' RATIO: 11.3 M/S
LVED V (TEICH): 124.05 ML
LVES V (TEICH): 42.97 ML
LVOT MG: 2.67 MMHG
LVOT MV: 0.75 CM/S
MV PEAK A VEL: 0.6 M/S
MV PEAK E VEL: 0.9 M/S
OHS CV RV/LV RATIO: 0.65 CM
PISA TR MAX VEL: 2.4 M/S
PV MEAN GRADIENT: 1 MMHG
PV PEAK GRADIENT: 4 MMHG
PV PEAK VELOCITY: 1.03 M/S
RA MAJOR: 4.95 CM
RA PRESSURE ESTIMATED: 3 MMHG
RA WIDTH: 3.03 CM
RIGHT VENTRICLE DIASTOLIC BASEL DIMENSION: 3.3 CM
RIGHT VENTRICULAR END-DIASTOLIC DIMENSION: 3.3 CM
RV TB RVSP: 5 MMHG
SINUS: 3.51 CM
STJ: 3.1 CM
TDI LATERAL: 0.11 M/S
TDI SEPTAL: 0.08 M/S
TDI: 0.1 M/S
TR MAX PG: 24 MMHG
TRICUSPID ANNULAR PLANE SYSTOLIC EXCURSION: 2.2 CM
TV REST PULMONARY ARTERY PRESSURE: 26 MMHG
Z-SCORE OF LEFT VENTRICULAR DIMENSION IN END DIASTOLE: -6.16
Z-SCORE OF LEFT VENTRICULAR DIMENSION IN END SYSTOLE: -4.3

## 2025-05-20 PROCEDURE — 93306 TTE W/DOPPLER COMPLETE: CPT

## 2025-05-20 PROCEDURE — 93306 TTE W/DOPPLER COMPLETE: CPT | Mod: 26,,, | Performed by: INTERNAL MEDICINE

## 2025-05-21 ENCOUNTER — RESULTS FOLLOW-UP (OUTPATIENT)
Dept: CARDIOLOGY | Facility: CLINIC | Age: 55
End: 2025-05-21

## 2025-05-21 ENCOUNTER — TELEPHONE (OUTPATIENT)
Dept: CARDIOLOGY | Facility: CLINIC | Age: 55
End: 2025-05-21
Payer: COMMERCIAL

## 2025-05-21 NOTE — TELEPHONE ENCOUNTER
----- Message from Alize Wu NP sent at 5/21/2025 10:03 AM CDT -----  Heart US with nml function  ----- Message -----  From: Andrew Randall MD  Sent: 5/20/2025   1:41 PM CDT  To: Alize Wu NP

## 2025-05-27 DIAGNOSIS — E78.1 HYPERTRIGLYCERIDEMIA: ICD-10-CM

## 2025-05-28 RX ORDER — FENOFIBRATE 160 MG/1
160 TABLET ORAL DAILY
Qty: 90 TABLET | Refills: 3 | Status: SHIPPED | OUTPATIENT
Start: 2025-05-28 | End: 2026-05-28

## 2025-06-08 DIAGNOSIS — Z79.4 TYPE 2 DIABETES MELLITUS WITH HYPERGLYCEMIA, WITH LONG-TERM CURRENT USE OF INSULIN: ICD-10-CM

## 2025-06-08 DIAGNOSIS — E11.65 TYPE 2 DIABETES MELLITUS WITH HYPERGLYCEMIA, WITH LONG-TERM CURRENT USE OF INSULIN: ICD-10-CM

## 2025-06-09 NOTE — TELEPHONE ENCOUNTER
Refill Routing Note   Medication(s) are not appropriate for processing by [facility] for the following reason(s):        No active prescription written by provider  New or recently adjusted medication  Clarification of medication (Rx) details    [facility] action(s):  Defer      Medication Therapy Plan: FLOS; TEST BLOOD SUGAR HOW MANY TIMES DAILY?      Appointments  past 12m or future 3m with PCP    Date Provider   Last Visit   12/16/2024 Kip Siddiqui MD   Next Visit   6/24/2025 Kip Siddiqui MD   ED visits in past 90 days: 0        Note composed:10:31 AM 06/09/2025

## 2025-06-09 NOTE — TELEPHONE ENCOUNTER
Care Due:                  Date            Visit Type   Department     Provider  --------------------------------------------------------------------------------                                EP -                              PRIMARY      [facility] INTERNAL  Last Visit: 12-      CARE ([facility])   TY Siddiqui                              EP -                              PRIMARY      [facility] INTERNAL  Next Visit: 06-      CARE ([facility])   TY Siddiqui                                                            Last  Test          Frequency    Reason                     Performed    Due Date  --------------------------------------------------------------------------------    HBA1C.......  6 months...  metFORMIN................  03- 09-    [facility] Embedded Care Due Messages. Reference number: [id number].   6/09/2025 10:16:20 AM CDT

## 2025-06-10 RX ORDER — BLOOD-GLUCOSE TRANSMITTER
EACH MISCELLANEOUS
Qty: 1 EACH | Refills: 0 | Status: SHIPPED | OUTPATIENT
Start: 2025-06-10

## 2025-06-19 ENCOUNTER — OFFICE VISIT (OUTPATIENT)
Dept: DIABETES | Facility: CLINIC | Age: 55
End: 2025-06-19
Payer: COMMERCIAL

## 2025-06-19 ENCOUNTER — LAB VISIT (OUTPATIENT)
Dept: LAB | Facility: HOSPITAL | Age: 55
End: 2025-06-19
Attending: FAMILY MEDICINE
Payer: COMMERCIAL

## 2025-06-19 VITALS
WEIGHT: 245.56 LBS | SYSTOLIC BLOOD PRESSURE: 130 MMHG | HEART RATE: 60 BPM | BODY MASS INDEX: 33.31 KG/M2 | DIASTOLIC BLOOD PRESSURE: 80 MMHG

## 2025-06-19 DIAGNOSIS — E66.811 CLASS 1 OBESITY DUE TO EXCESS CALORIES WITH SERIOUS COMORBIDITY AND BODY MASS INDEX (BMI) OF 33.0 TO 33.9 IN ADULT: Chronic | ICD-10-CM

## 2025-06-19 DIAGNOSIS — Z79.4 TYPE 2 DIABETES MELLITUS WITH HYPERGLYCEMIA, WITH LONG-TERM CURRENT USE OF INSULIN: Primary | ICD-10-CM

## 2025-06-19 DIAGNOSIS — E78.1 HYPERTRIGLYCERIDEMIA: ICD-10-CM

## 2025-06-19 DIAGNOSIS — E66.09 CLASS 1 OBESITY DUE TO EXCESS CALORIES WITH SERIOUS COMORBIDITY AND BODY MASS INDEX (BMI) OF 33.0 TO 33.9 IN ADULT: Chronic | ICD-10-CM

## 2025-06-19 DIAGNOSIS — I15.2 HYPERTENSION ASSOCIATED WITH DIABETES: ICD-10-CM

## 2025-06-19 DIAGNOSIS — K76.0 METABOLIC DYSFUNCTION-ASSOCIATED STEATOTIC LIVER DISEASE (MASLD): ICD-10-CM

## 2025-06-19 DIAGNOSIS — D50.9 IRON DEFICIENCY ANEMIA, UNSPECIFIED IRON DEFICIENCY ANEMIA TYPE: ICD-10-CM

## 2025-06-19 DIAGNOSIS — E11.65 TYPE 2 DIABETES MELLITUS WITH HYPERGLYCEMIA, WITH LONG-TERM CURRENT USE OF INSULIN: Primary | ICD-10-CM

## 2025-06-19 DIAGNOSIS — E78.2 MIXED HYPERLIPIDEMIA: ICD-10-CM

## 2025-06-19 DIAGNOSIS — E11.59 HYPERTENSION ASSOCIATED WITH DIABETES: ICD-10-CM

## 2025-06-19 LAB — GLUCOSE SERPL-MCNC: 119 MG/DL (ref 70–110)

## 2025-06-19 PROCEDURE — 99999 PR PBB SHADOW E&M-EST. PATIENT-LVL IV: CPT | Mod: PBBFAC,,, | Performed by: NURSE PRACTITIONER

## 2025-06-19 PROCEDURE — 82962 GLUCOSE BLOOD TEST: CPT | Mod: S$GLB,,, | Performed by: NURSE PRACTITIONER

## 2025-06-19 PROCEDURE — 36415 COLL VENOUS BLD VENIPUNCTURE: CPT | Mod: PN

## 2025-06-19 PROCEDURE — 80061 LIPID PANEL: CPT

## 2025-06-19 NOTE — PROGRESS NOTES
Patient ID: Guillermo Castillo is a 55 y.o. male.  Patient's current PCP is Kip Siddiqui MD.   Collaborating Physician: VIKASH Morales MD    Chief Complaint: Diabetes Mellitus (6 weeks follow up)    HPI  Guillermo Castillo is a 55 y.o. White male presenting for a follow up for diabetes.       Patient has been diagnosed with type 2 diabetes for several years.  Complications related to diabetes: MASLD  Recent diabetes related hospitalizations: none    Previous diabetes education: none  Occupation: EMT, works straight days  LMP: ---    Current diet: Using Cholestyramine. Variable. Trying to do Low carb, higher protein. Likes some vegetables. Has not downloaded Carb counting eunice yet  Current weight: 245 on 6/19/25  Weight at LOV:   246 on 5/7/25  Weight at FOV:  246 on 5/7/25  Activity level: No set activity - taking stairs at work    Changes made at the last visit: ---   -Start Lantus 15 units every night     - Humalog 12 units with each meal plus sliding scale  If blood sugar is          <60    =  subtract 2 units   60-80    =  subtract 1 unit     =  No change  141-180 =  add 1 unit  181-220 = add 2 units  221-260 = add 3 units  261-300 = add 4 units  301-340 = add 5 units  > 340     = add 6 units    - Continue the metformin   - Start to learn which foods are carbs vs protein vs other  - Start to get same amount of carb at each meal/balance plate. Will work towards lowering carb and increasing lean protein and vegetables over time  - Look at low FODMAP foods to help decrease bloating  - Decrease/limit fried foods  - Send readings in 1 week/prn  - Consider retrial of low dose Ozempic at later date    Current issues: Stomach much  improved off Ozempic. Has done some work on diet plan    Personal history of pancreatitis: denies  Personal history of abdominal surgery: denies  Personal history of thyroid surgery: denies  Family history of pancreatic cancer in first-degree relative: denies  Family history of  MTC/MEN/endocrine tumors: denies       Past Medical History:   Diagnosis Date    Anemia 2020    dr oglesby    COVID-19 01/16/2021    DM (diabetes mellitus)     Fatty liver     GERD (gastroesophageal reflux disease)     Gout     Hyperlipidemia     Hypertension     Hypertriglyceridemia     Mood disorder     Panic disorder     Sleep apnea     wears CPAP    Type 2 diabetes mellitus     05/2013 BS     Social History[1]    Review of patient's allergies indicates:   Allergen Reactions    Simvastatin      Other reaction(s): aches/inc lfts    Ozempic [semaglutide] Nausea And Vomiting     gastroparesis       CURRENT DM MEDICATIONS:   Diabetes Medications              insulin lispro (HUMALOG U-100 INSULIN) 100 unit/mL injection Use up to three times daily with meals per sliding scale  12 units sc meals plus ss    LANTUS U-100 INSULIN 100 unit/mL injection Inject up to 15 units daily    metFORMIN (GLUCOPHAGE-XR) 500 MG ER 24hr tablet Take 2 tablets (1,000 mg total) by mouth 2 (two) times daily with meals.     Past failed treatment(s) include:   Ozempic - gastroparesis. Delayed emptying study on Ozempic 2 mg    Meter/cgm: Dexcom G7  Blood glucose testing is performed regularly.   Patient is testing continuously.  Any episodes of hypoglycemia? Denies  Glucose trends:   Per CGM download, for the last 13 days:  Average glucose of 171 mg/dL. Patient is 61% in range. 33% of readings are mildly elevated with 6% of readings > 250. 0% hypoglycemia. SD 45 mg/dL. Estimated GMI 7.4%. Glycemic control is improving - spiking after occasional meals. Staying above goal between meals with slight rise at 2 am. Overall lower than last visit      CGM data:      His blood sugar in the clinic today was:   Lab Results   Component Value Date    POCGLU 119 (A) 06/19/2025       Statin: Taking  ACE/ARB: Taking    Labs reviewed and are noted below.    Screening or Prevention Patient's value Goal Complete/Controlled?   HgA1C Testing and Control   Lab  "Results   Component Value Date    HGBA1C 6.3 (H) 03/10/2025      Annually/Less than 8% Yes   Lipid profile : 05/06/2025 Annually Yes   LDL control Lab Results   Component Value Date    LDLCALC 98.8 05/06/2025    Annually/Less than 100 mg/dl  Yes   Nephropathy screening Lab Results   Component Value Date    MICALBCREAT 7.4 12/02/2024     Lab Results   Component Value Date    PROTEINUA 2+ (A) 09/18/2023    Annually Yes   Blood pressure BP Readings from Last 1 Encounters:   06/19/25 130/80    Less than 140/90 Yes   Dilated retinal exam : 12/09/2024 Patricia Annually Yes    Foot exam   : 12/16/2024 Annually Yes     Glucose   Date Value Ref Range Status   02/12/2025 187 (H) 70 - 110 mg/dL Final     Anion Gap   Date Value Ref Range Status   02/12/2025 11 8 - 16 mmol/L Final     eGFR if    Date Value Ref Range Status   05/25/2022 >60 >60 mL/min/1.73 m^2 Final     eGFR if non    Date Value Ref Range Status   05/25/2022 >60 >60 mL/min/1.73 m^2 Final     Comment:     Calculation used to obtain the estimated glomerular filtration  rate (eGFR) is the CKD-EPI equation.        Lab Results   Component Value Date    FREET4 0.98 01/22/2008    TSH 1.638 09/29/2019     No results found for: "CPEPTIDE"  No results found for: "GLUTAMICACID"    Wt Readings from Last 3 Encounters:   06/19/25 1114 111.4 kg (245 lb 9.5 oz)   05/20/25 1020 111.6 kg (246 lb)   05/19/25 1322 112 kg (246 lb 14.6 oz)       Review of Systems   Constitutional:  Negative for malaise/fatigue and weight loss.   Eyes:  Negative for blurred vision and double vision.   Respiratory:  Negative for shortness of breath.    Cardiovascular: Negative.    Gastrointestinal:  Negative for abdominal pain.   Genitourinary:  Negative for frequency.   Musculoskeletal:  Negative for myalgias.   Neurological: Negative.    Psychiatric/Behavioral: Negative.         Physical Exam  Vitals reviewed.   Constitutional:       General: He is not in acute " "distress.     Appearance: Normal appearance. He is obese. He is not ill-appearing.   Eyes:      Conjunctiva/sclera: Conjunctivae normal.      Pupils: Pupils are equal, round, and reactive to light.   Cardiovascular:      Rate and Rhythm: Normal rate and regular rhythm.      Pulses: Normal pulses.   Pulmonary:      Effort: Pulmonary effort is normal.   Skin:     General: Skin is warm and dry.   Neurological:      General: No focal deficit present.      Mental Status: He is alert and oriented to person, place, and time.   Psychiatric:         Mood and Affect: Mood normal.           Assessment & Plan    Guillermo Garcia" was seen today for diabetes mellitus.    Diagnoses and all orders for this visit:    Type 2 diabetes mellitus with hyperglycemia, with long-term current use of insulin  -     POCT Glucose, Hand-Held Device  -     No change at this time  -     Downloaded carb eunice in office  -     Reinforced diet and exercise  -     Send readings prn need for insulin adjustments      Hypertension associated with diabetes - on ARB.     Mixed hyperlipidemia- on rosuvastatin, fenofibrate and fish oil    Iron deficiency anemia, unspecified iron deficiency anemia type - on iron  - Consider checking fructosamine if A1c not accurate    Metabolic dysfunction-associated steatotic liver disease (MASLD)- managed by Specialist  - as above for weight loss, glucose and lipid control    Class 1 obesity due to excess calories with serious comorbidity and body mass index (BMI) of 33.0 to 33.9 in adult  - as above for weight loss    - Reviewed with patient:  The basic pathophysiology of Type 2 diabetes  Mechanism of action and action time of medications  Use of home glucose monitor/cgm  Basic diet/carbohydrate counting/avoiding simple sugars/plate method  Proper hydration   Risk of complications and preventive measures  When to call for assistance        - Follow up: 3 months    Visit today included increased complexity associated with the " care of the episodic problem GLUCOSE CONTROL addressed and managing the longitudinal care of the patient due to the serious and/or complex managed problem(s) DIABETES.                     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

## 2025-06-20 LAB
CHOLEST SERPL-MCNC: 178 MG/DL (ref 120–199)
CHOLEST/HDLC SERPL: 5.4 {RATIO} (ref 2–5)
HDLC SERPL-MCNC: 33 MG/DL (ref 40–75)
HDLC SERPL: 18.5 % (ref 20–50)
LDLC SERPL CALC-MCNC: 119 MG/DL (ref 63–159)
NONHDLC SERPL-MCNC: 145 MG/DL
TRIGL SERPL-MCNC: 130 MG/DL (ref 30–150)

## 2025-06-24 ENCOUNTER — OFFICE VISIT (OUTPATIENT)
Dept: INTERNAL MEDICINE | Facility: CLINIC | Age: 55
End: 2025-06-24
Payer: COMMERCIAL

## 2025-06-24 VITALS
WEIGHT: 246.25 LBS | TEMPERATURE: 97 F | DIASTOLIC BLOOD PRESSURE: 80 MMHG | BODY MASS INDEX: 33.35 KG/M2 | HEIGHT: 72 IN | OXYGEN SATURATION: 97 % | SYSTOLIC BLOOD PRESSURE: 128 MMHG | HEART RATE: 56 BPM

## 2025-06-24 DIAGNOSIS — Z79.4 TYPE 2 DIABETES MELLITUS WITHOUT COMPLICATION, WITH LONG-TERM CURRENT USE OF INSULIN: ICD-10-CM

## 2025-06-24 DIAGNOSIS — E11.59 HYPERTENSION ASSOCIATED WITH DIABETES: Primary | ICD-10-CM

## 2025-06-24 DIAGNOSIS — E11.9 TYPE 2 DIABETES MELLITUS WITHOUT COMPLICATION, WITH LONG-TERM CURRENT USE OF INSULIN: ICD-10-CM

## 2025-06-24 DIAGNOSIS — K76.0 METABOLIC DYSFUNCTION-ASSOCIATED STEATOTIC LIVER DISEASE (MASLD): ICD-10-CM

## 2025-06-24 DIAGNOSIS — K58.0 IRRITABLE BOWEL SYNDROME WITH DIARRHEA: ICD-10-CM

## 2025-06-24 DIAGNOSIS — K31.84 GASTROPARESIS: ICD-10-CM

## 2025-06-24 DIAGNOSIS — I15.2 HYPERTENSION ASSOCIATED WITH DIABETES: Primary | ICD-10-CM

## 2025-06-24 DIAGNOSIS — D50.9 IRON DEFICIENCY ANEMIA, UNSPECIFIED IRON DEFICIENCY ANEMIA TYPE: ICD-10-CM

## 2025-06-24 PROCEDURE — 3072F LOW RISK FOR RETINOPATHY: CPT | Mod: CPTII,S$GLB,, | Performed by: FAMILY MEDICINE

## 2025-06-24 PROCEDURE — 3079F DIAST BP 80-89 MM HG: CPT | Mod: CPTII,S$GLB,, | Performed by: FAMILY MEDICINE

## 2025-06-24 PROCEDURE — 1159F MED LIST DOCD IN RCRD: CPT | Mod: CPTII,S$GLB,, | Performed by: FAMILY MEDICINE

## 2025-06-24 PROCEDURE — 3008F BODY MASS INDEX DOCD: CPT | Mod: CPTII,S$GLB,, | Performed by: FAMILY MEDICINE

## 2025-06-24 PROCEDURE — G2211 COMPLEX E/M VISIT ADD ON: HCPCS | Mod: S$GLB,,, | Performed by: FAMILY MEDICINE

## 2025-06-24 PROCEDURE — 4010F ACE/ARB THERAPY RXD/TAKEN: CPT | Mod: CPTII,S$GLB,, | Performed by: FAMILY MEDICINE

## 2025-06-24 PROCEDURE — 99999 PR PBB SHADOW E&M-EST. PATIENT-LVL IV: CPT | Mod: PBBFAC,,, | Performed by: FAMILY MEDICINE

## 2025-06-24 PROCEDURE — 3044F HG A1C LEVEL LT 7.0%: CPT | Mod: CPTII,S$GLB,, | Performed by: FAMILY MEDICINE

## 2025-06-24 PROCEDURE — 3074F SYST BP LT 130 MM HG: CPT | Mod: CPTII,S$GLB,, | Performed by: FAMILY MEDICINE

## 2025-06-24 PROCEDURE — 99214 OFFICE O/P EST MOD 30 MIN: CPT | Mod: S$GLB,,, | Performed by: FAMILY MEDICINE

## 2025-06-24 NOTE — PROGRESS NOTES
Subjective:       Patient ID: Guillermo Castilol is a male.    Chief Complaint: Multiple issues see below    HPI Type 2 diabetes: a1co<6.5 digital med.  Diabetes clinic Tolerating medicine. ; on metformin metf, off Ozempic secondary to gastroparesis evaluation via gastroenterology ozmpic;b  Hypercholesterolemia:brenda med added fenofibrate has normalized triglycerides  Gout:  No c/o recent flares.   Hypertension: blood pressures at home nl.  Tolerating medicine.   Fatty liver nl . No etoh long time;   gerd controlled. 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 cholestyramine    Anemia /hematol hx            Review of Systems   Respiratory: Negative for shortness of breath.    Cardiovascular: Negative for chest pain a      Objective:      Physical Exam   Constitutional: He is oriented to person, place, and time. He appears well-developed and well-nourished.   HENT:   Head: Normocephalic and atraumatic.   Eyes: Conjunctivae and EOM are normal. Pupils are equal, round, and reactive to light.   Neck: Normal range of motion. Neck supple. Carotid bruit is not present. no mass  Cardiovascular: Normal rate and regular rhythm.    Pulmonary/Chest: Effort normal and breath sounds normal.         Neurological: He is alert and oriented to person, place, and time.   Skin: Skin is warm and dry.   Psychiatric: He has a normal mood and affect. His behavior is normal. Judgment and thought content normal.                   Assessment:       1. Type 2 diabetes mellitus    2. Hypertension    3. Gout    4. Hypercholesteremia    5. Fatty liver    ibs  Anemia hx  Mood d/o  Hx gastropar /glp   Plan:   Lab and follow up after in 6 months Nay  Follow-up diabetes clinic as planned    Hypertension associated with diabetes    Iron deficiency anemia, unspecified iron deficiency anemia type    Metabolic  dysfunction-associated steatotic liver disease (MASLD)    Type 2 diabetes mellitus without complication, with long-term current use of insulin  -     Hemoglobin A1C; Future; Expected date: 12/21/2025  -     Lipid Panel; Future; Expected date: 12/21/2025  -     Comprehensive Metabolic Panel; Future; Expected date: 12/21/2025  -     Microalbumin/Creatinine Ratio, Urine; Future; Expected date: 12/21/2025    Gastroparesis    Irritable bowel syndrome with diarrhea

## 2025-07-04 DIAGNOSIS — D64.9 ANEMIA, UNSPECIFIED TYPE: ICD-10-CM

## 2025-07-07 RX ORDER — IRON,CARBONYL/ASCORBIC ACID 100-250 MG
1 TABLET ORAL DAILY
Qty: 90 TABLET | Refills: 3 | Status: SHIPPED | OUTPATIENT
Start: 2025-07-07

## 2025-07-07 NOTE — TELEPHONE ENCOUNTER
Refill Routing Note   Medication(s) are not appropriate for processing by [facility] for the following reason(s):        Outside of protocol    [facility] action(s):  Route               Appointments  past 12m or future 3m with PCP    Date Provider   Last Visit   6/24/2025 Kip Siddiqui MD   Next Visit   12/29/2025 Kip Siddiqui MD   ED visits in past 90 days: 0        Note composed:10:30 AM 07/07/2025

## 2025-08-02 DIAGNOSIS — Z79.4 TYPE 2 DIABETES MELLITUS WITH HYPERGLYCEMIA, WITH LONG-TERM CURRENT USE OF INSULIN: ICD-10-CM

## 2025-08-02 DIAGNOSIS — E11.65 TYPE 2 DIABETES MELLITUS WITH HYPERGLYCEMIA, WITH LONG-TERM CURRENT USE OF INSULIN: ICD-10-CM

## 2025-08-04 RX ORDER — INSULIN GLARGINE 100 [IU]/ML
INJECTION, SOLUTION SUBCUTANEOUS
Qty: 10 ML | Refills: 0 | Status: SHIPPED | OUTPATIENT
Start: 2025-08-04

## 2025-08-30 DIAGNOSIS — Z79.4 TYPE 2 DIABETES MELLITUS WITH HYPERGLYCEMIA, WITH LONG-TERM CURRENT USE OF INSULIN: ICD-10-CM

## 2025-08-30 DIAGNOSIS — E11.65 TYPE 2 DIABETES MELLITUS WITH HYPERGLYCEMIA, WITH LONG-TERM CURRENT USE OF INSULIN: ICD-10-CM

## 2025-09-02 DIAGNOSIS — E11.65 TYPE 2 DIABETES MELLITUS WITH HYPERGLYCEMIA, WITH LONG-TERM CURRENT USE OF INSULIN: ICD-10-CM

## 2025-09-02 DIAGNOSIS — Z79.4 TYPE 2 DIABETES MELLITUS WITH HYPERGLYCEMIA, WITH LONG-TERM CURRENT USE OF INSULIN: ICD-10-CM

## 2025-09-02 RX ORDER — INSULIN GLARGINE 100 [IU]/ML
INJECTION, SOLUTION SUBCUTANEOUS
Qty: 10 ML | Refills: 0 | Status: SHIPPED | OUTPATIENT
Start: 2025-09-02

## 2025-09-03 DIAGNOSIS — I10 HYPERTENSION, UNSPECIFIED TYPE: Chronic | ICD-10-CM

## 2025-09-03 RX ORDER — CARVEDILOL 25 MG/1
25 TABLET ORAL 2 TIMES DAILY WITH MEALS
Qty: 180 TABLET | Refills: 3 | Status: SHIPPED | OUTPATIENT
Start: 2025-09-03

## 2025-09-03 RX ORDER — BLOOD-GLUCOSE TRANSMITTER
EACH MISCELLANEOUS
Qty: 1 EACH | Refills: 3 | Status: SHIPPED | OUTPATIENT
Start: 2025-09-03